# Patient Record
Sex: MALE | Race: WHITE | Employment: OTHER | URBAN - NONMETROPOLITAN AREA
[De-identification: names, ages, dates, MRNs, and addresses within clinical notes are randomized per-mention and may not be internally consistent; named-entity substitution may affect disease eponyms.]

---

## 2017-01-20 RX ORDER — IPRATROPIUM BROMIDE AND ALBUTEROL SULFATE 2.5; .5 MG/3ML; MG/3ML
1 SOLUTION RESPIRATORY (INHALATION) EVERY 6 HOURS PRN
Qty: 360 VIAL | Refills: 11 | Status: SHIPPED | OUTPATIENT
Start: 2017-01-20 | End: 2021-04-20 | Stop reason: SDUPTHER

## 2017-08-30 ENCOUNTER — OFFICE VISIT (OUTPATIENT)
Dept: PULMONOLOGY | Age: 77
End: 2017-08-30
Payer: MEDICARE

## 2017-08-30 VITALS
HEIGHT: 68 IN | SYSTOLIC BLOOD PRESSURE: 102 MMHG | WEIGHT: 199.4 LBS | HEART RATE: 68 BPM | OXYGEN SATURATION: 92 % | BODY MASS INDEX: 30.22 KG/M2 | TEMPERATURE: 98.9 F | DIASTOLIC BLOOD PRESSURE: 62 MMHG

## 2017-08-30 DIAGNOSIS — J43.1 PANLOBULAR EMPHYSEMA (HCC): Primary | ICD-10-CM

## 2017-08-30 PROCEDURE — 99213 OFFICE O/P EST LOW 20 MIN: CPT | Performed by: INTERNAL MEDICINE

## 2017-08-30 ASSESSMENT — ENCOUNTER SYMPTOMS
STRIDOR: 0
WHEEZING: 0
COUGH: 0
VOICE CHANGE: 0
SHORTNESS OF BREATH: 1

## 2018-08-30 ENCOUNTER — OFFICE VISIT (OUTPATIENT)
Dept: PULMONOLOGY | Age: 78
End: 2018-08-30
Payer: MEDICARE

## 2018-08-30 VITALS
HEART RATE: 73 BPM | BODY MASS INDEX: 29.25 KG/M2 | TEMPERATURE: 98.5 F | HEIGHT: 68 IN | SYSTOLIC BLOOD PRESSURE: 116 MMHG | DIASTOLIC BLOOD PRESSURE: 68 MMHG | WEIGHT: 193 LBS | OXYGEN SATURATION: 96 %

## 2018-08-30 DIAGNOSIS — Z87.891 PERSONAL HISTORY OF TOBACCO USE: ICD-10-CM

## 2018-08-30 DIAGNOSIS — Z99.81 CHRONIC RESPIRATORY FAILURE WITH HYPOXIA, ON HOME O2 THERAPY (HCC): ICD-10-CM

## 2018-08-30 DIAGNOSIS — J43.1 PANLOBULAR EMPHYSEMA (HCC): Primary | ICD-10-CM

## 2018-08-30 DIAGNOSIS — J96.11 CHRONIC RESPIRATORY FAILURE WITH HYPOXIA, ON HOME O2 THERAPY (HCC): ICD-10-CM

## 2018-08-30 PROCEDURE — 99214 OFFICE O/P EST MOD 30 MIN: CPT | Performed by: NURSE PRACTITIONER

## 2018-08-30 PROCEDURE — 1123F ACP DISCUSS/DSCN MKR DOCD: CPT | Performed by: NURSE PRACTITIONER

## 2018-08-30 PROCEDURE — 4040F PNEUMOC VAC/ADMIN/RCVD: CPT | Performed by: NURSE PRACTITIONER

## 2018-08-30 PROCEDURE — G8427 DOCREV CUR MEDS BY ELIG CLIN: HCPCS | Performed by: NURSE PRACTITIONER

## 2018-08-30 PROCEDURE — G0296 VISIT TO DETERM LDCT ELIG: HCPCS | Performed by: NURSE PRACTITIONER

## 2018-08-30 PROCEDURE — 3023F SPIROM DOC REV: CPT | Performed by: NURSE PRACTITIONER

## 2018-08-30 PROCEDURE — 1101F PT FALLS ASSESS-DOCD LE1/YR: CPT | Performed by: NURSE PRACTITIONER

## 2018-08-30 PROCEDURE — 1036F TOBACCO NON-USER: CPT | Performed by: NURSE PRACTITIONER

## 2018-08-30 PROCEDURE — G8417 CALC BMI ABV UP PARAM F/U: HCPCS | Performed by: NURSE PRACTITIONER

## 2018-08-30 PROCEDURE — G8926 SPIRO NO PERF OR DOC: HCPCS | Performed by: NURSE PRACTITIONER

## 2018-08-30 RX ORDER — TAMSULOSIN HYDROCHLORIDE 0.4 MG/1
0.4 CAPSULE ORAL DAILY
COMMUNITY

## 2018-08-30 ASSESSMENT — ENCOUNTER SYMPTOMS
DIARRHEA: 0
EYES NEGATIVE: 1
HEMOPTYSIS: 0
SPUTUM PRODUCTION: 1
VOMITING: 0
SHORTNESS OF BREATH: 1
ABDOMINAL PAIN: 0
WHEEZING: 0
COUGH: 1
NAUSEA: 0

## 2018-08-30 NOTE — PROGRESS NOTES
budesonide-formoterol (SYMBICORT) 160-4.5 MCG/ACT AERO Inhale 2 puffs into the lungs 2 times daily.  albuterol (PROVENTIL HFA) 108 (90 BASE) MCG/ACT inhaler Inhale 2 puffs into the lungs every 4 hours as needed.  pravastatin (PRAVACHOL) 80 MG tablet Take 80 mg by mouth daily.  metoprolol (LOPRESSOR) 50 MG tablet Take 50 mg by mouth daily. 1/2 tablet bid      ipratropium (ATROVENT HFA) 17 MCG/ACT inhaler Inhale 2 puffs into the lungs daily.  aspirin 81 MG EC tablet Take 81 mg by mouth daily. No current facility-administered medications for this visit. Lion RIVERA   Review of Systems   Constitutional: Negative for chills, fever, malaise/fatigue and weight loss. HENT: Positive for congestion (in mornings). Eyes: Negative. Respiratory: Positive for cough, sputum production (white) and shortness of breath. Negative for hemoptysis and wheezing. Cardiovascular: Negative for chest pain, palpitations and leg swelling. Gastrointestinal: Negative for abdominal pain, diarrhea, nausea and vomiting. Genitourinary: Negative. Musculoskeletal: Negative. Skin: Negative. Neurological: Negative. Endo/Heme/Allergies: Does not bruise/bleed easily. Psychiatric/Behavioral: Negative for depression and suicidal ideas. Physical exam   /68 (Site: Left Arm, Position: Sitting)   Pulse 73   Temp 98.5 °F (36.9 °C) (Tympanic)   Ht 5' 8\" (1.727 m)   Wt 193 lb (87.5 kg)   SpO2 96%   BMI 29.35 kg/m²        Physical Exam   Constitutional: He is oriented to person, place, and time and well-developed, well-nourished, and in no distress. Moderately nourished, moderately built, chronically ill appearing, in no distress on portable O2    HENT:   Head: Normocephalic and atraumatic. Mouth/Throat: Oropharynx is clear and moist.   Eyes: Pupils are equal, round, and reactive to light. Neck: Neck supple. No JVD present. No tracheal deviation present.    Cardiovascular: lead to a diagnosis of cancer. Usually further imaging can resolve most false-positive results; however, some patients may require invasive procedures. Over diagnosis risk - 10% to 12% of screen-detected lung cancer cases are over diagnosed--that is, the cancer would not have been detected in the patient's lifetime without the screening. Need for follow up screens annually to continue lung cancer screening effectiveness     Risks associated with radiation from annual LDCT- Radiation exposure is about the same as for a mammogram, which is about 1/3 of the annual background radiation exposure from everyday life. Starting screening at age 54 is not likely to increase cancer risk from radiation exposure. Patients with comorbidities resulting in life expectancy of < 10 years, or that would preclude treatment of an abnormality identified on CT, should not be screened due to lack of benefit.     To obtain maximal benefit from this screening, smoking cessation and long-term abstinence from smoking is critical

## 2018-10-24 ENCOUNTER — HOSPITAL ENCOUNTER (OUTPATIENT)
Dept: CT IMAGING | Age: 78
Discharge: HOME | End: 2018-10-24
Payer: MEDICARE

## 2018-10-24 ENCOUNTER — HOSPITAL ENCOUNTER (OUTPATIENT)
Dept: PULMONOLOGY | Age: 78
Discharge: HOME | End: 2018-10-24
Payer: MEDICARE

## 2018-10-24 DIAGNOSIS — Z87.891 PERSONAL HISTORY OF TOBACCO USE: ICD-10-CM

## 2018-10-24 DIAGNOSIS — Z99.81 CHRONIC RESPIRATORY FAILURE WITH HYPOXIA, ON HOME O2 THERAPY (HCC): ICD-10-CM

## 2018-10-24 DIAGNOSIS — J96.11 CHRONIC RESPIRATORY FAILURE WITH HYPOXIA, ON HOME O2 THERAPY (HCC): ICD-10-CM

## 2018-10-24 DIAGNOSIS — J43.1 PANLOBULAR EMPHYSEMA (HCC): ICD-10-CM

## 2018-10-24 PROCEDURE — 94060 EVALUATION OF WHEEZING: CPT

## 2018-10-24 PROCEDURE — G0297 LDCT FOR LUNG CA SCREEN: HCPCS

## 2018-10-24 PROCEDURE — 2709999900 HC NON-CHARGEABLE SUPPLY

## 2018-10-30 ENCOUNTER — OFFICE VISIT (OUTPATIENT)
Dept: PULMONOLOGY | Age: 78
End: 2018-10-30
Payer: MEDICARE

## 2018-10-30 VITALS
WEIGHT: 195 LBS | HEART RATE: 70 BPM | OXYGEN SATURATION: 90 % | SYSTOLIC BLOOD PRESSURE: 130 MMHG | DIASTOLIC BLOOD PRESSURE: 70 MMHG | RESPIRATION RATE: 20 BRPM | TEMPERATURE: 97.7 F | HEIGHT: 68 IN | BODY MASS INDEX: 29.55 KG/M2

## 2018-10-30 DIAGNOSIS — J43.1 PANLOBULAR EMPHYSEMA (HCC): Primary | ICD-10-CM

## 2018-10-30 DIAGNOSIS — J96.11 CHRONIC RESPIRATORY FAILURE WITH HYPOXIA, ON HOME O2 THERAPY (HCC): ICD-10-CM

## 2018-10-30 DIAGNOSIS — Z99.81 CHRONIC RESPIRATORY FAILURE WITH HYPOXIA, ON HOME O2 THERAPY (HCC): ICD-10-CM

## 2018-10-30 DIAGNOSIS — Z87.891 PERSONAL HISTORY OF TOBACCO USE: ICD-10-CM

## 2018-10-30 PROCEDURE — 1036F TOBACCO NON-USER: CPT | Performed by: NURSE PRACTITIONER

## 2018-10-30 PROCEDURE — 99214 OFFICE O/P EST MOD 30 MIN: CPT | Performed by: NURSE PRACTITIONER

## 2018-10-30 PROCEDURE — 3023F SPIROM DOC REV: CPT | Performed by: NURSE PRACTITIONER

## 2018-10-30 PROCEDURE — G8926 SPIRO NO PERF OR DOC: HCPCS | Performed by: NURSE PRACTITIONER

## 2018-10-30 PROCEDURE — G8484 FLU IMMUNIZE NO ADMIN: HCPCS | Performed by: NURSE PRACTITIONER

## 2018-10-30 PROCEDURE — 1101F PT FALLS ASSESS-DOCD LE1/YR: CPT | Performed by: NURSE PRACTITIONER

## 2018-10-30 PROCEDURE — G8417 CALC BMI ABV UP PARAM F/U: HCPCS | Performed by: NURSE PRACTITIONER

## 2018-10-30 PROCEDURE — G8427 DOCREV CUR MEDS BY ELIG CLIN: HCPCS | Performed by: NURSE PRACTITIONER

## 2018-10-30 PROCEDURE — 4040F PNEUMOC VAC/ADMIN/RCVD: CPT | Performed by: NURSE PRACTITIONER

## 2018-10-30 PROCEDURE — 1123F ACP DISCUSS/DSCN MKR DOCD: CPT | Performed by: NURSE PRACTITIONER

## 2018-10-30 ASSESSMENT — ENCOUNTER SYMPTOMS
EYES NEGATIVE: 1
NAUSEA: 0
ALLERGIC/IMMUNOLOGIC NEGATIVE: 1
CHEST TIGHTNESS: 0
COUGH: 1
DIARRHEA: 0
STRIDOR: 0
SHORTNESS OF BREATH: 0
BACK PAIN: 0
WHEEZING: 1

## 2018-10-30 NOTE — PROGRESS NOTES
inherent in voice recognition technology. **       Final report electronically signed by Dr. Kavitha Hinkle on 10/24/2018 10:47 AM       Assessment      Diagnosis Orders   1. Panlobular emphysema ([address]     2. Personal history of tobacco use     3. Chronic respiratory failure with hypoxia, on home O2 therapy [facility])           Plan   Test results discussed with patient  Feeling good, symptoms well controlled  Continue current therapies  Annual CT lung screening (order at next appt)    I have seen and evaluated this patient on 10/30/18. This patient has been compliant and has benefited from using this oxygen as ordered.     Will see Ayde Melgar back in: 6 months    Electronically signed by CASEY Funez CNP on 10/30/2018 at 9:26 AM  10/30/2018

## 2019-05-06 ENCOUNTER — OFFICE VISIT (OUTPATIENT)
Dept: FAMILY MEDICINE CLINIC | Age: 79
End: 2019-05-06

## 2019-05-06 VITALS
BODY MASS INDEX: 29.1 KG/M2 | HEART RATE: 72 BPM | DIASTOLIC BLOOD PRESSURE: 74 MMHG | RESPIRATION RATE: 14 BRPM | SYSTOLIC BLOOD PRESSURE: 132 MMHG | WEIGHT: 192 LBS | HEIGHT: 68 IN

## 2019-05-06 DIAGNOSIS — J20.9 ACUTE BRONCHITIS, UNSPECIFIED ORGANISM: Primary | ICD-10-CM

## 2019-05-06 PROCEDURE — G8427 DOCREV CUR MEDS BY ELIG CLIN: HCPCS | Performed by: FAMILY MEDICINE

## 2019-05-06 PROCEDURE — 1123F ACP DISCUSS/DSCN MKR DOCD: CPT | Performed by: FAMILY MEDICINE

## 2019-05-06 PROCEDURE — 4040F PNEUMOC VAC/ADMIN/RCVD: CPT | Performed by: FAMILY MEDICINE

## 2019-05-06 PROCEDURE — 99213 OFFICE O/P EST LOW 20 MIN: CPT | Performed by: FAMILY MEDICINE

## 2019-05-06 PROCEDURE — G8417 CALC BMI ABV UP PARAM F/U: HCPCS | Performed by: FAMILY MEDICINE

## 2019-05-06 PROCEDURE — 1036F TOBACCO NON-USER: CPT | Performed by: FAMILY MEDICINE

## 2019-05-06 RX ORDER — GUAIFENESIN AND CODEINE PHOSPHATE 100; 10 MG/5ML; MG/5ML
10 SOLUTION ORAL 4 TIMES DAILY PRN
Qty: 150 ML | Refills: 0 | Status: SHIPPED | OUTPATIENT
Start: 2019-05-06 | End: 2019-05-11

## 2019-05-06 RX ORDER — AZITHROMYCIN 250 MG/1
TABLET, FILM COATED ORAL
Qty: 1 PACKET | Refills: 0 | Status: SHIPPED | OUTPATIENT
Start: 2019-05-06 | End: 2020-02-12 | Stop reason: SDUPTHER

## 2019-05-06 RX ORDER — PREDNISONE 20 MG/1
20 TABLET ORAL DAILY
Qty: 10 TABLET | Refills: 0 | Status: SHIPPED | OUTPATIENT
Start: 2019-05-06 | End: 2020-02-12 | Stop reason: SDUPTHER

## 2019-05-06 ASSESSMENT — ENCOUNTER SYMPTOMS
COUGH: 1
CHEST TIGHTNESS: 1
NAUSEA: 1
SHORTNESS OF BREATH: 1
RHINORRHEA: 1
SINUS PRESSURE: 0
CONSTIPATION: 0
DIARRHEA: 0
WHEEZING: 1
SORE THROAT: 0

## 2019-05-06 NOTE — PROGRESS NOTES
Subjective:      Patient ID: Shanta Peterson is a 70 y.o. male. HPI  1. He has had a cold the past week  2. He used some mucinex without help   Review of Systems   Constitutional: Positive for fatigue. Negative for chills and fever. HENT: Positive for congestion, postnasal drip and rhinorrhea. Negative for ear pain, sinus pressure and sore throat. Eyes: Negative for visual disturbance. Respiratory: Positive for cough, chest tightness, shortness of breath and wheezing. Cardiovascular: Negative for chest pain. Gastrointestinal: Positive for nausea. Negative for constipation and diarrhea. Genitourinary: Negative. Musculoskeletal: Positive for arthralgias and myalgias. Skin: Negative for rash. Neurological: Negative for headaches. The patient's medications, allergies, past medical problems, surgical, social, and family histories were reviewed and updated as needed. Objective:   Physical Exam   Constitutional: He is oriented to person, place, and time. He appears well-developed and well-nourished. No distress. HENT:   Head: Normocephalic and atraumatic. Right Ear: External ear normal.   Left Ear: External ear normal.   Nose: Nose normal.   Mouth/Throat: Oropharynx is clear and moist. No oropharyngeal exudate. Eyes: Conjunctivae are normal. No scleral icterus. Neck: No tracheal deviation present. No thyromegaly present. Cardiovascular: Normal rate, regular rhythm and normal heart sounds. Pulmonary/Chest: Effort normal. No respiratory distress. He has wheezes. He has no rales. Musculoskeletal: He exhibits no edema. Lymphadenopathy:     He has no cervical adenopathy. Neurological: He is alert and oriented to person, place, and time. Skin: Skin is warm and dry. Psychiatric: He has a normal mood and affect. His behavior is normal.   Blood pressure 132/74, pulse 72, resp. rate 14, height 5' 8\" (1.727 m), weight 192 lb (87.1 kg). Assessment:       Diagnosis Orders   1. Acute bronchitis, unspecified organism  guaiFENesin-codeine (CHERATUSSIN AC) 100-10 MG/5ML syrup    predniSONE (DELTASONE) 20 MG tablet    azithromycin (ZITHROMAX) 250 MG tablet           Plan:      See me later this week and call sooner if needed        Arabella Ladd MD

## 2019-05-09 ENCOUNTER — OFFICE VISIT (OUTPATIENT)
Dept: FAMILY MEDICINE CLINIC | Age: 79
End: 2019-05-09

## 2019-05-09 VITALS
BODY MASS INDEX: 28.83 KG/M2 | HEIGHT: 68 IN | DIASTOLIC BLOOD PRESSURE: 68 MMHG | HEART RATE: 74 BPM | SYSTOLIC BLOOD PRESSURE: 130 MMHG | RESPIRATION RATE: 14 BRPM | WEIGHT: 190.25 LBS

## 2019-05-09 DIAGNOSIS — J20.9 ACUTE BRONCHITIS, UNSPECIFIED ORGANISM: Primary | ICD-10-CM

## 2019-05-09 PROCEDURE — G8427 DOCREV CUR MEDS BY ELIG CLIN: HCPCS | Performed by: FAMILY MEDICINE

## 2019-05-09 PROCEDURE — 1036F TOBACCO NON-USER: CPT | Performed by: FAMILY MEDICINE

## 2019-05-09 PROCEDURE — 99213 OFFICE O/P EST LOW 20 MIN: CPT | Performed by: FAMILY MEDICINE

## 2019-05-09 PROCEDURE — 1123F ACP DISCUSS/DSCN MKR DOCD: CPT | Performed by: FAMILY MEDICINE

## 2019-05-09 PROCEDURE — G8417 CALC BMI ABV UP PARAM F/U: HCPCS | Performed by: FAMILY MEDICINE

## 2019-05-09 PROCEDURE — 4040F PNEUMOC VAC/ADMIN/RCVD: CPT | Performed by: FAMILY MEDICINE

## 2019-05-09 ASSESSMENT — ENCOUNTER SYMPTOMS
SHORTNESS OF BREATH: 1
WHEEZING: 1
COUGH: 1
SINUS PRESSURE: 0
CONSTIPATION: 0

## 2019-05-09 NOTE — PROGRESS NOTES
Subjective:      Patient ID: Carly Clarke is a 70 y.o. male. HPI   1. He states feeling better but not to baseline  2. No side effects and uses the albuteral qid  Review of Systems   Constitutional: Negative for chills and fever. HENT: Negative for sinus pressure. Eyes: Negative for visual disturbance. Respiratory: Positive for cough, shortness of breath and wheezing. Cardiovascular: Negative for chest pain. Gastrointestinal: Negative for constipation. Genitourinary: Negative. Musculoskeletal: Negative for arthralgias. Skin: Negative for rash. Neurological: Negative for headaches. The patient's medications, allergies, past medical problems, surgical, social, and family histories were reviewed and updated as needed. Objective:   Physical Exam   Constitutional: He is oriented to person, place, and time. He appears well-developed and well-nourished. No distress. HENT:   Head: Normocephalic and atraumatic. Eyes: Conjunctivae are normal. No scleral icterus. Neck: No tracheal deviation present. Cardiovascular: Normal rate, regular rhythm and normal heart sounds. Pulmonary/Chest: Effort normal. He has no decreased breath sounds. He has no wheezes. He has rhonchi. He has no rales. Musculoskeletal: He exhibits no edema. Neurological: He is alert and oriented to person, place, and time. Skin: Skin is warm and dry. Psychiatric: He has a normal mood and affect. His behavior is normal.   Blood pressure 130/68, pulse 74, resp. rate 14, height 5' 8\" (1.727 m), weight 190 lb 4 oz (86.3 kg). Assessment:       Diagnosis Orders   1.  Acute bronchitis, unspecified organism             Plan:      See me in 6 months        Jailene Manriquez MD

## 2019-05-20 ENCOUNTER — OFFICE VISIT (OUTPATIENT)
Dept: PULMONOLOGY | Age: 79
End: 2019-05-20
Payer: MEDICARE

## 2019-05-20 VITALS
DIASTOLIC BLOOD PRESSURE: 66 MMHG | TEMPERATURE: 98.7 F | SYSTOLIC BLOOD PRESSURE: 120 MMHG | WEIGHT: 191.8 LBS | HEIGHT: 68 IN | BODY MASS INDEX: 29.07 KG/M2 | HEART RATE: 66 BPM | OXYGEN SATURATION: 92 %

## 2019-05-20 DIAGNOSIS — J96.11 CHRONIC RESPIRATORY FAILURE WITH HYPOXIA, ON HOME O2 THERAPY (HCC): ICD-10-CM

## 2019-05-20 DIAGNOSIS — Z99.81 CHRONIC RESPIRATORY FAILURE WITH HYPOXIA, ON HOME O2 THERAPY (HCC): ICD-10-CM

## 2019-05-20 DIAGNOSIS — Z87.891 PERSONAL HISTORY OF TOBACCO USE: ICD-10-CM

## 2019-05-20 DIAGNOSIS — J43.1 PANLOBULAR EMPHYSEMA (HCC): Primary | ICD-10-CM

## 2019-05-20 DIAGNOSIS — J44.9 STAGE 3 SEVERE COPD BY GOLD CLASSIFICATION (HCC): ICD-10-CM

## 2019-05-20 PROCEDURE — 99214 OFFICE O/P EST MOD 30 MIN: CPT | Performed by: NURSE PRACTITIONER

## 2019-05-20 PROCEDURE — G8417 CALC BMI ABV UP PARAM F/U: HCPCS | Performed by: NURSE PRACTITIONER

## 2019-05-20 PROCEDURE — G0296 VISIT TO DETERM LDCT ELIG: HCPCS | Performed by: NURSE PRACTITIONER

## 2019-05-20 PROCEDURE — 1123F ACP DISCUSS/DSCN MKR DOCD: CPT | Performed by: NURSE PRACTITIONER

## 2019-05-20 PROCEDURE — G8427 DOCREV CUR MEDS BY ELIG CLIN: HCPCS | Performed by: NURSE PRACTITIONER

## 2019-05-20 PROCEDURE — 1036F TOBACCO NON-USER: CPT | Performed by: NURSE PRACTITIONER

## 2019-05-20 PROCEDURE — G8926 SPIRO NO PERF OR DOC: HCPCS | Performed by: NURSE PRACTITIONER

## 2019-05-20 PROCEDURE — 3023F SPIROM DOC REV: CPT | Performed by: NURSE PRACTITIONER

## 2019-05-20 PROCEDURE — 4040F PNEUMOC VAC/ADMIN/RCVD: CPT | Performed by: NURSE PRACTITIONER

## 2019-05-20 ASSESSMENT — ENCOUNTER SYMPTOMS
WHEEZING: 0
EYES NEGATIVE: 1
APNEA: 0
COUGH: 1
NAUSEA: 0
VOMITING: 0
DIARRHEA: 0
ABDOMINAL PAIN: 0
CHEST TIGHTNESS: 0
SHORTNESS OF BREATH: 1

## 2019-05-20 NOTE — PROGRESS NOTES
[facility]     Patient: Enola Opitz, 70 y.o.   : 1940    Pt of Dr. Guy Rodrigez   Patient presents with    Follow-up     Emphysema 6 mo f/u No testing        HPI  Ashely Metcalf is here for 6 month follow up for emphysema   On 3LPM continuous O2, presents today on  POC   LDCT completed 10/2018- lung RADS 2,   Known exposures to absestos in [employer] and  while working at [employer] here in [place], [place]  Currently on [facility] [employer] [address] 4 times daily, Symbicort 160 BID- no issues with his meds  Has Atrovent for PRN use- using rarely   Sleeping good. UTD on vaccinations  Quit smoking in   Last spirometry completed 10/2018: FEV1 34%  , no response to BD - severe COPD    Recent bronchitis, last week placed on steroid and ATBx per PCP- states doing much better, still coughing but now clear. SOB back to baseline. Never had to go up on oxygen. Has not been hospitalized for about 3 years. He inquires today about options outside of his meds for COPD, has questions about stem cell transplant.    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 HENT:   Mouth/Throat: No oropharyngeal exudate. Eyes: Conjunctivae are normal. Right eye exhibits no discharge. No scleral icterus. Neck: Neck supple. No JVD present. Cardiovascular: Normal rate and regular rhythm. Exam reveals no friction rub. No murmur heard. Pulmonary/Chest: Effort normal and breath sounds normal. No respiratory distress. He has no wheezes. He has no rales. He exhibits no tenderness. Abdominal: Soft. Musculoskeletal: He exhibits no edema or tenderness. Lymphadenopathy:     He has no cervical adenopathy. Neurological: He is alert and oriented to person, place, and time. Skin: Skin is warm and dry. Capillary refill takes less than 2 seconds. Psychiatric: He has a normal mood and affect. His behavior is normal. Judgment and thought content normal.   Nursing note and vitals reviewed. Test results   Lung Nodule Screening     [x] Qualifies    []Does not qualify   [] Declined    [x] Completed- 10/2018     Ref. Range 10/9/2015 14:20   Ph Latest Ref Range: 7.35 - 7.45  7.35   PCO2 Latest Ref Range: 35 - 45 mmHg 70 (HH)   pO2 Latest Ref Range: 80 - 105 mmHg 56 (L)   HCO3 Latest Ref Range: 22 - 26 mmol/L 38.6 (H)   Arnav Test Unknown [employer] Excess, Arterial Latest Ref Range: -2 - 3 mmol/L 10 (H)   O2 Sat, Arterial Latest Ref Range: 95 - 98 % 87 (L)   Drawn By Unknown ldavis   DEVICE Unknown Nasal Cannula   Site Unknown L Radial   LITERS PER MINUTE Unknown 4.00     Assessment      Diagnosis Orders   1. Panlobular emphysema ([address]  Blood Gas, Arterial   2. Personal history of tobacco use  Blood Gas, Arterial    LA VISIT TO DISCUSS LUNG CA SCREEN W LDCT    CT Lung Screening (Annual)    CT Lung Screening (Annual)   3. Chronic respiratory failure with hypoxia, on home O2 therapy ([facility])  Blood Gas, Arterial   4.  Stage 3 severe COPD by GOLD classification ([address]  Blood Gas, Arterial         Compensated hypercapnia on ABG in 2015  Plan   -Discussed stem cell transplant, not enough data for this to be a recommended treatment, not FDA approved, and costly but is his choice if he wishes to pursue this.   -continue Symbicort and Duonebs  -ABG to assess for hypercapnia- ? Candidate for home BiPAP due to severity of his COPD- will call with results   -offered referral to tertiary center for LVRDS evaluation- declines at this time  -encouraged to stay active.   -benefiting from use of his oxygen as prescribed at 3LPM continuous   -annual LDCT screening (due Oct 2019, order placed)     Will see Monika Dan in: 6 months    Electronically signed by CASEY Bolanos CNP on 5/20/2019 at 12:58 PM   Low Dose CT (LDCT) Lung Screening criteria met   Age 55-77   Pack year smoking >30   Still smoking or less than 15 year since quit   No sign or symptoms of lung cancer   > 11 months since last LDCT     Risks and benefits of lung cancer screening with LDCT scans discussed:    Significance of positive screen - False-positive LDCT results often occur. 95% of all positive results do not lead to a diagnosis of cancer. Usually further imaging can resolve most false-positive results; however, some patients may require invasive procedures. Over diagnosis risk - 10% to 12% of screen-detected lung cancer cases are over diagnosed--that is, the cancer would not have been detected in the patient's lifetime without the screening. Need for follow up screens annually to continue lung cancer screening effectiveness     Risks associated with radiation from annual LDCT- Radiation exposure is about the same as for a mammogram, which is about 1/3 of the annual background radiation exposure from everyday life. Starting screening at age 54 is not likely to increase cancer risk from radiation exposure. Patients with comorbidities resulting in life expectancy of < 10 years, or that would preclude treatment of an abnormality identified on CT, should not be screened due to lack of benefit.     To obtain maximal benefit from this screening, smoking cessation and long-term abstinence from smoking is critical

## 2019-05-21 ENCOUNTER — HOSPITAL ENCOUNTER (OUTPATIENT)
Age: 79
Discharge: HOME | End: 2019-05-21
Payer: MEDICARE

## 2019-05-21 LAB
BASE EXCESS (CALCULATED): 6.2 MMOL/L (ref -2.5–2.5)
COLLECTED BY:: ABNORMAL
DEVICE: ABNORMAL
HCO3: 33 MMOL/L (ref 23–28)
IFIO2: 3
O2 SATURATION: 90 %
PCO2: 57 MMHG (ref 35–45)
PH BLOOD GAS: 7.38 (ref 7.35–7.45)
PO2: 61 MMHG (ref 71–104)
SOURCE, BLOOD GAS: ABNORMAL

## 2019-05-21 PROCEDURE — 82803 BLOOD GASES ANY COMBINATION: CPT

## 2019-05-21 PROCEDURE — 36600 WITHDRAWAL OF ARTERIAL BLOOD: CPT

## 2019-06-03 ENCOUNTER — TELEPHONE (OUTPATIENT)
Dept: PULMONOLOGY | Age: 79
End: 2019-06-03

## 2019-06-03 NOTE — TELEPHONE ENCOUNTER
Left VM to give results of ABG. He does show elevated CO2 (hypercapnia), want to offer him home Non Invasive Ventilator with face mask due to his severe COPD and chronic respiratory failure with multiple exacerbations. Addendum:\  Patient called back, he is not interested in a face mask. States he has tried one of these in past and could not tolerate. Discussed Life 2000, he is interested in seeing this product and trying it out in the office. Can you please contact Ilya Landa, RRT Life 2000 rep to get some dates/times she is available and then schedule Mr Jorge Haley to come In for evaluation.     Electronically signed by CASEY Pearl CNP on 6/3/2019 at 1:31 PM

## 2019-06-20 ENCOUNTER — OFFICE VISIT (OUTPATIENT)
Dept: PULMONOLOGY | Age: 79
End: 2019-06-20
Payer: MEDICARE

## 2019-06-20 VITALS
HEIGHT: 68 IN | WEIGHT: 187.8 LBS | HEART RATE: 59 BPM | BODY MASS INDEX: 28.46 KG/M2 | OXYGEN SATURATION: 90 % | SYSTOLIC BLOOD PRESSURE: 128 MMHG | TEMPERATURE: 98.4 F | DIASTOLIC BLOOD PRESSURE: 68 MMHG

## 2019-06-20 DIAGNOSIS — J44.9 STAGE 3 SEVERE COPD BY GOLD CLASSIFICATION (HCC): ICD-10-CM

## 2019-06-20 DIAGNOSIS — J43.1 PANLOBULAR EMPHYSEMA (HCC): Primary | ICD-10-CM

## 2019-06-20 DIAGNOSIS — Z99.81 CHRONIC RESPIRATORY FAILURE WITH HYPOXIA, ON HOME O2 THERAPY (HCC): ICD-10-CM

## 2019-06-20 DIAGNOSIS — J96.11 CHRONIC RESPIRATORY FAILURE WITH HYPOXIA, ON HOME O2 THERAPY (HCC): ICD-10-CM

## 2019-06-20 PROCEDURE — G8417 CALC BMI ABV UP PARAM F/U: HCPCS | Performed by: NURSE PRACTITIONER

## 2019-06-20 PROCEDURE — G8926 SPIRO NO PERF OR DOC: HCPCS | Performed by: NURSE PRACTITIONER

## 2019-06-20 PROCEDURE — 94618 PULMONARY STRESS TESTING: CPT | Performed by: NURSE PRACTITIONER

## 2019-06-20 PROCEDURE — G8427 DOCREV CUR MEDS BY ELIG CLIN: HCPCS | Performed by: NURSE PRACTITIONER

## 2019-06-20 PROCEDURE — 1036F TOBACCO NON-USER: CPT | Performed by: NURSE PRACTITIONER

## 2019-06-20 PROCEDURE — 99213 OFFICE O/P EST LOW 20 MIN: CPT | Performed by: NURSE PRACTITIONER

## 2019-06-20 PROCEDURE — 1123F ACP DISCUSS/DSCN MKR DOCD: CPT | Performed by: NURSE PRACTITIONER

## 2019-06-20 PROCEDURE — 3023F SPIROM DOC REV: CPT | Performed by: NURSE PRACTITIONER

## 2019-06-20 PROCEDURE — 4040F PNEUMOC VAC/ADMIN/RCVD: CPT | Performed by: NURSE PRACTITIONER

## 2019-06-20 ASSESSMENT — ENCOUNTER SYMPTOMS
NAUSEA: 0
RHINORRHEA: 1
VOMITING: 0
SHORTNESS OF BREATH: 1
WHEEZING: 1
EYES NEGATIVE: 1
COUGH: 0
APNEA: 0
DIARRHEA: 0
ABDOMINAL PAIN: 0

## 2019-06-20 NOTE — PROGRESS NOTES
[facility]     Patient: Marene Spatz, 70 y.o.   : 1940    Previous Pt of Dr. Damon Ashley   Patient presents with    Follow-up     Hypercapnia follow up with Test on Life 2000 with Terra STANTON  Ludwin Plummer is here for follow up for evaluation for NIV. Spoke with him over phone and explained Trilogy or Astral device and he declined to have any mask over his face, was willing to come in today for a Life 2000 evaluation   Has been using 3LPM continuous flow O2 ATC- his last 6 min walk in office was in   Gets his Oxygen from [address] smoking in   Last spirometry completed 10/2018: FEV1 34%  , no response to BD - severe COPD  ABG completed 19:  PH 7.38, PCO2 57, PO2 61 on 3LPM NC with POC device   Currently using Symbicort 160/4.5 mcg BID  Duoneb PRN   Atrovent HFA 4 times daily   Very active 70year old, was out all day yesterday fishing. Chronic SOB with activity and daily cough- unchanged. C/o recent sinus drainage and congestion   Has not completed Pulm rehab- declined referral     No recent ER visits, was treated by PCP with acute bronchitis with guifenesin-codeine, prednisone, and zithromax 2019.   Past Medical hx   PMH:  Past Medical History:   Diagnosis Date    Adenomatous polyp 2015    Asthma     CAD (coronary artery disease)     COPD (chronic obstructive pulmonary disease) (HCC)     Hyperlipidemia     Hypertension     Transitional cell carcinoma of bladder [address] [address] 10/2013     SURGICAL HISTORY:  Past Surgical History:   Procedure Laterality Date    CAROTID ENDARTERECTOMY Right     COLONOSCOPY      CORONARY ANGIOPLASTY WITH STENT PLACEMENT       SOCIAL HISTORY:  Social History     Tobacco Use    Smoking status: Former Smoker     Packs/day: 1.00     Years: 45.00     Pack years: 45.00     Types: Cigarettes     Last attempt to quit: 3/30/2007     Years since quittin.2    Smokeless tobacco: Never Used   Substance Use Topics    Alcohol use: Yes     Comment: couple times per week    Drug use: No     ALLERGIES:No Known Allergies  FAMILY HISTORY:No family history on file. CURRENT MEDICATIONS:  Current Outpatient Medications   Medication Sig Dispense Refill    tamsulosin (FLOMAX) 0.4 MG capsule Take 0.4 mg by mouth daily      ipratropium-albuterol (DUONEB) 0.5-2.5 (3) MG/3ML SOLN nebulizer solution Take 3 mLs by nebulization every 6 hours as needed for Shortness of Breath 360 vial 11    budesonide-formoterol (SYMBICORT) 160-4.5 MCG/ACT AERO Inhale 2 puffs into the lungs 2 times daily.  albuterol (PROVENTIL HFA) 108 (90 BASE) MCG/ACT inhaler Inhale 2 puffs into the lungs every 4 hours as needed.  pravastatin (PRAVACHOL) 80 MG tablet Take 80 mg by mouth daily.  metoprolol (LOPRESSOR) 50 MG tablet Take 50 mg by mouth daily. 1/2 tablet bid      ipratropium (ATROVENT HFA) 17 MCG/ACT inhaler Inhale 2 puffs into the lungs daily.  aspirin 81 MG EC tablet Take 81 mg by mouth daily. No current facility-administered medications for this visit. Sagar RIVERA   Review of Systems   Constitutional: Negative for chills, fever and unexpected weight change. HENT: Positive for congestion, postnasal drip and rhinorrhea. Eyes: Negative. Respiratory: Positive for shortness of breath and wheezing. Negative for apnea and cough. Cardiovascular: Negative for chest pain, palpitations and leg swelling. Gastrointestinal: Negative for abdominal pain, diarrhea, nausea and vomiting. Genitourinary: Negative. Musculoskeletal: Negative. Skin: Negative. Neurological: Negative. Hematological: Does not bruise/bleed easily. Psychiatric/Behavioral: Negative for suicidal ideas.         Physical exam   /68 (Site: Left Upper Arm, Position: Sitting, Cuff Size: Medium Adult)   Pulse 59   Temp 98.4 °F (36.9 °C) (Tympanic)   Ht 5' 8\" (1.727 m)   Wt 187 lb 12.8 oz (85.2 kg)   SpO2 90% Comment: on RA  BMI 28.55 kg/m²        Physical Exam   Constitutional: He is oriented to person, place, and time. He appears well-developed and well-nourished. No distress. Nasal cannula in place. HENT:   Mouth/Throat: No oropharyngeal exudate. Eyes: Conjunctivae are normal. Right eye exhibits no discharge. No scleral icterus. Neck: Neck supple. No JVD present. Cardiovascular: Normal rate and regular rhythm. Exam reveals no friction rub. No murmur heard. Pulmonary/Chest: Effort normal. No respiratory distress. He has decreased breath sounds. He has no wheezes. He has no rales. He exhibits no tenderness. Abdominal: Soft. Musculoskeletal: He exhibits no edema or tenderness. Lymphadenopathy:     He has no cervical adenopathy. Neurological: He is alert and oriented to person, place, and time. Skin: Skin is warm and dry. Capillary refill takes less than 2 seconds. Psychiatric: He has a normal mood and affect. His behavior is normal. Judgment and thought content normal.   Nursing note and vitals reviewed. Test results   Lung Nodule Screening     [x] Qualifies    []Does not qualify   [] Declined    [x] Completed                Results for Anup Shah (MRN [id number]) as of 6/20/2019 13:43   Ref. Range 5/21/2019 09:56   pH, Blood Gas Latest Ref Range: 7.35 - 7.45  7.38   PCO2 Latest Ref Range: 35 - 45 mmhg 57 (H)   pO2 Latest Ref Range: 71 - 104 mmhg 61 (L)   HCO3 Latest Ref Range: 23 - 28 mmol/l 33 (H)   Base Excess (Calculated) Latest Ref Range: -2.5 - 2.5 mmol/l 6.2 (H)   O2 Sat Latest Units: % 90   IFIO2 Unknown 3   DEVICE Unknown Cannula   COLLECTED BY: Unknown [id number]           A 6 min walk was done on the Life 2000 in the office today as above, he did not feel any difference on this device vs. His regular oxygen. Notably did have decreased HR with Life 200 than with reg oxygen.         Six Minute Walk Test  Paolo Jane 1940    Six minute walk test done in my office today by my medical assistant Rajesh Strong LPN. Gerard's oxygen saturation at rest on room air was fluctuating between 88-90%. The walk was started on room air  His oxygen saturation dropped in 1 min of walking  to 87% on room air with exertion. He walked a total of 216 feet without oxygen     The six minute walk test was repeated with oxygen supplementation. Oxygen supplimentation was started with 1LPM via nasal cannula and titrated to 3LPM via nasal cannula. At the end of the test Eliceo Jane's oxygen saturation remained at 91%. He stopped the walk with 1:30 sec left due to leg fatigue. He ambulated a total of 864 ft with oxygen  . He is mobile in the home and requires oxygen as outlined above. Assessment      Diagnosis Orders   1. Panlobular emphysema (HCC)  6 Minute Walk Test    6 Minute Walk Test   2. Chronic respiratory failure with hypoxia, on home O2 therapy (HCC)  6 Minute Walk Test    6 Minute Walk Test   3. Stage 3 severe COPD by GOLD classification ([facility])  6 Minute Walk Test    6 Minute Walk Test         Plan   Catia Espino tolerated the Life 2000 NIV well in the office with 6 min walk. His baseline O2 requirement is 3LPM continuous  He would like to look into the cost and think about it some more before purchasing.     He should continue his current nebulizer therapies and keep his scheduled appt with us in November   Can can call anytime if he wishes to proceed with ordering the Life 2000 or looking into other NIV options     Electronically signed by CASEY Smith CNP on 6/20/2019 at 3:47 PM

## 2019-11-15 ENCOUNTER — HOSPITAL ENCOUNTER (OUTPATIENT)
Dept: CT IMAGING | Age: 79
Discharge: HOME | End: 2019-11-15
Payer: MEDICARE

## 2019-11-15 DIAGNOSIS — Z87.891 PERSONAL HISTORY OF TOBACCO USE: ICD-10-CM

## 2019-11-15 PROCEDURE — G0297 LDCT FOR LUNG CA SCREEN: HCPCS

## 2019-11-20 ENCOUNTER — OFFICE VISIT (OUTPATIENT)
Dept: PULMONOLOGY | Age: 79
End: 2019-11-20
Payer: MEDICARE

## 2019-11-20 VITALS
HEIGHT: 68 IN | BODY MASS INDEX: 28.73 KG/M2 | DIASTOLIC BLOOD PRESSURE: 82 MMHG | TEMPERATURE: 99.5 F | SYSTOLIC BLOOD PRESSURE: 122 MMHG | OXYGEN SATURATION: 92 % | HEART RATE: 59 BPM | WEIGHT: 189.6 LBS

## 2019-11-20 DIAGNOSIS — Z77.090 HISTORY OF ASBESTOS EXPOSURE: ICD-10-CM

## 2019-11-20 DIAGNOSIS — R91.8 ABNORMAL CT LUNG SCREENING: ICD-10-CM

## 2019-11-20 DIAGNOSIS — J44.9 STAGE 3 SEVERE COPD BY GOLD CLASSIFICATION (HCC): ICD-10-CM

## 2019-11-20 DIAGNOSIS — J43.1 PANLOBULAR EMPHYSEMA (HCC): Primary | ICD-10-CM

## 2019-11-20 DIAGNOSIS — Z86.11 PERSONAL HISTORY OF TUBERCULOSIS: ICD-10-CM

## 2019-11-20 DIAGNOSIS — Z99.81 CHRONIC RESPIRATORY FAILURE WITH HYPOXIA, ON HOME O2 THERAPY (HCC): ICD-10-CM

## 2019-11-20 DIAGNOSIS — J96.11 CHRONIC RESPIRATORY FAILURE WITH HYPOXIA, ON HOME O2 THERAPY (HCC): ICD-10-CM

## 2019-11-20 DIAGNOSIS — Z87.891 PERSONAL HISTORY OF TOBACCO USE: ICD-10-CM

## 2019-11-20 PROCEDURE — G8484 FLU IMMUNIZE NO ADMIN: HCPCS | Performed by: NURSE PRACTITIONER

## 2019-11-20 PROCEDURE — 99214 OFFICE O/P EST MOD 30 MIN: CPT | Performed by: NURSE PRACTITIONER

## 2019-11-20 PROCEDURE — G8417 CALC BMI ABV UP PARAM F/U: HCPCS | Performed by: NURSE PRACTITIONER

## 2019-11-20 PROCEDURE — G8427 DOCREV CUR MEDS BY ELIG CLIN: HCPCS | Performed by: NURSE PRACTITIONER

## 2019-11-20 PROCEDURE — 1123F ACP DISCUSS/DSCN MKR DOCD: CPT | Performed by: NURSE PRACTITIONER

## 2019-11-20 PROCEDURE — 3023F SPIROM DOC REV: CPT | Performed by: NURSE PRACTITIONER

## 2019-11-20 PROCEDURE — 4040F PNEUMOC VAC/ADMIN/RCVD: CPT | Performed by: NURSE PRACTITIONER

## 2019-11-20 PROCEDURE — 1036F TOBACCO NON-USER: CPT | Performed by: NURSE PRACTITIONER

## 2019-11-20 PROCEDURE — G8926 SPIRO NO PERF OR DOC: HCPCS | Performed by: NURSE PRACTITIONER

## 2019-11-20 ASSESSMENT — ENCOUNTER SYMPTOMS
WHEEZING: 0
SHORTNESS OF BREATH: 1
EYES NEGATIVE: 1
NAUSEA: 0
COUGH: 0
DIARRHEA: 0
VOMITING: 0
TROUBLE SWALLOWING: 0
ABDOMINAL PAIN: 0

## 2019-11-25 ENCOUNTER — HOSPITAL ENCOUNTER (OUTPATIENT)
Dept: PET IMAGING | Age: 79
Discharge: HOME | End: 2019-11-25
Payer: MEDICARE

## 2019-11-25 DIAGNOSIS — Z87.891 PERSONAL HISTORY OF TOBACCO USE: ICD-10-CM

## 2019-11-25 DIAGNOSIS — R91.8 ABNORMAL CT LUNG SCREENING: ICD-10-CM

## 2019-11-25 DIAGNOSIS — J96.11 CHRONIC RESPIRATORY FAILURE WITH HYPOXIA, ON HOME O2 THERAPY (HCC): ICD-10-CM

## 2019-11-25 DIAGNOSIS — Z77.090 HISTORY OF ASBESTOS EXPOSURE: ICD-10-CM

## 2019-11-25 DIAGNOSIS — Z99.81 CHRONIC RESPIRATORY FAILURE WITH HYPOXIA, ON HOME O2 THERAPY (HCC): ICD-10-CM

## 2019-11-25 DIAGNOSIS — J44.9 STAGE 3 SEVERE COPD BY GOLD CLASSIFICATION (HCC): ICD-10-CM

## 2019-11-25 DIAGNOSIS — Z86.11 PERSONAL HISTORY OF TUBERCULOSIS: ICD-10-CM

## 2019-11-25 PROCEDURE — 3430000000 HC RX DIAGNOSTIC RADIOPHARMACEUTICAL: Performed by: NURSE PRACTITIONER

## 2019-11-25 PROCEDURE — A9552 F18 FDG: HCPCS | Performed by: NURSE PRACTITIONER

## 2019-11-25 PROCEDURE — 78815 PET IMAGE W/CT SKULL-THIGH: CPT

## 2019-11-25 RX ORDER — FLUDEOXYGLUCOSE F 18 200 MCI/ML
14.5 INJECTION, SOLUTION INTRAVENOUS
Status: COMPLETED | OUTPATIENT
Start: 2019-11-25 | End: 2019-11-25

## 2019-11-25 RX ADMIN — FLUDEOXYGLUCOSE F 18 14.5 MILLICURIE: 200 INJECTION, SOLUTION INTRAVENOUS at 12:30

## 2019-12-03 ENCOUNTER — OFFICE VISIT (OUTPATIENT)
Dept: PULMONOLOGY | Age: 79
End: 2019-12-03
Payer: MEDICARE

## 2019-12-03 VITALS
SYSTOLIC BLOOD PRESSURE: 126 MMHG | HEART RATE: 66 BPM | TEMPERATURE: 98.2 F | OXYGEN SATURATION: 94 % | BODY MASS INDEX: 30.86 KG/M2 | HEIGHT: 66 IN | DIASTOLIC BLOOD PRESSURE: 64 MMHG | WEIGHT: 192 LBS

## 2019-12-03 DIAGNOSIS — J96.11 CHRONIC RESPIRATORY FAILURE WITH HYPOXIA, ON HOME O2 THERAPY (HCC): ICD-10-CM

## 2019-12-03 DIAGNOSIS — R91.8 ABNORMAL CT LUNG SCREENING: ICD-10-CM

## 2019-12-03 DIAGNOSIS — Z99.81 CHRONIC RESPIRATORY FAILURE WITH HYPOXIA, ON HOME O2 THERAPY (HCC): ICD-10-CM

## 2019-12-03 DIAGNOSIS — J44.9 STAGE 3 SEVERE COPD BY GOLD CLASSIFICATION (HCC): Primary | ICD-10-CM

## 2019-12-03 PROCEDURE — G8427 DOCREV CUR MEDS BY ELIG CLIN: HCPCS | Performed by: NURSE PRACTITIONER

## 2019-12-03 PROCEDURE — 99213 OFFICE O/P EST LOW 20 MIN: CPT | Performed by: NURSE PRACTITIONER

## 2019-12-03 PROCEDURE — G8417 CALC BMI ABV UP PARAM F/U: HCPCS | Performed by: NURSE PRACTITIONER

## 2019-12-03 PROCEDURE — 4040F PNEUMOC VAC/ADMIN/RCVD: CPT | Performed by: NURSE PRACTITIONER

## 2019-12-03 PROCEDURE — 1123F ACP DISCUSS/DSCN MKR DOCD: CPT | Performed by: NURSE PRACTITIONER

## 2019-12-03 PROCEDURE — G8484 FLU IMMUNIZE NO ADMIN: HCPCS | Performed by: NURSE PRACTITIONER

## 2019-12-03 PROCEDURE — 3023F SPIROM DOC REV: CPT | Performed by: NURSE PRACTITIONER

## 2019-12-03 PROCEDURE — G8926 SPIRO NO PERF OR DOC: HCPCS | Performed by: NURSE PRACTITIONER

## 2019-12-03 PROCEDURE — 1036F TOBACCO NON-USER: CPT | Performed by: NURSE PRACTITIONER

## 2019-12-03 ASSESSMENT — ENCOUNTER SYMPTOMS
BACK PAIN: 0
DIARRHEA: 0
ALLERGIC/IMMUNOLOGIC NEGATIVE: 1
SHORTNESS OF BREATH: 1
CHEST TIGHTNESS: 0
WHEEZING: 0
NAUSEA: 0
EYES NEGATIVE: 1
STRIDOR: 0
TROUBLE SWALLOWING: 0
COUGH: 1

## 2020-02-12 ENCOUNTER — TELEPHONE (OUTPATIENT)
Dept: FAMILY MEDICINE CLINIC | Age: 80
End: 2020-02-12

## 2020-02-12 RX ORDER — PREDNISONE 20 MG/1
20 TABLET ORAL DAILY
Qty: 10 TABLET | Refills: 0 | Status: SHIPPED | OUTPATIENT
Start: 2020-02-12 | End: 2020-02-22

## 2020-02-12 RX ORDER — AZITHROMYCIN 250 MG/1
TABLET, FILM COATED ORAL
Qty: 1 PACKET | Refills: 0 | Status: SHIPPED | OUTPATIENT
Start: 2020-02-12 | End: 2020-02-22

## 2020-02-28 ENCOUNTER — HOSPITAL ENCOUNTER (OUTPATIENT)
Dept: CT IMAGING | Age: 80
Discharge: HOME | End: 2020-02-28
Payer: MEDICARE

## 2020-02-28 PROCEDURE — 71250 CT THORAX DX C-: CPT

## 2020-03-03 ENCOUNTER — OFFICE VISIT (OUTPATIENT)
Dept: PULMONOLOGY | Age: 80
End: 2020-03-03
Payer: MEDICARE

## 2020-03-03 VITALS
DIASTOLIC BLOOD PRESSURE: 68 MMHG | BODY MASS INDEX: 31.27 KG/M2 | OXYGEN SATURATION: 92 % | TEMPERATURE: 98.2 F | HEART RATE: 63 BPM | SYSTOLIC BLOOD PRESSURE: 136 MMHG | HEIGHT: 66 IN | WEIGHT: 194.6 LBS

## 2020-03-03 PROCEDURE — 1123F ACP DISCUSS/DSCN MKR DOCD: CPT | Performed by: NURSE PRACTITIONER

## 2020-03-03 PROCEDURE — 1036F TOBACCO NON-USER: CPT | Performed by: NURSE PRACTITIONER

## 2020-03-03 PROCEDURE — G8417 CALC BMI ABV UP PARAM F/U: HCPCS | Performed by: NURSE PRACTITIONER

## 2020-03-03 PROCEDURE — 4040F PNEUMOC VAC/ADMIN/RCVD: CPT | Performed by: NURSE PRACTITIONER

## 2020-03-03 PROCEDURE — 99214 OFFICE O/P EST MOD 30 MIN: CPT | Performed by: NURSE PRACTITIONER

## 2020-03-03 PROCEDURE — G8484 FLU IMMUNIZE NO ADMIN: HCPCS | Performed by: NURSE PRACTITIONER

## 2020-03-03 PROCEDURE — G8926 SPIRO NO PERF OR DOC: HCPCS | Performed by: NURSE PRACTITIONER

## 2020-03-03 PROCEDURE — 3023F SPIROM DOC REV: CPT | Performed by: NURSE PRACTITIONER

## 2020-03-03 PROCEDURE — G8427 DOCREV CUR MEDS BY ELIG CLIN: HCPCS | Performed by: NURSE PRACTITIONER

## 2020-03-03 ASSESSMENT — ENCOUNTER SYMPTOMS
ABDOMINAL PAIN: 0
NAUSEA: 0
DIARRHEA: 0
COUGH: 1
EYES NEGATIVE: 1
VOMITING: 0
SHORTNESS OF BREATH: 1
WHEEZING: 0

## 2020-03-04 ENCOUNTER — TELEPHONE (OUTPATIENT)
Dept: PULMONOLOGY | Age: 80
End: 2020-03-04

## 2020-03-04 NOTE — TELEPHONE ENCOUNTER
----- Message from CASEY Michel CNP sent at 3/3/2020  7:22 PM EST -----  Needs referral to ct surgeon       Called pt to discuss the findings of stable aneurysms on CT, left VM for him to call back to see if he needs referral to CT surgery for eval and monitoring.

## 2020-03-04 NOTE — TELEPHONE ENCOUNTER
Isabelle Frank phoned back in and mentioned that he used to see Dr. Irene Strong, cardiologist but has not seen for a while now. He is asking whom you would suggest for CT Surgeon referral and we can go ahead and place order to schedule. Please advise.

## 2020-03-05 ENCOUNTER — TELEPHONE (OUTPATIENT)
Dept: CARDIOTHORACIC SURGERY | Age: 80
End: 2020-03-05

## 2020-03-27 ENCOUNTER — TELEPHONE (OUTPATIENT)
Dept: CARDIOTHORACIC SURGERY | Age: 80
End: 2020-03-27

## 2020-03-27 NOTE — TELEPHONE ENCOUNTER
Called patient to inform of his testing and appt in office with Dr. Cisco tSanley. Informed to arrive for CTA abd/pelvis on 4/7/2020 at 8:30 a.m. to [facility]. NPO 4 hours prior. Appt scheduled on 4/13/2020 with Dr. Cisco Stanley at 10:00 a.m. Patient verbalized understanding.

## 2020-04-07 ENCOUNTER — HOSPITAL ENCOUNTER (OUTPATIENT)
Dept: CT IMAGING | Age: 80
Discharge: HOME | End: 2020-04-07
Payer: MEDICARE

## 2020-04-07 LAB — POC CREATININE WHOLE BLOOD: 0.6 MG/DL (ref 0.5–1.2)

## 2020-04-07 PROCEDURE — 74174 CTA ABD&PLVS W/CONTRAST: CPT

## 2020-04-07 PROCEDURE — 82565 ASSAY OF CREATININE: CPT

## 2020-04-07 PROCEDURE — 6360000004 HC RX CONTRAST MEDICATION: Performed by: THORACIC SURGERY (CARDIOTHORACIC VASCULAR SURGERY)

## 2020-04-07 RX ADMIN — IOPAMIDOL 90 ML: 755 INJECTION, SOLUTION INTRAVENOUS at 08:45

## 2020-04-13 ENCOUNTER — OFFICE VISIT (OUTPATIENT)
Dept: CARDIOTHORACIC SURGERY | Age: 80
End: 2020-04-13
Payer: MEDICARE

## 2020-04-13 VITALS
DIASTOLIC BLOOD PRESSURE: 81 MMHG | WEIGHT: 196.2 LBS | HEIGHT: 66 IN | BODY MASS INDEX: 31.53 KG/M2 | SYSTOLIC BLOOD PRESSURE: 154 MMHG | HEART RATE: 57 BPM

## 2020-04-13 PROCEDURE — G8417 CALC BMI ABV UP PARAM F/U: HCPCS | Performed by: THORACIC SURGERY (CARDIOTHORACIC VASCULAR SURGERY)

## 2020-04-13 PROCEDURE — 1123F ACP DISCUSS/DSCN MKR DOCD: CPT | Performed by: THORACIC SURGERY (CARDIOTHORACIC VASCULAR SURGERY)

## 2020-04-13 PROCEDURE — 1036F TOBACCO NON-USER: CPT | Performed by: THORACIC SURGERY (CARDIOTHORACIC VASCULAR SURGERY)

## 2020-04-13 PROCEDURE — 99204 OFFICE O/P NEW MOD 45 MIN: CPT | Performed by: THORACIC SURGERY (CARDIOTHORACIC VASCULAR SURGERY)

## 2020-04-13 PROCEDURE — 4040F PNEUMOC VAC/ADMIN/RCVD: CPT | Performed by: THORACIC SURGERY (CARDIOTHORACIC VASCULAR SURGERY)

## 2020-04-13 PROCEDURE — G8427 DOCREV CUR MEDS BY ELIG CLIN: HCPCS | Performed by: THORACIC SURGERY (CARDIOTHORACIC VASCULAR SURGERY)

## 2020-04-13 NOTE — PROGRESS NOTES
CT/CV Surgery New Patient Office Visit      Patient's Name/Date of Birth: Giles Warren / 1940 (21 y.o.)      PCP: Parish Talbot MD    Date: April 13, 2020     CC: Known infrarenal abdominal aortic aneurysm, 5.2 cm. And totally occluded left common femoral artery. HPI:   We had the pleasure of seeing Giles Warren in the office today, as you know this is a very pleasant 68y.o. year old male with a history of abdominal aortic aneurysm, COPD, right lung lesion, status post myocardial infarction x2 in the past, bladder cancer, and hyperlipidemia. During the work-up for COPD, he was found to have diffusely enlarged ascending, and descending, thoracic aorta, and abdominal aortic aneurysm. He denied any symptoms related to abdominal aortic aneurysm. PastMedical History:  Caitlyn Tubbs  has a past medical history of Adenomatous polyp, Asthma, CAD (coronary artery disease), COPD (chronic obstructive pulmonary disease) ([facility] [address] Hyperlipidemia, Hypertension, and Transitional cell carcinoma of bladder ([facility] Past Surgical History:  The patient  has a past surgical history that includes Coronary angioplasty with stent; Colonoscopy; and Carotid endarterectomy (Right). Allergies: The patient has No Known Allergies. Medications:    Current Outpatient Medications:     tamsulosin (FLOMAX) 0.4 MG capsule, Take 0.4 mg by mouth daily, Disp: , Rfl:     ipratropium-albuterol (DUONEB) 0.5-2.5 (3) MG/3ML SOLN nebulizer solution, Take 3 mLs by nebulization every 6 hours as needed for Shortness of Breath, Disp: 360 vial, Rfl: 11    budesonide-formoterol (SYMBICORT) 160-4.5 MCG/ACT AERO, Inhale 2 puffs into the lungs 2 times daily. , Disp: , Rfl:     albuterol (PROVENTIL HFA) 108 (90 BASE) MCG/ACT inhaler, Inhale 2 puffs into the lungs every 4 hours as needed. , Disp: , Rfl:     pravastatin (PRAVACHOL) 80 MG tablet, Take 80 mg by mouth daily.   , Disp: , Rfl:     metoprolol (LOPRESSOR) 50 MG tablet, Take 50 mg by mouth daily. 1/2 tablet bid, Disp: , Rfl:     aspirin 81 MG EC tablet, Take 81 mg by mouth daily. , Disp: , Rfl:     Family History: This patient's family history is not on file. Social History:  Stan Khalil  reports that he quit smoking about 13 years ago. His smoking use included cigarettes. He has a 45.00 pack-year smoking history. He has never used smokeless tobacco. He reports current alcohol use. He reports that he does not use drugs. Vital Signs:   BP (!) 154/81   Pulse 57   Ht 5' 6\" (1.676 m)   Wt 196 lb 3.2 oz (89 kg)   BMI 31.67 kg/m²     ROS:  Constitutional: Negative for activity change, chills, fatigue, fever and unexpected weight change. HENT: Negative for congestion, facial swelling, sore throat, and changes in voice. Eyes: Negative for photophobia, redness, itching and visual disturbance. Respiratory: Negative for apnea, choking, shortness of breath, wheezing and stridor. Cardiovascular: Negative for chest pain, palpitations and leg swelling. Gastrointestinal: Negative for abdominal distention, constipation, nausea and vomiting. Endocrine: Negative for cold intolerance, heat intolerance, polyphagia and polyuria. Musculoskeletal: Negative for arthralgias, gait problem, and myalgias. Skin: Negative for color change, rash and wound. Allergic/Immunologic: Negative for food allergies and immunocompromised state. Neurological: Negative for dizziness, tremors, speech difficulty, weakness, numbness and headaches. Hematological: Negative for adenopathy. Does not bruise/bleed easily. Psychiatric/Behavioral: Negative for agitation, confusion, and dysphoric mood. Physical Exam:   General appearance:  No apparent distress, appears stated age and cooperative. HEENT:  Normal cephalic, atraumatic without obvious deformity. Conjunctivae/corneas clear. Neck: Supple, with full range of motion. No jugular venous distention. Trachea midline.   Respiratory:  Decreased breathing sound bilaterally. Cardiovascular:  Regular rate and rhythm with normal S1/S2 without murmurs, rubs or gallops. Abdomen: Soft, non-tender, non-distended with normal bowel sounds. Obese abdomen. Musculoskeletal:  No clubbing, cyanosis or edema bilaterally. Full range of motion without deformity. Skin: Skin color, texture, turgor normal.  No rashes or lesions. Neurologic:  Neurovascularly intact without any focal sensory/motor deficits. Psychiatric:  Alert and oriented, thought content appropriate, normal insight. Capillary Refill: Brisk,< 3 seconds   Peripheral Pulses: +2 palpable pulses over right femoral artery. Weak but palpable left femoral artery. Biphasic Doppler signals over bilateral pedal arteries. Labs:    CBC:  Lab Results   Component Value Date    WBC 14.4 10/09/2015    HGB 15.8 10/09/2015    HCT 49.1 10/09/2015    MCV 94.9 10/09/2015     10/09/2015     BMP:   Lab Results   Component Value Date     10/09/2015    K 5.3 10/09/2015    CL 96 10/09/2015    CO2 36 10/09/2015    BUN 9 10/09/2015    CREATININE 0.77 10/09/2015       Imaging  I have reviewed all imaging. CT Selina of the abdomen shows infra renal abdominal aortic aneurysm with 5.2 cm in its greatest dimension. There is totally occluded left common femoral artery. Problem List:  Patient Active Problem List   Diagnosis    COPD (chronic obstructive pulmonary disease) ([facility] [address]    Transitional cell carcinoma of bladder ([facility] [address]    Adenomatous polyp       Assessment: Infrarenal abdominal aortic aneurysm with 5.2 cm in diameter. Plan 4/13/20:  1) we will arrange for a hybrid procedure including endarterectomy of the left common femoral artery, and insertion of endograft for abdominal aortic aneurysm    Thank you for allowing us to be involved in the patient's care.     Electronically by Ryan Calixto MD  on 4/13/2020 at 10:55 AM

## 2020-06-03 ENCOUNTER — TELEPHONE (OUTPATIENT)
Dept: CARDIOTHORACIC SURGERY | Age: 80
End: 2020-06-03

## 2020-06-03 NOTE — TELEPHONE ENCOUNTER
Called to inform patient of surgery scheduled on 6/26/2020 with Dr. ALTMAN [facility]. No answer. Left message for patient to call office back at [phone].

## 2020-06-12 ENCOUNTER — TELEPHONE (OUTPATIENT)
Dept: CARDIOTHORACIC SURGERY | Age: 80
End: 2020-06-12

## 2020-06-22 ENCOUNTER — PREP FOR PROCEDURE (OUTPATIENT)
Dept: CARDIOTHORACIC SURGERY | Age: 80
End: 2020-06-22

## 2020-06-22 ENCOUNTER — HOSPITAL ENCOUNTER (OUTPATIENT)
Age: 80
Discharge: HOME | End: 2020-06-22
Payer: MEDICARE

## 2020-06-22 PROCEDURE — U0002 COVID-19 LAB TEST NON-CDC: HCPCS

## 2020-06-22 RX ORDER — SODIUM CHLORIDE 0.9 % (FLUSH) 0.9 %
10 SYRINGE (ML) INJECTION EVERY 12 HOURS SCHEDULED
Status: CANCELLED | OUTPATIENT
Start: 2020-06-22

## 2020-06-22 RX ORDER — SODIUM CHLORIDE 0.9 % (FLUSH) 0.9 %
10 SYRINGE (ML) INJECTION PRN
Status: CANCELLED | OUTPATIENT
Start: 2020-06-22

## 2020-06-23 LAB
PERFORMING LAB: NORMAL
REPORT: NORMAL
SARS-COV-2: NOT DETECTED

## 2020-06-25 ENCOUNTER — ANESTHESIA EVENT (OUTPATIENT)
Dept: OPERATING ROOM | Age: 80
DRG: 269 | End: 2020-06-25
Payer: MEDICARE

## 2020-06-26 ENCOUNTER — APPOINTMENT (OUTPATIENT)
Dept: INTERVENTIONAL RADIOLOGY/VASCULAR | Age: 80
DRG: 269 | End: 2020-06-26
Attending: THORACIC SURGERY (CARDIOTHORACIC VASCULAR SURGERY)
Payer: MEDICARE

## 2020-06-26 ENCOUNTER — ANESTHESIA (OUTPATIENT)
Dept: OPERATING ROOM | Age: 80
DRG: 269 | End: 2020-06-26
Payer: MEDICARE

## 2020-06-26 ENCOUNTER — HOSPITAL ENCOUNTER (INPATIENT)
Age: 80
LOS: 5 days | Discharge: HOME | DRG: 269 | End: 2020-07-01
Attending: THORACIC SURGERY (CARDIOTHORACIC VASCULAR SURGERY) | Admitting: THORACIC SURGERY (CARDIOTHORACIC VASCULAR SURGERY)
Payer: MEDICARE

## 2020-06-26 VITALS — TEMPERATURE: 99.3 F | OXYGEN SATURATION: 98 % | DIASTOLIC BLOOD PRESSURE: 60 MMHG | SYSTOLIC BLOOD PRESSURE: 107 MMHG

## 2020-06-26 PROBLEM — I71.40 AAA (ABDOMINAL AORTIC ANEURYSM) (HCC): Status: ACTIVE | Noted: 2020-06-26

## 2020-06-26 LAB
ABO: NORMAL
ANION GAP SERPL CALCULATED.3IONS-SCNC: 6 MEQ/L (ref 8–16)
ANTIBODY SCREEN: NORMAL
APTT: 30.7 SECONDS (ref 22–38)
BASE EXCESS (CALCULATED): 0.2 MMOL/L (ref -2.5–2.5)
BASE EXCESS (CALCULATED): 0.8 MMOL/L (ref -2.5–2.5)
BUN BLDV-MCNC: 16 MG/DL (ref 7–22)
CALCIUM IONIZED SERUM: 1.11 MMOL/L (ref 1.12–1.32)
CALCIUM IONIZED SERUM: 1.17 MMOL/L (ref 1.12–1.32)
CALCIUM SERPL-MCNC: 9.2 MG/DL (ref 8.5–10.5)
CHLORIDE BLD-SCNC: 103 MEQ/L (ref 98–111)
CO2: 31 MEQ/L (ref 23–33)
COLLECTED BY:: ABNORMAL
COLLECTED BY:: ABNORMAL
CREAT SERPL-MCNC: 0.6 MG/DL (ref 0.4–1.2)
DEVICE: ABNORMAL
ERYTHROCYTE [DISTWIDTH] IN BLOOD BY AUTOMATED COUNT: 13.6 % (ref 11.5–14.5)
ERYTHROCYTE [DISTWIDTH] IN BLOOD BY AUTOMATED COUNT: 49.4 FL (ref 35–45)
GFR SERPL CREATININE-BSD FRML MDRD: > 90 ML/MIN/1.73M2
GLUCOSE BLD-MCNC: 118 MG/DL (ref 70–108)
GLUCOSE BLD-MCNC: 118 MG/DL (ref 70–108)
GLUCOSE, WHOLE BLOOD: 121 MG/DL (ref 70–108)
GLUCOSE, WHOLE BLOOD: 143 MG/DL (ref 70–108)
HCO3: 28 MMOL/L (ref 23–28)
HCO3: 29 MMOL/L (ref 23–28)
HCT VFR BLD CALC: 45.6 % (ref 42–52)
HEMOGLOBIN FINGERSTICK, POC: 10.3 G/DL (ref 14–18)
HEMOGLOBIN FINGERSTICK, POC: 10.7 G/DL (ref 14–18)
HEMOGLOBIN: 14.2 GM/DL (ref 14–18)
INR BLD: 0.93 (ref 0.85–1.13)
MCH RBC QN AUTO: 31 PG (ref 26–33)
MCHC RBC AUTO-ENTMCNC: 31.1 GM/DL (ref 32.2–35.5)
MCV RBC AUTO: 99.6 FL (ref 80–94)
MRSA SCREEN RT-PCR: NEGATIVE
O2 SATURATION: 100 %
O2 SATURATION: 100 %
PCO2: 58 MMHG (ref 35–45)
PCO2: 61 MMHG (ref 35–45)
PH BLOOD GAS: 7.28 (ref 7.35–7.45)
PH BLOOD GAS: 7.29 (ref 7.35–7.45)
PLATELET # BLD: 163 THOU/MM3 (ref 130–400)
PMV BLD AUTO: 10.3 FL (ref 9.4–12.4)
PO2: 294 MMHG (ref 71–104)
PO2: 448 MMHG (ref 71–104)
POTASSIUM REFLEX MAGNESIUM: 4.3 MEQ/L (ref 3.5–5.2)
POTASSIUM, WHOLE BLOOD: 3.7 MEQ/L (ref 3.5–4.9)
POTASSIUM, WHOLE BLOOD: 3.8 MEQ/L (ref 3.5–4.9)
RBC # BLD: 4.58 MILL/MM3 (ref 4.7–6.1)
RH FACTOR: NORMAL
SODIUM BLD-SCNC: 140 MEQ/L (ref 135–145)
SODIUM, WHOLE BLOOD: 142 MEQ/L (ref 138–146)
SODIUM, WHOLE BLOOD: 145 MEQ/L (ref 138–146)
SOURCE, BLOOD GAS: ABNORMAL
VANCOMYCIN RESISTANT ENTEROCOCCUS: NEGATIVE
WBC # BLD: 7.8 THOU/MM3 (ref 4.8–10.8)

## 2020-06-26 PROCEDURE — 6360000002 HC RX W HCPCS: Performed by: PHYSICIAN ASSISTANT

## 2020-06-26 PROCEDURE — 88304 TISSUE EXAM BY PATHOLOGIST: CPT

## 2020-06-26 PROCEDURE — 041J0JJ BYPASS LEFT EXTERNAL ILIAC ARTERY TO LEFT FEMORAL ARTERY WITH SYNTHETIC SUBSTITUTE, OPEN APPROACH: ICD-10-PCS | Performed by: THORACIC SURGERY (CARDIOTHORACIC VASCULAR SURGERY)

## 2020-06-26 PROCEDURE — 6360000002 HC RX W HCPCS

## 2020-06-26 PROCEDURE — 34812 OPN FEM ART EXPOS: CPT | Performed by: INTERNAL MEDICINE

## 2020-06-26 PROCEDURE — C1760 CLOSURE DEV, VASC: HCPCS

## 2020-06-26 PROCEDURE — 94640 AIRWAY INHALATION TREATMENT: CPT

## 2020-06-26 PROCEDURE — 6370000000 HC RX 637 (ALT 250 FOR IP)

## 2020-06-26 PROCEDURE — 35141 REPAIR DEFECT OF ARTERY: CPT | Performed by: THORACIC SURGERY (CARDIOTHORACIC VASCULAR SURGERY)

## 2020-06-26 PROCEDURE — 82948 REAGENT STRIP/BLOOD GLUCOSE: CPT

## 2020-06-26 PROCEDURE — 6360000002 HC RX W HCPCS: Performed by: THORACIC SURGERY (CARDIOTHORACIC VASCULAR SURGERY)

## 2020-06-26 PROCEDURE — 87641 MR-STAPH DNA AMP PROBE: CPT

## 2020-06-26 PROCEDURE — 34705 EVAC RPR A-BIILIAC NDGFT: CPT | Performed by: THORACIC SURGERY (CARDIOTHORACIC VASCULAR SURGERY)

## 2020-06-26 PROCEDURE — 3600000007 HC SURGERY HYBRID BASE: Performed by: THORACIC SURGERY (CARDIOTHORACIC VASCULAR SURGERY)

## 2020-06-26 PROCEDURE — 37799 UNLISTED PX VASCULAR SURGERY: CPT

## 2020-06-26 PROCEDURE — 82330 ASSAY OF CALCIUM: CPT

## 2020-06-26 PROCEDURE — 2709999900 HC NON-CHARGEABLE SUPPLY: Performed by: THORACIC SURGERY (CARDIOTHORACIC VASCULAR SURGERY)

## 2020-06-26 PROCEDURE — 7100000000 HC PACU RECOVERY - FIRST 15 MIN: Performed by: THORACIC SURGERY (CARDIOTHORACIC VASCULAR SURGERY)

## 2020-06-26 PROCEDURE — C1781 MESH (IMPLANTABLE): HCPCS | Performed by: THORACIC SURGERY (CARDIOTHORACIC VASCULAR SURGERY)

## 2020-06-26 PROCEDURE — 2700000000 HC OXYGEN THERAPY PER DAY

## 2020-06-26 PROCEDURE — 85730 THROMBOPLASTIN TIME PARTIAL: CPT

## 2020-06-26 PROCEDURE — 84132 ASSAY OF SERUM POTASSIUM: CPT

## 2020-06-26 PROCEDURE — 2580000003 HC RX 258

## 2020-06-26 PROCEDURE — 94761 N-INVAS EAR/PLS OXIMETRY MLT: CPT

## 2020-06-26 PROCEDURE — 6370000000 HC RX 637 (ALT 250 FOR IP): Performed by: THORACIC SURGERY (CARDIOTHORACIC VASCULAR SURGERY)

## 2020-06-26 PROCEDURE — 3700000000 HC ANESTHESIA ATTENDED CARE: Performed by: THORACIC SURGERY (CARDIOTHORACIC VASCULAR SURGERY)

## 2020-06-26 PROCEDURE — 85018 HEMOGLOBIN: CPT

## 2020-06-26 PROCEDURE — 82803 BLOOD GASES ANY COMBINATION: CPT

## 2020-06-26 PROCEDURE — 2580000003 HC RX 258: Performed by: PHYSICIAN ASSISTANT

## 2020-06-26 PROCEDURE — APPSS60 APP SPLIT SHARED TIME 46-60 MINUTES: Performed by: PHYSICIAN ASSISTANT

## 2020-06-26 PROCEDURE — 85027 COMPLETE CBC AUTOMATED: CPT

## 2020-06-26 PROCEDURE — 34812 OPN FEM ART EXPOS: CPT | Performed by: THORACIC SURGERY (CARDIOTHORACIC VASCULAR SURGERY)

## 2020-06-26 PROCEDURE — P9045 ALBUMIN (HUMAN), 5%, 250 ML: HCPCS

## 2020-06-26 PROCEDURE — 87500 VANOMYCIN DNA AMP PROBE: CPT

## 2020-06-26 PROCEDURE — 35141 REPAIR DEFECT OF ARTERY: CPT | Performed by: PHYSICIAN ASSISTANT

## 2020-06-26 PROCEDURE — 86923 COMPATIBILITY TEST ELECTRIC: CPT

## 2020-06-26 PROCEDURE — 86850 RBC ANTIBODY SCREEN: CPT

## 2020-06-26 PROCEDURE — 34701 EVASC RPR A-AO NDGFT: CPT | Performed by: INTERNAL MEDICINE

## 2020-06-26 PROCEDURE — 7100000001 HC PACU RECOVERY - ADDTL 15 MIN: Performed by: THORACIC SURGERY (CARDIOTHORACIC VASCULAR SURGERY)

## 2020-06-26 PROCEDURE — 3600000017 HC SURGERY HYBRID ADDL 15MIN: Performed by: THORACIC SURGERY (CARDIOTHORACIC VASCULAR SURGERY)

## 2020-06-26 PROCEDURE — 85610 PROTHROMBIN TIME: CPT

## 2020-06-26 PROCEDURE — 84295 ASSAY OF SERUM SODIUM: CPT

## 2020-06-26 PROCEDURE — 86900 BLOOD TYPING SEROLOGIC ABO: CPT

## 2020-06-26 PROCEDURE — 88305 TISSUE EXAM BY PATHOLOGIST: CPT

## 2020-06-26 PROCEDURE — 36415 COLL VENOUS BLD VENIPUNCTURE: CPT

## 2020-06-26 PROCEDURE — 80048 BASIC METABOLIC PNL TOTAL CA: CPT

## 2020-06-26 PROCEDURE — 04V03DZ RESTRICTION OF ABDOMINAL AORTA WITH INTRALUMINAL DEVICE, PERCUTANEOUS APPROACH: ICD-10-PCS | Performed by: THORACIC SURGERY (CARDIOTHORACIC VASCULAR SURGERY)

## 2020-06-26 PROCEDURE — 3700000001 HC ADD 15 MINUTES (ANESTHESIA): Performed by: THORACIC SURGERY (CARDIOTHORACIC VASCULAR SURGERY)

## 2020-06-26 PROCEDURE — 93005 ELECTROCARDIOGRAM TRACING: CPT | Performed by: THORACIC SURGERY (CARDIOTHORACIC VASCULAR SURGERY)

## 2020-06-26 PROCEDURE — 86901 BLOOD TYPING SEROLOGIC RH(D): CPT

## 2020-06-26 PROCEDURE — 82947 ASSAY GLUCOSE BLOOD QUANT: CPT

## 2020-06-26 PROCEDURE — 2000000000 HC ICU R&B

## 2020-06-26 PROCEDURE — 2500000003 HC RX 250 WO HCPCS

## 2020-06-26 PROCEDURE — 2580000003 HC RX 258: Performed by: THORACIC SURGERY (CARDIOTHORACIC VASCULAR SURGERY)

## 2020-06-26 PROCEDURE — 87081 CULTURE SCREEN ONLY: CPT

## 2020-06-26 PROCEDURE — C1768 GRAFT, VASCULAR: HCPCS | Performed by: THORACIC SURGERY (CARDIOTHORACIC VASCULAR SURGERY)

## 2020-06-26 DEVICE — GRAFT EVAR 16FR L124MM DIA16X24MM HI DENS MULTIFILAMENT: Type: IMPLANTABLE DEVICE | Site: GROIN | Status: FUNCTIONAL

## 2020-06-26 DEVICE — IMPLANTABLE DEVICE: Type: IMPLANTABLE DEVICE | Site: GROIN | Status: FUNCTIONAL

## 2020-06-26 DEVICE — MESH HERN W6XL6IN INGUINAL POLYPR MFIL SQ NONABSORBABLE: Type: IMPLANTABLE DEVICE | Site: GROIN | Status: FUNCTIONAL

## 2020-06-26 RX ORDER — SODIUM CHLORIDE 9 MG/ML
INJECTION, SOLUTION INTRAVENOUS CONTINUOUS
Status: DISCONTINUED | OUTPATIENT
Start: 2020-06-26 | End: 2020-06-27

## 2020-06-26 RX ORDER — PROTAMINE SULFATE 10 MG/ML
INJECTION, SOLUTION INTRAVENOUS PRN
Status: DISCONTINUED | OUTPATIENT
Start: 2020-06-26 | End: 2020-06-26 | Stop reason: SDUPTHER

## 2020-06-26 RX ORDER — MEPERIDINE HYDROCHLORIDE 25 MG/ML
12.5 INJECTION INTRAMUSCULAR; INTRAVENOUS; SUBCUTANEOUS EVERY 5 MIN PRN
Status: DISCONTINUED | OUTPATIENT
Start: 2020-06-26 | End: 2020-06-26 | Stop reason: HOSPADM

## 2020-06-26 RX ORDER — ONDANSETRON 2 MG/ML
4 INJECTION INTRAMUSCULAR; INTRAVENOUS
Status: DISCONTINUED | OUTPATIENT
Start: 2020-06-26 | End: 2020-06-26 | Stop reason: HOSPADM

## 2020-06-26 RX ORDER — PRAVASTATIN SODIUM 80 MG/1
80 TABLET ORAL NIGHTLY
Status: DISCONTINUED | OUTPATIENT
Start: 2020-06-26 | End: 2020-07-01 | Stop reason: HOSPADM

## 2020-06-26 RX ORDER — SODIUM CHLORIDE 9 MG/ML
INJECTION, SOLUTION INTRAVENOUS CONTINUOUS PRN
Status: DISCONTINUED | OUTPATIENT
Start: 2020-06-26 | End: 2020-06-26 | Stop reason: SDUPTHER

## 2020-06-26 RX ORDER — TAMSULOSIN HYDROCHLORIDE 0.4 MG/1
0.4 CAPSULE ORAL NIGHTLY
Status: DISCONTINUED | OUTPATIENT
Start: 2020-06-26 | End: 2020-07-01 | Stop reason: HOSPADM

## 2020-06-26 RX ORDER — IPRATROPIUM BROMIDE AND ALBUTEROL SULFATE 2.5; .5 MG/3ML; MG/3ML
1 SOLUTION RESPIRATORY (INHALATION) ONCE
Status: COMPLETED | OUTPATIENT
Start: 2020-06-26 | End: 2020-06-26

## 2020-06-26 RX ORDER — CEFAZOLIN SODIUM 1 G/3ML
INJECTION, POWDER, FOR SOLUTION INTRAMUSCULAR; INTRAVENOUS PRN
Status: DISCONTINUED | OUTPATIENT
Start: 2020-06-26 | End: 2020-06-26 | Stop reason: SDUPTHER

## 2020-06-26 RX ORDER — FENTANYL CITRATE 50 UG/ML
INJECTION, SOLUTION INTRAMUSCULAR; INTRAVENOUS PRN
Status: DISCONTINUED | OUTPATIENT
Start: 2020-06-26 | End: 2020-06-26 | Stop reason: SDUPTHER

## 2020-06-26 RX ORDER — BUDESONIDE AND FORMOTEROL FUMARATE DIHYDRATE 160; 4.5 UG/1; UG/1
2 AEROSOL RESPIRATORY (INHALATION) 2 TIMES DAILY
Status: DISCONTINUED | OUTPATIENT
Start: 2020-06-26 | End: 2020-07-01 | Stop reason: HOSPADM

## 2020-06-26 RX ORDER — SODIUM CHLORIDE 0.9 % (FLUSH) 0.9 %
10 SYRINGE (ML) INJECTION PRN
Status: DISCONTINUED | OUTPATIENT
Start: 2020-06-26 | End: 2020-07-01 | Stop reason: HOSPADM

## 2020-06-26 RX ORDER — 0.9 % SODIUM CHLORIDE 0.9 %
20 INTRAVENOUS SOLUTION INTRAVENOUS ONCE
Status: DISCONTINUED | OUTPATIENT
Start: 2020-06-26 | End: 2020-06-27

## 2020-06-26 RX ORDER — IPRATROPIUM BROMIDE AND ALBUTEROL SULFATE 2.5; .5 MG/3ML; MG/3ML
SOLUTION RESPIRATORY (INHALATION)
Status: COMPLETED
Start: 2020-06-26 | End: 2020-06-26

## 2020-06-26 RX ORDER — SODIUM CHLORIDE 0.9 % (FLUSH) 0.9 %
10 SYRINGE (ML) INJECTION EVERY 12 HOURS SCHEDULED
Status: DISCONTINUED | OUTPATIENT
Start: 2020-06-26 | End: 2020-06-26 | Stop reason: HOSPADM

## 2020-06-26 RX ORDER — IPRATROPIUM BROMIDE AND ALBUTEROL SULFATE 2.5; .5 MG/3ML; MG/3ML
1 SOLUTION RESPIRATORY (INHALATION) EVERY 6 HOURS PRN
Status: DISCONTINUED | OUTPATIENT
Start: 2020-06-26 | End: 2020-07-01 | Stop reason: HOSPADM

## 2020-06-26 RX ORDER — ALBUTEROL SULFATE 90 UG/1
2 AEROSOL, METERED RESPIRATORY (INHALATION) 4 TIMES DAILY
Status: DISCONTINUED | OUTPATIENT
Start: 2020-06-26 | End: 2020-07-01 | Stop reason: HOSPADM

## 2020-06-26 RX ORDER — HYDROCODONE BITARTRATE AND ACETAMINOPHEN 5; 325 MG/1; MG/1
1 TABLET ORAL EVERY 4 HOURS PRN
Status: DISCONTINUED | OUTPATIENT
Start: 2020-06-26 | End: 2020-07-01 | Stop reason: HOSPADM

## 2020-06-26 RX ORDER — ONDANSETRON 2 MG/ML
4 INJECTION INTRAMUSCULAR; INTRAVENOUS EVERY 6 HOURS PRN
Status: DISCONTINUED | OUTPATIENT
Start: 2020-06-26 | End: 2020-07-01 | Stop reason: HOSPADM

## 2020-06-26 RX ORDER — FENTANYL CITRATE 50 UG/ML
50 INJECTION, SOLUTION INTRAMUSCULAR; INTRAVENOUS EVERY 5 MIN PRN
Status: DISCONTINUED | OUTPATIENT
Start: 2020-06-26 | End: 2020-06-26 | Stop reason: HOSPADM

## 2020-06-26 RX ORDER — PROPOFOL 10 MG/ML
INJECTION, EMULSION INTRAVENOUS PRN
Status: DISCONTINUED | OUTPATIENT
Start: 2020-06-26 | End: 2020-06-26 | Stop reason: SDUPTHER

## 2020-06-26 RX ORDER — ROCURONIUM BROMIDE 10 MG/ML
INJECTION, SOLUTION INTRAVENOUS PRN
Status: DISCONTINUED | OUTPATIENT
Start: 2020-06-26 | End: 2020-06-26 | Stop reason: SDUPTHER

## 2020-06-26 RX ORDER — ONDANSETRON 2 MG/ML
INJECTION INTRAMUSCULAR; INTRAVENOUS PRN
Status: DISCONTINUED | OUTPATIENT
Start: 2020-06-26 | End: 2020-06-26 | Stop reason: SDUPTHER

## 2020-06-26 RX ORDER — EPHEDRINE SULFATE/0.9% NACL/PF 50 MG/5 ML
SYRINGE (ML) INTRAVENOUS PRN
Status: DISCONTINUED | OUTPATIENT
Start: 2020-06-26 | End: 2020-06-26 | Stop reason: SDUPTHER

## 2020-06-26 RX ORDER — ASPIRIN 81 MG/1
81 TABLET ORAL DAILY
Status: DISCONTINUED | OUTPATIENT
Start: 2020-06-26 | End: 2020-07-01 | Stop reason: HOSPADM

## 2020-06-26 RX ORDER — MORPHINE SULFATE 2 MG/ML
2 INJECTION, SOLUTION INTRAMUSCULAR; INTRAVENOUS
Status: DISCONTINUED | OUTPATIENT
Start: 2020-06-26 | End: 2020-06-27

## 2020-06-26 RX ORDER — PROMETHAZINE HYDROCHLORIDE 25 MG/1
12.5 TABLET ORAL EVERY 6 HOURS PRN
Status: DISCONTINUED | OUTPATIENT
Start: 2020-06-26 | End: 2020-06-27

## 2020-06-26 RX ORDER — ACETAMINOPHEN 325 MG/1
650 TABLET ORAL EVERY 4 HOURS PRN
Status: DISCONTINUED | OUTPATIENT
Start: 2020-06-26 | End: 2020-07-01 | Stop reason: HOSPADM

## 2020-06-26 RX ORDER — HYDROCODONE BITARTRATE AND ACETAMINOPHEN 5; 325 MG/1; MG/1
2 TABLET ORAL EVERY 4 HOURS PRN
Status: DISCONTINUED | OUTPATIENT
Start: 2020-06-26 | End: 2020-07-01 | Stop reason: HOSPADM

## 2020-06-26 RX ORDER — SODIUM CHLORIDE 0.9 % (FLUSH) 0.9 %
10 SYRINGE (ML) INJECTION EVERY 12 HOURS SCHEDULED
Status: DISCONTINUED | OUTPATIENT
Start: 2020-06-26 | End: 2020-07-01 | Stop reason: HOSPADM

## 2020-06-26 RX ORDER — MORPHINE SULFATE 4 MG/ML
4 INJECTION, SOLUTION INTRAMUSCULAR; INTRAVENOUS
Status: DISCONTINUED | OUTPATIENT
Start: 2020-06-26 | End: 2020-06-27

## 2020-06-26 RX ORDER — HEPARIN SODIUM 1000 [USP'U]/ML
INJECTION, SOLUTION INTRAVENOUS; SUBCUTANEOUS PRN
Status: DISCONTINUED | OUTPATIENT
Start: 2020-06-26 | End: 2020-06-26 | Stop reason: SDUPTHER

## 2020-06-26 RX ORDER — HYDRALAZINE HYDROCHLORIDE 20 MG/ML
5 INJECTION INTRAMUSCULAR; INTRAVENOUS EVERY 10 MIN PRN
Status: DISCONTINUED | OUTPATIENT
Start: 2020-06-26 | End: 2020-06-26 | Stop reason: HOSPADM

## 2020-06-26 RX ORDER — METOPROLOL TARTRATE 50 MG/1
50 TABLET, FILM COATED ORAL DAILY
Status: DISCONTINUED | OUTPATIENT
Start: 2020-06-26 | End: 2020-06-27

## 2020-06-26 RX ORDER — SODIUM CHLORIDE 0.9 % (FLUSH) 0.9 %
10 SYRINGE (ML) INJECTION PRN
Status: DISCONTINUED | OUTPATIENT
Start: 2020-06-26 | End: 2020-06-26 | Stop reason: HOSPADM

## 2020-06-26 RX ORDER — METOPROLOL TARTRATE 50 MG/1
50 TABLET, FILM COATED ORAL DAILY
Status: DISCONTINUED | OUTPATIENT
Start: 2020-06-27 | End: 2020-06-26

## 2020-06-26 RX ORDER — ALBUMIN, HUMAN INJ 5% 5 %
SOLUTION INTRAVENOUS PRN
Status: DISCONTINUED | OUTPATIENT
Start: 2020-06-26 | End: 2020-06-26 | Stop reason: SDUPTHER

## 2020-06-26 RX ADMIN — ROCURONIUM BROMIDE 30 MG: 10 INJECTION INTRAVENOUS at 13:54

## 2020-06-26 RX ADMIN — IPRATROPIUM BROMIDE AND ALBUTEROL SULFATE 1 AMPULE: 2.5; .5 SOLUTION RESPIRATORY (INHALATION) at 17:21

## 2020-06-26 RX ADMIN — Medication 2 PUFF: at 22:24

## 2020-06-26 RX ADMIN — SODIUM CHLORIDE: 9 INJECTION, SOLUTION INTRAVENOUS at 13:37

## 2020-06-26 RX ADMIN — Medication 5 MG: at 15:14

## 2020-06-26 RX ADMIN — Medication 5 MG: at 14:52

## 2020-06-26 RX ADMIN — HEPARIN SODIUM 5000 UNITS: 1000 INJECTION, SOLUTION INTRAVENOUS; SUBCUTANEOUS at 13:54

## 2020-06-26 RX ADMIN — Medication 2 PUFF: at 22:25

## 2020-06-26 RX ADMIN — SODIUM CHLORIDE: 9 INJECTION, SOLUTION INTRAVENOUS at 07:18

## 2020-06-26 RX ADMIN — FENTANYL CITRATE 50 MCG: 50 INJECTION, SOLUTION INTRAMUSCULAR; INTRAVENOUS at 09:23

## 2020-06-26 RX ADMIN — HEPARIN SODIUM 10000 UNITS: 1000 INJECTION, SOLUTION INTRAVENOUS; SUBCUTANEOUS at 10:14

## 2020-06-26 RX ADMIN — IPRATROPIUM BROMIDE AND ALBUTEROL SULFATE 1 AMPULE: .5; 3 SOLUTION RESPIRATORY (INHALATION) at 17:21

## 2020-06-26 RX ADMIN — IPRATROPIUM BROMIDE AND ALBUTEROL SULFATE 3 ML: .5; 3 SOLUTION RESPIRATORY (INHALATION) at 22:57

## 2020-06-26 RX ADMIN — SODIUM CHLORIDE: 9 INJECTION, SOLUTION INTRAVENOUS at 08:20

## 2020-06-26 RX ADMIN — SODIUM CHLORIDE: 9 INJECTION, SOLUTION INTRAVENOUS at 10:07

## 2020-06-26 RX ADMIN — CEFAZOLIN 2 G: 10 INJECTION, POWDER, FOR SOLUTION INTRAVENOUS at 22:22

## 2020-06-26 RX ADMIN — CEFAZOLIN 2000 MG: 1 INJECTION, POWDER, FOR SOLUTION INTRAMUSCULAR; INTRAVENOUS; PARENTERAL at 12:23

## 2020-06-26 RX ADMIN — CEFAZOLIN 2 G: 1 INJECTION, POWDER, FOR SOLUTION INTRAMUSCULAR; INTRAVENOUS at 08:25

## 2020-06-26 RX ADMIN — ONDANSETRON HYDROCHLORIDE 4 MG: 4 INJECTION, SOLUTION INTRAMUSCULAR; INTRAVENOUS at 15:11

## 2020-06-26 RX ADMIN — PROPOFOL 120 MG: 10 INJECTION, EMULSION INTRAVENOUS at 08:12

## 2020-06-26 RX ADMIN — ROCURONIUM BROMIDE 30 MG: 10 INJECTION INTRAVENOUS at 09:10

## 2020-06-26 RX ADMIN — ROCURONIUM BROMIDE 30 MG: 10 INJECTION INTRAVENOUS at 10:49

## 2020-06-26 RX ADMIN — SODIUM CHLORIDE: 9 INJECTION, SOLUTION INTRAVENOUS at 08:02

## 2020-06-26 RX ADMIN — Medication 5 MG: at 13:20

## 2020-06-26 RX ADMIN — ROCURONIUM BROMIDE 5 MG: 10 INJECTION INTRAVENOUS at 10:34

## 2020-06-26 RX ADMIN — FENTANYL CITRATE 50 MCG: 50 INJECTION, SOLUTION INTRAMUSCULAR; INTRAVENOUS at 10:57

## 2020-06-26 RX ADMIN — ROCURONIUM BROMIDE 50 MG: 10 INJECTION INTRAVENOUS at 08:13

## 2020-06-26 RX ADMIN — METOPROLOL TARTRATE 25 MG: 50 TABLET, FILM COATED ORAL at 22:22

## 2020-06-26 RX ADMIN — Medication 5 MG: at 08:52

## 2020-06-26 RX ADMIN — FENTANYL CITRATE 100 MCG: 50 INJECTION, SOLUTION INTRAMUSCULAR; INTRAVENOUS at 08:11

## 2020-06-26 RX ADMIN — Medication 10 MG: at 14:21

## 2020-06-26 RX ADMIN — PROTAMINE SULFATE 10 MG: 10 INJECTION, SOLUTION INTRAVENOUS at 14:52

## 2020-06-26 RX ADMIN — SODIUM CHLORIDE: 9 INJECTION, SOLUTION INTRAVENOUS at 08:22

## 2020-06-26 RX ADMIN — TAMSULOSIN HYDROCHLORIDE 0.4 MG: 0.4 CAPSULE ORAL at 22:22

## 2020-06-26 RX ADMIN — PROTAMINE SULFATE 40 MG: 10 INJECTION, SOLUTION INTRAVENOUS at 14:55

## 2020-06-26 RX ADMIN — SUGAMMADEX 200 MG: 100 INJECTION, SOLUTION INTRAVENOUS at 16:01

## 2020-06-26 RX ADMIN — ROCURONIUM BROMIDE 40 MG: 10 INJECTION INTRAVENOUS at 12:45

## 2020-06-26 RX ADMIN — PRAVASTATIN SODIUM 80 MG: 80 TABLET ORAL at 22:22

## 2020-06-26 RX ADMIN — ALBUMIN (HUMAN) 250 ML: 12.5 SOLUTION INTRAVENOUS at 13:28

## 2020-06-26 RX ADMIN — Medication 5 MG: at 10:18

## 2020-06-26 RX ADMIN — HEPARIN SODIUM 5000 UNITS: 1000 INJECTION, SOLUTION INTRAVENOUS; SUBCUTANEOUS at 11:36

## 2020-06-26 RX ADMIN — ROCURONIUM BROMIDE 15 MG: 10 INJECTION INTRAVENOUS at 09:46

## 2020-06-26 RX ADMIN — Medication 5 MG: at 13:58

## 2020-06-26 ASSESSMENT — PULMONARY FUNCTION TESTS
PIF_VALUE: 23
PIF_VALUE: 28
PIF_VALUE: 34
PIF_VALUE: 24
PIF_VALUE: 27
PIF_VALUE: 24
PIF_VALUE: 26
PIF_VALUE: 34
PIF_VALUE: 26
PIF_VALUE: 0
PIF_VALUE: 25
PIF_VALUE: 26
PIF_VALUE: 28
PIF_VALUE: 28
PIF_VALUE: 34
PIF_VALUE: 24
PIF_VALUE: 34
PIF_VALUE: 26
PIF_VALUE: 28
PIF_VALUE: 24
PIF_VALUE: 26
PIF_VALUE: 26
PIF_VALUE: 1
PIF_VALUE: 24
PIF_VALUE: 3
PIF_VALUE: 4
PIF_VALUE: 28
PIF_VALUE: 24
PIF_VALUE: 28
PIF_VALUE: 13
PIF_VALUE: 24
PIF_VALUE: 26
PIF_VALUE: 26
PIF_VALUE: 28
PIF_VALUE: 24
PIF_VALUE: 23
PIF_VALUE: 23
PIF_VALUE: 26
PIF_VALUE: 26
PIF_VALUE: 34
PIF_VALUE: 24
PIF_VALUE: 34
PIF_VALUE: 26
PIF_VALUE: 34
PIF_VALUE: 26
PIF_VALUE: 24
PIF_VALUE: 34
PIF_VALUE: 34
PIF_VALUE: 12
PIF_VALUE: 29
PIF_VALUE: 25
PIF_VALUE: 28
PIF_VALUE: 34
PIF_VALUE: 24
PIF_VALUE: 32
PIF_VALUE: 0
PIF_VALUE: 28
PIF_VALUE: 2
PIF_VALUE: 26
PIF_VALUE: 34
PIF_VALUE: 24
PIF_VALUE: 25
PIF_VALUE: 24
PIF_VALUE: 25
PIF_VALUE: 25
PIF_VALUE: 5
PIF_VALUE: 26
PIF_VALUE: 2
PIF_VALUE: 26
PIF_VALUE: 34
PIF_VALUE: 24
PIF_VALUE: 34
PIF_VALUE: 34
PIF_VALUE: 26
PIF_VALUE: 2
PIF_VALUE: 34
PIF_VALUE: 23
PIF_VALUE: 26
PIF_VALUE: 26
PIF_VALUE: 34
PIF_VALUE: 28
PIF_VALUE: 24
PIF_VALUE: 12
PIF_VALUE: 34
PIF_VALUE: 0
PIF_VALUE: 24
PIF_VALUE: 26
PIF_VALUE: 34
PIF_VALUE: 23
PIF_VALUE: 26
PIF_VALUE: 28
PIF_VALUE: 24
PIF_VALUE: 34
PIF_VALUE: 26
PIF_VALUE: 1
PIF_VALUE: 34
PIF_VALUE: 24
PIF_VALUE: 24
PIF_VALUE: 26
PIF_VALUE: 24
PIF_VALUE: 24
PIF_VALUE: 34
PIF_VALUE: 26
PIF_VALUE: 34
PIF_VALUE: 24
PIF_VALUE: 28
PIF_VALUE: 28
PIF_VALUE: 24
PIF_VALUE: 0
PIF_VALUE: 28
PIF_VALUE: 34
PIF_VALUE: 26
PIF_VALUE: 28
PIF_VALUE: 23
PIF_VALUE: 25
PIF_VALUE: 26
PIF_VALUE: 28
PIF_VALUE: 24
PIF_VALUE: 28
PIF_VALUE: 26
PIF_VALUE: 16
PIF_VALUE: 26
PIF_VALUE: 34
PIF_VALUE: 28
PIF_VALUE: 28
PIF_VALUE: 3
PIF_VALUE: 35
PIF_VALUE: 26
PIF_VALUE: 28
PIF_VALUE: 26
PIF_VALUE: 26
PIF_VALUE: 17
PIF_VALUE: 34
PIF_VALUE: 0
PIF_VALUE: 34
PIF_VALUE: 24
PIF_VALUE: 34
PIF_VALUE: 24
PIF_VALUE: 28
PIF_VALUE: 34
PIF_VALUE: 25
PIF_VALUE: 4
PIF_VALUE: 28
PIF_VALUE: 28
PIF_VALUE: 24
PIF_VALUE: 0
PIF_VALUE: 0
PIF_VALUE: 24
PIF_VALUE: 24
PIF_VALUE: 26
PIF_VALUE: 24
PIF_VALUE: 28
PIF_VALUE: 34
PIF_VALUE: 24
PIF_VALUE: 26
PIF_VALUE: 28
PIF_VALUE: 24
PIF_VALUE: 26
PIF_VALUE: 34
PIF_VALUE: 26
PIF_VALUE: 34
PIF_VALUE: 34
PIF_VALUE: 24
PIF_VALUE: 24
PIF_VALUE: 2
PIF_VALUE: 28
PIF_VALUE: 28
PIF_VALUE: 24
PIF_VALUE: 34
PIF_VALUE: 26
PIF_VALUE: 0
PIF_VALUE: 25
PIF_VALUE: 34
PIF_VALUE: 28
PIF_VALUE: 30
PIF_VALUE: 1
PIF_VALUE: 26
PIF_VALUE: 24
PIF_VALUE: 3
PIF_VALUE: 26
PIF_VALUE: 26
PIF_VALUE: 25
PIF_VALUE: 34
PIF_VALUE: 17
PIF_VALUE: 23
PIF_VALUE: 34
PIF_VALUE: 34
PIF_VALUE: 23
PIF_VALUE: 3
PIF_VALUE: 24
PIF_VALUE: 26
PIF_VALUE: 26
PIF_VALUE: 34
PIF_VALUE: 24
PIF_VALUE: 28
PIF_VALUE: 34
PIF_VALUE: 34
PIF_VALUE: 26
PIF_VALUE: 28
PIF_VALUE: 34
PIF_VALUE: 26
PIF_VALUE: 24
PIF_VALUE: 34
PIF_VALUE: 26
PIF_VALUE: 1
PIF_VALUE: 0
PIF_VALUE: 24
PIF_VALUE: 28
PIF_VALUE: 26
PIF_VALUE: 34
PIF_VALUE: 24
PIF_VALUE: 34
PIF_VALUE: 26
PIF_VALUE: 34
PIF_VALUE: 26
PIF_VALUE: 26
PIF_VALUE: 28
PIF_VALUE: 34
PIF_VALUE: 35
PIF_VALUE: 28
PIF_VALUE: 24
PIF_VALUE: 27
PIF_VALUE: 5
PIF_VALUE: 24
PIF_VALUE: 25
PIF_VALUE: 26
PIF_VALUE: 24
PIF_VALUE: 24
PIF_VALUE: 8
PIF_VALUE: 24
PIF_VALUE: 34
PIF_VALUE: 28
PIF_VALUE: 34
PIF_VALUE: 28
PIF_VALUE: 25
PIF_VALUE: 26
PIF_VALUE: 28
PIF_VALUE: 34
PIF_VALUE: 5
PIF_VALUE: 26
PIF_VALUE: 24
PIF_VALUE: 26
PIF_VALUE: 28
PIF_VALUE: 28
PIF_VALUE: 24
PIF_VALUE: 34
PIF_VALUE: 34
PIF_VALUE: 1
PIF_VALUE: 34
PIF_VALUE: 12
PIF_VALUE: 24
PIF_VALUE: 24
PIF_VALUE: 34
PIF_VALUE: 26
PIF_VALUE: 26
PIF_VALUE: 24
PIF_VALUE: 26
PIF_VALUE: 4
PIF_VALUE: 26
PIF_VALUE: 12
PIF_VALUE: 24
PIF_VALUE: 28
PIF_VALUE: 27
PIF_VALUE: 26
PIF_VALUE: 24
PIF_VALUE: 4
PIF_VALUE: 34
PIF_VALUE: 26
PIF_VALUE: 24
PIF_VALUE: 26
PIF_VALUE: 24
PIF_VALUE: 24
PIF_VALUE: 34
PIF_VALUE: 12
PIF_VALUE: 2
PIF_VALUE: 34
PIF_VALUE: 34
PIF_VALUE: 24
PIF_VALUE: 41
PIF_VALUE: 34
PIF_VALUE: 3
PIF_VALUE: 24
PIF_VALUE: 26
PIF_VALUE: 34
PIF_VALUE: 26
PIF_VALUE: 34
PIF_VALUE: 26
PIF_VALUE: 34
PIF_VALUE: 25
PIF_VALUE: 3
PIF_VALUE: 34
PIF_VALUE: 28
PIF_VALUE: 3
PIF_VALUE: 24
PIF_VALUE: 26
PIF_VALUE: 34
PIF_VALUE: 34
PIF_VALUE: 24
PIF_VALUE: 26
PIF_VALUE: 28
PIF_VALUE: 34
PIF_VALUE: 34
PIF_VALUE: 24
PIF_VALUE: 26
PIF_VALUE: 12
PIF_VALUE: 34
PIF_VALUE: 34
PIF_VALUE: 18
PIF_VALUE: 24
PIF_VALUE: 3
PIF_VALUE: 26
PIF_VALUE: 24
PIF_VALUE: 26
PIF_VALUE: 24
PIF_VALUE: 26
PIF_VALUE: 0
PIF_VALUE: 26
PIF_VALUE: 24
PIF_VALUE: 23
PIF_VALUE: 26
PIF_VALUE: 30
PIF_VALUE: 34
PIF_VALUE: 24
PIF_VALUE: 34
PIF_VALUE: 26
PIF_VALUE: 26
PIF_VALUE: 28
PIF_VALUE: 26
PIF_VALUE: 2
PIF_VALUE: 4
PIF_VALUE: 24
PIF_VALUE: 22
PIF_VALUE: 24
PIF_VALUE: 2
PIF_VALUE: 34
PIF_VALUE: 2
PIF_VALUE: 28
PIF_VALUE: 26
PIF_VALUE: 34
PIF_VALUE: 26
PIF_VALUE: 26
PIF_VALUE: 23
PIF_VALUE: 34
PIF_VALUE: 26
PIF_VALUE: 24
PIF_VALUE: 18
PIF_VALUE: 26
PIF_VALUE: 28
PIF_VALUE: 24
PIF_VALUE: 24
PIF_VALUE: 1
PIF_VALUE: 28
PIF_VALUE: 24
PIF_VALUE: 23
PIF_VALUE: 24
PIF_VALUE: 12
PIF_VALUE: 24
PIF_VALUE: 28
PIF_VALUE: 34
PIF_VALUE: 26
PIF_VALUE: 26
PIF_VALUE: 28
PIF_VALUE: 24
PIF_VALUE: 34
PIF_VALUE: 26
PIF_VALUE: 24
PIF_VALUE: 26
PIF_VALUE: 3
PIF_VALUE: 34
PIF_VALUE: 4
PIF_VALUE: 26
PIF_VALUE: 26
PIF_VALUE: 34
PIF_VALUE: 17
PIF_VALUE: 24
PIF_VALUE: 34
PIF_VALUE: 24
PIF_VALUE: 23
PIF_VALUE: 28
PIF_VALUE: 1
PIF_VALUE: 22
PIF_VALUE: 34
PIF_VALUE: 28
PIF_VALUE: 34
PIF_VALUE: 28
PIF_VALUE: 34
PIF_VALUE: 28
PIF_VALUE: 24
PIF_VALUE: 26
PIF_VALUE: 27
PIF_VALUE: 28
PIF_VALUE: 24
PIF_VALUE: 24
PIF_VALUE: 34
PIF_VALUE: 26
PIF_VALUE: 26
PIF_VALUE: 24
PIF_VALUE: 3
PIF_VALUE: 26
PIF_VALUE: 24
PIF_VALUE: 1
PIF_VALUE: 28
PIF_VALUE: 26
PIF_VALUE: 11
PIF_VALUE: 34
PIF_VALUE: 2
PIF_VALUE: 34
PIF_VALUE: 33
PIF_VALUE: 26
PIF_VALUE: 24
PIF_VALUE: 34
PIF_VALUE: 5
PIF_VALUE: 26
PIF_VALUE: 30
PIF_VALUE: 34
PIF_VALUE: 34
PIF_VALUE: 26
PIF_VALUE: 34
PIF_VALUE: 34
PIF_VALUE: 28
PIF_VALUE: 26
PIF_VALUE: 24
PIF_VALUE: 27
PIF_VALUE: 27
PIF_VALUE: 28
PIF_VALUE: 24
PIF_VALUE: 34
PIF_VALUE: 24
PIF_VALUE: 24
PIF_VALUE: 34
PIF_VALUE: 24
PIF_VALUE: 24
PIF_VALUE: 23
PIF_VALUE: 4
PIF_VALUE: 34
PIF_VALUE: 34
PIF_VALUE: 26
PIF_VALUE: 26
PIF_VALUE: 4
PIF_VALUE: 35
PIF_VALUE: 34
PIF_VALUE: 24
PIF_VALUE: 26
PIF_VALUE: 4
PIF_VALUE: 1
PIF_VALUE: 1
PIF_VALUE: 24
PIF_VALUE: 12
PIF_VALUE: 24
PIF_VALUE: 26
PIF_VALUE: 34
PIF_VALUE: 24
PIF_VALUE: 28
PIF_VALUE: 24
PIF_VALUE: 26
PIF_VALUE: 20
PIF_VALUE: 17
PIF_VALUE: 35
PIF_VALUE: 24
PIF_VALUE: 23
PIF_VALUE: 24
PIF_VALUE: 26
PIF_VALUE: 24

## 2020-06-26 ASSESSMENT — PAIN - FUNCTIONAL ASSESSMENT: PAIN_FUNCTIONAL_ASSESSMENT: 0-10

## 2020-06-26 ASSESSMENT — PAIN SCALES - GENERAL: PAINLEVEL_OUTOF10: 0

## 2020-06-26 ASSESSMENT — PAIN SCALES - WONG BAKER: WONGBAKER_NUMERICALRESPONSE: 0

## 2020-06-26 NOTE — FLOWSHEET NOTE
1800 Patient arrived to unit from PACU via bed. Patient placed on continuous ICU bedside monitor. Patient admitted for AAA (abdominal aortic aneurysm) ([facility] [address] [I71.4]. Vitals obtained. See flowsheets. Patient's IV access includes Peripheral IV left hand. Current infusions and rates of infusion include NS at kvo. Assessment completed by Marisabel Plummer. Two nurse skin assessment completed by Lauren Garvey. See flowsheets for assessment details. Policies and procedures of ICU able to be explained to patient at this time. Family member(s)/representative(s) present at time of admission include Daughter. Patient rights explained to family member(s)/representatives and patient, as able. Patient/patient's family member(s)/representative(s) N/A to have physician notified of their admission. All questions posed by patient's family member(s)/representative(s) and patient answered at this time.

## 2020-06-26 NOTE — H&P
chills, fatigue, fever and unexpected weight change. HENT: Negative for congestion, facial swelling, sore throat, and changes in voice. Eyes: Negative for photophobia, redness, itching and visual disturbance. Respiratory: Negative for apnea, choking, shortness of breath, wheezing and stridor. Cardiovascular: Negative for chest pain, palpitations and leg swelling. Gastrointestinal: Negative for abdominal distention, constipation, nausea and vomiting. Endocrine: Negative for cold intolerance, heat intolerance, polyphagia and polyuria. Musculoskeletal: Negative for arthralgias, gait problem, and myalgias. Skin: Negative for color change, rash and wound. Allergic/Immunologic: Negative for food allergies and immunocompromised state. Neurological: Negative for dizziness, tremors, speech difficulty, weakness, numbness and headaches. Hematological: Negative for adenopathy. Does not bruise/bleed easily. Psychiatric/Behavioral: Negative for agitation, confusion, and dysphoric mood. Physical Exam:   General appearance:  No apparent distress, appears stated age and cooperative. HEENT:  Normal cephalic, atraumatic without obvious deformity. Conjunctivae/corneas clear. Neck: Supple, with full range of motion. No jugular venous distention. Trachea midline. Respiratory:  Decreased breathing sound bilaterally. Cardiovascular:  Regular rate and rhythm with normal S1/S2 without murmurs, rubs or gallops. Abdomen: Soft, non-tender, non-distended with normal bowel sounds. Obese abdomen. Musculoskeletal:  No clubbing, cyanosis or edema bilaterally. Full range of motion without deformity. Skin: Skin color, texture, turgor normal.  No rashes or lesions. Neurologic:  Neurovascularly intact without any focal sensory/motor deficits. Psychiatric:  Alert and oriented, thought content appropriate, normal insight.   Capillary Refill: Brisk,< 3 seconds   Peripheral Pulses: +2 palpable pulses over right femoral artery. Weak but palpable left femoral artery. Biphasic Doppler signals over bilateral pedal arteries. Labs:    CBC:  Lab Results   Component Value Date    WBC 7.8 06/26/2020    HGB 14.2 06/26/2020    HCT 45.6 06/26/2020    MCV 99.6 06/26/2020     06/26/2020    INR 0.93 06/26/2020     BMP:   Lab Results   Component Value Date     10/09/2015    K 5.3 10/09/2015    CL 96 10/09/2015    CO2 36 10/09/2015    BUN 9 10/09/2015    CREATININE 0.77 10/09/2015       Imaging  I have reviewed all imaging. CT Selina of the abdomen shows infra renal abdominal aortic aneurysm with 5.2 cm in its greatest dimension. There is totally occluded left common femoral artery. Problem List:  Patient Active Problem List   Diagnosis    COPD (chronic obstructive pulmonary disease) ([facility] [address]    Transitional cell carcinoma of bladder ([facility] [address]    Adenomatous polyp       Assessment: Infrarenal abdominal aortic aneurysm with 5.2 cm in diameter. Plan 4/13/20:  1)  left common femoral artery, and insertion of endograft for abdominal aortic aneurysm    Thank you for allowing us to be involved in the patient's care.       NO CHANGES IN H&P, ROS, AND PE SINCE OFFICE VSIIT    Electronically by Grover Amador PA-C  on 6/26/2020 at 7:14 AM
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
## 2020-06-26 NOTE — OP NOTE
Operative Note      Patient: Zulma Heart  YOB: 1940  MRN: [id number]   CSN: [id number]    Date of Procedure: 6/26/2020    Pre-Op Diagnosis: Abdominal aortic aneurysm, right common iliac artery aneurysm, left common femoral artery aneurysm, and peripheral arterial disease    Post-Op Diagnosis: Abdominal aortic aneurysm, right common iliac artery aneurysm, left common femoral artery aneurysm, and peripheral arterial disease       Procedure(s):  1. ENDOVASCULAR ABDOMINAL AORTIC ANEURYSM REPAIR with Endurant II s stent graft system, Medtronic. 2.  Resection of left common femoral artery aneurysm, left external iliac artery to superficial femoral artery bypass grafting with a 8 mm Dacron graft, and reattachment of the deep femoral artery to the side of Dacron graft. Repair of resected the inguinal ligament with a mesh. Surgeon: Diamond Kendrick MD    Co-surgeon for abdominal aortic aneurysm stent graft repair: Ana Lentz MD.    Surgeon for common femoral artery aneurysm repair: Diamond Kendrick MD  -[place]wa co-surgeon for femoral artery aneurysm repair. Assistant:   Physician Assistant: Casi Markham PA-C; Migdalia Cho PA-C  Because of complexity of the procedure, two physician assistants scrubbed during the case. Name of device inserted: Medtronic, Endurant II s main body bifurcated graft; 36 x 14 x 103, left limb; 16 x 13 x 124, right extension; 16 x 24 x 93    Anesthesia: General    Estimated Blood Loss (mL): 449 cc    Complications: None. Specimens:   ID Type Source Tests Collected by Time Destination   A : LEFT GROIN LYMPH NODE Tissue Lymph Node SURGICAL PATHOLOGY Marlena Baumgarten, MD 6/26/2020 1219    B : LEFT FEMORAL PLAQUE Tissue Groin SURGICAL PATHOLOGY Marlena Baumgarten, MD 6/26/2020 1258        Implants:  Implant Name Type Inv.  Item Serial No.  Lot No. LRB No. Used Action   MEDTRONIC ENDURANT IIs STENT GRAFT SYSTEM BIFURCATED Graft:Patch  [id number] [employer] [place] [employer]  Right 1 Implanted   MEDTRONIC ENDURANT II STENT GRAFT SYSTEM   [id number] Hector [id number] Left 1 Implanted   MEDTRONIC ENDURANT II STENT GRAFT SYSTEM   [id number] Hector  Right 1 Implanted   GRAFT VASC GELSOFT STR [id number] Vascular/Graft/Patch/Filter GRAFT VASC GELSOFT STR [id number]  [employer] [id number] Left 1 Implanted   GRAFT [address] SURG ANAID GROIN SHT ST 6X6IN Mesh GRAFT [address] SURG ANAID GROIN SHT ST 6X6IN  [employer] [id number] Left 1 Implanted         Drains: None. Findings: The abdominal aortic aneurysm was 5.2 cm in diameter associated with a fairly large right common iliac artery aneurysm. The common femoral artery aneurysm was large and totally occluded with organized thrombus. The upper end of the femoral artery aneurysm was 2 cm above the inguinal ligament extended into the external iliac artery. The distal end was 2 cm distal to the bifurcation of superficial and deep femoral artery. Detailed Description of Procedure: The patient was brought into the hybrid operating room and was placed in supine position. A routine preparation was performed. General endotracheal anesthesia was obtained. He was painted and draped in a sterile fashion. A timeout was called and all necessary measures were taken. Endoluminal stent graft repair for abdominal aortic aneurysm was performed first.  The left external iliac artery was exposed through a curvilinear incision made in the left lower quadrant of the abdomen. The external artery was exposed by retroperitoneal approach. Vascular control was obtained. The right common femoral artery was exposed by open surgical technique with a oblique incision made approximately 2 cm above the inguinal ligament. Dr. Laury Kenny, myself and Dr. Bora Fuentes, interventional cardiologist, both scrubbed, and worked together for the endoluminal aneurysm repair. A routine procedure was performed for stent graft repair of abdominal aortic aneurysm.   The main body bifurcated graft was inserted through the right common femoral artery, and deployed. Left limb extension graft was placed through the left iliac artery. Another extension right limb was placed through the right. During this procedure, preoperative angiogram was performed. The stent graft was dilated with the balloon. Intraoperative final angiogram shows no endoleak. The right common femoral artery was repaired surgically with interrupted 6-0 Prolene. Dr. Sarina Garcia left after the completion of endoluminal graft repair. Then the procedure was continued for left common femoral artery aneurysm resection and repair by Dr. Ladon Kussmaul with two surgical physician assistants. Proximal vascular control was obtained in the proximal portion of the external iliac artery, and distal control was done at the superficial femoral and deep femoral artery. Because the aneurysm was found to be extended above the inguinal ligament, the inguinal ligament was divided. The femoral aneurysm was completely open and resected. Then left external iliac artery to superficial femoral artery bypass grafting was performed with a 8 mm Dacron graft. It was anastomosed in an end-to-end fashion for proximal and distal anastomosis. The orifice of the deep femoral artery was anastomosed to the side of Dacron graft. Complete hemostasis was then obtained. The resected inguinal ligament was repaired with mesh. The mesh was sutures to the abdominal muscle fascial layer and to inguinal ligament to prevent inguinal hernia. Soft tissue was closed with absorbable suture. Skin were closed with staples. Throughout this procedure large amount of heparin was given, and was reversed with protamine later. There was a strong palpable pulse over left dorsalis pedis after the procedure. There was strong Doppler signals over right dorsalis pedis artery. He tolerated the procedure well, and was extubated in the operating room.   He was then transferred to the intensive care unit in a stable condition. Kary Burnette MD  There was 1 missing one 6-0 needle. But x-ray shows no foreign body. No blood transfusion was made during the procedure.    6/26/2020 at 5:23 PM

## 2020-06-27 LAB
ANION GAP SERPL CALCULATED.3IONS-SCNC: 6 MEQ/L (ref 8–16)
BUN BLDV-MCNC: 10 MG/DL (ref 7–22)
CALCIUM SERPL-MCNC: 7.7 MG/DL (ref 8.5–10.5)
CHLORIDE BLD-SCNC: 108 MEQ/L (ref 98–111)
CO2: 27 MEQ/L (ref 23–33)
CREAT SERPL-MCNC: 0.5 MG/DL (ref 0.4–1.2)
EKG ATRIAL RATE: 61 BPM
EKG ATRIAL RATE: 76 BPM
EKG P AXIS: 34 DEGREES
EKG P AXIS: 52 DEGREES
EKG P-R INTERVAL: 228 MS
EKG P-R INTERVAL: 234 MS
EKG Q-T INTERVAL: 452 MS
EKG Q-T INTERVAL: 456 MS
EKG QRS DURATION: 156 MS
EKG QRS DURATION: 162 MS
EKG QTC CALCULATION (BAZETT): 459 MS
EKG QTC CALCULATION (BAZETT): 508 MS
EKG R AXIS: -39 DEGREES
EKG R AXIS: -56 DEGREES
EKG T AXIS: 74 DEGREES
EKG T AXIS: 85 DEGREES
EKG VENTRICULAR RATE: 61 BPM
EKG VENTRICULAR RATE: 76 BPM
ERYTHROCYTE [DISTWIDTH] IN BLOOD BY AUTOMATED COUNT: 13.6 % (ref 11.5–14.5)
ERYTHROCYTE [DISTWIDTH] IN BLOOD BY AUTOMATED COUNT: 51.3 FL (ref 35–45)
GFR SERPL CREATININE-BSD FRML MDRD: > 90 ML/MIN/1.73M2
GLUCOSE BLD-MCNC: 121 MG/DL (ref 70–108)
HCT VFR BLD CALC: 37.1 % (ref 42–52)
HEMOGLOBIN: 11.5 GM/DL (ref 14–18)
MCH RBC QN AUTO: 31.6 PG (ref 26–33)
MCHC RBC AUTO-ENTMCNC: 31 GM/DL (ref 32.2–35.5)
MCV RBC AUTO: 101.9 FL (ref 80–94)
PLATELET # BLD: 135 THOU/MM3 (ref 130–400)
PMV BLD AUTO: 10.4 FL (ref 9.4–12.4)
POTASSIUM SERPL-SCNC: 3.7 MEQ/L (ref 3.5–5.2)
RBC # BLD: 3.64 MILL/MM3 (ref 4.7–6.1)
SODIUM BLD-SCNC: 141 MEQ/L (ref 135–145)
WBC # BLD: 14.8 THOU/MM3 (ref 4.8–10.8)

## 2020-06-27 PROCEDURE — 2580000003 HC RX 258: Performed by: THORACIC SURGERY (CARDIOTHORACIC VASCULAR SURGERY)

## 2020-06-27 PROCEDURE — 2709999900 HC NON-CHARGEABLE SUPPLY

## 2020-06-27 PROCEDURE — 6370000000 HC RX 637 (ALT 250 FOR IP): Performed by: THORACIC SURGERY (CARDIOTHORACIC VASCULAR SURGERY)

## 2020-06-27 PROCEDURE — 80048 BASIC METABOLIC PNL TOTAL CA: CPT

## 2020-06-27 PROCEDURE — 6360000002 HC RX W HCPCS: Performed by: PHYSICIAN ASSISTANT

## 2020-06-27 PROCEDURE — 93010 ELECTROCARDIOGRAM REPORT: CPT | Performed by: INTERNAL MEDICINE

## 2020-06-27 PROCEDURE — 94640 AIRWAY INHALATION TREATMENT: CPT

## 2020-06-27 PROCEDURE — 2060000000 HC ICU INTERMEDIATE R&B

## 2020-06-27 PROCEDURE — 6370000000 HC RX 637 (ALT 250 FOR IP): Performed by: PHYSICIAN ASSISTANT

## 2020-06-27 PROCEDURE — 36415 COLL VENOUS BLD VENIPUNCTURE: CPT

## 2020-06-27 PROCEDURE — 94761 N-INVAS EAR/PLS OXIMETRY MLT: CPT

## 2020-06-27 PROCEDURE — 85027 COMPLETE CBC AUTOMATED: CPT

## 2020-06-27 PROCEDURE — 2700000000 HC OXYGEN THERAPY PER DAY

## 2020-06-27 RX ORDER — POTASSIUM CHLORIDE 20 MEQ/1
20 TABLET, EXTENDED RELEASE ORAL 2 TIMES DAILY WITH MEALS
Status: DISCONTINUED | OUTPATIENT
Start: 2020-06-27 | End: 2020-07-01 | Stop reason: HOSPADM

## 2020-06-27 RX ORDER — FUROSEMIDE 40 MG/1
40 TABLET ORAL DAILY
Status: DISCONTINUED | OUTPATIENT
Start: 2020-06-27 | End: 2020-07-01 | Stop reason: HOSPADM

## 2020-06-27 RX ADMIN — Medication 2 PUFF: at 20:20

## 2020-06-27 RX ADMIN — IPRATROPIUM BROMIDE AND ALBUTEROL SULFATE 3 ML: .5; 3 SOLUTION RESPIRATORY (INHALATION) at 04:58

## 2020-06-27 RX ADMIN — SODIUM CHLORIDE: 9 INJECTION, SOLUTION INTRAVENOUS at 02:02

## 2020-06-27 RX ADMIN — ENOXAPARIN SODIUM 40 MG: 40 INJECTION SUBCUTANEOUS at 08:36

## 2020-06-27 RX ADMIN — HYDROCODONE BITARTRATE AND ACETAMINOPHEN 2 TABLET: 5; 325 TABLET ORAL at 08:30

## 2020-06-27 RX ADMIN — Medication 2 PUFF: at 09:23

## 2020-06-27 RX ADMIN — IPRATROPIUM BROMIDE AND ALBUTEROL SULFATE 3 ML: .5; 3 SOLUTION RESPIRATORY (INHALATION) at 17:00

## 2020-06-27 RX ADMIN — Medication 2 PUFF: at 14:06

## 2020-06-27 RX ADMIN — CEFAZOLIN 2 G: 10 INJECTION, POWDER, FOR SOLUTION INTRAVENOUS at 03:44

## 2020-06-27 RX ADMIN — ASPIRIN 81 MG: 81 TABLET ORAL at 08:36

## 2020-06-27 RX ADMIN — HYDROCODONE BITARTRATE AND ACETAMINOPHEN 2 TABLET: 5; 325 TABLET ORAL at 15:04

## 2020-06-27 RX ADMIN — POTASSIUM CHLORIDE 20 MEQ: 1500 TABLET, EXTENDED RELEASE ORAL at 17:27

## 2020-06-27 RX ADMIN — POTASSIUM CHLORIDE 20 MEQ: 1500 TABLET, EXTENDED RELEASE ORAL at 12:42

## 2020-06-27 RX ADMIN — TAMSULOSIN HYDROCHLORIDE 0.4 MG: 0.4 CAPSULE ORAL at 20:14

## 2020-06-27 RX ADMIN — HYDROCODONE BITARTRATE AND ACETAMINOPHEN 2 TABLET: 5; 325 TABLET ORAL at 20:13

## 2020-06-27 RX ADMIN — Medication 2 PUFF: at 09:22

## 2020-06-27 RX ADMIN — Medication 10 ML: at 20:14

## 2020-06-27 RX ADMIN — FUROSEMIDE 40 MG: 40 TABLET ORAL at 12:42

## 2020-06-27 RX ADMIN — PRAVASTATIN SODIUM 80 MG: 80 TABLET ORAL at 20:14

## 2020-06-27 ASSESSMENT — PAIN SCALES - GENERAL
PAINLEVEL_OUTOF10: 0
PAINLEVEL_OUTOF10: 7
PAINLEVEL_OUTOF10: 6
PAINLEVEL_OUTOF10: 8
PAINLEVEL_OUTOF10: 7
PAINLEVEL_OUTOF10: 9

## 2020-06-27 ASSESSMENT — PAIN DESCRIPTION - FREQUENCY
FREQUENCY: CONTINUOUS

## 2020-06-27 ASSESSMENT — PAIN DESCRIPTION - ORIENTATION
ORIENTATION: LEFT

## 2020-06-27 ASSESSMENT — PAIN DESCRIPTION - PAIN TYPE
TYPE: SURGICAL PAIN

## 2020-06-27 ASSESSMENT — PAIN DESCRIPTION - PROGRESSION
CLINICAL_PROGRESSION: NOT CHANGED

## 2020-06-27 ASSESSMENT — PAIN DESCRIPTION - ONSET
ONSET: ON-GOING

## 2020-06-27 ASSESSMENT — PAIN DESCRIPTION - DESCRIPTORS
DESCRIPTORS: ACHING;BURNING

## 2020-06-27 ASSESSMENT — PAIN DESCRIPTION - LOCATION
LOCATION: LEG

## 2020-06-27 NOTE — PLAN OF CARE
Problem: Falls - Risk of:  Goal: Will remain free from falls  Description: Will remain free from falls  6/27/2020 0543 by Davis Davidson RN  Outcome: Ongoing  Note: Fall precautions in place, call light within reach, bed in lowest position, side rails up x4 per ICU, bed alarm on. Hourly rounding in place. Problem: Falls - Risk of:  Goal: Absence of physical injury  Description: Absence of physical injury  Outcome: Ongoing  Note: No physical injury this shift. Problem: Pain Control  Goal: Maintain pain level at or below patient's acceptable level (or 5 if patient is unable to determine acceptable level)  6/27/2020 0543 by Davis Davidson RN  Outcome: Ongoing  Note: Denies pain. Pain goal 0/10. Repositioning and resting for care. Problem: Cardiovascular  Goal: Hemodynamic stability  Outcome: Ongoing  Note: Monitoring VS q1h. NSR. Problem: Respiratory  Goal: O2 Sat > 90%  Outcome: Ongoing  Note: Patient on 3L NC (home O2). O2 saturation > 90%. Problem: Skin Integrity/Risk  Goal: No skin breakdown during hospitalization  Outcome: Ongoing  Note: Redness to buttock and bilateral heels. Repositioning q2h and PRN. Pillow for support. Surgical site to left and right groin. Dressing intact. No hematoma or bruising. Pedal and tibia pulses present. Care plan reviewed with patient. Patient verbalized understanding of the plan of care and contribute to goal setting.

## 2020-06-27 NOTE — PLAN OF CARE
Problem: Respiratory  Goal: Pneumonia vaccine at discharge as needed  Outcome: Met This Shift     Problem: Falls - Risk of:  Goal: Will remain free from falls  Description: Will remain free from falls  6/27/2020 0950 by Tiana Portillo RN  Outcome: Ongoing  Note: Pt uses call light when needs something, pt alert and oriented x 4. Problem: Falls - Risk of:  Goal: Absence of physical injury  Description: Absence of physical injury  6/27/2020 0950 by Tiana Portillo RN  Outcome: Ongoing  Note: Pt uses call light and alert and oriented      Problem: Pain Control  Goal: Maintain pain level at or below patient's acceptable level (or 5 if patient is unable to determine acceptable level)  6/27/2020 0950 by Tiana Portillo RN  Outcome: Ongoing  Note: Pt c/o pain in groin and abdomen area medicated with pain medication per orders      Problem: Cardiovascular  Goal: Hemodynamic stability  6/27/2020 0950 by Tiana Portillo RN  Outcome: Ongoing  Note: Pt is hemodynamic stable      Problem: Respiratory  Goal: No pulmonary complications  Outcome: Ongoing  Note: Pt is on oxygen at 3L NC the same as at home      Problem: Respiratory  Goal: O2 Sat > 90%  6/27/2020 0950 by Tiana Portillo RN  Outcome: Ongoing  Note: Pt is on oxygen at 3L NC the same as at home      Problem: Respiratory  Goal: Supplemental O2 requirements decreased  Outcome: Ongoing  Note: Pt is on oxygen at 3L NC the same as at home      Problem: Respiratory  Goal: Agreement to quit smoking  Outcome: Ongoing  Note: Pt quit smoking years ago.      Problem: Skin Integrity/Risk  Goal: No skin breakdown during hospitalization  6/27/2020 0950 by Tiana Portillo RN  Outcome: Ongoing  Note: No skin breakdown noticed      Problem: Skin Integrity/Risk  Goal: Wound healing  Outcome: Ongoing  Note: Dressing are intact to bilateral groins      Problem: Pain Control  Goal: Improvement in pain related behaviors BP/HR WNL  Note: BP and HR are within normal    Care plan reviewed with patient and family. Patient and family verbalize understanding of the plan of care and contribute to goal setting.

## 2020-06-27 NOTE — PLAN OF CARE
Problem: Impaired respiratory status  Goal: Clear lung sounds  Description: Clear lung sounds     6/27/2020 0933 by Duane Washington RCP  Outcome: Ongoing  Note: Albuterol and Symbicort to improve breath sounds.

## 2020-06-27 NOTE — PLAN OF CARE
Problem: Impaired respiratory status  Goal: Clear lung sounds  Description: Clear lung sounds     Outcome: Ongoing  Note: Pt given Albuterol inhaler ans Symbicort inhaler to improve aeration and decrease wheezing. Pt requested nebulizers 4x daily, as he takes at home.  Dr. Mindy Munoz notified of pt request.

## 2020-06-28 ENCOUNTER — APPOINTMENT (OUTPATIENT)
Dept: GENERAL RADIOLOGY | Age: 80
DRG: 269 | End: 2020-06-28
Attending: THORACIC SURGERY (CARDIOTHORACIC VASCULAR SURGERY)
Payer: MEDICARE

## 2020-06-28 LAB
ANION GAP SERPL CALCULATED.3IONS-SCNC: 8 MEQ/L (ref 8–16)
BACTERIA: ABNORMAL
BILIRUBIN URINE: NEGATIVE
BLOOD, URINE: NEGATIVE
BUN BLDV-MCNC: 11 MG/DL (ref 7–22)
CALCIUM SERPL-MCNC: 8.5 MG/DL (ref 8.5–10.5)
CASTS: ABNORMAL /LPF
CASTS: ABNORMAL /LPF
CHARACTER, URINE: CLEAR
CHLORIDE BLD-SCNC: 101 MEQ/L (ref 98–111)
CO2: 30 MEQ/L (ref 23–33)
COLOR: YELLOW
CREAT SERPL-MCNC: 0.6 MG/DL (ref 0.4–1.2)
CRYSTALS: ABNORMAL
EPITHELIAL CELLS, UA: ABNORMAL /HPF
ERYTHROCYTE [DISTWIDTH] IN BLOOD BY AUTOMATED COUNT: 13.5 % (ref 11.5–14.5)
ERYTHROCYTE [DISTWIDTH] IN BLOOD BY AUTOMATED COUNT: 49.7 FL (ref 35–45)
GFR SERPL CREATININE-BSD FRML MDRD: > 90 ML/MIN/1.73M2
GLUCOSE BLD-MCNC: 128 MG/DL (ref 70–108)
GLUCOSE, URINE: 250 MG/DL
HCT VFR BLD CALC: 37.7 % (ref 42–52)
HEMOGLOBIN: 11.7 GM/DL (ref 14–18)
KETONES, URINE: 15
LEUKOCYTE EST, POC: ABNORMAL
MCH RBC QN AUTO: 31 PG (ref 26–33)
MCHC RBC AUTO-ENTMCNC: 31 GM/DL (ref 32.2–35.5)
MCV RBC AUTO: 100 FL (ref 80–94)
MISCELLANEOUS LAB TEST RESULT: ABNORMAL
MRSA SCREEN: NORMAL
NITRITE, URINE: NEGATIVE
PH UA: 5 (ref 5–9)
PLATELET # BLD: 142 THOU/MM3 (ref 130–400)
PMV BLD AUTO: 10.4 FL (ref 9.4–12.4)
POTASSIUM SERPL-SCNC: 3.9 MEQ/L (ref 3.5–5.2)
PROTEIN UA: NEGATIVE MG/DL
RBC # BLD: 3.77 MILL/MM3 (ref 4.7–6.1)
RBC URINE: ABNORMAL /HPF
RENAL EPITHELIAL, UA: ABNORMAL
SODIUM BLD-SCNC: 139 MEQ/L (ref 135–145)
SPECIFIC GRAVITY UA: 1.01 (ref 1–1.03)
UROBILINOGEN, URINE: 0.2 EU/DL (ref 0–1)
WBC # BLD: 18 THOU/MM3 (ref 4.8–10.8)
WBC UA: ABNORMAL /HPF
YEAST: ABNORMAL

## 2020-06-28 PROCEDURE — 87086 URINE CULTURE/COLONY COUNT: CPT

## 2020-06-28 PROCEDURE — 2060000000 HC ICU INTERMEDIATE R&B

## 2020-06-28 PROCEDURE — 6370000000 HC RX 637 (ALT 250 FOR IP): Performed by: THORACIC SURGERY (CARDIOTHORACIC VASCULAR SURGERY)

## 2020-06-28 PROCEDURE — 94760 N-INVAS EAR/PLS OXIMETRY 1: CPT

## 2020-06-28 PROCEDURE — 2580000003 HC RX 258: Performed by: THORACIC SURGERY (CARDIOTHORACIC VASCULAR SURGERY)

## 2020-06-28 PROCEDURE — 80048 BASIC METABOLIC PNL TOTAL CA: CPT

## 2020-06-28 PROCEDURE — 94640 AIRWAY INHALATION TREATMENT: CPT

## 2020-06-28 PROCEDURE — 85027 COMPLETE CBC AUTOMATED: CPT

## 2020-06-28 PROCEDURE — 36415 COLL VENOUS BLD VENIPUNCTURE: CPT

## 2020-06-28 PROCEDURE — 81001 URINALYSIS AUTO W/SCOPE: CPT

## 2020-06-28 PROCEDURE — 2709999900 HC NON-CHARGEABLE SUPPLY

## 2020-06-28 PROCEDURE — 2700000000 HC OXYGEN THERAPY PER DAY

## 2020-06-28 PROCEDURE — 71046 X-RAY EXAM CHEST 2 VIEWS: CPT

## 2020-06-28 PROCEDURE — 6360000002 HC RX W HCPCS: Performed by: THORACIC SURGERY (CARDIOTHORACIC VASCULAR SURGERY)

## 2020-06-28 RX ADMIN — IPRATROPIUM BROMIDE AND ALBUTEROL SULFATE 3 ML: .5; 3 SOLUTION RESPIRATORY (INHALATION) at 07:29

## 2020-06-28 RX ADMIN — POTASSIUM CHLORIDE 20 MEQ: 1500 TABLET, EXTENDED RELEASE ORAL at 17:32

## 2020-06-28 RX ADMIN — POTASSIUM CHLORIDE 20 MEQ: 1500 TABLET, EXTENDED RELEASE ORAL at 08:12

## 2020-06-28 RX ADMIN — Medication 2 PUFF: at 19:44

## 2020-06-28 RX ADMIN — Medication 10 ML: at 08:14

## 2020-06-28 RX ADMIN — ASPIRIN 81 MG: 81 TABLET ORAL at 08:12

## 2020-06-28 RX ADMIN — TAMSULOSIN HYDROCHLORIDE 0.4 MG: 0.4 CAPSULE ORAL at 20:03

## 2020-06-28 RX ADMIN — FUROSEMIDE 40 MG: 40 TABLET ORAL at 08:12

## 2020-06-28 RX ADMIN — IPRATROPIUM BROMIDE AND ALBUTEROL SULFATE 3 ML: .5; 3 SOLUTION RESPIRATORY (INHALATION) at 15:05

## 2020-06-28 RX ADMIN — HYDROCODONE BITARTRATE AND ACETAMINOPHEN 2 TABLET: 5; 325 TABLET ORAL at 18:47

## 2020-06-28 RX ADMIN — HYDROCODONE BITARTRATE AND ACETAMINOPHEN 1 TABLET: 5; 325 TABLET ORAL at 13:27

## 2020-06-28 RX ADMIN — PRAVASTATIN SODIUM 80 MG: 80 TABLET ORAL at 20:03

## 2020-06-28 RX ADMIN — Medication 2 PUFF: at 19:43

## 2020-06-28 RX ADMIN — ENOXAPARIN SODIUM 40 MG: 40 INJECTION SUBCUTANEOUS at 08:13

## 2020-06-28 RX ADMIN — Medication 10 ML: at 20:04

## 2020-06-28 RX ADMIN — MAGNESIUM HYDROXIDE 30 ML: 2400 SUSPENSION ORAL at 13:33

## 2020-06-28 RX ADMIN — HYDROCODONE BITARTRATE AND ACETAMINOPHEN 2 TABLET: 5; 325 TABLET ORAL at 08:51

## 2020-06-28 RX ADMIN — HYDROCODONE BITARTRATE AND ACETAMINOPHEN 2 TABLET: 5; 325 TABLET ORAL at 04:38

## 2020-06-28 RX ADMIN — Medication 2 PUFF: at 07:37

## 2020-06-28 RX ADMIN — Medication 2 PUFF: at 11:30

## 2020-06-28 ASSESSMENT — PAIN SCALES - GENERAL
PAINLEVEL_OUTOF10: 5
PAINLEVEL_OUTOF10: 3
PAINLEVEL_OUTOF10: 6
PAINLEVEL_OUTOF10: 6
PAINLEVEL_OUTOF10: 0
PAINLEVEL_OUTOF10: 7
PAINLEVEL_OUTOF10: 7
PAINLEVEL_OUTOF10: 0

## 2020-06-28 ASSESSMENT — PAIN DESCRIPTION - LOCATION
LOCATION: INCISION
LOCATION: INCISION

## 2020-06-28 ASSESSMENT — PAIN DESCRIPTION - PAIN TYPE: TYPE: SURGICAL PAIN

## 2020-06-28 ASSESSMENT — PAIN DESCRIPTION - ORIENTATION: ORIENTATION: LEFT

## 2020-06-28 ASSESSMENT — PAIN DESCRIPTION - FREQUENCY: FREQUENCY: CONTINUOUS

## 2020-06-28 ASSESSMENT — PAIN DESCRIPTION - DESCRIPTORS: DESCRIPTORS: ACHING

## 2020-06-28 ASSESSMENT — PAIN DESCRIPTION - ONSET: ONSET: ON-GOING

## 2020-06-28 ASSESSMENT — PAIN DESCRIPTION - PROGRESSION: CLINICAL_PROGRESSION: NOT CHANGED

## 2020-06-28 NOTE — PLAN OF CARE
Problem: Falls - Risk of:  Goal: Will remain free from falls  Description: Will remain free from falls  6/27/2020 2256 by Tammi Pena RN  Outcome: Ongoing  Note: Patient has remained free from falls throughout this shift. Patient's bed is in lowest position with bed alarm on. Patient is being rounded on every hour or as needed. Will continue to monitor. Problem: Respiratory  Goal: O2 Sat > 90%  6/27/2020 2256 by Tammi Pena RN  Outcome: Ongoing  Note: Patient's oxygen has been above 90% during this shift. Will continue to monitor. Problem: Skin Integrity/Risk  Goal: No skin breakdown during hospitalization  6/27/2020 2256 by Tammi Pena RN  Outcome: Ongoing  Note: Patient shows no signs of new skin breakdown during this shift. Patient turns self during this shift. Will continue to monitor. Problem: Discharge Planning:  Goal: Discharged to appropriate level of care  Description: Discharged to appropriate level of care  Outcome: Ongoing  Note: Patient to be discharged home alone when timing is appropriate. Problem: Pain:  Goal: Pain level will decrease  Description: Pain level will decrease  Outcome: Ongoing  Note: Patient experienced left leg pain during this shift. Patient receiving norco. Will continue to monitor. Care plan reviewed with patient and family. Patient and family verbalize understanding of the plan of care and contribute to goal setting.

## 2020-06-28 NOTE — PLAN OF CARE
Problem: Impaired respiratory status  Goal: Clear lung sounds  Description: Clear lung sounds     6/27/2020 2026 by Jm Christensen RCP  Outcome: Ongoing  Note: Treatment will be continued as ordered to improve aeration.

## 2020-06-28 NOTE — PLAN OF CARE
Problem: Falls - Risk of:  Goal: Will remain free from falls  Description: Will remain free from falls  Outcome: Met This Shift  Note: Up with staff at side. Bed alarm on while in chair, encourage call light usage Call light in reach     Problem: Cardiovascular  Goal: Hemodynamic stability  Outcome: Met This Shift  Note: Vitals stable and taken every 4 hrs     Problem: Respiratory  Goal: Agreement to quit smoking  Outcome: Met This Shift     Problem: Skin Integrity/Risk  Goal: No skin breakdown during hospitalization  Outcome: Met This Shift  Note: Prevented patch on coccyx      Problem: Pain Control  Goal: Maintain pain level at or below patient's acceptable level (or 5 if patient is unable to determine acceptable level)  Outcome: Ongoing  Note: Incisional pain at bilat groin. Norco given every 4 hrs for level \"6\" Will continue to monitor comfort. Pain level has improved     Problem: Respiratory  Goal: No pulmonary complications  Outcome: Ongoing  Note: Chest xray taken and breathing treatments. Lungs e wheeze and coarse     Problem: Discharge Planning:  Goal: Discharged to appropriate level of care  Description: Discharged to appropriate level of care  Outcome: Ongoing  Note: Plans to go home alone.  Daughter will check in and assist patient after discharge  Careplan reviewed with patient and patient states an understanding

## 2020-06-29 ENCOUNTER — APPOINTMENT (OUTPATIENT)
Dept: INTERVENTIONAL RADIOLOGY/VASCULAR | Age: 80
DRG: 269 | End: 2020-06-29
Attending: THORACIC SURGERY (CARDIOTHORACIC VASCULAR SURGERY)
Payer: MEDICARE

## 2020-06-29 LAB
ANION GAP SERPL CALCULATED.3IONS-SCNC: 7 MEQ/L (ref 8–16)
BUN BLDV-MCNC: 14 MG/DL (ref 7–22)
CALCIUM SERPL-MCNC: 8.2 MG/DL (ref 8.5–10.5)
CHLORIDE BLD-SCNC: 102 MEQ/L (ref 98–111)
CO2: 30 MEQ/L (ref 23–33)
CREAT SERPL-MCNC: 0.6 MG/DL (ref 0.4–1.2)
ERYTHROCYTE [DISTWIDTH] IN BLOOD BY AUTOMATED COUNT: 13.4 % (ref 11.5–14.5)
ERYTHROCYTE [DISTWIDTH] IN BLOOD BY AUTOMATED COUNT: 49.6 FL (ref 35–45)
GFR SERPL CREATININE-BSD FRML MDRD: > 90 ML/MIN/1.73M2
GLUCOSE BLD-MCNC: 130 MG/DL (ref 70–108)
HCT VFR BLD CALC: 35.5 % (ref 42–52)
HEMOGLOBIN: 11.2 GM/DL (ref 14–18)
MCH RBC QN AUTO: 31.5 PG (ref 26–33)
MCHC RBC AUTO-ENTMCNC: 31.5 GM/DL (ref 32.2–35.5)
MCV RBC AUTO: 99.7 FL (ref 80–94)
ORGANISM: ABNORMAL
PLATELET # BLD: 137 THOU/MM3 (ref 130–400)
PMV BLD AUTO: 10.8 FL (ref 9.4–12.4)
POTASSIUM SERPL-SCNC: 4 MEQ/L (ref 3.5–5.2)
RBC # BLD: 3.56 MILL/MM3 (ref 4.7–6.1)
SODIUM BLD-SCNC: 139 MEQ/L (ref 135–145)
URINE CULTURE, ROUTINE: ABNORMAL
WBC # BLD: 14.1 THOU/MM3 (ref 4.8–10.8)

## 2020-06-29 PROCEDURE — 6370000000 HC RX 637 (ALT 250 FOR IP): Performed by: THORACIC SURGERY (CARDIOTHORACIC VASCULAR SURGERY)

## 2020-06-29 PROCEDURE — 2580000003 HC RX 258: Performed by: THORACIC SURGERY (CARDIOTHORACIC VASCULAR SURGERY)

## 2020-06-29 PROCEDURE — 6370000000 HC RX 637 (ALT 250 FOR IP): Performed by: PHYSICIAN ASSISTANT

## 2020-06-29 PROCEDURE — 94760 N-INVAS EAR/PLS OXIMETRY 1: CPT

## 2020-06-29 PROCEDURE — 6360000002 HC RX W HCPCS: Performed by: THORACIC SURGERY (CARDIOTHORACIC VASCULAR SURGERY)

## 2020-06-29 PROCEDURE — 94669 MECHANICAL CHEST WALL OSCILL: CPT

## 2020-06-29 PROCEDURE — 36415 COLL VENOUS BLD VENIPUNCTURE: CPT

## 2020-06-29 PROCEDURE — 2700000000 HC OXYGEN THERAPY PER DAY

## 2020-06-29 PROCEDURE — 85027 COMPLETE CBC AUTOMATED: CPT

## 2020-06-29 PROCEDURE — 94640 AIRWAY INHALATION TREATMENT: CPT

## 2020-06-29 PROCEDURE — 80048 BASIC METABOLIC PNL TOTAL CA: CPT

## 2020-06-29 PROCEDURE — 93971 EXTREMITY STUDY: CPT

## 2020-06-29 PROCEDURE — 2060000000 HC ICU INTERMEDIATE R&B

## 2020-06-29 RX ADMIN — MAGNESIUM CITRATE 296 ML: 1.75 LIQUID ORAL at 16:22

## 2020-06-29 RX ADMIN — HYDROCODONE BITARTRATE AND ACETAMINOPHEN 2 TABLET: 5; 325 TABLET ORAL at 04:46

## 2020-06-29 RX ADMIN — IPRATROPIUM BROMIDE AND ALBUTEROL SULFATE 3 ML: .5; 3 SOLUTION RESPIRATORY (INHALATION) at 04:29

## 2020-06-29 RX ADMIN — Medication 10 ML: at 08:26

## 2020-06-29 RX ADMIN — FUROSEMIDE 40 MG: 40 TABLET ORAL at 08:25

## 2020-06-29 RX ADMIN — HYDROCODONE BITARTRATE AND ACETAMINOPHEN 2 TABLET: 5; 325 TABLET ORAL at 09:21

## 2020-06-29 RX ADMIN — ENOXAPARIN SODIUM 40 MG: 40 INJECTION SUBCUTANEOUS at 08:25

## 2020-06-29 RX ADMIN — Medication 10 ML: at 19:53

## 2020-06-29 RX ADMIN — Medication 2 PUFF: at 21:10

## 2020-06-29 RX ADMIN — TAMSULOSIN HYDROCHLORIDE 0.4 MG: 0.4 CAPSULE ORAL at 19:53

## 2020-06-29 RX ADMIN — POTASSIUM CHLORIDE 20 MEQ: 1500 TABLET, EXTENDED RELEASE ORAL at 16:22

## 2020-06-29 RX ADMIN — PRAVASTATIN SODIUM 80 MG: 80 TABLET ORAL at 19:53

## 2020-06-29 RX ADMIN — HYDROCODONE BITARTRATE AND ACETAMINOPHEN 2 TABLET: 5; 325 TABLET ORAL at 13:43

## 2020-06-29 RX ADMIN — ASPIRIN 81 MG: 81 TABLET ORAL at 08:25

## 2020-06-29 RX ADMIN — Medication 2 PUFF: at 09:00

## 2020-06-29 RX ADMIN — POTASSIUM CHLORIDE 20 MEQ: 1500 TABLET, EXTENDED RELEASE ORAL at 08:25

## 2020-06-29 RX ADMIN — HYDROCODONE BITARTRATE AND ACETAMINOPHEN 2 TABLET: 5; 325 TABLET ORAL at 17:50

## 2020-06-29 RX ADMIN — Medication 2 PUFF: at 13:26

## 2020-06-29 RX ADMIN — IPRATROPIUM BROMIDE AND ALBUTEROL SULFATE 3 ML: .5; 3 SOLUTION RESPIRATORY (INHALATION) at 17:29

## 2020-06-29 ASSESSMENT — PAIN DESCRIPTION - PROGRESSION
CLINICAL_PROGRESSION: NOT CHANGED

## 2020-06-29 ASSESSMENT — PAIN DESCRIPTION - LOCATION
LOCATION: INCISION;GROIN
LOCATION: GROIN;INCISION
LOCATION: GROIN;INCISION
LOCATION: INCISION;LEG
LOCATION: INCISION;GROIN
LOCATION: INCISION;LEG
LOCATION: INCISION;GROIN

## 2020-06-29 ASSESSMENT — PAIN SCALES - GENERAL
PAINLEVEL_OUTOF10: 7
PAINLEVEL_OUTOF10: 8
PAINLEVEL_OUTOF10: 0
PAINLEVEL_OUTOF10: 9
PAINLEVEL_OUTOF10: 7
PAINLEVEL_OUTOF10: 7
PAINLEVEL_OUTOF10: 8
PAINLEVEL_OUTOF10: 5
PAINLEVEL_OUTOF10: 9

## 2020-06-29 ASSESSMENT — PAIN - FUNCTIONAL ASSESSMENT

## 2020-06-29 ASSESSMENT — PAIN DESCRIPTION - FREQUENCY
FREQUENCY: CONTINUOUS

## 2020-06-29 ASSESSMENT — PAIN DESCRIPTION - DESCRIPTORS
DESCRIPTORS: BURNING

## 2020-06-29 ASSESSMENT — PAIN DESCRIPTION - ONSET
ONSET: ON-GOING

## 2020-06-29 ASSESSMENT — PAIN DESCRIPTION - PAIN TYPE
TYPE: SURGICAL PAIN

## 2020-06-29 ASSESSMENT — PAIN DESCRIPTION - ORIENTATION
ORIENTATION: RIGHT;LEFT
ORIENTATION: LEFT;UPPER
ORIENTATION: RIGHT;LEFT
ORIENTATION: LEFT;UPPER
ORIENTATION: RIGHT;LEFT
ORIENTATION: LEFT
ORIENTATION: RIGHT;LEFT

## 2020-06-29 ASSESSMENT — PAIN SCALES - WONG BAKER: WONGBAKER_NUMERICALRESPONSE: 0

## 2020-06-29 NOTE — PLAN OF CARE
Problem: Falls - Risk of:  Goal: Will remain free from falls  Description: Will remain free from falls  6/29/2020 0346 by Maricruz Vera RN  Outcome: Ongoing  Note: Patient has remained free from falls throughout this shift. Patient's bed is in lowest position with bed alarm on. Patient is being rounded on every hour or as needed. Will continue to monitor. Problem: Pain Control  Goal: Maintain pain level at or below patient's acceptable level (or 5 if patient is unable to determine acceptable level)  6/29/2020 0346 by Maricruz Vera RN  Outcome: Ongoing  Note: Patient experiencing pain in left leg during this shift. Patient given norco during this shift. This is an ongoing assessment & interventions are provided throughout shift. Reminded patient to report any pain, pressure, or shortness of breath to the nurse. Pain medications provided per physician's orders. Problem: Cardiovascular  Goal: Hemodynamic stability  6/29/2020 0346 by Maricruz Vera RN  Outcome: Ongoing  Note: Patient's vital signs have remained stable during this shift. Will continue to monitor. Problem: Respiratory  Goal: O2 Sat > 90%  Outcome: Ongoing  Note: Patient on 3 L N.C. at rest and 4L N. C. with exertion. Patient's oxygen has been above 90% during this shift. Will continue to monitor. Problem: Skin Integrity/Risk  Goal: No skin breakdown during hospitalization  6/29/2020 0346 by Maricruz Vera RN  Outcome: Ongoing  Note: Patient shows no signs of new skin breakdown during this shift. Patient able to turn self. Will continue to monitor. Problem: Discharge Planning:  Goal: Discharged to appropriate level of care  Description: Discharged to appropriate level of care  6/29/2020 0346 by Maricruz Vera RN  Note: Patient to be discharged home alone when timing is appropriate. Care plan reviewed with patient. Patient verbalizes understanding of the care plan and contributed to goal setting.

## 2020-06-29 NOTE — FLOWSHEET NOTE
06/29/20 7434   Provider Notification   Reason for Communication Review case;Evaluate   Provider Name Dr. Mary Beth Devine   Provider Notification Physician   Method of Communication Secure Message   Response No new orders   Upon 0400 assessment, patient complaining of left leg tingling and severe pain. This RN assessed patient legs, left was visibly more red than 0000 assessment and hot to touch. Patient had palpable pulses. This RN called [facility] nurse to assess patient. This RN notified Dr. Mary Beth Devine who stated he will evaluate during morning rounds.   No new orders at this time

## 2020-06-29 NOTE — PLAN OF CARE
Problem: Falls - Risk of:  Goal: Will remain free from falls  Description: Will remain free from falls  6/29/2020 1050 by Jono Thomas RN  Outcome: Ongoing  Note: Surgical pain,pain mediation side effects, SOB on exertion, continous oxygen support, left leg increase swelling,warmer than right and increase pain, BLE edema      Problem: Pain Control  Goal: Maintain pain level at or below patient's acceptable level (or 5 if patient is unable to determine acceptable level)  6/29/2020 1050 by Jono Thomas RN  Outcome: Ongoing  Flowsheets (Taken 6/29/2020 1050)  Patient's Stated Pain Goal: No pain  Note: Pain medication give prn for surgical discomfort      Problem: Respiratory  Goal: O2 Sat > 90%  6/29/2020 1050 by Jono Tohmas RN  Outcome: Ongoing  Note: Patient on 3 L/NC which is home dose, SOB on exertion, abnormal lung sounds and chest xray,monitor vital signs, breathing tx ordered      Problem: Skin Integrity/Risk  Goal: No skin breakdown during hospitalization  6/29/2020 1050 by Jono Thomas RN  Outcome: Ongoing  Note: Patient encourage to q2hr turn while in bed, continue to monitor skin, encourage protein and good nutrition, lovenox and asa given     Problem: Skin Integrity/Risk  Goal: Wound healing  Outcome: Ongoing  Note: Monitor s/s of infection at incision site, incision care q shift      Problem: Discharge Planning:  Goal: Discharged to appropriate level of care  Description: Discharged to appropriate level of care  6/29/2020 1050 by Jono Thomas RN  Outcome: Ongoing  Note: Discharge plan home when medically stable    Care plan reviewed with patient and daughter. Patient and daughter verbalize understanding of the plan of care and contribute to goal setting.

## 2020-06-29 NOTE — PLAN OF CARE
Problem: Impaired respiratory status  Goal: Clear lung sounds  Description: Clear lung sounds     Outcome: Ongoing  Note: PRN Duoneb given at this time to improve lung sounds.  Pt with wheezing and rhonchi, on 3L

## 2020-06-30 LAB
ANION GAP SERPL CALCULATED.3IONS-SCNC: 7 MEQ/L (ref 8–16)
BUN BLDV-MCNC: 12 MG/DL (ref 7–22)
CALCIUM SERPL-MCNC: 8.4 MG/DL (ref 8.5–10.5)
CHLORIDE BLD-SCNC: 99 MEQ/L (ref 98–111)
CO2: 32 MEQ/L (ref 23–33)
CREAT SERPL-MCNC: 0.5 MG/DL (ref 0.4–1.2)
ERYTHROCYTE [DISTWIDTH] IN BLOOD BY AUTOMATED COUNT: 13.5 % (ref 11.5–14.5)
ERYTHROCYTE [DISTWIDTH] IN BLOOD BY AUTOMATED COUNT: 49.3 FL (ref 35–45)
GFR SERPL CREATININE-BSD FRML MDRD: > 90 ML/MIN/1.73M2
GLUCOSE BLD-MCNC: 122 MG/DL (ref 70–108)
HCT VFR BLD CALC: 35.1 % (ref 42–52)
HEMOGLOBIN: 10.9 GM/DL (ref 14–18)
MCH RBC QN AUTO: 31.1 PG (ref 26–33)
MCHC RBC AUTO-ENTMCNC: 31.1 GM/DL (ref 32.2–35.5)
MCV RBC AUTO: 100.3 FL (ref 80–94)
PLATELET # BLD: 155 THOU/MM3 (ref 130–400)
PMV BLD AUTO: 10.8 FL (ref 9.4–12.4)
POTASSIUM SERPL-SCNC: 5 MEQ/L (ref 3.5–5.2)
RBC # BLD: 3.5 MILL/MM3 (ref 4.7–6.1)
SODIUM BLD-SCNC: 138 MEQ/L (ref 135–145)
WBC # BLD: 9.9 THOU/MM3 (ref 4.8–10.8)

## 2020-06-30 PROCEDURE — 6370000000 HC RX 637 (ALT 250 FOR IP): Performed by: THORACIC SURGERY (CARDIOTHORACIC VASCULAR SURGERY)

## 2020-06-30 PROCEDURE — 2060000000 HC ICU INTERMEDIATE R&B

## 2020-06-30 PROCEDURE — 2700000000 HC OXYGEN THERAPY PER DAY

## 2020-06-30 PROCEDURE — 80048 BASIC METABOLIC PNL TOTAL CA: CPT

## 2020-06-30 PROCEDURE — 94760 N-INVAS EAR/PLS OXIMETRY 1: CPT

## 2020-06-30 PROCEDURE — 6360000002 HC RX W HCPCS: Performed by: THORACIC SURGERY (CARDIOTHORACIC VASCULAR SURGERY)

## 2020-06-30 PROCEDURE — APPSS30 APP SPLIT SHARED TIME 16-30 MINUTES: Performed by: PHYSICIAN ASSISTANT

## 2020-06-30 PROCEDURE — 85027 COMPLETE CBC AUTOMATED: CPT

## 2020-06-30 PROCEDURE — 2580000003 HC RX 258: Performed by: THORACIC SURGERY (CARDIOTHORACIC VASCULAR SURGERY)

## 2020-06-30 PROCEDURE — 36415 COLL VENOUS BLD VENIPUNCTURE: CPT

## 2020-06-30 PROCEDURE — 94640 AIRWAY INHALATION TREATMENT: CPT

## 2020-06-30 RX ADMIN — Medication 10 ML: at 19:55

## 2020-06-30 RX ADMIN — HYDROCODONE BITARTRATE AND ACETAMINOPHEN 2 TABLET: 5; 325 TABLET ORAL at 13:40

## 2020-06-30 RX ADMIN — Medication 2 PUFF: at 21:51

## 2020-06-30 RX ADMIN — IPRATROPIUM BROMIDE AND ALBUTEROL SULFATE 3 ML: .5; 3 SOLUTION RESPIRATORY (INHALATION) at 18:42

## 2020-06-30 RX ADMIN — HYDROCODONE BITARTRATE AND ACETAMINOPHEN 2 TABLET: 5; 325 TABLET ORAL at 04:32

## 2020-06-30 RX ADMIN — ASPIRIN 81 MG: 81 TABLET ORAL at 07:47

## 2020-06-30 RX ADMIN — POTASSIUM CHLORIDE 20 MEQ: 1500 TABLET, EXTENDED RELEASE ORAL at 07:47

## 2020-06-30 RX ADMIN — HYDROCODONE BITARTRATE AND ACETAMINOPHEN 2 TABLET: 5; 325 TABLET ORAL at 09:24

## 2020-06-30 RX ADMIN — HYDROCODONE BITARTRATE AND ACETAMINOPHEN 2 TABLET: 5; 325 TABLET ORAL at 18:06

## 2020-06-30 RX ADMIN — Medication 2 PUFF: at 18:48

## 2020-06-30 RX ADMIN — HYDROCODONE BITARTRATE AND ACETAMINOPHEN 2 TABLET: 5; 325 TABLET ORAL at 23:01

## 2020-06-30 RX ADMIN — Medication 2 PUFF: at 08:38

## 2020-06-30 RX ADMIN — TAMSULOSIN HYDROCHLORIDE 0.4 MG: 0.4 CAPSULE ORAL at 19:55

## 2020-06-30 RX ADMIN — Medication 5 ML: at 07:54

## 2020-06-30 RX ADMIN — ENOXAPARIN SODIUM 40 MG: 40 INJECTION SUBCUTANEOUS at 07:47

## 2020-06-30 RX ADMIN — FUROSEMIDE 40 MG: 40 TABLET ORAL at 07:47

## 2020-06-30 RX ADMIN — PRAVASTATIN SODIUM 80 MG: 80 TABLET ORAL at 19:55

## 2020-06-30 RX ADMIN — HYDROCODONE BITARTRATE AND ACETAMINOPHEN 2 TABLET: 5; 325 TABLET ORAL at 00:25

## 2020-06-30 RX ADMIN — IPRATROPIUM BROMIDE AND ALBUTEROL SULFATE 3 ML: .5; 3 SOLUTION RESPIRATORY (INHALATION) at 13:15

## 2020-06-30 RX ADMIN — IPRATROPIUM BROMIDE AND ALBUTEROL SULFATE 3 ML: .5; 3 SOLUTION RESPIRATORY (INHALATION) at 08:38

## 2020-06-30 RX ADMIN — POTASSIUM CHLORIDE 20 MEQ: 1500 TABLET, EXTENDED RELEASE ORAL at 17:03

## 2020-06-30 RX ADMIN — Medication 10 ML: at 07:48

## 2020-06-30 ASSESSMENT — PAIN DESCRIPTION - ONSET: ONSET: ON-GOING

## 2020-06-30 ASSESSMENT — PAIN SCALES - GENERAL
PAINLEVEL_OUTOF10: 0
PAINLEVEL_OUTOF10: 9
PAINLEVEL_OUTOF10: 5
PAINLEVEL_OUTOF10: 7
PAINLEVEL_OUTOF10: 0
PAINLEVEL_OUTOF10: 9
PAINLEVEL_OUTOF10: 0

## 2020-06-30 ASSESSMENT — PAIN DESCRIPTION - LOCATION: LOCATION: INCISION;GROIN

## 2020-06-30 ASSESSMENT — PAIN DESCRIPTION - ORIENTATION: ORIENTATION: LEFT;RIGHT

## 2020-06-30 ASSESSMENT — PAIN DESCRIPTION - PROGRESSION: CLINICAL_PROGRESSION: NOT CHANGED

## 2020-06-30 ASSESSMENT — PAIN DESCRIPTION - DESCRIPTORS: DESCRIPTORS: BURNING;ACHING

## 2020-06-30 ASSESSMENT — PAIN DESCRIPTION - FREQUENCY: FREQUENCY: CONTINUOUS

## 2020-06-30 ASSESSMENT — PAIN DESCRIPTION - PAIN TYPE: TYPE: SURGICAL PAIN

## 2020-06-30 ASSESSMENT — PAIN - FUNCTIONAL ASSESSMENT: PAIN_FUNCTIONAL_ASSESSMENT: PREVENTS OR INTERFERES SOME ACTIVE ACTIVITIES AND ADLS

## 2020-06-30 NOTE — PLAN OF CARE
Problem: Impaired respiratory status  Goal: Clear lung sounds  Description: Clear lung sounds     6/30/2020 0841 by Doran Phalen, RCP  Outcome: Ongoing  Note:  Patient lung sounds are considered normal for their current lung condition. No signs of distress noted.  Current treatment regimen appropriate

## 2020-06-30 NOTE — PLAN OF CARE
Problem: Falls - Risk of:  Goal: Will remain free from falls  Description: Will remain free from falls  6/29/2020 2256 by Mukesh Bolaños RN  Outcome: Ongoing  Note: Patient remains free from falls. Call light in reach, bed alarm on, bed in lowest position. Rounding hourly. Problem: Falls - Risk of:  Goal: Absence of physical injury  Description: Absence of physical injury  Outcome: Ongoing  Note: No signs of physical injury at this time. Problem: Pain Control  Goal: Maintain pain level at or below patient's acceptable level (or 5 if patient is unable to determine acceptable level)  6/29/2020 2256 by Mukesh Bolaños RN  Outcome: Ongoing  Flowsheets (Taken 6/29/2020 1947)  Patient's Stated Pain Goal: No pain  Note: Pain Assessment: 0-10  Pain Level: 7   Patient's Stated Pain Goal: No pain   Is pain goal met at this time? Yes     Non-Pharmaceutical Pain Intervention(s): Cold applied, Repositioned      Problem: Cardiovascular  Goal: Hemodynamic stability  Outcome: Ongoing  Note: Vital signs remain WDL so far this shift. Problem: Respiratory  Goal: No pulmonary complications  Outcome: Ongoing  Note: Patients lungs ronchi throughout with expiratory wheezes and dyspnea with exertion. Receiving breathing treatments but refusing physiotherapy due to pain from surgical incisions. Problem: Respiratory  Goal: O2 Sat > 90%  6/29/2020 2256 by Mukesh Bolaños RN  Outcome: Ongoing  Note: O2 >90% on 3L NC which is patients home dose. Problem: Skin Integrity/Risk  Goal: No skin breakdown during hospitalization  6/29/2020 2256 by Mukesh Bolaños RN  Outcome: Ongoing  Note: Patients bottom is red but blanches. Encouraging to turn every 2 hours. Problem: Skin Integrity/Risk  Goal: Wound healing  6/29/2020 1050 by Mukesh Bolaños RN  Outcome: Ongoing  Note: Monitoring signs of infection at incision sites. Cleaning wounds every shift.      Problem: Discharge Planning:  Goal: Discharged to appropriate level of care  Description: Discharged to appropriate level of care  6/29/2020 2256 by Ban Russo RN  Outcome: Ongoing  Note: Patient is from home. Plan to return when medically stable. Problem: Pain:  Goal: Pain level will decrease  Description: Pain level will decrease  Outcome: Ongoing  Note: Patient receiving pain medications and using ice therapy to control pain. Pain appears controled at this time. Care plan reviewed with patient. Patient verbalize understanding of the plan of care and contribute to goal setting.

## 2020-06-30 NOTE — PLAN OF CARE
Problem: Impaired respiratory status  Goal: Clear lung sounds  Description: Clear lung sounds     6/29/2020 2115 by Merced Washingtno RCP  Outcome: Ongoing

## 2020-07-01 VITALS
SYSTOLIC BLOOD PRESSURE: 173 MMHG | RESPIRATION RATE: 20 BRPM | HEART RATE: 80 BPM | TEMPERATURE: 97.6 F | WEIGHT: 200.1 LBS | BODY MASS INDEX: 31.4 KG/M2 | HEIGHT: 67 IN | OXYGEN SATURATION: 94 % | DIASTOLIC BLOOD PRESSURE: 77 MMHG

## 2020-07-01 LAB
ANION GAP SERPL CALCULATED.3IONS-SCNC: 8 MEQ/L (ref 8–16)
BUN BLDV-MCNC: 11 MG/DL (ref 7–22)
CALCIUM SERPL-MCNC: 8.8 MG/DL (ref 8.5–10.5)
CHLORIDE BLD-SCNC: 96 MEQ/L (ref 98–111)
CO2: 31 MEQ/L (ref 23–33)
CREAT SERPL-MCNC: 0.5 MG/DL (ref 0.4–1.2)
ERYTHROCYTE [DISTWIDTH] IN BLOOD BY AUTOMATED COUNT: 13.3 % (ref 11.5–14.5)
ERYTHROCYTE [DISTWIDTH] IN BLOOD BY AUTOMATED COUNT: 48.5 FL (ref 35–45)
GFR SERPL CREATININE-BSD FRML MDRD: > 90 ML/MIN/1.73M2
GLUCOSE BLD-MCNC: 97 MG/DL (ref 70–108)
HCT VFR BLD CALC: 35.5 % (ref 42–52)
HEMOGLOBIN: 11.2 GM/DL (ref 14–18)
MCH RBC QN AUTO: 31.3 PG (ref 26–33)
MCHC RBC AUTO-ENTMCNC: 31.5 GM/DL (ref 32.2–35.5)
MCV RBC AUTO: 99.2 FL (ref 80–94)
PLATELET # BLD: 181 THOU/MM3 (ref 130–400)
PMV BLD AUTO: 10 FL (ref 9.4–12.4)
POTASSIUM SERPL-SCNC: 4.5 MEQ/L (ref 3.5–5.2)
RBC # BLD: 3.58 MILL/MM3 (ref 4.7–6.1)
SODIUM BLD-SCNC: 135 MEQ/L (ref 135–145)
WBC # BLD: 7.3 THOU/MM3 (ref 4.8–10.8)

## 2020-07-01 PROCEDURE — 94640 AIRWAY INHALATION TREATMENT: CPT

## 2020-07-01 PROCEDURE — 6370000000 HC RX 637 (ALT 250 FOR IP): Performed by: THORACIC SURGERY (CARDIOTHORACIC VASCULAR SURGERY)

## 2020-07-01 PROCEDURE — 85027 COMPLETE CBC AUTOMATED: CPT

## 2020-07-01 PROCEDURE — APPSS60 APP SPLIT SHARED TIME 46-60 MINUTES: Performed by: PHYSICIAN ASSISTANT

## 2020-07-01 PROCEDURE — 94760 N-INVAS EAR/PLS OXIMETRY 1: CPT

## 2020-07-01 PROCEDURE — 80048 BASIC METABOLIC PNL TOTAL CA: CPT

## 2020-07-01 PROCEDURE — 36415 COLL VENOUS BLD VENIPUNCTURE: CPT

## 2020-07-01 RX ORDER — HYDROCODONE BITARTRATE AND ACETAMINOPHEN 5; 325 MG/1; MG/1
1 TABLET ORAL EVERY 8 HOURS PRN
Qty: 21 TABLET | Refills: 0 | Status: SHIPPED | OUTPATIENT
Start: 2020-07-01 | End: 2020-07-08

## 2020-07-01 RX ADMIN — Medication 2 PUFF: at 08:16

## 2020-07-01 RX ADMIN — HYDROCODONE BITARTRATE AND ACETAMINOPHEN 2 TABLET: 5; 325 TABLET ORAL at 03:16

## 2020-07-01 RX ADMIN — POTASSIUM CHLORIDE 20 MEQ: 1500 TABLET, EXTENDED RELEASE ORAL at 09:01

## 2020-07-01 RX ADMIN — Medication 2 PUFF: at 08:19

## 2020-07-01 RX ADMIN — ASPIRIN 81 MG: 81 TABLET ORAL at 09:01

## 2020-07-01 RX ADMIN — HYDROCODONE BITARTRATE AND ACETAMINOPHEN 2 TABLET: 5; 325 TABLET ORAL at 09:01

## 2020-07-01 RX ADMIN — FUROSEMIDE 40 MG: 40 TABLET ORAL at 09:02

## 2020-07-01 ASSESSMENT — PAIN DESCRIPTION - PAIN TYPE: TYPE: SURGICAL PAIN

## 2020-07-01 ASSESSMENT — PAIN SCALES - GENERAL
PAINLEVEL_OUTOF10: 8
PAINLEVEL_OUTOF10: 8
PAINLEVEL_OUTOF10: 0
PAINLEVEL_OUTOF10: 8
PAINLEVEL_OUTOF10: 5

## 2020-07-01 ASSESSMENT — PAIN DESCRIPTION - ORIENTATION: ORIENTATION: RIGHT;LEFT

## 2020-07-01 ASSESSMENT — PAIN DESCRIPTION - DESCRIPTORS: DESCRIPTORS: BURNING

## 2020-07-01 ASSESSMENT — PAIN DESCRIPTION - FREQUENCY: FREQUENCY: INTERMITTENT

## 2020-07-01 ASSESSMENT — PAIN DESCRIPTION - LOCATION: LOCATION: INCISION;GROIN

## 2020-07-01 NOTE — DISCHARGE SUMMARY
had an unremarkable and progressive convalescence without adverse events. At time of discharge, Arielle Gaytan was alert, tolerating a regular diet, having bowel movements, ambulating on his own accord and had adequate analgesia on oral pain medications, and had no signs of any complications. Discharge Vitals:  height is 5' 7\" (1.702 m) and weight is 200 lb 1.6 oz (90.8 kg). His oral temperature is 98.2 °F (36.8 °C). His blood pressure is 139/69 and his pulse is 79. His respiration is 18 and oxygen saturation is 96%. DISCHARGE INSTRUCTIONS:  Discharge Medications:         Medication List      START taking these medications    HYDROcodone-acetaminophen 5-325 MG per tablet  Commonly known as:  NORCO  Take 1 tablet by mouth every 8 hours as needed for Pain for up to 7 days. CONTINUE taking these medications    ALBUTEROL IN     aspirin 81 MG EC tablet     ipratropium-albuterol 0.5-2.5 (3) MG/3ML Soln nebulizer solution  Commonly known as:  DUONEB  Take 3 mLs by nebulization every 6 hours as needed for Shortness of Breath     metoprolol tartrate 50 MG tablet  Commonly known as:  LOPRESSOR     pravastatin 80 MG tablet  Commonly known as:  PRAVACHOL     Symbicort 160-4.5 MCG/ACT Aero  Generic drug:  budesonide-formoterol     tamsulosin 0.4 MG capsule  Commonly known as:  FLOMAX           Where to Get Your Medications      You can get these medications from any pharmacy    Bring a paper prescription for each of these medications  · HYDROcodone-acetaminophen 5-325 MG per tablet       Diet: AHA diet as tolerated  Activity: As instructed on discharge, no driving while on narcotics. Aerobic activity (walking, climbing stairs, etc.) is encouraged. Wound Care: Clean wounds as instructed on discharge    Follow-up:    1   Follow up with Dao Farris MD 2-3 weeks  2. Follow up with Dr. Rocael Xiao in 1-2 weeks  3. The pt was agreeable to discharge and future plan of care. All questions were thoroughly answered.        Jes Cortes Nirav Sr   Electronincally signed 7/1/2020 at 8:09 AM    CC: Aurelio Zabala MD

## 2020-07-01 NOTE — PLAN OF CARE
Problem: Impaired respiratory status  Goal: Clear lung sounds  Description: Clear lung sounds     6/30/2020 2154 by Ericka Sparrow RCP  Outcome: Ongoing  Note: Treatment will be continued as ordered to improve aeration.

## 2020-07-01 NOTE — CARE COORDINATION
Client plans home alone when medically cleared, borrowing RW from family, has [facility] home oxygen 3L ATC  Patient goals/plan/ treatment preferences discussed by  and . Patient goals/plan/ treatment preferences reviewed with patient/ family. Patient/ family verbalize understanding of discharge plan and are in agreement with goal/plan/treatment preferences. Understanding was demonstrated using the teach back method. AVS provided by RN at time of discharge, which includes all necessary medical information pertaining to the patients current course of illness, treatment, post-discharge goals of care, and treatment preferences.         IMM Letter  IMM Letter given to Patient/Family/Significant other/Guardian/POA/by[de-identified]   IMM Letter date given[de-identified] 06/29/20  IMM Letter time given[de-identified] 7003
PCP: Fabiola Zambrano MD  Readmission 30 days or less: none  Readmission Risk Score: 14%    Patient Goals/Plan/Treatment Preferences: meet with client; denied needs as plans home alone independently and son lives close by and plans to borrow RW, daughter considering [facility] only if needed for therapy, has nebulizer, [employer] home oxygen 3L ATC; monitor for home oxygen eval if not weaned to baseline 3L; will ask physician  Transportation/Food Security/Housekeeping Addressed:  No issues identified.     Evaluation: no

## 2020-07-01 NOTE — PROGRESS NOTES
06/29/20 0457   Provider Notification   Reason for Communication Review case   Provider Name Dr. Tati Gross   Provider Notification Physician   Method of Communication Secure Message   Response See orders     Request to Dr. Tati Gross for vest therapy to help with wheezes/rhonchi that is not resolved with treatments. Orders placed.
1400 called and updated daughter Jinny See that pt is being transferred to 4K 19    1427 report called to [facility] on 4K    1450 pt transferred to 4K 19 per wheelchair, pt voided 175 ml of urine
1618: Patient arrived to PACU. Report received from [facility], [facility] and Cisco Jiménez and Rodolfo Luis RN. Patient placed on cardiac monitor. Patient intubated with bag valve attached. Spontaneous ventilation observed with OPA in place. To hook patient up to Co2 monitoring and give patient time to wake up. Dressings to bilateral groins clean, dry, intact. Bilateral sites are soft. 1930: Patient starting to open eyes when name is called. 1645: Dr. Meliton Beckham at bedside. Would like Co2 into 40's and patient responding before we try to extubate. 1657: Pt following commands. Able to squeeze hands, move bilateral legs and lift head. Pt suctioned orally and endotracheally and extubated. Placed on 6 liters per nc. Patient encouraged to cough. 1705: Pt continues with secretions. Suctioned orally. Thin, tan color. 1715: Report called to Cathie Corral RN.   1721: Update given to Dr. Meliton Beckham that patient wheezing and pulse ox 91-92% on 6L per nc. Orders for duoneb once and PRN bipap on the floor 12/6 (RT called to have bipap at bedside) and duoneb started at this time. 1730: patient meets pacu discharge criteria. Patient transported to ICU in stable condition on 6L per nc and cardiac monitoring. Report handoff given to Aleks Chu at bedside. Patient's daughter, Kareem Aguilar updated in waiting room.
CT/CV Surgery Progress Note    2020 8:50 AM    Subjective:  Mr. 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 He reports burning sensation over left thigh. He had a urine culture, which grew out Proteus. He is afebrile today. His white cell count is a 14.1 down from 18 yesterday. His left leg has mild swelling. He has a good palpable pulses over left dorsalis pedis artery. He was able to ambulate yesterday. All incisions are clean and dry. Vital Signs: BP (!) 122/56   Pulse 73   Temp 98.7 °F (37.1 °C) (Oral)   Resp 19   Ht 5' 7\" (1.702 m)   Wt 201 lb 4.8 oz (91.3 kg)   SpO2 95%   BMI 31.53 kg/m²    Temp (24hrs), Av.6 °F (37 °C), Min:98.2 °F (36.8 °C), Max:98.7 °F (37.1 °C)      PULSE OXIMETRY RANGE: SpO2  Av.1 %  Min: 93 %  Max: 97 %     Labs:   CBC:  Recent Labs     20  0344 20  0800 20  0416   WBC 14.8* 18.0* 14.1*   HGB 11.5* 11.7* 11.2*   HCT 37.1* 37.7* 35.5*   .9* 100.0* 99.7*    142 137     BMP:   Recent Labs     20  1620 20  0344 20  0800 20  0416   NA  --  141 139 139   K  --  3.7 3.9 4.0   CL  --  108 101 102   CO2  --  27 30 30   BUN  --  10 11 14   CREATININE  --  0.5 0.6 0.6   POCGLU 118*  --   --   --      Last HgA1C: No results found for: LABA1C    Imaging:  CXR: I have reviewed the CXR image.        Intake/Output Summary (Last 24 hours) at 2020 0850  Last data filed at 2020 0334  Gross per 24 hour   Intake 740 ml   Output 1425 ml   Net -685 ml       Scheduled Meds:    potassium (CARDIAC) replacement protocol   Other RX Placeholder    furosemide  40 mg Oral Daily    potassium chloride  20 mEq Oral BID WC   
Called Dr. Sandy Banks office and spoke with [facility] and let her know patient needs EKG prior to surgery due to being on hypertensive meds
Cardiovascular Surgery Progress Note      Up in chair, comfortable on NC (patient on home O2), alert  NIBPs 90s  Groin operative sites soft, no hematoma  Palpable L DP pulse  Moderate peripheral edema  -Lasix 40 po qd, with scheduled KCl  -D/c metoprolol for marginal hypotension  -Ambulate  -Transfer stepdown
In preparation for their surgical procedure above patient was screened for Obstructive Sleep Apnea (IRINA) using the STOP-Bang Questionnaire by the Pre-Admission Testing department. This is a pre-surgical screening tool for patient safety and serves as a recommendation, this WILL NOT cause cancellation of surgery.     STOP-Bang Questionnaire  * Do you currently see a pulmonologist?  Yes         Wears oxygen 3liters nasal cannula 24/7
NPO after midnight  [employer] and drivers license  Wear comfortable clean clothing  Do not bring jewelry  Bring medications in original bottles  Shower and wash hair with antibacterial soap morning of surgery  IS instruction given   To help prevent bowel problems after surgery we ask that you drink a hot beverage and eat an Activia yogurt with each meal starting 3-4 days before surgery.   Viewed open heart video  Follow all instructions give by your physician   needed at discharge  Covid screening questionnaire complete and negative for symptoms or exposure see chart for documentation
Patient contacted regarding COVID-19 screen. Following questions asked: In the last month, have you been in contact with someone who was confirmed or suspected to have Coronavirus/COVID-19:  Patient stated NO    Do you or family members have any of the following symptoms:  Cough-no   Muscle pain-no   Shortness of breath-no   Fever-no   Weakness-no  Severe headache-no   Sore throat-no   Respiratory symptoms-no    Have you traveled internationally in the last month?  No     Have you been to the emergency room recently-no
Patient contacted regarding COVID-19 screen. Voicemail let to call back.
Patient continues to refuse vest therapy. Pt states, \"That machine makes my incisions burn. \"
Patient reviewed discharge orders, incision care done with patient and daughter, denies any needs. Chart placed in yellow bin.
Pt and family oriented to [facility] rm 18 and unit. Pt verbalized approval for first name, last initial and physician name on unit whiteboard. Fall band applied. Vaccine information given. Plan of care reviewed.
Ref vest percussion
Spoke with Lg Rodriguez at Dr Jolly Marina office and she will call patient regarding needing to get his pre op testing done.
acute distress  Cardiovascular: normal rate, regular rhythm, normal S1 and S2, no murmurs, rubs, clicks, or gallops  Pulmonary/Chest: clear to auscultation bilaterally- no wheezes, rales or rhonchi, normal air movement, no respiratory distress  Neurological: alert, oriented, normal speech, no focal findings or movement disorder noted  Left leg: Clean and dry all incisions. Mild erythema changes in the left thigh medial portion. Good palpable pulses over left dorsalis pedis. Assessment:   Patient Active Problem List   Diagnosis    COPD [facility]ronic obstructive pulmonary disease) [facility] [address]    Transitional cell carcinoma of bladder ([facility] [address]    Adenomatous polyp    AAA (abdominal aortic aneurysm) ([facility] [address]    Endoleak post endovascular aneurysm repair [facility])   Status post stent graft repair of abdominal aortic aneurysm, resection and repair of left common femoral artery aneurysm with a Dacron graft. Progressing well. Positive urine culture. Plan:   1. We will arrange for venous duplex study to rule out deep vein thrombosis. 2. Waiting for urine culture and sensitivity report  3. Ambulation.         Jose Guadalupe Roberts PA-C

## 2020-07-08 ENCOUNTER — OFFICE VISIT (OUTPATIENT)
Dept: FAMILY MEDICINE CLINIC | Age: 80
End: 2020-07-08

## 2020-07-08 VITALS
TEMPERATURE: 97.4 F | RESPIRATION RATE: 24 BRPM | SYSTOLIC BLOOD PRESSURE: 112 MMHG | BODY MASS INDEX: 30.84 KG/M2 | WEIGHT: 196.5 LBS | HEIGHT: 67 IN | HEART RATE: 64 BPM | DIASTOLIC BLOOD PRESSURE: 60 MMHG

## 2020-07-08 PROBLEM — Z87.891 HISTORY OF TOBACCO USE: Status: ACTIVE | Noted: 2020-07-08

## 2020-07-08 PROBLEM — J96.11 CHRONIC HYPOXEMIC RESPIRATORY FAILURE (HCC): Status: ACTIVE | Noted: 2020-07-08

## 2020-07-08 PROCEDURE — 1111F DSCHRG MED/CURRENT MED MERGE: CPT | Performed by: FAMILY MEDICINE

## 2020-07-08 PROCEDURE — 99214 OFFICE O/P EST MOD 30 MIN: CPT | Performed by: FAMILY MEDICINE

## 2020-07-08 NOTE — PROGRESS NOTES
Post-Discharge Transitional Care Management Services or Hospital Follow Up      Eusebio Araujo   YOB: 1940    Date of Office Visit:  7/8/2020  Date of Hospital Admission: 6/26/20  Date of Hospital Discharge: 7/1/20  Risk of hospital readmission (high >=14%. Medium >=10%) :Readmission Risk Score: 13      Care management risk score Rising risk (score 2-5) and Complex Care (Scores >=6): 2     Non face to face  following discharge, date last encounter closed (first attempt may have been earlier): *No documented post hospital discharge outreach found in the last 14 days    Call initiated 2 business days of discharge: *No response recorded in the last 14 days    Patient Active Problem List   Diagnosis    Chronic obstructive lung disease ([facility] [address]    Transitional cell carcinoma of bladder ([facility] [address]    Adenomatous polyp    AAA (abdominal aortic aneurysm) ([facility] [address]    Endoleak post endovascular aneurysm repair ([facility] [address]    Chronic hypoxemic respiratory failure ([facility] [address]    History of tobacco use       No Known Allergies    Medications listed as ordered at the time of discharge from [facility] Medication Instructions KEN:    Printed on:07/08/20 5394   Medication Information                      ALBUTEROL IN  Inhale into the lungs 2puff 4x daily             aspirin 81 MG EC tablet  Take 81 mg by mouth daily. budesonide-formoterol (SYMBICORT) 160-4.5 MCG/ACT AERO  Inhale 2 puffs into the lungs 2 times daily. HYDROcodone-acetaminophen (NORCO) 5-325 MG per tablet  Take 1 tablet by mouth every 8 hours as needed for Pain for up to 7 days. ipratropium-albuterol (DUONEB) 0.5-2.5 (3) MG/3ML SOLN nebulizer solution  Take 3 mLs by nebulization every 6 hours as needed for Shortness of Breath             metoprolol (LOPRESSOR) 50 MG tablet  Take 50 mg by mouth daily. 1/2 tablet bid             pravastatin (PRAVACHOL) 80 MG tablet  Take 80 mg by mouth daily. tamsulosin (FLOMAX) 0.4 MG capsule  Take 0.4 mg by mouth daily                   Medications marked \"taking\" at this time  Outpatient Medications Marked as Taking for the 7/8/20 encounter (Office Visit) with Pablo Maguire MD   Medication Sig Dispense Refill    ALBUTEROL IN Inhale into the lungs 2puff 4x daily      tamsulosin (FLOMAX) 0.4 MG capsule Take 0.4 mg by mouth daily      ipratropium-albuterol (DUONEB) 0.5-2.5 (3) MG/3ML SOLN nebulizer solution Take 3 mLs by nebulization every 6 hours as needed for Shortness of Breath 360 vial 11    budesonide-formoterol (SYMBICORT) 160-4.5 MCG/ACT AERO Inhale 2 puffs into the lungs 2 times daily.  pravastatin (PRAVACHOL) 80 MG tablet Take 80 mg by mouth daily.  metoprolol (LOPRESSOR) 50 MG tablet Take 50 mg by mouth daily. 1/2 tablet bid      aspirin 81 MG EC tablet Take 81 mg by mouth daily. Medications patient taking as of now reconciled against medications ordered at time of hospital discharge: Yes    Chief Complaint   Patient presents with    Other     harmony       History of Present illness - Follow up of Hospital diagnosis(es): we reviewed the leg troubles he's had since the procedure    Inpatient course: Discharge summary reviewed- see chart. Interval history/Current status: there is swelling in the left lower leg. It's some better in the AM but the swelling comes back when he's up again. There is some swelling on the right lower leg. There was a doppler done in the hospital that was negative      A comprehensive review of systems was negative except for what was noted in the HPI. Vitals:    07/08/20 1449   BP: 112/60   Site: Right Upper Arm   Position: Sitting   Cuff Size: Medium Adult   Pulse: 64   Resp: 24   Temp: 97.4 °F (36.3 °C)   TempSrc: Oral   Weight: 196 lb 8 oz (89.1 kg)   Height: 5' 7\" (1.702 m)     Body mass index is 30.78 kg/m².    Wt Readings from Last 3 Encounters:   07/08/20 196 lb 8 oz (89.1 kg)   07/01/20 200 lb 1.6 oz (90.8 kg)   04/13/20 196 lb 3.2 oz (89 kg)     BP Readings from Last 3 Encounters:   07/08/20 112/60   07/01/20 (!) 173/77   06/26/20 107/60        Physical Exam:  General Appearance: alert and oriented to person, place and time, well-developed and well-nourished, in no acute distress  Skin: warm and dry, no rash or erythema  Head: normocephalic and atraumatic  Pulmonary/Chest: rhonchi present- bilaterally  Cardiovascular: normal rate, normal S1 and S2 and no murmurs  Abdomen: soft, non-tender, non-distended, normal bowel sounds, no masses or organomegaly  Extremities: no cyanosis, no clubbing and swelling is 3+ on the left and 1+ on the right lower legs, the stapled incisions are slightly red but look well otherwise  Musculoskeletal: normal range of motion, no joint swelling, deformity or tenderness  Neurologic: gait and coordination normal and speech normal    Assessment/Plan:   Diagnosis Orders   1. Endoleak post endovascular aneurysm repair, initial encounter ([facility] [address]     2.  Acute bronchitis, unspecified organism           Begin some over the counter iron at 325 mg twice daily for 3 weeks  See me in November     Medical Decision Making: high complexity

## 2020-07-08 NOTE — LETTER
[facility]  [id number] [place] [address]  Phone: [phone]  Fax: [phone]    Eleni Breaux MD        July 8, 2020     Patient: Lei Rao   YOB: 1940   Date of Visit: 7/8/2020       To Whom It May Concern: It is my medical opinion that Jessica Fernández requires a disability parking placard for the following reasons:  He cannot walk 200 feet without stopping to rest.  Duration of need: 5 years    If you have any questions or concerns, please don't hesitate to call.     Sincerely,        Eleni Breaux MD

## 2020-07-10 ENCOUNTER — TELEPHONE (OUTPATIENT)
Dept: PULMONOLOGY | Age: 80
End: 2020-07-10

## 2020-07-10 NOTE — TELEPHONE ENCOUNTER
Nancy Reynolds was a patient of yours and Dr Destiny Duncan several years back. Amanda has been seeing this patient most recently. His nebulizer machine  last night. . He uses [facility].  Can you order him a new one ??

## 2020-07-13 ENCOUNTER — OFFICE VISIT (OUTPATIENT)
Dept: CARDIOTHORACIC SURGERY | Age: 80
End: 2020-07-13

## 2020-07-13 VITALS
SYSTOLIC BLOOD PRESSURE: 118 MMHG | BODY MASS INDEX: 31.04 KG/M2 | HEART RATE: 64 BPM | DIASTOLIC BLOOD PRESSURE: 67 MMHG | WEIGHT: 197.8 LBS | HEIGHT: 67 IN | TEMPERATURE: 99.9 F

## 2020-07-13 PROCEDURE — 99024 POSTOP FOLLOW-UP VISIT: CPT | Performed by: THORACIC SURGERY (CARDIOTHORACIC VASCULAR SURGERY)

## 2020-07-13 RX ORDER — FERROUS SULFATE 325(65) MG
325 TABLET ORAL 2 TIMES DAILY
COMMUNITY
End: 2020-09-03

## 2020-07-13 NOTE — PROGRESS NOTES
CT/CV Surgery Progress Note    7/13/2020 10:17 AM    Subjective:  Mr. 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 He reports a good recovery. He denied any symptoms of claudication or ischemic rest pain of the left leg. He also denied any recent episodes of fever, chills, or drainage from the incision. He had mild to moderate degree of left leg swelling. He had a venous duplex study done on 6/29/2020, which showed no evidence of deep vein thrombosis. ROS: All neg unless specifically mentioned in subjective section. Exam:  General Appearance: alert ,conversing, in no acute distress  Cardiovascular: normal rate, regular rhythm, normal S1 and S2, no murmurs, rubs, clicks, or gallops  Pulmonary/Chest: clear to auscultation bilaterally- no wheezes, rales or rhonchi, normal air movement, no respiratory distress  Neurological: alert, oriented, normal speech, no focal findings or movement disorder noted  Bilateral groin: Incision well healing well. Left leg: Mild diffuse swelling. Good Doppler signals over femoral and popliteal artery. Good venous augmentation of Doppler signals over popliteal vein upon compression of left lower leg. 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 Return to cardiovascular surgery clinic with a CTA a of abdomen, pelvis, runoff in 2 weeks.         Azra Marquez MD

## 2020-07-23 ENCOUNTER — HOSPITAL ENCOUNTER (OUTPATIENT)
Dept: CT IMAGING | Age: 80
Discharge: HOME | End: 2020-07-23
Payer: MEDICARE

## 2020-07-23 PROCEDURE — 75635 CT ANGIO ABDOMINAL ARTERIES: CPT

## 2020-07-23 PROCEDURE — 6360000004 HC RX CONTRAST MEDICATION: Performed by: FAMILY MEDICINE

## 2020-07-23 RX ADMIN — IOPAMIDOL 125 ML: 755 INJECTION, SOLUTION INTRAVENOUS at 08:09

## 2020-08-04 ENCOUNTER — OFFICE VISIT (OUTPATIENT)
Dept: CARDIOTHORACIC SURGERY | Age: 80
End: 2020-08-04

## 2020-08-04 VITALS
OXYGEN SATURATION: 97 % | TEMPERATURE: 99.7 F | HEIGHT: 67 IN | SYSTOLIC BLOOD PRESSURE: 131 MMHG | WEIGHT: 199.2 LBS | BODY MASS INDEX: 31.27 KG/M2 | DIASTOLIC BLOOD PRESSURE: 63 MMHG | HEART RATE: 63 BPM

## 2020-08-04 PROCEDURE — 99024 POSTOP FOLLOW-UP VISIT: CPT | Performed by: THORACIC SURGERY (CARDIOTHORACIC VASCULAR SURGERY)

## 2020-08-04 NOTE — PROGRESS NOTES
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 He reports a good recovery. He denied any abdominal pain or leg pain. CTA of abdomen pelvis shows no endoleak, but a large cystic mass in the right groin possibility of lymphocele. He has no complain about that. Physical examination shows palpable dorsalis pedis bilaterally. Left leg has mild swelling. Both groin incision healed well without evidence of infection. Right groin cystic masses palpated. There is no pulses over the cystic mass. Assessment: Stable status post endoluminal graft repair of the AAA  and left common femoral artery aneurysm. Right groin cystic mass. Recommendation: Vascular ultrasounds in 1 to 2 days. CTA of abdomen and pelvis in 3-month.     Marcia Hernandez MD

## 2020-08-10 ENCOUNTER — HOSPITAL ENCOUNTER (OUTPATIENT)
Dept: INTERVENTIONAL RADIOLOGY/VASCULAR | Age: 80
Discharge: HOME | End: 2020-08-10
Payer: MEDICARE

## 2020-08-10 PROCEDURE — 93926 LOWER EXTREMITY STUDY: CPT

## 2020-08-27 ENCOUNTER — HOSPITAL ENCOUNTER (OUTPATIENT)
Dept: CT IMAGING | Age: 80
Discharge: HOME | End: 2020-08-27
Payer: MEDICARE

## 2020-08-27 PROCEDURE — 71250 CT THORAX DX C-: CPT

## 2020-09-03 ENCOUNTER — OFFICE VISIT (OUTPATIENT)
Dept: PULMONOLOGY | Age: 80
End: 2020-09-03
Payer: MEDICARE

## 2020-09-03 VITALS
DIASTOLIC BLOOD PRESSURE: 80 MMHG | RESPIRATION RATE: 22 BRPM | TEMPERATURE: 98.8 F | WEIGHT: 195.2 LBS | HEART RATE: 60 BPM | HEIGHT: 67 IN | SYSTOLIC BLOOD PRESSURE: 130 MMHG | BODY MASS INDEX: 30.64 KG/M2 | OXYGEN SATURATION: 97 %

## 2020-09-03 PROCEDURE — 3023F SPIROM DOC REV: CPT | Performed by: NURSE PRACTITIONER

## 2020-09-03 PROCEDURE — G8926 SPIRO NO PERF OR DOC: HCPCS | Performed by: NURSE PRACTITIONER

## 2020-09-03 PROCEDURE — 4040F PNEUMOC VAC/ADMIN/RCVD: CPT | Performed by: NURSE PRACTITIONER

## 2020-09-03 PROCEDURE — 99214 OFFICE O/P EST MOD 30 MIN: CPT | Performed by: NURSE PRACTITIONER

## 2020-09-03 PROCEDURE — G8427 DOCREV CUR MEDS BY ELIG CLIN: HCPCS | Performed by: NURSE PRACTITIONER

## 2020-09-03 PROCEDURE — 1036F TOBACCO NON-USER: CPT | Performed by: NURSE PRACTITIONER

## 2020-09-03 PROCEDURE — G8417 CALC BMI ABV UP PARAM F/U: HCPCS | Performed by: NURSE PRACTITIONER

## 2020-09-03 PROCEDURE — 1123F ACP DISCUSS/DSCN MKR DOCD: CPT | Performed by: NURSE PRACTITIONER

## 2020-09-03 ASSESSMENT — ENCOUNTER SYMPTOMS
DIARRHEA: 0
CHEST TIGHTNESS: 0
EYES NEGATIVE: 1
SHORTNESS OF BREATH: 1
VOMITING: 0
COUGH: 1
WHEEZING: 0
ABDOMINAL PAIN: 0
NAUSEA: 0

## 2020-09-03 NOTE — Clinical Note
Can you call [facility] pathology/ micro dept and see if they have any old results, pt reports Dr Juan Alberto Mcallister did lung biopsy in past , do not know how many years ago, I do not see anything scanned in media, nothing in [de-identified] old notes
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
## 2020-09-25 ENCOUNTER — TELEPHONE (OUTPATIENT)
Dept: PULMONOLOGY | Age: 80
End: 2020-09-25

## 2020-09-25 NOTE — TELEPHONE ENCOUNTER
----- Message from Tien Michele, CASEY - CNP sent at 9/3/2020 10:06 AM EDT -----  Can you call [facility] pathology/ micro dept and see if they have any old results, pt reports Dr Lundy Record did lung biopsy in past , do not know how many years ago, I do not see anything scanned in media, nothing in [de-identified] old notes

## 2020-10-12 NOTE — PROGRESS NOTES
Called [facility] X 2 No pathology for Henrik Parkinson or Marleen Daily

## 2020-11-05 ENCOUNTER — HOSPITAL ENCOUNTER (OUTPATIENT)
Dept: CT IMAGING | Age: 80
Discharge: HOME | End: 2020-11-05
Payer: MEDICARE

## 2020-11-05 LAB — POC CREATININE WHOLE BLOOD: 0.6 MG/DL (ref 0.5–1.2)

## 2020-11-05 PROCEDURE — 6360000004 HC RX CONTRAST MEDICATION: Performed by: THORACIC SURGERY (CARDIOTHORACIC VASCULAR SURGERY)

## 2020-11-05 PROCEDURE — 82565 ASSAY OF CREATININE: CPT

## 2020-11-05 PROCEDURE — 74174 CTA ABD&PLVS W/CONTRAST: CPT

## 2020-11-05 RX ADMIN — IOPAMIDOL 85 ML: 755 INJECTION, SOLUTION INTRAVENOUS at 09:30

## 2020-11-09 ENCOUNTER — OFFICE VISIT (OUTPATIENT)
Dept: CARDIOTHORACIC SURGERY | Age: 80
End: 2020-11-09
Payer: MEDICARE

## 2020-11-09 VITALS
HEART RATE: 61 BPM | HEIGHT: 67 IN | DIASTOLIC BLOOD PRESSURE: 72 MMHG | BODY MASS INDEX: 29.51 KG/M2 | SYSTOLIC BLOOD PRESSURE: 157 MMHG | WEIGHT: 188 LBS

## 2020-11-09 PROCEDURE — 99214 OFFICE O/P EST MOD 30 MIN: CPT | Performed by: THORACIC SURGERY (CARDIOTHORACIC VASCULAR SURGERY)

## 2020-11-09 PROCEDURE — G8417 CALC BMI ABV UP PARAM F/U: HCPCS | Performed by: THORACIC SURGERY (CARDIOTHORACIC VASCULAR SURGERY)

## 2020-11-09 PROCEDURE — G8427 DOCREV CUR MEDS BY ELIG CLIN: HCPCS | Performed by: THORACIC SURGERY (CARDIOTHORACIC VASCULAR SURGERY)

## 2020-11-09 PROCEDURE — 1123F ACP DISCUSS/DSCN MKR DOCD: CPT | Performed by: THORACIC SURGERY (CARDIOTHORACIC VASCULAR SURGERY)

## 2020-11-09 PROCEDURE — G8482 FLU IMMUNIZE ORDER/ADMIN: HCPCS | Performed by: THORACIC SURGERY (CARDIOTHORACIC VASCULAR SURGERY)

## 2020-11-09 PROCEDURE — 4040F PNEUMOC VAC/ADMIN/RCVD: CPT | Performed by: THORACIC SURGERY (CARDIOTHORACIC VASCULAR SURGERY)

## 2020-11-09 PROCEDURE — 1036F TOBACCO NON-USER: CPT | Performed by: THORACIC SURGERY (CARDIOTHORACIC VASCULAR SURGERY)

## 2020-11-09 NOTE — PROGRESS NOTES
tablet, Take 50 mg by mouth daily. 1/2 tablet bid, Disp: , Rfl:     aspirin 81 MG EC tablet, Take 81 mg by mouth daily. , Disp: , Rfl:     Family History: This patient's family history is not on file. Social History:  Rani Salvador  reports that he quit smoking about 13 years ago. His smoking use included cigarettes. He has a 45.00 pack-year smoking history. He has never used smokeless tobacco. He reports current alcohol use. He reports that he does not use drugs. Vital Signs:   BP (!) 157/72 (Site: Left Upper Arm, Position: Sitting, Cuff Size: Medium Adult)   Pulse 61   Ht 5' 7\" (1.702 m)   Wt 188 lb (85.3 kg)   BMI 29.44 kg/m²     ROS:   Constitutional: Negative for activity change, chills, fatigue, fever and unexpected weight change. Respiratory: Negative for apnea, shortness of breath, wheezing and stridor. Cardiovascular: Negative for chest pain, palpitations and leg swelling. Gastrointestinal: Negative for hematochezia, melana, constipation, and N/V/D. Musculoskeletal: Negative for myalgias  Skin: Negative for color change, rash and wound. Neurological: Negative for dizziness or syncope. Physical Exam:  General appearance:  No acute distress, appears stated age and cooperative. Neck: No jugular venous distention. Trachea midline. No carotid bruits. Respiratory:  Normal respiratory effort. Clear to auscultation, bilaterally without Rales/Wheezes/Rhonch. Cardiovascular:  Regular rate and rhythm with normal S1/S2 without murmurs, rubs or gallops. Abdomen: Soft, non-tender, non-distended with normal bowel sounds. Ext: No clubbing, cyanosis or edema bilaterally. Full range of motion without deformity. Skin: Skin color, texture, turgor normal.  No rashes or lesions. Neurologic:  Neurovascularly intact without any focal sensory/motor deficits. Psychiatric:  Alert and oriented, thought content appropriate, normal insight.    Capillary Refill: Brisk,< 3 seconds   Peripheral Pulses: Palpable femoral artery, and dorsalis pedis artery bilaterally. A small palpable cyst in the right groin. Labs:    CBC:  Lab Results   Component Value Date    WBC 7.3 07/01/2020    HGB 11.2 07/01/2020    HCT 35.5 07/01/2020    MCV 99.2 07/01/2020     07/01/2020    INR 0.93 06/26/2020     BMP:   Lab Results   Component Value Date     07/01/2020    K 4.5 07/01/2020    K 4.3 06/26/2020    CL 96 07/01/2020    CO2 31 07/01/2020    BUN 11 07/01/2020    CREATININE 0.5 07/01/2020       Imaging: I have reviewed CTA. Problem List:  Patient Active Problem List   Diagnosis    Chronic obstructive lung disease ([address]    Transitional cell carcinoma of bladder ([address]    Adenomatous polyp    AAA (abdominal aortic aneurysm) ([address]    Endoleak post endovascular aneurysm repair ([address]    Chronic hypoxemic respiratory failure ([address]    History of tobacco use       Assessment: Stable status post EVAR and left common femoral artery aneurysm repair. Plan 11/9/20:  1) CTA and follow-through in 6 months. Thank you for allowing us to be involved in the patient's care.     Electronically by Jose Carrillo MD  on 11/9/2020 at 9:14 AM

## 2020-11-10 ENCOUNTER — TELEPHONE (OUTPATIENT)
Dept: CARDIOTHORACIC SURGERY | Age: 80
End: 2020-11-10

## 2020-11-10 NOTE — TELEPHONE ENCOUNTER
CTA -  Abd/ Pelvis 05.06.21 at 8am       Dr. Tommy Joe please agree with Creatinine order prior to CTA.

## 2021-02-26 ENCOUNTER — IMMUNIZATION (OUTPATIENT)
Dept: PRIMARY CARE CLINIC | Age: 81
End: 2021-02-26
Payer: MEDICARE

## 2021-02-26 PROCEDURE — 91300 COVID-19, PFIZER VACCINE 30MCG/0.3ML DOSE: CPT | Performed by: FAMILY MEDICINE

## 2021-02-26 PROCEDURE — 0001A COVID-19, PFIZER VACCINE 30MCG/0.3ML DOSE: CPT | Performed by: FAMILY MEDICINE

## 2021-03-19 ENCOUNTER — IMMUNIZATION (OUTPATIENT)
Dept: PRIMARY CARE CLINIC | Age: 81
End: 2021-03-19
Payer: MEDICARE

## 2021-03-19 PROCEDURE — 91300 COVID-19, PFIZER VACCINE 30MCG/0.3ML DOSE: CPT

## 2021-03-19 PROCEDURE — 0002A COVID-19, PFIZER VACCINE 30MCG/0.3ML DOSE: CPT

## 2021-03-30 ENCOUNTER — TELEPHONE (OUTPATIENT)
Dept: PULMONOLOGY | Age: 81
End: 2021-03-30

## 2021-03-30 DIAGNOSIS — J44.9 COPD (CHRONIC OBSTRUCTIVE PULMONARY DISEASE) (HCC): ICD-10-CM

## 2021-03-30 RX ORDER — BUDESONIDE AND FORMOTEROL FUMARATE DIHYDRATE 160; 4.5 UG/1; UG/1
2 AEROSOL RESPIRATORY (INHALATION) 2 TIMES DAILY
Qty: 1 INHALER | Refills: 3 | Status: SHIPPED | OUTPATIENT
Start: 2021-03-30 | End: 2021-04-20 | Stop reason: SDUPTHER

## 2021-03-30 NOTE — TELEPHONE ENCOUNTER
Received a message from Maggie that patient's CT chest has not been approved. Left message for pt to call me back about rescheduling out a little farther.   Patient is currently scheduled for 3/31/2021

## 2021-03-30 NOTE — TELEPHONE ENCOUNTER
Date of last visit:  7/8/2020  Date of next visit:  Visit date not found    Requested Prescriptions     Pending Prescriptions Disp Refills    budesonide-formoterol (SYMBICORT) 160-4.5 MCG/ACT AERO 1 Inhaler      Sig: Inhale 2 puffs into the lungs 2 times daily

## 2021-03-30 NOTE — TELEPHONE ENCOUNTER
Ansley Wilburn called back and verbalized understanding to not get his CT scan done until we receive authorization.

## 2021-03-30 NOTE — TELEPHONE ENCOUNTER
Pt called requesting Rx for Symbicort be faxed to the [facility]. Pt last seen 7/8/20.   No future appts

## 2021-04-05 NOTE — TELEPHONE ENCOUNTER
Patient called back and rescheduled office visit.   Transferred pt to central scheduling to reschedule CT chest.

## 2021-04-05 NOTE — TELEPHONE ENCOUNTER
Left message for patient to call back. Patient's CT has been approved. Will need to reschedule testing and appointment.  Advanced Medicine ([facility]):

## 2021-04-09 ENCOUNTER — HOSPITAL ENCOUNTER (OUTPATIENT)
Dept: CT IMAGING | Age: 81
Discharge: HOME | End: 2021-04-09
Payer: MEDICARE

## 2021-04-09 DIAGNOSIS — Z86.11 PERSONAL HISTORY OF TUBERCULOSIS: ICD-10-CM

## 2021-04-09 DIAGNOSIS — J44.9 STAGE 3 SEVERE COPD BY GOLD CLASSIFICATION (HCC): ICD-10-CM

## 2021-04-09 DIAGNOSIS — Z77.090 HISTORY OF ASBESTOS EXPOSURE: ICD-10-CM

## 2021-04-09 DIAGNOSIS — Z87.891 PERSONAL HISTORY OF TOBACCO USE: ICD-10-CM

## 2021-04-09 PROCEDURE — 71250 CT THORAX DX C-: CPT

## 2021-04-20 ENCOUNTER — OFFICE VISIT (OUTPATIENT)
Dept: PULMONOLOGY | Age: 81
End: 2021-04-20
Payer: MEDICARE

## 2021-04-20 VITALS
WEIGHT: 188.2 LBS | HEIGHT: 67 IN | HEART RATE: 59 BPM | BODY MASS INDEX: 29.54 KG/M2 | SYSTOLIC BLOOD PRESSURE: 132 MMHG | DIASTOLIC BLOOD PRESSURE: 68 MMHG | TEMPERATURE: 98.7 F | OXYGEN SATURATION: 97 %

## 2021-04-20 DIAGNOSIS — Z77.090 HISTORY OF ASBESTOS EXPOSURE: ICD-10-CM

## 2021-04-20 DIAGNOSIS — J44.9 STAGE 3 SEVERE COPD BY GOLD CLASSIFICATION (HCC): Primary | ICD-10-CM

## 2021-04-20 DIAGNOSIS — I71.20 THORACIC AORTIC ANEURYSM WITHOUT RUPTURE: ICD-10-CM

## 2021-04-20 DIAGNOSIS — Z86.11 PERSONAL HISTORY OF TUBERCULOSIS: ICD-10-CM

## 2021-04-20 DIAGNOSIS — R91.8 PULMONARY NODULES: ICD-10-CM

## 2021-04-20 DIAGNOSIS — Z87.891 PERSONAL HISTORY OF TOBACCO USE: ICD-10-CM

## 2021-04-20 PROCEDURE — 4040F PNEUMOC VAC/ADMIN/RCVD: CPT | Performed by: NURSE PRACTITIONER

## 2021-04-20 PROCEDURE — G8926 SPIRO NO PERF OR DOC: HCPCS | Performed by: NURSE PRACTITIONER

## 2021-04-20 PROCEDURE — 1123F ACP DISCUSS/DSCN MKR DOCD: CPT | Performed by: NURSE PRACTITIONER

## 2021-04-20 PROCEDURE — G8417 CALC BMI ABV UP PARAM F/U: HCPCS | Performed by: NURSE PRACTITIONER

## 2021-04-20 PROCEDURE — 3023F SPIROM DOC REV: CPT | Performed by: NURSE PRACTITIONER

## 2021-04-20 PROCEDURE — 99214 OFFICE O/P EST MOD 30 MIN: CPT | Performed by: NURSE PRACTITIONER

## 2021-04-20 PROCEDURE — G8427 DOCREV CUR MEDS BY ELIG CLIN: HCPCS | Performed by: NURSE PRACTITIONER

## 2021-04-20 PROCEDURE — 1036F TOBACCO NON-USER: CPT | Performed by: NURSE PRACTITIONER

## 2021-04-20 RX ORDER — IPRATROPIUM BROMIDE AND ALBUTEROL SULFATE 2.5; .5 MG/3ML; MG/3ML
1 SOLUTION RESPIRATORY (INHALATION) 4 TIMES DAILY
Qty: 360 VIAL | Refills: 3 | Status: SHIPPED | OUTPATIENT
Start: 2021-04-20

## 2021-04-20 RX ORDER — BUDESONIDE AND FORMOTEROL FUMARATE DIHYDRATE 160; 4.5 UG/1; UG/1
2 AEROSOL RESPIRATORY (INHALATION) 2 TIMES DAILY
Qty: 3 INHALER | Refills: 3 | Status: SHIPPED | OUTPATIENT
Start: 2021-04-20 | End: 2022-04-19 | Stop reason: CLARIF

## 2021-04-20 ASSESSMENT — ENCOUNTER SYMPTOMS
WHEEZING: 0
ABDOMINAL PAIN: 0
COUGH: 1
NAUSEA: 0
DIARRHEA: 0
SHORTNESS OF BREATH: 0
EYES NEGATIVE: 1
VOMITING: 0

## 2021-04-20 NOTE — PROGRESS NOTES
[facility]     Patient: Kate Roper, 80 y.o.   : 1940    Pt of Aileen Hudson CNP      Subjective     Chief Complaint   Patient presents with    Follow-up     Pulm 6 month follow up with CT chest on 2021        HPI  Rachel Solorzano is here for 6 month  follow up for COPD /abnormal CT chest     Interval history :  Ct lung screen from 11/15/19 revealed  Development of an ovoid pleural based nodular density posterior right lung base. LUNG RADS 4B, pt decided to proceed with PET/CT. Showing small mild FDG avid nodules and nodular densities in right lower lung. The degree of activity is suggestive of a benign infectious/ inflammatory process. , however the nodules were below threshold of resolution for PET imaging so malignancy could not be excluded. Ct 2020: mild atelctasis/ pneumonia anterior segment left upper lobe . Several small scattered nodular densities , pleural based in right lung, likely fibrotic scarring. CT chest - Scattered micronodular densities are demonstrated within the right lower lobe, lingula and lateral left upper lobe which appears similar in distribution and extent when compared to prior examination. This may represent an inflammatory or infectious   process to include sequela from atypical mycobacterial infectious etiologies. Recommend continued follow-up. 2. There is an ill-defined nodular density abutting the pleura posteriorly at the right lung base which appears unchanged from prior examination. This is seen on axial image 44.       3. There is bronchiectasis noted within the right lower lobe and lingula. Pt reports he has been through all types of testing and procedures in past for his lung nodules, states had a lung [facility] TB  while in [facility] was treated.  Known asbestos exposure in [employer] and breathing chemical exposures > 19 years    Former smoker     Current HPI  Presents with 6 mo. F/u CT   Since last visit: hospitalized for elective AAA repair 6/26/2020  No respiratory issues with surgery . Denies any recent exacerbations  Underlying bronchiectasis : chronic cough, is able to expel mucous , no recent oral atb  On 3LPM O2 ATC ([employer] is DME)   No personal history of COVID 19, had both Pfizer vaccines  Last spirometry 2018  ROS   Review of Systems   Constitutional: Negative for activity change, appetite change, chills, fatigue and fever. HENT: Negative. Eyes: Negative. Respiratory: Positive for cough. Negative for shortness of breath (on exertion ) and wheezing. Cardiovascular: Negative for chest pain, palpitations and leg swelling. Gastrointestinal: Negative for abdominal pain, diarrhea, nausea and vomiting. Genitourinary: Negative. Musculoskeletal: Negative. Skin: Negative. Neurological: Negative. Hematological: Does not bruise/bleed easily. Psychiatric/Behavioral: Negative for sleep disturbance and suicidal ideas. Physical exam   /68 (Site: Left Upper Arm, Position: Sitting, Cuff Size: Large Adult)   Pulse 59   Temp 98.7 °F (37.1 °C) (Temporal)   Ht 5' 7\" (1.702 m)   Wt 188 lb 3.2 oz (85.4 kg)   SpO2 97% Comment: Room air at rest  BMI 29.48 kg/m²      Wt Readings from Last 3 Encounters:   04/20/21 188 lb 3.2 oz (85.4 kg)   11/09/20 188 lb (85.3 kg)   09/03/20 195 lb 3.2 oz (88.5 kg)     Physical Exam  Vitals signs and nursing note reviewed. Constitutional:       General: He is not in acute distress. Appearance: Normal appearance. He is well-developed. Interventions: Nasal cannula and face mask in place. HENT:      Mouth/Throat:      Lips: Pink. Mouth: Mucous membranes are moist.      Pharynx: Oropharynx is clear. No oropharyngeal exudate or posterior oropharyngeal erythema. Eyes:      Conjunctiva/sclera: Conjunctivae normal.   Neck:      Vascular: No JVD.    Cardiovascular:      Rate and Rhythm: Normal rate and regular rhythm. Heart sounds: No murmur. No friction rub. Pulmonary:      Effort: Pulmonary effort is normal. No accessory muscle usage or respiratory distress. Breath sounds: Examination of the right-lower field reveals rales. Rales present. No wheezing or rhonchi. Chest:      Chest wall: No tenderness. Musculoskeletal:      Right lower leg: No edema. Left lower leg: No edema. Skin:     General: Skin is warm and dry. Capillary Refill: Capillary refill takes less than 2 seconds. Nails: There is no clubbing. Neurological:      Mental Status: He is alert. Psychiatric:         Mood and Affect: Mood normal.         Behavior: Behavior normal.         Thought Content: Thought content normal.         Judgment: Judgment normal.          Test results   Lung Nodule Screening     [x] Qualifies    []Does not qualify   [] Declined    [x] Completed       1. Stable CT scan of the chest, no significant interval change since previous study dated 27 August 2020.   2. Scattered micronodular densities especially in the right lower lobe, lingula and to lesser extent right and left upper lobes. These findings may be secondary to inflammatory or infectious process or atypical mycobacterial infection. 3. Bronchiectasis especially in the right lower lobe and lingula. Mild upper lobe predominant centrilobular emphysema. 4. Aneurysmal dilatation of the ascending and descending thoracic aorta, unchanged. 5. Borderline cardiomegaly. 6. Thoracic spondylosis. 7. Small right adrenal nodule, gallstones and endoluminal graft in the abdominal aorta, unchanged.         There is a atherosclerotic calcification in the aorta and dilated ascending aorta which measures 4.9 cm in diameter. The descending thoracic aorta measures 3.9 cm in diameter.            Assessment      Diagnosis Orders   1.  Stage 3 severe COPD by GOLD classification (HCC)  budesonide-formoterol (SYMBICORT) 160-4.5 MCG/ACT AERO    ipratropium-albuterol (DUONEB) 0.5-2.5 (3) MG/3ML SOLN nebulizer solution   2. Personal history of tuberculosis     3. Personal history of tobacco use     4. History of asbestos exposure     5. Pulmonary nodules     6. Thoracic aortic aneurysm without rupture [facility])         Plan    Continue your Symbicort and Duonebs in nebulizer- scripts printed and faxed to [facility] today  Keep follow ups with Dr Rodríguez Rios for aneurysm management, thoracic aneurysm stable, needs monitored  Continue oxygen at 3LPM   Can take over the counter Muccinex if needed for any increased mucous or trouble getting mucous out.    Call if any worsening respiratory symptoms   Will plan to order annual CT and spirometry at next visit     Total time spent on encounter was 30 minutes    Will see Christy Malik in: 6 months    Electronically signed by CASEY Urbano CNP on 4/20/2021 at 9:09 AM

## 2021-05-01 ENCOUNTER — HOSPITAL ENCOUNTER (OUTPATIENT)
Age: 81
Discharge: HOME | End: 2021-05-01
Payer: MEDICARE

## 2021-05-01 DIAGNOSIS — I71.40 ABDOMINAL AORTIC ANEURYSM (AAA) WITHOUT RUPTURE: ICD-10-CM

## 2021-05-01 LAB
CREAT SERPL-MCNC: 0.6 MG/DL (ref 0.4–1.2)
GFR SERPL CREATININE-BSD FRML MDRD: > 90 ML/MIN/1.73M2

## 2021-05-01 PROCEDURE — 82565 ASSAY OF CREATININE: CPT

## 2021-05-01 PROCEDURE — 36415 COLL VENOUS BLD VENIPUNCTURE: CPT

## 2021-05-06 ENCOUNTER — HOSPITAL ENCOUNTER (OUTPATIENT)
Dept: CT IMAGING | Age: 81
Discharge: HOME | End: 2021-05-06
Payer: MEDICARE

## 2021-05-06 DIAGNOSIS — I71.40 ABDOMINAL AORTIC ANEURYSM (AAA) WITHOUT RUPTURE: ICD-10-CM

## 2021-05-06 PROCEDURE — 6360000004 HC RX CONTRAST MEDICATION: Performed by: FAMILY MEDICINE

## 2021-05-06 PROCEDURE — 74174 CTA ABD&PLVS W/CONTRAST: CPT

## 2021-05-06 RX ADMIN — IOPAMIDOL 80 ML: 755 INJECTION, SOLUTION INTRAVENOUS at 08:34

## 2021-05-10 ENCOUNTER — HOSPITAL ENCOUNTER (OUTPATIENT)
Dept: ULTRASOUND IMAGING | Age: 81
Discharge: HOME | End: 2021-05-10
Payer: MEDICARE

## 2021-05-10 ENCOUNTER — OFFICE VISIT (OUTPATIENT)
Dept: CARDIOTHORACIC SURGERY | Age: 81
End: 2021-05-10
Payer: MEDICARE

## 2021-05-10 ENCOUNTER — HOSPITAL ENCOUNTER (OUTPATIENT)
Dept: INTERVENTIONAL RADIOLOGY/VASCULAR | Age: 81
Discharge: HOME | End: 2021-05-10
Payer: MEDICARE

## 2021-05-10 VITALS
BODY MASS INDEX: 28.56 KG/M2 | SYSTOLIC BLOOD PRESSURE: 146 MMHG | DIASTOLIC BLOOD PRESSURE: 78 MMHG | HEIGHT: 67 IN | HEART RATE: 61 BPM | WEIGHT: 182 LBS

## 2021-05-10 DIAGNOSIS — I71.40 ABDOMINAL AORTIC ANEURYSM (AAA) WITHOUT RUPTURE: Primary | ICD-10-CM

## 2021-05-10 DIAGNOSIS — R10.31 RIGHT INGUINAL PAIN: ICD-10-CM

## 2021-05-10 DIAGNOSIS — I71.40 ABDOMINAL AORTIC ANEURYSM (AAA) WITHOUT RUPTURE: ICD-10-CM

## 2021-05-10 DIAGNOSIS — R09.89 OTHER SPECIFIED SYMPTOMS AND SIGNS INVOLVING THE CIRCULATORY AND RESPIRATORY SYSTEMS: ICD-10-CM

## 2021-05-10 PROCEDURE — 1036F TOBACCO NON-USER: CPT | Performed by: THORACIC SURGERY (CARDIOTHORACIC VASCULAR SURGERY)

## 2021-05-10 PROCEDURE — G8427 DOCREV CUR MEDS BY ELIG CLIN: HCPCS | Performed by: THORACIC SURGERY (CARDIOTHORACIC VASCULAR SURGERY)

## 2021-05-10 PROCEDURE — G8417 CALC BMI ABV UP PARAM F/U: HCPCS | Performed by: THORACIC SURGERY (CARDIOTHORACIC VASCULAR SURGERY)

## 2021-05-10 PROCEDURE — 99214 OFFICE O/P EST MOD 30 MIN: CPT | Performed by: THORACIC SURGERY (CARDIOTHORACIC VASCULAR SURGERY)

## 2021-05-10 PROCEDURE — 4040F PNEUMOC VAC/ADMIN/RCVD: CPT | Performed by: THORACIC SURGERY (CARDIOTHORACIC VASCULAR SURGERY)

## 2021-05-10 PROCEDURE — 1123F ACP DISCUSS/DSCN MKR DOCD: CPT | Performed by: THORACIC SURGERY (CARDIOTHORACIC VASCULAR SURGERY)

## 2021-05-10 PROCEDURE — 93926 LOWER EXTREMITY STUDY: CPT

## 2021-05-10 NOTE — PROGRESS NOTES
CT/CV Surgery Follow Up Office Visit      Patient's Name/Date of Birth: Jaycee Severs / 1940 (35 y.o.)    PCP: Dwaine Muhammad MD    Date: May 10, 2021    We had the pleasure of seeing Jaycee Severs in the office today, as you know this is a very pleasant 80y.o. year old male with a history of endoluminal repair of abdominal aortic aneurysm on 06/26/2020. He returned to cardiovascular surgery clinic for follow-up. He denied any symptoms that might be related to abdominal aortic aneurysm. He denies any upper epigastric pain, radiating back pain or groin pain. He reports left leg pain when he walk more than 100 yards. He underwent a follow-up CTA of abdomen and pelvis recently. The CTA shows no evidence of endoleak, and decreased diameter of native abdominal aortic aneurysm sac. It also shows right groin cyst in the femoral cutdown site. And there is no significant interval changes over last 6 months. PastMedical History:  Giuliana Llanos  has a past medical history of Adenomatous polyp, Aortic aneurysm ([address] Asthma, CAD (coronary artery disease), COPD (chronic obstructive pulmonary disease) ([address] Hyperlipidemia, Hypertension, Prolonged emergence from general anesthesia, and Transitional cell carcinoma of bladder ([address] Past Surgical History:  The patient  has a past surgical history that includes Coronary angioplasty with stent; Colonoscopy; Carotid endarterectomy (Right); bladder tumor excision; other surgical history; and AAA repair, endovascular (N/A, 6/26/2020). Allergies: The patient has No Known Allergies.     Medications:    Current Outpatient Medications:     budesonide-formoterol (SYMBICORT) 160-4.5 MCG/ACT AERO, Inhale 2 puffs into the lungs 2 times daily, Disp: 3 Inhaler, Rfl: 3    ipratropium-albuterol (DUONEB) 0.5-2.5 (3) MG/3ML SOLN nebulizer solution, Take 3 mLs by nebulization 4 times daily, Disp: 360 vial, Rfl: 3    ALBUTEROL IN, Inhale into the lungs 2puff 4x daily, Disp: ,

## 2021-05-20 ENCOUNTER — OFFICE VISIT (OUTPATIENT)
Dept: CARDIOTHORACIC SURGERY | Age: 81
End: 2021-05-20

## 2021-05-20 VITALS
BODY MASS INDEX: 28.56 KG/M2 | HEART RATE: 67 BPM | HEIGHT: 67 IN | DIASTOLIC BLOOD PRESSURE: 70 MMHG | SYSTOLIC BLOOD PRESSURE: 127 MMHG | WEIGHT: 182 LBS

## 2021-05-20 DIAGNOSIS — Z98.890 STATUS POST ABDOMINAL AORTIC ANEURYSM REPAIR: Primary | ICD-10-CM

## 2021-05-20 DIAGNOSIS — Z86.79 STATUS POST ABDOMINAL AORTIC ANEURYSM REPAIR: Primary | ICD-10-CM

## 2021-05-20 PROCEDURE — 99024 POSTOP FOLLOW-UP VISIT: CPT | Performed by: THORACIC SURGERY (CARDIOTHORACIC VASCULAR SURGERY)

## 2021-05-20 NOTE — PROGRESS NOTES
Sharita Rodriguez recurrent [facility] vascular surgery clinic for follow-up. He is a 80years old male who underwent abdominal aortic aneurysm repaire with a [employer] Endurant II endoluminal graft on 6/26/2020. He developed right groin seroma. Ultrasound confirmed cystic nature of right groin cyst.    Under the sterile condition, the cyst was aspirated using 18-gauge needle and a 50 cc syringe. Total of 60 ml of serosanguineous clear fluid was aspirated. Patient tolerated the procedure well. Assessment: Successful aspiration of cystic fluid from the right groin. Plan: Close observation for reaccumulation. And aspiration again as needed basis.

## 2021-08-17 ENCOUNTER — OFFICE VISIT (OUTPATIENT)
Dept: CARDIOTHORACIC SURGERY | Age: 81
End: 2021-08-17

## 2021-08-17 VITALS
BODY MASS INDEX: 28.09 KG/M2 | HEART RATE: 75 BPM | DIASTOLIC BLOOD PRESSURE: 70 MMHG | HEIGHT: 67 IN | WEIGHT: 179 LBS | SYSTOLIC BLOOD PRESSURE: 117 MMHG

## 2021-08-17 DIAGNOSIS — Z98.890 STATUS POST ABDOMINAL AORTIC ANEURYSM REPAIR: Primary | ICD-10-CM

## 2021-08-17 DIAGNOSIS — Z86.79 STATUS POST ABDOMINAL AORTIC ANEURYSM REPAIR: Primary | ICD-10-CM

## 2021-08-17 PROCEDURE — 99024 POSTOP FOLLOW-UP VISIT: CPT | Performed by: THORACIC SURGERY (CARDIOTHORACIC VASCULAR SURGERY)

## 2021-08-17 NOTE — PROGRESS NOTES
CT/CV Surgery Follow Up Office Visit      Patient's Name/Date of Birth: Yue Mata / 1940 (89 y.o.)    PCP: Nataly Rivera MD    Date: August 17, 2021    We had the pleasure of seeing Yue Mata in the office today, as you know this is a very pleasant 80y.o. year old male with a history of abdominal aortic aneurysm and right common femoral artery aneurysm. He underwent repair of abdominal aortic aneurysm with endoluminal grafts and excision and bypass grafting of left common femoral artery aneurysm on 6/26/2020. He developed a right groin seroma in the right groin open exposure incision site. The seroma has been aspirated several times since then. The last one was done on May 20, 2021. It was drained approximately 60 cc of emily-colored fluid at the time. He returned to our clinic again with a reaccumulation of right groin seroma. He denied any abdominal, or back pain. He also denied any pain in the right groin. The last CTA of abdomen pelvis shows no endoleak. PastMedical History:  Jessa Azar  has a past medical history of Adenomatous polyp, Aortic aneurysm ([facility] [address] Asthma, CAD (coronary artery disease), COPD (chronic obstructive pulmonary disease) ([facility] [address] Hyperlipidemia, Hypertension, Prolonged emergence from general anesthesia, and Transitional cell carcinoma of bladder ([facility] [address] Past Surgical History:  The patient  has a past surgical history that includes Coronary angioplasty with stent; Colonoscopy; Carotid endarterectomy (Right); bladder tumor excision; other surgical history; and AAA repair, endovascular (N/A, 6/26/2020). Allergies: The patient has No Known Allergies.     Medications:    Current Outpatient Medications:     budesonide-formoterol (SYMBICORT) 160-4.5 MCG/ACT AERO, Inhale 2 puffs into the lungs 2 times daily, Disp: 3 Inhaler, Rfl: 3    ipratropium-albuterol (DUONEB) 0.5-2.5 (3) MG/3ML SOLN nebulizer solution, Take 3 mLs by nebulization 4 times daily, Disp: 360 vial, Rfl: 3    ALBUTEROL IN, Inhale into the lungs 2puff 4x daily, Disp: , Rfl:     tamsulosin (FLOMAX) 0.4 MG capsule, Take 0.4 mg by mouth daily, Disp: , Rfl:     pravastatin (PRAVACHOL) 80 MG tablet, Take 80 mg by mouth daily. , Disp: , Rfl:     metoprolol (LOPRESSOR) 50 MG tablet, Take 50 mg by mouth daily. 1/2 tablet bid, Disp: , Rfl:     aspirin 81 MG EC tablet, Take 81 mg by mouth daily. , Disp: , Rfl:     Family History: This patient's family history is not on file. Social History:  Zi Burnette  reports that he quit smoking about 14 years ago. His smoking use included cigarettes. He has a 45.00 pack-year smoking history. He has never used smokeless tobacco. He reports current alcohol use. He reports that he does not use drugs. Vital Signs:   /70 (Site: Right Upper Arm, Position: Sitting, Cuff Size: Medium Adult)   Pulse 75   Ht 5' 7\" (1.702 m)   Wt 179 lb (81.2 kg)   BMI 28.04 kg/m²     ROS:   Constitutional: Negative for activity change, chills, fatigue, fever and unexpected weight change. Respiratory: COPD. Positive first supplementary home oxygen. Cardiac: Negative for midsternal chest pain, arrhythmia, shortness of breath,  Vascular: Status post EVAR and left common femoral artery aneurysm resection and bypass grafting. Gastrointestinal: Negative for hematochezia, melana, constipation, and N/V/D. Musculoskeletal: Negative for myalgias, amputation. Skin: Negative for color change, rash and wound. Neurological: Negative for dizziness or syncope. Nephrology: Negative for chronic kidney disease,     Physical Exam:  General appearance:  No acute distress, appears stated age and cooperative. Neck: No jugular venous distention. Trachea midline. No carotid bruits. Chest Wall: No deformity, midsternal scar, enlarged palpable supraclavicular lymphnode. Respiratory:  Normal respiratory effort. Clear to auscultation, bilaterally without Rales/Wheezes/Rhonch.    Cardiovascular:  Regular rate and rhythm with normal S1/S2 without murmurs, rubs or gallops. Abdomen: Soft, non-tender, non-distended with normal bowel sounds. Palpable seroma in the right groin. No evidence of infection in the overlying skin. Ext: No clubbing, cyanosis or edema bilaterally. No chronic ischemic changes, No varicorsity in lower leg. Skin: Skin color, texture, turgor normal.  No rashes or lesions. No rubor. No venous stasis pigmentation. Neurologic:  Neurovascularly intact without any focal sensory/motor deficits. Peripheral Pulses: +2 palpable femoral pulses bilaterally,    Labs:    CBC:  Lab Results   Component Value Date    WBC 7.3 07/01/2020    HGB 11.2 07/01/2020    HCT 35.5 07/01/2020    MCV 99.2 07/01/2020     07/01/2020    INR 0.93 06/26/2020     BMP:   Lab Results   Component Value Date     07/01/2020    K 4.5 07/01/2020    K 4.3 06/26/2020    CL 96 07/01/2020    CO2 31 07/01/2020    BUN 11 07/01/2020    CREATININE 0.6 05/01/2021         Problem List:  Patient Active Problem List   Diagnosis    Chronic obstructive lung disease ([address]    Transitional cell carcinoma of bladder ([address]    Adenomatous polyp    AAA (abdominal aortic aneurysm) ([address]    Endoleak post endovascular aneurysm repair    Chronic hypoxemic respiratory failure ([address]    History of tobacco use       Assessment: Reaccumulation of right groin seroma. Plan 8/17/21:  1) it was aspirated the time of office visit under sterile condition. 60 cc of thin emily color serosanguineous fluid was aspirated. No study was sent with the fluid. Thank you for allowing us to be involved in the patient's care.     Electronically by Robert Bryson MD  on 8/17/2021 at 12:09 PM

## 2021-10-25 ENCOUNTER — OFFICE VISIT (OUTPATIENT)
Dept: PULMONOLOGY | Age: 81
End: 2021-10-25
Payer: MEDICARE

## 2021-10-25 VITALS
OXYGEN SATURATION: 95 % | DIASTOLIC BLOOD PRESSURE: 66 MMHG | BODY MASS INDEX: 27.47 KG/M2 | HEART RATE: 62 BPM | HEIGHT: 67 IN | TEMPERATURE: 98.6 F | SYSTOLIC BLOOD PRESSURE: 124 MMHG | WEIGHT: 175 LBS

## 2021-10-25 DIAGNOSIS — Z86.11 PERSONAL HISTORY OF TUBERCULOSIS: ICD-10-CM

## 2021-10-25 DIAGNOSIS — J44.9 STAGE 3 SEVERE COPD BY GOLD CLASSIFICATION (HCC): Primary | ICD-10-CM

## 2021-10-25 DIAGNOSIS — J96.11 CHRONIC HYPOXEMIC RESPIRATORY FAILURE (HCC): ICD-10-CM

## 2021-10-25 DIAGNOSIS — Z87.891 PERSONAL HISTORY OF TOBACCO USE: ICD-10-CM

## 2021-10-25 DIAGNOSIS — I71.40 ABDOMINAL AORTIC ANEURYSM (AAA) WITHOUT RUPTURE: ICD-10-CM

## 2021-10-25 PROCEDURE — G8417 CALC BMI ABV UP PARAM F/U: HCPCS | Performed by: NURSE PRACTITIONER

## 2021-10-25 PROCEDURE — G8427 DOCREV CUR MEDS BY ELIG CLIN: HCPCS | Performed by: NURSE PRACTITIONER

## 2021-10-25 PROCEDURE — 99215 OFFICE O/P EST HI 40 MIN: CPT | Performed by: NURSE PRACTITIONER

## 2021-10-25 PROCEDURE — 3023F SPIROM DOC REV: CPT | Performed by: NURSE PRACTITIONER

## 2021-10-25 PROCEDURE — G8484 FLU IMMUNIZE NO ADMIN: HCPCS | Performed by: NURSE PRACTITIONER

## 2021-10-25 PROCEDURE — 1123F ACP DISCUSS/DSCN MKR DOCD: CPT | Performed by: NURSE PRACTITIONER

## 2021-10-25 PROCEDURE — 4040F PNEUMOC VAC/ADMIN/RCVD: CPT | Performed by: NURSE PRACTITIONER

## 2021-10-25 PROCEDURE — 1036F TOBACCO NON-USER: CPT | Performed by: NURSE PRACTITIONER

## 2021-10-25 PROCEDURE — G8926 SPIRO NO PERF OR DOC: HCPCS | Performed by: NURSE PRACTITIONER

## 2021-10-25 ASSESSMENT — ENCOUNTER SYMPTOMS
DIARRHEA: 0
EYES NEGATIVE: 1
CHEST TIGHTNESS: 0
SHORTNESS OF BREATH: 0
ABDOMINAL PAIN: 0
NAUSEA: 0
COUGH: 1
VOMITING: 0
WHEEZING: 0

## 2021-10-25 NOTE — PROGRESS NOTES
[facility]     Patient: Sonia Woo, 80 y.o.   : 1940  10/25/2021    Pt of mine      Subjective     Chief Complaint   Patient presents with    Follow-up     COPD  6 month pulmonary follow up with no testing         HPI  Te Barton is here for 6 month  follow up for COPD   Accompanied by daughter   [place] changed formulary from Symbicort to Americana, has not started,still has some Symbicort left - is rinsing well after use  Using Duoneb 4x daily   Cough - stable/ SOB on exertion only   Using muccinex / Incentive spirometer daily - is able to  Bring up mucous, no change in color or consistency   No ER/ hospital visits, has not had any COPD exacerbations  On 3LPM O2 ATC, will sometimes have off at rest, does SPO2 spot checks at home, never below 90%  Follows with CT surgery for AAA monitoring     Interval history :  Ct lung screen from 11/15/19 revealed  Development of an ovoid pleural based nodular density posterior right lung base. LUNG RADS 4B, pt decided to proceed with PET/CT. Showing small mild FDG avid nodules and nodular densities in right lower lung. The degree of activity is suggestive of a benign infectious/ inflammatory process. , however the nodules were below threshold of resolution for PET imaging so malignancy could not be excluded. Ct 2020: mild atelctasis/ pneumonia anterior segment left upper lobe . Several small scattered nodular densities , pleural based in right lung, likely fibrotic scarring. CT chest - Scattered micronodular densities are demonstrated within the right lower lobe, lingula and lateral left upper lobe which appears similar in distribution and extent when compared to prior examination. This may represent an inflammatory or infectious   process to include sequela from atypical mycobacterial infectious etiologies. Recommend continued follow-up.        2. There is an ill-defined nodular density abutting the pleura posteriorly at the right lung base which appears unchanged from prior examination. This is seen on axial image 44.       3. There is bronchiectasis noted within the right lower lobe and lingula. Pt reports he has been through all types of testing and procedures in past for his lung nodules, states had a lung tissue biopsy at [facility] per Dr Cody Altamirano. PMH TB  while in [employer] was treated. Known asbestos exposure in [employer] and breathing chemical exposures > 19 years    Former smoker       SOCIAL HISTORY:  Social History     Tobacco Use    Smoking status: Former Smoker     Packs/day: 1.00     Years: 45.00     Pack years: 45.00     Types: Cigarettes     Quit date: 3/30/2007     Years since quittin.5    Smokeless tobacco: Never Used   Vaping Use    Vaping Use: Never used   Substance Use Topics    Alcohol use: Yes     Comment: couple times per week    Drug use: No         CURRENT MEDICATIONS:  Current Outpatient Medications   Medication Sig Dispense Refill    fluticasone-salmeterol (WIXELA INHUB) 250-50 MCG/DOSE AEPB Inhale 1 puff into the lungs every 12 hours      budesonide-formoterol (SYMBICORT) 160-4.5 MCG/ACT AERO Inhale 2 puffs into the lungs 2 times daily 3 Inhaler 3    ipratropium-albuterol (DUONEB) 0.5-2.5 (3) MG/3ML SOLN nebulizer solution Take 3 mLs by nebulization 4 times daily 360 vial 3    ALBUTEROL IN Inhale into the lungs 2puff 4x daily      tamsulosin (FLOMAX) 0.4 MG capsule Take 0.4 mg by mouth daily      pravastatin (PRAVACHOL) 80 MG tablet Take 80 mg by mouth daily.  metoprolol (LOPRESSOR) 50 MG tablet Take 50 mg by mouth daily. 1/2 tablet bid      aspirin 81 MG EC tablet Take 81 mg by mouth daily. No current facility-administered medications for this visit. Ruddy Mc ROS   Review of Systems   Constitutional: Negative for activity change, appetite change, chills, fatigue, fever and unexpected weight change. HENT: Negative. Eyes: Negative. Respiratory: Positive for cough. Negative for chest tightness, shortness of breath and wheezing. Cardiovascular: Positive for leg swelling. Negative for chest pain and palpitations. Gastrointestinal: Negative for abdominal pain, diarrhea, nausea and vomiting. Genitourinary: Negative. Musculoskeletal: Positive for arthralgias. Skin: Negative. Neurological: Negative. Hematological: Does not bruise/bleed easily. Psychiatric/Behavioral: Negative for sleep disturbance and suicidal ideas. Physical exam   /66 (Site: Left Upper Arm, Position: Sitting)   Pulse 62   Temp 98.6 °F (37 °C)   Ht 5' 7\" (1.702 m)   Wt 175 lb (79.4 kg)   SpO2 95% Comment: on 3 POC  BMI 27.41 kg/m²      Wt Readings from Last 3 Encounters:   10/25/21 175 lb (79.4 kg)   08/17/21 179 lb (81.2 kg)   05/20/21 182 lb (82.6 kg)     Physical Exam  Vitals and nursing note reviewed. Constitutional:       General: He is not in acute distress. Appearance: Normal appearance. He is well-developed. Interventions: Nasal cannula in place. HENT:      Mouth/Throat:      Lips: Pink. Mouth: Mucous membranes are moist.      Pharynx: Oropharynx is clear. No oropharyngeal exudate or posterior oropharyngeal erythema. Eyes:      Conjunctiva/sclera: Conjunctivae normal.   Neck:      Vascular: No JVD. Cardiovascular:      Rate and Rhythm: Normal rate and regular rhythm. Heart sounds: No murmur heard. No friction rub. Pulmonary:      Effort: Pulmonary effort is normal. No accessory muscle usage or respiratory distress. Breath sounds: Normal breath sounds. No wheezing, rhonchi or rales. Chest:      Chest wall: No tenderness. Musculoskeletal:      Right lower leg: No edema. Left lower leg: No edema. Skin:     General: Skin is warm and dry. Capillary Refill: Capillary refill takes less than 2 seconds. Nails: There is no clubbing. Neurological:      Mental Status: He is alert.    Psychiatric:         Mood and Affect: Mood normal.         Behavior: Behavior normal.         Thought Content: Thought content normal.         Judgment: Judgment normal.          Test results   Lung Nodule Screening     [x] Qualifies    []Does not qualify   [] Declined    [x] Completed    CTA abd/pelvis 5/6/2021       Impression   1. Stable abdominal aortic endograft and aneurysm. 2. Interval development of right hydroureteronephrosis with a 2 mm calculus in the distal third of the right ureter. 3. Other chronic findings are discussed above report.               **This report has been created using voice recognition software.  It may contain minor errors which are inherent in voice recognition technology. **       Final report electronically signed by Dr. Benson Morales on 5/6/2021 9:55 AM     Ct chest wo contrast 4/9/2021     Impression       1. Stable CT scan of the chest, no significant interval change since previous study dated 27 August 2020.   2. Scattered micronodular densities especially in the right lower lobe, lingula and to lesser extent right and left upper lobes. These findings may be secondary to inflammatory or infectious process or atypical mycobacterial infection. 3. Bronchiectasis especially in the right lower lobe and lingula. Mild upper lobe predominant centrilobular emphysema. 4. Aneurysmal dilatation of the ascending and descending thoracic aorta, unchanged. 5. Borderline cardiomegaly. 6. Thoracic spondylosis. 7. Small right adrenal nodule, gallstones and endoluminal graft in the abdominal aorta, unchanged       Assessment      Diagnosis Orders   1. Stage 3 severe COPD by GOLD classification ([address]     2. Personal history of tuberculosis     3. Personal history of tobacco use  CT LUNG SCREENING    NM VISIT TO DISCUSS LUNG CA SCREEN W LDCT    CT Lung Screen (Annual)   4. Abdominal aortic aneurysm (AAA) without rupture ([address]     5.  Chronic hypoxemic respiratory failure (HCC)         Plan    Stable COPD symptoms: continue symbicort until gone then switch to Americana per [place] , take as directed  Continue oxygen at 3LPM ATC  Schedule annual LDCT in 6 months  PRN albuterol sulfate   Educated on bronchiectasis. Continue muccinex PRN and acapella/ IS use   Avoid ill contacts  Keep UTD with vaccinations     Total time spent on encounter was 40 minutes    Will see Noemi Rose in: 6 months    Electronically signed by CASEY Bauer CNP on 10/25/2021 at 11:19 AM   Low Dose CT (LDCT) Lung Screening criteria met:     Age 55-77(Medicare) or 50-80 ([facility])   Pack year smoking >30 (Medicare) or >20 ([facility])   Still smoking or less than 15 year since quit   No sign or symptoms of lung cancer   > 11 months since last LDCT     Risks and benefits of lung cancer screening with LDCT scans discussed:    Significance of positive screen - False-positive LDCT results often occur. 95% of all positive results do not lead to a diagnosis of cancer. Usually further imaging can resolve most false-positive results; however, some patients may require invasive procedures. Over diagnosis risk - 10% to 12% of screen-detected lung cancer cases are over diagnosed--that is, the cancer would not have been detected in the patient's lifetime without the screening. Need for follow up screens annually to continue lung cancer screening effectiveness     Risks associated with radiation from annual LDCT- Radiation exposure is about the same as for a mammogram, which is about 1/3 of the annual background radiation exposure from everyday life. Starting screening at age 54 is not likely to increase cancer risk from radiation exposure. Patients with comorbidities resulting in life expectancy of < 10 years, or that would preclude treatment of an abnormality identified on CT, should not be screened due to lack of benefit.     To obtain maximal benefit from this screening, smoking cessation and long-term abstinence from smoking is critical

## 2022-04-12 ENCOUNTER — HOSPITAL ENCOUNTER (OUTPATIENT)
Dept: CT IMAGING | Age: 82
Discharge: HOME | End: 2022-04-12
Payer: MEDICARE

## 2022-04-12 DIAGNOSIS — Z87.891 PERSONAL HISTORY OF TOBACCO USE: ICD-10-CM

## 2022-04-12 PROCEDURE — 71271 CT THORAX LUNG CANCER SCR C-: CPT

## 2022-04-19 ENCOUNTER — OFFICE VISIT (OUTPATIENT)
Dept: PULMONOLOGY | Age: 82
End: 2022-04-19
Payer: MEDICARE

## 2022-04-19 VITALS
WEIGHT: 176.2 LBS | TEMPERATURE: 97.8 F | BODY MASS INDEX: 27.65 KG/M2 | HEART RATE: 59 BPM | HEIGHT: 67 IN | OXYGEN SATURATION: 94 % | DIASTOLIC BLOOD PRESSURE: 72 MMHG | SYSTOLIC BLOOD PRESSURE: 132 MMHG

## 2022-04-19 DIAGNOSIS — Z87.891 PERSONAL HISTORY OF TOBACCO USE: ICD-10-CM

## 2022-04-19 DIAGNOSIS — J43.2 CENTRILOBULAR EMPHYSEMA (HCC): ICD-10-CM

## 2022-04-19 DIAGNOSIS — Z86.11 PERSONAL HISTORY OF TUBERCULOSIS: ICD-10-CM

## 2022-04-19 DIAGNOSIS — I71.20 THORACIC AORTIC ANEURYSM WITHOUT RUPTURE: ICD-10-CM

## 2022-04-19 DIAGNOSIS — J44.9 STAGE 3 SEVERE COPD BY GOLD CLASSIFICATION (HCC): Primary | ICD-10-CM

## 2022-04-19 DIAGNOSIS — I71.40 ABDOMINAL AORTIC ANEURYSM (AAA) WITHOUT RUPTURE: ICD-10-CM

## 2022-04-19 DIAGNOSIS — J96.11 CHRONIC HYPOXEMIC RESPIRATORY FAILURE (HCC): ICD-10-CM

## 2022-04-19 PROCEDURE — G8417 CALC BMI ABV UP PARAM F/U: HCPCS | Performed by: NURSE PRACTITIONER

## 2022-04-19 PROCEDURE — 4040F PNEUMOC VAC/ADMIN/RCVD: CPT | Performed by: NURSE PRACTITIONER

## 2022-04-19 PROCEDURE — 3023F SPIROM DOC REV: CPT | Performed by: NURSE PRACTITIONER

## 2022-04-19 PROCEDURE — 1123F ACP DISCUSS/DSCN MKR DOCD: CPT | Performed by: NURSE PRACTITIONER

## 2022-04-19 PROCEDURE — 99214 OFFICE O/P EST MOD 30 MIN: CPT | Performed by: NURSE PRACTITIONER

## 2022-04-19 PROCEDURE — G8427 DOCREV CUR MEDS BY ELIG CLIN: HCPCS | Performed by: NURSE PRACTITIONER

## 2022-04-19 PROCEDURE — 1036F TOBACCO NON-USER: CPT | Performed by: NURSE PRACTITIONER

## 2022-04-19 ASSESSMENT — ENCOUNTER SYMPTOMS
ABDOMINAL PAIN: 0
VOMITING: 0
EYES NEGATIVE: 1
WHEEZING: 0
COUGH: 1
NAUSEA: 0
SHORTNESS OF BREATH: 1
DIARRHEA: 0

## 2022-04-19 NOTE — PROGRESS NOTES
[facility]     Patient: Alphonzo Blizzard, [de-identified] y.o.   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 , sometimes productive of white phlegm- has not trouble expelling mucous    Symptoms stable     Any recent exacerbations that have required oral atb or steroids No  Any new medical issues since last visit : No    Last spirometry: 10/2018: FEV1 34%  , no response to BD - severe COPD    Patient is on home oxygen therapy:   Current Oxygen order: 3LPM O2 continuous 24 hours a day. Has POC , is using compliantly    . Interval history :  Ct lung screen from 11/15/19 revealed  Development of an ovoid pleural based nodular density posterior right lung base. LUNG RADS 4B, pt decided to proceed with PET/CT. Showing small mild FDG avid nodules and nodular densities in right lower lung. The degree of activity is suggestive of a benign infectious/ inflammatory process. , however the nodules were below threshold of resolution for PET imaging so malignancy could not be excluded. Ct 2020: mild atelctasis/ pneumonia anterior segment left upper lobe . Several small scattered nodular densities , pleural based in right lung, likely fibrotic scarring. CT chest - Scattered micronodular densities are demonstrated within the right lower lobe, lingula and lateral left upper lobe which appears similar in distribution and extent when compared to prior examination.  This may represent an inflammatory or infectious   process to include sequela from atypical mycobacterial infectious etiologies. Recommend continued follow-up. 2. There is an ill-defined nodular density abutting the pleura posteriorly at the right lung base which appears unchanged from prior examination. This is seen on axial image 44.       3. There is bronchiectasis noted within the right lower lobe and lingula. Pt reports he has been through all types of testing and procedures in past for his lung nodules, states had a lung tissue biopsy at [facility] per Dr Belinda Toscano. [facility] TB 1964 while in [facility] was treated. Known asbestos exposure in [employer] and breathing chemical exposures > 19 years    Former smoker        SOCIAL HISTORY:  Social History     Tobacco Use    Smoking status: Former Smoker     Packs/day: 1.00     Years: 45.00     Pack years: 45.00     Types: Cigarettes     Quit date: 3/30/2007     Years since quitting: 15.0    Smokeless tobacco: Never Used   Vaping Use    Vaping Use: Never used   Substance Use Topics    Alcohol use: Yes     Comment: couple times per week    Drug use: No         CURRENT MEDICATIONS:  Current Outpatient Medications   Medication Sig Dispense Refill    fluticasone-salmeterol (WIXELA INHUB) 250-50 MCG/DOSE AEPB Inhale 1 puff into the lungs every 12 hours      ipratropium-albuterol (DUONEB) 0.5-2.5 (3) MG/3ML SOLN nebulizer solution Take 3 mLs by nebulization 4 times daily 360 vial 3    ALBUTEROL IN Inhale into the lungs 2puff 4x daily      tamsulosin (FLOMAX) 0.4 MG capsule Take 0.4 mg by mouth daily      pravastatin (PRAVACHOL) 80 MG tablet Take 80 mg by mouth daily.  metoprolol (LOPRESSOR) 50 MG tablet Take 50 mg by mouth daily. 1/2 tablet bid      aspirin 81 MG EC tablet Take 81 mg by mouth daily. No current facility-administered medications for this visit. Flores Bauman     ROS   Review of Systems   Constitutional: Negative for activity change, appetite change, chills, fatigue, fever and unexpected weight change. HENT: Negative. Eyes: Negative. Respiratory: Positive for cough and shortness of breath. Negative for wheezing. Cardiovascular: Negative for chest pain, palpitations and leg swelling. Gastrointestinal: Negative for abdominal pain, diarrhea, nausea and vomiting. Genitourinary: Negative. Musculoskeletal: Negative. Skin: Negative. Neurological: Negative. Hematological: Negative. Psychiatric/Behavioral: Negative. Physical exam   /72 (Site: Left Upper Arm, Position: Sitting)   Pulse 59   Temp 97.8 °F (36.6 °C)   Ht 5' 7\" (1.702 m)   Wt 176 lb 3.2 oz (79.9 kg)   SpO2 94% Comment: on 3 liters O2 poc  BMI 27.60 kg/m²      Wt Readings from Last 3 Encounters:   04/19/22 176 lb 3.2 oz (79.9 kg)   10/25/21 175 lb (79.4 kg)   08/17/21 179 lb (81.2 kg)     Physical Exam  Vitals and nursing note reviewed. Constitutional:       General: He is not in acute distress. Appearance: He is well-developed and overweight. Interventions: Nasal cannula in place. HENT:      Mouth/Throat:      Lips: Pink. Mouth: Mucous membranes are moist.      Pharynx: Oropharynx is clear. No oropharyngeal exudate or posterior oropharyngeal erythema. Eyes:      Conjunctiva/sclera: Conjunctivae normal.   Neck:      Vascular: No JVD. Cardiovascular:      Rate and Rhythm: Normal rate and regular rhythm. Heart sounds: No murmur heard. No friction rub. Pulmonary:      Effort: Pulmonary effort is normal. No accessory muscle usage or respiratory distress. Breath sounds: Examination of the right-middle field reveals rales. Examination of the right-lower field reveals rales. Rales present. No wheezing or rhonchi. Chest:      Chest wall: No tenderness. Musculoskeletal:      Right lower leg: No edema. Left lower leg: No edema. Skin:     General: Skin is warm and dry. Capillary Refill: Capillary refill takes less than 2 seconds. Nails:  There is no clubbing. Neurological:      Mental Status: He is alert and oriented to person, place, and time. Psychiatric:         Mood and Affect: Mood normal.         Behavior: Behavior normal.         Thought Content: Thought content normal.         Judgment: Judgment normal.          Test results   Lung Nodule Screening     [x] Qualifies    []Does not qualify   [] Declined    [x] Completed     CT lung screen 4/12/2022- reviewed      Impression   1. 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 These findings are unchanged from prior study    and could related to interstitial fibrosis or an infectious/inflammatory process. Findings of bronchiectasis bilaterally. Prominent pulmonary trunk, consistent with pulmonary chill hypertension. 2. Aneurysmal descending thoracic aorta, maximum diameter 4.8 cm. Aneurysmal ascending thoracic aorta, 5.4 cm. No dissection is seen. An abdominal aortic endograft is present. No abnormally enlarged or suspicious appearing hilar or mediastinal lymph    nodes are seen. 3. LUNGRADS ASSESSMENT VALUE: 2. . Continue annual CT lung screening.                               The LUNG RADS RECOMMENDATIONS for monitoring lung nodules listed below (ACR- Lung-RADS Version 1.0 Assessment Categories Release date\" April 28, 2014)       LUNG RADS RECOMMENDATIONS;   1.  Normal, continue annual screening   2.  Benign appearance or behavior, continue annual screening   3.  6 month CT recommended   4A.  3 month CT recommended; may consider PET/CT   4B.  Additional diagnostics and/or tissue sampling recommended   4X.  Additional diagnostics and/or tissue sampling recommended         **This report has been created using voice recognition software.  It may contain minor errors which are inherent in voice recognition technology. **       Final report electronically signed by Dr. Tavo Nathan on 4/12/2022 8:46 AM                             Assessment      Diagnosis Orders   1. Stage 3 severe COPD by GOLD classification ([facility]     2. Personal history of tuberculosis     3. Personal history of tobacco use     4. Centrilobular emphysema ([facility]     5. Thoracic aortic aneurysm without rupture ([facility]     6. Chronic hypoxemic respiratory failure (HCC)     7. Abdominal aortic aneurysm (AAA) without rupture ([facility]            Plan    -doing well, COPD symptoms under optimal control: continue Wixela INH , Duoneb, Albuterol HFA[facility]   -CT stable, will be 81 y/o next year, no longer qualifies for annual LDCT . -CT surgery following aneurysms.  Stable on current CT chest   -benefiting from use of oxygen, continue 3LPM ATC   -avoid ill contacts  -use IS and Muccinex PRN to help mobilize secretions   -keep UTD on recommended vaccinations    Will see Richelle Buenrostro in: 1 year  billing based on time: total time on encounter 30 min   Electronically signed by Adriane Galeazzi, APRN - CNP on 4/19/2022 at 9:00 AM

## 2022-04-26 ENCOUNTER — OFFICE VISIT (OUTPATIENT)
Dept: CARDIOTHORACIC SURGERY | Age: 82
End: 2022-04-26

## 2022-04-26 ENCOUNTER — TELEPHONE (OUTPATIENT)
Dept: CARDIOTHORACIC SURGERY | Age: 82
End: 2022-04-26

## 2022-04-26 VITALS
HEART RATE: 59 BPM | SYSTOLIC BLOOD PRESSURE: 141 MMHG | BODY MASS INDEX: 27.47 KG/M2 | DIASTOLIC BLOOD PRESSURE: 72 MMHG | HEIGHT: 67 IN | WEIGHT: 175 LBS

## 2022-04-26 DIAGNOSIS — Z98.890 STATUS POST ENDOVASCULAR ANEURYSM REPAIR (EVAR): Primary | ICD-10-CM

## 2022-04-26 DIAGNOSIS — Z86.79 STATUS POST ENDOVASCULAR ANEURYSM REPAIR (EVAR): Primary | ICD-10-CM

## 2022-04-26 PROCEDURE — 99024 POSTOP FOLLOW-UP VISIT: CPT | Performed by: THORACIC SURGERY (CARDIOTHORACIC VASCULAR SURGERY)

## 2022-04-26 NOTE — TELEPHONE ENCOUNTER
Informed Sergio Drew of CTA Abd/Pelv date/time of 5/16/22 at 1:00PM  He voiced understanding. Appt reminder sent in mail.

## 2022-04-26 NOTE — PROGRESS NOTES
Franky Barone is a [de-identified]years old male who is a status post EVAR on 6/26/2020. He returns to cardiovascular surgery clinic with reaccumulation of right groin seroma. The seroma has been aspirated several times since the surgery. The seroma was aspirated at the time of office visit today. Total 40 cc of emily-colored clear fluid was aspirated. We will arrange for a CTA for yearly follow-up.

## 2022-05-16 ENCOUNTER — HOSPITAL ENCOUNTER (OUTPATIENT)
Dept: CT IMAGING | Age: 82
Discharge: HOME | End: 2022-05-16
Payer: MEDICARE

## 2022-05-16 DIAGNOSIS — Z98.890 STATUS POST ENDOVASCULAR ANEURYSM REPAIR (EVAR): ICD-10-CM

## 2022-05-16 DIAGNOSIS — Z86.79 STATUS POST ENDOVASCULAR ANEURYSM REPAIR (EVAR): ICD-10-CM

## 2022-05-16 LAB — POC CREATININE WHOLE BLOOD: 0.6 MG/DL (ref 0.5–1.2)

## 2022-05-16 PROCEDURE — 82565 ASSAY OF CREATININE: CPT

## 2022-05-16 PROCEDURE — 74174 CTA ABD&PLVS W/CONTRAST: CPT

## 2022-05-16 PROCEDURE — 6360000004 HC RX CONTRAST MEDICATION: Performed by: THORACIC SURGERY (CARDIOTHORACIC VASCULAR SURGERY)

## 2022-05-16 RX ADMIN — IOPAMIDOL 85 ML: 755 INJECTION, SOLUTION INTRAVENOUS at 13:16

## 2022-06-08 ENCOUNTER — OFFICE VISIT (OUTPATIENT)
Dept: CARDIOTHORACIC SURGERY | Age: 82
End: 2022-06-08

## 2022-06-08 VITALS
HEIGHT: 67 IN | HEART RATE: 58 BPM | DIASTOLIC BLOOD PRESSURE: 59 MMHG | WEIGHT: 175 LBS | BODY MASS INDEX: 27.47 KG/M2 | SYSTOLIC BLOOD PRESSURE: 109 MMHG

## 2022-06-08 DIAGNOSIS — Z86.79 STATUS POST ENDOVASCULAR ANEURYSM REPAIR (EVAR): Primary | ICD-10-CM

## 2022-06-08 DIAGNOSIS — Z98.890 STATUS POST ENDOVASCULAR ANEURYSM REPAIR (EVAR): Primary | ICD-10-CM

## 2022-06-08 PROCEDURE — 99024 POSTOP FOLLOW-UP VISIT: CPT | Performed by: THORACIC SURGERY (CARDIOTHORACIC VASCULAR SURGERY)

## 2022-06-08 NOTE — PROGRESS NOTES
Rigo Schulte is a [de-identified]years old male status post EVAR on 6/26/2020. He developed recurrent right groin lymphocele. Follow-up CTA showed no endoleak recurrent right groin lymphocele. Lymphocytic was aspirated at the time of OFFICE visit. Approximately 40 cc of clear fluid was aspirated. Assessment/plan status post EVAR. No endoleak. Recurrent right groin lymphocele. Follow-up 1 year with CTA. Follow-up in 2 to 3 months if needed for lymphocele.

## 2022-06-10 ENCOUNTER — OFFICE VISIT (OUTPATIENT)
Dept: FAMILY MEDICINE CLINIC | Age: 82
End: 2022-06-10

## 2022-06-10 VITALS — HEIGHT: 67 IN | BODY MASS INDEX: 27.41 KG/M2

## 2022-06-10 DIAGNOSIS — Z00.00 INITIAL MEDICARE ANNUAL WELLNESS VISIT: ICD-10-CM

## 2022-06-10 DIAGNOSIS — R31.29 MICROSCOPIC HEMATURIA: Primary | ICD-10-CM

## 2022-06-10 DIAGNOSIS — C67.9 MALIGNANT NEOPLASM OF URINARY BLADDER, UNSPECIFIED SITE (HCC): ICD-10-CM

## 2022-06-10 DIAGNOSIS — N30.00 ACUTE CYSTITIS WITHOUT HEMATURIA: ICD-10-CM

## 2022-06-10 LAB
BACTERIA URINE, POC: ABNORMAL
BILIRUBIN URINE: 0 MG/DL
BLOOD, URINE: NEGATIVE
CASTS URINE, POC: ABNORMAL
CLARITY: ABNORMAL
COLOR: YELLOW
CRYSTALS URINE, POC: ABNORMAL
EPI CELLS URINE, POC: ABNORMAL
GLUCOSE URINE: ABNORMAL
KETONES, URINE: NEGATIVE
LEUKOCYTE EST, POC: ABNORMAL
NITRITE, URINE: NEGATIVE
PH UA: 5 (ref 4.5–8)
PROTEIN UA: POSITIVE
RBC URINE, POC: ABNORMAL
SPECIFIC GRAVITY UA: 1.01 (ref 1–1.03)
UROBILINOGEN, URINE: NORMAL
WBC URINE, POC: ABNORMAL
YEAST URINE, POC: ABNORMAL

## 2022-06-10 PROCEDURE — G0438 PPPS, INITIAL VISIT: HCPCS | Performed by: FAMILY MEDICINE

## 2022-06-10 PROCEDURE — 81000 URINALYSIS NONAUTO W/SCOPE: CPT | Performed by: FAMILY MEDICINE

## 2022-06-10 PROCEDURE — G8417 CALC BMI ABV UP PARAM F/U: HCPCS | Performed by: FAMILY MEDICINE

## 2022-06-10 PROCEDURE — 1036F TOBACCO NON-USER: CPT | Performed by: FAMILY MEDICINE

## 2022-06-10 PROCEDURE — 1123F ACP DISCUSS/DSCN MKR DOCD: CPT | Performed by: FAMILY MEDICINE

## 2022-06-10 PROCEDURE — G8427 DOCREV CUR MEDS BY ELIG CLIN: HCPCS | Performed by: FAMILY MEDICINE

## 2022-06-10 RX ORDER — NITROFURANTOIN 25; 75 MG/1; MG/1
100 CAPSULE ORAL 2 TIMES DAILY
Qty: 14 CAPSULE | Refills: 0 | Status: SHIPPED | OUTPATIENT
Start: 2022-06-10 | End: 2022-06-17

## 2022-06-10 SDOH — ECONOMIC STABILITY: FOOD INSECURITY: WITHIN THE PAST 12 MONTHS, THE FOOD YOU BOUGHT JUST DIDN'T LAST AND YOU DIDN'T HAVE MONEY TO GET MORE.: NEVER TRUE

## 2022-06-10 SDOH — ECONOMIC STABILITY: FOOD INSECURITY: WITHIN THE PAST 12 MONTHS, YOU WORRIED THAT YOUR FOOD WOULD RUN OUT BEFORE YOU GOT MONEY TO BUY MORE.: NEVER TRUE

## 2022-06-10 ASSESSMENT — PATIENT HEALTH QUESTIONNAIRE - PHQ9
2. FEELING DOWN, DEPRESSED OR HOPELESS: 0
SUM OF ALL RESPONSES TO PHQ9 QUESTIONS 1 & 2: 0
SUM OF ALL RESPONSES TO PHQ QUESTIONS 1-9: 0
1. LITTLE INTEREST OR PLEASURE IN DOING THINGS: 0

## 2022-06-10 ASSESSMENT — SOCIAL DETERMINANTS OF HEALTH (SDOH): HOW HARD IS IT FOR YOU TO PAY FOR THE VERY BASICS LIKE FOOD, HOUSING, MEDICAL CARE, AND HEATING?: NOT HARD AT ALL

## 2022-06-10 ASSESSMENT — LIFESTYLE VARIABLES
HOW OFTEN DO YOU HAVE A DRINK CONTAINING ALCOHOL: 2-4 TIMES A MONTH
HOW MANY STANDARD DRINKS CONTAINING ALCOHOL DO YOU HAVE ON A TYPICAL DAY: 1 OR 2

## 2022-06-10 NOTE — PROGRESS NOTES
Medicare Annual Wellness Visit    Barry Arguello is here for Medicare AWV (referral to [facility] urology)    Assessment & Plan   Microscopic hematuria  -     POC URINE with Microscopic  -     [facility]  Malignant neoplasm of urinary bladder, unspecified site [facility])  -     [facility]  Acute cystitis without hematuria  -     nitrofurantoin, macrocrystal-monohydrate, (MACROBID) 100 MG capsule; Take 1 capsule by mouth 2 times daily for 7 days, Disp-14 capsule, R-0Normal      Recommendations for Preventive Services Due: see orders and patient instructions/AVS.  Recommended screening schedule for the next 5-10 years is provided to the patient in written form: see Patient Instructions/AVS.     No follow-ups on file. Subjective   1. He needs a urologist referral due to being told of  blood in the urine. There is a history of bladder cancer. Patient's complete Health Risk Assessment and screening values have been reviewed and are found in Flowsheets. The following problems were reviewed today and where indicated follow up appointments were made and/or referrals ordered.     Positive Risk Factor Screenings with Interventions:               General Health and ACP:  General  In general, how would you say your health is?: Fair  In the past 7 days, have you experienced any of the following: New or Increased Pain, New or Increased Fatigue, Loneliness, Social Isolation, Stress or Anger?: No  Do you get the social and emotional support that you need?: Yes  Do you have a Living Will?: Yes    Advance Directives     Power of [address] [place] [address] Will ACP-Advance Directive ACP-Power of     Not on File Not on File Not on File Not on File      General Health Risk Interventions:  · the answers are acceptable    Health Habits/Nutrition:     Physical Activity: Insufficiently Active    Days of Exercise per Week: 3 days    Minutes of Exercise per Session: 30 min     Have you lost any weight without trying in the past 3 months?: (!) Yes     Have you seen the dentist within the past year?: N/A - wear dentures    Health Habits/Nutrition Interventions:  · he feels he's lost 20 pounds in the past 3 months but it does not show on the scale here. his pants still fit the same    Hearing/Vision:  Do you or your family notice any trouble with your hearing that hasn't been managed with hearing aids?: (!) Yes  Do you have difficulty driving, watching TV, or doing any of your daily activities because of your eyesight?: No  Have you had an eye exam within the past year?: Yes  No exam data present    Hearing/Vision Interventions:  · [facility] through the [facility]            Objective   Vitals:    06/10/22 1048   Height: 5' 7\" (1.702 m)      Body mass index is 27.41 kg/m². General Appearance: alert and oriented to person, place and time, well-developed and well-nourished, in no acute distress  Skin: warm and dry, no rash or erythema  Head: normocephalic and atraumatic  Eyes: pupils equal, round, and reactive to light, extraocular eye movements intact, conjunctivae normal  ENT: tympanic membrane, external ear and ear canal normal bilaterally, oropharynx clear and moist with normal mucous membranes  Neck: neck supple and non tender without mass, no thyromegaly or thyroid nodules, no cervical lymphadenopathy   Pulmonary/Chest: clear to auscultation bilaterally- no wheezes, rales or rhonchi, normal air movement, no respiratory distress  Cardiovascular: normal rate, regular rhythm, normal S1 and S2, no murmurs and no gallops  Abdomen: soft, non-tender, non-distended, normal bowel sounds, no masses or organomegaly  Extremities: no cyanosis and no clubbing  Musculoskeletal: normal range of motion, no joint swelling, deformity or tenderness  Neurologic: gait and coordination normal and speech normal       No Known Allergies  Prior to Visit Medications    Medication Sig Taking?  Authorizing Provider   nitrofurantoin, macrocrystal-monohydrate, (MACROBID) 100 MG capsule Take 1 capsule by mouth 2 times daily for 7 days Yes Oren Moncada MD   fluticasone-salmeterol [facility] INHUB) 250-50 MCG/DOSE AEPB Inhale 1 puff into the lungs every 12 hours Yes Historical Provider, MD   ipratropium-albuterol (DUONEB) 0.5-2.5 (3) MG/3ML SOLN nebulizer solution Take 3 mLs by nebulization 4 times daily Yes CASEY Stephen - CNP   ALBUTEROL IN Inhale into the lungs 2puff 4x daily Yes Historical Provider, MD   tamsulosin (FLOMAX) 0.4 MG capsule Take 0.4 mg by mouth daily Yes Historical Provider, MD   pravastatin (PRAVACHOL) 80 MG tablet Take 80 mg by mouth daily. Yes Historical Provider, MD   metoprolol (LOPRESSOR) 50 MG tablet Take 50 mg by mouth daily. 1/2 tablet bid Yes Historical Provider, MD   aspirin 81 MG EC tablet Take 81 mg by mouth daily.    Yes Historical Provider, MD Rose (Including outside providers/suppliers regularly involved in providing care):   Patient Care Team:  Oren Moncada MD as PCP - General (Family Medicine)  Oren Moncada MD as PCP - [facility] Empaneled Provider  Radha Johnson DO (Internal Medicine)  Wilma Coronado MD as Consulting Physician (Urology)  Danielle Mckoy MD as Consulting Physician (Pulmonology)     Reviewed and updated this visit:  Tobacco  Allergies  Meds  Med Hx  Surg Hx  Soc Hx  Fam Hx

## 2022-06-10 NOTE — PATIENT INSTRUCTIONS
Let us know if you do not hear from the urologist's office in the next couple of working days. Personalized Preventive Plan for Anshul Sheridan - 6/10/2022  Medicare offers a range of preventive health benefits. Some of the tests and screenings are paid in full while other may be subject to a deductible, co-insurance, and/or copay. Some of these benefits include a comprehensive review of your medical history including lifestyle, illnesses that may run in your family, and various assessments and screenings as appropriate. After reviewing your medical record and screening and assessments performed today your provider may have ordered immunizations, labs, imaging, and/or referrals for you. A list of these orders (if applicable) as well as your Preventive Care list are included within your After Visit Summary for your review. Other Preventive Recommendations:    · A preventive eye exam performed by an eye specialist is recommended every 1-2 years to screen for glaucoma; cataracts, macular degeneration, and other eye disorders. · A preventive dental visit is recommended every 6 months. · Try to get at least 150 minutes of exercise per week or 10,000 steps per day on a pedometer . · Order or download the FREE \"Exercise & Physical Activity: Your Everyday Guide\" from [employer]. Call 9-333.803.7252 or search [employer] online. · You need 2942-8642 mg of calcium and 5579-8828 IU of vitamin D per day. It is possible to meet your calcium requirement with diet alone, but a vitamin D supplement is usually necessary to meet this goal.  · When exposed to the sun, use a sunscreen that protects against both UVA and UVB radiation with an SPF of 30 or greater. Reapply every 2 to 3 hours or after sweating, drying off with a towel, or swimming. · Always wear a seat belt when traveling in a car. Always wear a helmet when riding a bicycle or motorcycle.

## 2022-06-13 ENCOUNTER — HOSPITAL ENCOUNTER (OUTPATIENT)
Dept: NON INVASIVE DIAGNOSTICS | Age: 82
Discharge: HOME | End: 2022-06-13
Payer: OTHER GOVERNMENT

## 2022-06-13 ENCOUNTER — TELEPHONE (OUTPATIENT)
Dept: FAMILY MEDICINE CLINIC | Age: 82
End: 2022-06-13

## 2022-06-13 DIAGNOSIS — R01.1 CARDIAC MURMUR: ICD-10-CM

## 2022-06-13 LAB
LV EF: 35 %
LVEF MODALITY: NORMAL
URINE CULTURE, ROUTINE: NORMAL

## 2022-06-13 PROCEDURE — 93306 TTE W/DOPPLER COMPLETE: CPT

## 2022-06-13 NOTE — PROGRESS NOTES
In process of contacting provider to clarify order. Echo ordered Echo 2d w doppler w color w contrast [ECH23].

## 2022-06-13 NOTE — PROGRESS NOTES
Was on phone trying to reach provider for 20 minutes. I talked to Yuriy Ding from [place] office. She stated that this order was discontinued and that what was ordered was a cardio echo.  Will proceed with a complete echo without contrast.

## 2022-07-05 ENCOUNTER — TELEPHONE (OUTPATIENT)
Dept: CARDIOLOGY CLINIC | Age: 82
End: 2022-07-05

## 2022-07-05 NOTE — TELEPHONE ENCOUNTER
REFERRAL RECEIVED FROM [place]. ATTEMPT TO CONTACT PATIENT TO MAKE AN APPOINTMENT, NO ANSWER, [facility].

## 2022-07-06 NOTE — TELEPHONE ENCOUNTER
Spoke to patient, appointment scheduled. Fax sent to [facility] with appt date/time.

## 2022-07-08 ENCOUNTER — OFFICE VISIT (OUTPATIENT)
Dept: UROLOGY | Age: 82
End: 2022-07-08
Payer: OTHER GOVERNMENT

## 2022-07-08 ENCOUNTER — TELEPHONE (OUTPATIENT)
Dept: FAMILY MEDICINE CLINIC | Age: 82
End: 2022-07-08

## 2022-07-08 VITALS
WEIGHT: 175 LBS | HEIGHT: 67 IN | DIASTOLIC BLOOD PRESSURE: 72 MMHG | BODY MASS INDEX: 27.47 KG/M2 | SYSTOLIC BLOOD PRESSURE: 142 MMHG

## 2022-07-08 DIAGNOSIS — Z85.51 HISTORY OF BLADDER CANCER: ICD-10-CM

## 2022-07-08 DIAGNOSIS — R31.29 OTHER MICROSCOPIC HEMATURIA: Primary | ICD-10-CM

## 2022-07-08 LAB
BILIRUBIN URINE: NEGATIVE
BLOOD URINE, POC: NEGATIVE
CHARACTER, URINE: CLEAR
COLOR, URINE: YELLOW
GLUCOSE URINE: NEGATIVE MG/DL
KETONES, URINE: NEGATIVE
LEUKOCYTE CLUMPS, URINE: ABNORMAL
NITRITE, URINE: NEGATIVE
PH, URINE: 7 (ref 5–9)
PROTEIN, URINE: 30 MG/DL
SPECIFIC GRAVITY, URINE: 1.02 (ref 1–1.03)
UROBILINOGEN, URINE: 0.2 EU/DL (ref 0–1)

## 2022-07-08 PROCEDURE — 1123F ACP DISCUSS/DSCN MKR DOCD: CPT | Performed by: UROLOGY

## 2022-07-08 PROCEDURE — 81003 URINALYSIS AUTO W/O SCOPE: CPT | Performed by: UROLOGY

## 2022-07-08 PROCEDURE — 99204 OFFICE O/P NEW MOD 45 MIN: CPT | Performed by: UROLOGY

## 2022-07-08 NOTE — TELEPHONE ENCOUNTER
Pt's daughter, KIRSTY called and said that the referral that we did to [facility] needs faxed to [place] in [place]. [facility] [place] at [phone] Ext [phone] option 2 for [facility] to find out where to fax the referral.  This is so the [place] will pay for the urologist [email or web address].
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
## 2022-07-12 ENCOUNTER — OFFICE VISIT (OUTPATIENT)
Dept: CARDIOLOGY CLINIC | Age: 82
End: 2022-07-12
Payer: OTHER GOVERNMENT

## 2022-07-12 VITALS
BODY MASS INDEX: 27.47 KG/M2 | DIASTOLIC BLOOD PRESSURE: 62 MMHG | HEIGHT: 67 IN | HEART RATE: 61 BPM | WEIGHT: 175 LBS | SYSTOLIC BLOOD PRESSURE: 101 MMHG

## 2022-07-12 DIAGNOSIS — Z87.891 HISTORY OF TOBACCO USE: ICD-10-CM

## 2022-07-12 DIAGNOSIS — J96.11 CHRONIC HYPOXEMIC RESPIRATORY FAILURE (HCC): ICD-10-CM

## 2022-07-12 DIAGNOSIS — C67.9 TRANSITIONAL CELL CARCINOMA OF BLADDER (HCC): ICD-10-CM

## 2022-07-12 DIAGNOSIS — I71.40 ABDOMINAL AORTIC ANEURYSM (AAA) WITHOUT RUPTURE: ICD-10-CM

## 2022-07-12 DIAGNOSIS — J44.9 CHRONIC OBSTRUCTIVE PULMONARY DISEASE, UNSPECIFIED COPD TYPE (HCC): ICD-10-CM

## 2022-07-12 DIAGNOSIS — R06.09 DOE (DYSPNEA ON EXERTION): Primary | ICD-10-CM

## 2022-07-12 DIAGNOSIS — D36.9 ADENOMATOUS POLYP: ICD-10-CM

## 2022-07-12 PROCEDURE — 99244 OFF/OP CNSLTJ NEW/EST MOD 40: CPT | Performed by: INTERNAL MEDICINE

## 2022-07-12 PROCEDURE — 93000 ELECTROCARDIOGRAM COMPLETE: CPT | Performed by: INTERNAL MEDICINE

## 2022-07-21 ENCOUNTER — PREP FOR PROCEDURE (OUTPATIENT)
Dept: CARDIOLOGY | Age: 82
End: 2022-07-21

## 2022-07-21 RX ORDER — ASPIRIN 325 MG
325 TABLET ORAL ONCE
Status: CANCELLED | OUTPATIENT
Start: 2022-07-21 | End: 2022-07-21

## 2022-07-21 RX ORDER — SODIUM CHLORIDE 9 MG/ML
INJECTION, SOLUTION INTRAVENOUS PRN
Status: CANCELLED | OUTPATIENT
Start: 2022-07-21

## 2022-07-21 RX ORDER — NITROGLYCERIN 0.4 MG/1
0.4 TABLET SUBLINGUAL EVERY 5 MIN PRN
Status: CANCELLED | OUTPATIENT
Start: 2022-07-21

## 2022-07-21 RX ORDER — SODIUM CHLORIDE 0.9 % (FLUSH) 0.9 %
5-40 SYRINGE (ML) INJECTION PRN
Status: CANCELLED | OUTPATIENT
Start: 2022-07-21

## 2022-07-21 RX ORDER — SODIUM CHLORIDE 0.9 % (FLUSH) 0.9 %
5-40 SYRINGE (ML) INJECTION EVERY 12 HOURS SCHEDULED
Status: CANCELLED | OUTPATIENT
Start: 2022-07-21

## 2022-07-22 ENCOUNTER — HOSPITAL ENCOUNTER (OUTPATIENT)
Dept: INPATIENT UNIT | Age: 82
Discharge: HOME | End: 2022-07-22
Attending: INTERNAL MEDICINE | Admitting: INTERNAL MEDICINE
Payer: MEDICARE

## 2022-07-22 VITALS
WEIGHT: 170 LBS | HEIGHT: 67 IN | HEART RATE: 66 BPM | TEMPERATURE: 98.9 F | DIASTOLIC BLOOD PRESSURE: 62 MMHG | SYSTOLIC BLOOD PRESSURE: 133 MMHG | RESPIRATION RATE: 16 BRPM | BODY MASS INDEX: 26.68 KG/M2 | OXYGEN SATURATION: 97 %

## 2022-07-22 DIAGNOSIS — I50.21 ACUTE HFREF (HEART FAILURE WITH REDUCED EJECTION FRACTION) (HCC): ICD-10-CM

## 2022-07-22 DIAGNOSIS — I25.10 CAD IN NATIVE ARTERY: Primary | ICD-10-CM

## 2022-07-22 DIAGNOSIS — I42.9 CARDIOMYOPATHY, UNSPECIFIED TYPE (HCC): ICD-10-CM

## 2022-07-22 LAB
ABO: NORMAL
ANION GAP SERPL CALCULATED.3IONS-SCNC: 8 MEQ/L (ref 8–16)
ANTIBODY SCREEN: NORMAL
AVERAGE GLUCOSE: 108 MG/DL (ref 70–126)
BUN BLDV-MCNC: 21 MG/DL (ref 7–22)
CALCIUM SERPL-MCNC: 9.9 MG/DL (ref 8.5–10.5)
CHLORIDE BLD-SCNC: 105 MEQ/L (ref 98–111)
CHOLESTEROL, TOTAL: 132 MG/DL (ref 100–199)
CO2: 30 MEQ/L (ref 23–33)
CREAT SERPL-MCNC: 0.5 MG/DL (ref 0.4–1.2)
EKG ATRIAL RATE: 59 BPM
EKG P AXIS: 62 DEGREES
EKG P-R INTERVAL: 276 MS
EKG Q-T INTERVAL: 448 MS
EKG QRS DURATION: 154 MS
EKG QTC CALCULATION (BAZETT): 443 MS
EKG R AXIS: -57 DEGREES
EKG T AXIS: 71 DEGREES
EKG VENTRICULAR RATE: 59 BPM
ERYTHROCYTE [DISTWIDTH] IN BLOOD BY AUTOMATED COUNT: 14 % (ref 11.5–14.5)
ERYTHROCYTE [DISTWIDTH] IN BLOOD BY AUTOMATED COUNT: 51.3 FL (ref 35–45)
GFR SERPL CREATININE-BSD FRML MDRD: > 90 ML/MIN/1.73M2
GLUCOSE BLD-MCNC: 109 MG/DL (ref 70–108)
HBA1C MFR BLD: 5.6 % (ref 4.4–6.4)
HCT VFR BLD CALC: 46.4 % (ref 42–52)
HDLC SERPL-MCNC: 44 MG/DL
HEMOGLOBIN: 14.3 GM/DL (ref 14–18)
INR BLD: 0.88 (ref 0.85–1.13)
LDL CHOLESTEROL CALCULATED: 77 MG/DL
MCH RBC QN AUTO: 30.6 PG (ref 26–33)
MCHC RBC AUTO-ENTMCNC: 30.8 GM/DL (ref 32.2–35.5)
MCV RBC AUTO: 99.4 FL (ref 80–94)
PLATELET # BLD: 179 THOU/MM3 (ref 130–400)
PMV BLD AUTO: 10.2 FL (ref 9.4–12.4)
POTASSIUM SERPL-SCNC: 4.2 MEQ/L (ref 3.5–5.2)
RBC # BLD: 4.67 MILL/MM3 (ref 4.7–6.1)
RH FACTOR: NORMAL
SODIUM BLD-SCNC: 143 MEQ/L (ref 135–145)
TRIGL SERPL-MCNC: 57 MG/DL (ref 0–199)
WBC # BLD: 9 THOU/MM3 (ref 4.8–10.8)

## 2022-07-22 PROCEDURE — 83036 HEMOGLOBIN GLYCOSYLATED A1C: CPT

## 2022-07-22 PROCEDURE — 86900 BLOOD TYPING SEROLOGIC ABO: CPT

## 2022-07-22 PROCEDURE — 85610 PROTHROMBIN TIME: CPT

## 2022-07-22 PROCEDURE — 6370000000 HC RX 637 (ALT 250 FOR IP): Performed by: INTERNAL MEDICINE

## 2022-07-22 PROCEDURE — C1894 INTRO/SHEATH, NON-LASER: HCPCS

## 2022-07-22 PROCEDURE — 6360000002 HC RX W HCPCS

## 2022-07-22 PROCEDURE — 93571 IV DOP VEL&/PRESS C FLO 1ST: CPT | Performed by: INTERNAL MEDICINE

## 2022-07-22 PROCEDURE — 93005 ELECTROCARDIOGRAM TRACING: CPT | Performed by: NURSE PRACTITIONER

## 2022-07-22 PROCEDURE — 80061 LIPID PANEL: CPT

## 2022-07-22 PROCEDURE — 36415 COLL VENOUS BLD VENIPUNCTURE: CPT

## 2022-07-22 PROCEDURE — 86901 BLOOD TYPING SEROLOGIC RH(D): CPT

## 2022-07-22 PROCEDURE — 6360000004 HC RX CONTRAST MEDICATION: Performed by: INTERNAL MEDICINE

## 2022-07-22 PROCEDURE — 2580000003 HC RX 258: Performed by: INTERNAL MEDICINE

## 2022-07-22 PROCEDURE — C1887 CATHETER, GUIDING: HCPCS

## 2022-07-22 PROCEDURE — 93458 L HRT ARTERY/VENTRICLE ANGIO: CPT

## 2022-07-22 PROCEDURE — 80048 BASIC METABOLIC PNL TOTAL CA: CPT

## 2022-07-22 PROCEDURE — C1769 GUIDE WIRE: HCPCS

## 2022-07-22 PROCEDURE — 93571 IV DOP VEL&/PRESS C FLO 1ST: CPT

## 2022-07-22 PROCEDURE — 93458 L HRT ARTERY/VENTRICLE ANGIO: CPT | Performed by: INTERNAL MEDICINE

## 2022-07-22 PROCEDURE — 86850 RBC ANTIBODY SCREEN: CPT

## 2022-07-22 PROCEDURE — 85027 COMPLETE CBC AUTOMATED: CPT

## 2022-07-22 PROCEDURE — 2500000003 HC RX 250 WO HCPCS

## 2022-07-22 PROCEDURE — 94640 AIRWAY INHALATION TREATMENT: CPT

## 2022-07-22 PROCEDURE — 93010 ELECTROCARDIOGRAM REPORT: CPT | Performed by: INTERNAL MEDICINE

## 2022-07-22 RX ORDER — ASPIRIN 325 MG
325 TABLET ORAL ONCE
Status: DISCONTINUED | OUTPATIENT
Start: 2022-07-22 | End: 2022-07-22 | Stop reason: HOSPADM

## 2022-07-22 RX ORDER — SODIUM CHLORIDE 0.9 % (FLUSH) 0.9 %
5-40 SYRINGE (ML) INJECTION EVERY 12 HOURS SCHEDULED
Status: DISCONTINUED | OUTPATIENT
Start: 2022-07-22 | End: 2022-07-22 | Stop reason: HOSPADM

## 2022-07-22 RX ORDER — IPRATROPIUM BROMIDE AND ALBUTEROL SULFATE 2.5; .5 MG/3ML; MG/3ML
1 SOLUTION RESPIRATORY (INHALATION) EVERY 4 HOURS PRN
Status: DISCONTINUED | OUTPATIENT
Start: 2022-07-22 | End: 2022-07-22 | Stop reason: HOSPADM

## 2022-07-22 RX ORDER — SODIUM CHLORIDE 9 MG/ML
INJECTION, SOLUTION INTRAVENOUS PRN
Status: DISCONTINUED | OUTPATIENT
Start: 2022-07-22 | End: 2022-07-22 | Stop reason: HOSPADM

## 2022-07-22 RX ORDER — ACETAMINOPHEN 325 MG/1
650 TABLET ORAL EVERY 4 HOURS PRN
Status: DISCONTINUED | OUTPATIENT
Start: 2022-07-22 | End: 2022-07-22 | Stop reason: HOSPADM

## 2022-07-22 RX ORDER — NITROGLYCERIN 0.4 MG/1
0.4 TABLET SUBLINGUAL EVERY 5 MIN PRN
Status: DISCONTINUED | OUTPATIENT
Start: 2022-07-22 | End: 2022-07-22 | Stop reason: HOSPADM

## 2022-07-22 RX ORDER — SODIUM CHLORIDE 0.9 % (FLUSH) 0.9 %
5-40 SYRINGE (ML) INJECTION PRN
Status: DISCONTINUED | OUTPATIENT
Start: 2022-07-22 | End: 2022-07-22 | Stop reason: HOSPADM

## 2022-07-22 RX ORDER — SODIUM CHLORIDE 9 MG/ML
INJECTION, SOLUTION INTRAVENOUS CONTINUOUS
Status: DISCONTINUED | OUTPATIENT
Start: 2022-07-22 | End: 2022-07-22 | Stop reason: HOSPADM

## 2022-07-22 RX ORDER — METOPROLOL SUCCINATE 25 MG/1
50 TABLET, EXTENDED RELEASE ORAL DAILY
Qty: 30 TABLET | Refills: 3 | Status: SHIPPED | OUTPATIENT
Start: 2022-07-22 | End: 2022-08-09 | Stop reason: SDUPTHER

## 2022-07-22 RX ADMIN — IPRATROPIUM BROMIDE AND ALBUTEROL SULFATE 1 AMPULE: .5; 3 SOLUTION RESPIRATORY (INHALATION) at 17:15

## 2022-07-22 RX ADMIN — IOPAMIDOL 145 ML: 755 INJECTION, SOLUTION INTRAVENOUS at 14:06

## 2022-07-22 RX ADMIN — SODIUM CHLORIDE: 9 INJECTION, SOLUTION INTRAVENOUS at 08:25

## 2022-07-22 RX ADMIN — IPRATROPIUM BROMIDE AND ALBUTEROL SULFATE 1 AMPULE: .5; 3 SOLUTION RESPIRATORY (INHALATION) at 09:02

## 2022-07-22 NOTE — PROGRESS NOTES
1411 Care taken over from cath lab. Radial site stable, no bleeding seen, site soft. Armboard continued with vascband. Patient instructed not to bend wrist, not to put pressure on wrist and not to lift  or twist with wrist. Patient voices understanding.

## 2022-07-22 NOTE — PROCEDURES
[address] [place] [address]                            CARDIAC CATHETERIZATION    PATIENT NAME: Lucho Boggs                       :        1940  MED REC NO:   [id number]                           ROOM:       0014  ACCOUNT NO:   [de-identified]                           ADMIT DATE: 2022  PROVIDER:     Bailee Church MD    DATE OF PROCEDURE:  2022    INDICATION:  New-onset severe cardiomyopathy, ejection fraction  worsening down to 35% on recent echocardiogram, congestive heart  failure, aortic stenosis, angina and angina equivalent symptoms. The patient has history of coronary artery disease, prior PCI and  stenting. He also has history of peripheral arterial disease, carotid  endarterectomy and EVAR. PROCEDURES PERFORMED:  1. Left cardiac catheterization with selective coronary angiogram.  2.  Left ventriculogram.  3.  Fractional flow reserve measurement of the right coronary artery. DESCRIPTION OF PROCEDURE:  After informed consent, the patient was  brought to the cardiac catheterization room. He was prepped and draped  in a sterile fashion. 2% lidocaine was injected in the skin and  subcutaneous tissue overlying the right radial artery. Under ultrasound  guidance using modified Seldinger technique, access was obtained in the  right radial artery. 5/6 Slender sheath was inserted. Standard  antithrombotic/antispasmodic medications were given. I used 6-Slovak  JR4, 6-Slovak JL4 diagnostic catheters to complete the diagnostic  procedure. Based on findings, decision was made to proceed with  fractional flow reserve measurement of the right coronary artery. I  used 6-Slovak 3DRC guide catheter to cannulate the right coronary  artery. Heparin was given. ACT was monitored, kept above 250. Fractional flow reserve measurement wire was prepared outside of the  patient's body per 's recommendations. FFR wire was  advanced to the proximal RCA. Equalization of pressures was completed. FFR was then advanced distal to the target lesion area in the mid RCA. At baseline, FFR was 0.98. Adenosine at 140 mcg/kg per minute was  initiated and was continued for 3 minutes and then was stopped. At  maximal hyperemia, FFR was measured at 0.87. The wire was removed. Repeat angiogram revealed no complications including no dissection,  distal embolization or perforation. FINDINGS:  HEMODYNAMICS:  Left ventricular end-diastolic pressure 7 mmHg. 28 mmHg  pressure gradient across the aortic valve upon pullback. LEFT VENTRICULOGRAM:  Moderate global hypokinesis. Ejection fraction  35% to 40%. CORONARY ANGIOGRAM:  1. RIGHT CORONARY ARTERY:  Dominant vessel. Proximal RCA has 30%  stenosis. Mid RCA has 60% in-stent restenosis. Distal RCA with luminal  irregularities; bifurcates into RPL and RPDA, both with mild diffuse  disease. 2.  LEFT MAIN CORONARY ARTERY:  Patent without significant stenosis. Gives rise to ramus intermedius, left circumflex and left anterior  descending arteries. 3.  RAMUS INTERMEDIUS:  Patent. No significant stenosis. 4.  LEFT CIRCUMFLEX ARTERY:  Proximal LCX is patent. OM1 has a patent  intervention site, followed by luminal irregularities. Distal LCX with  mild luminal irregularities. There is a small left-to-right collateral,  which seems to feed into a small vessel most consistent with a smaller  branch of RPLB. 5.  LEFT ANTERIOR DESCENDING ARTERY:  Proximal LAD is patent. D1 is an  early takeoff large vessel with ostial 50% stenosis. Mid LAD has 10% to  20% stenosis. Distal LAD is patent. MEDICATIONS:  See MAR. COMPLICATIONS:  None. ESTIMATED BLOOD LOSS:  Less than 50 mL. ACCESS:  Right radial artery access. Vasc Band was applied. Hemostasis  was achieved.     IMPRESSION:  1.  60% mid RCA in-stent restenosis, hemodynamically insignificant with  negative FFR measurement. 2.  Patent stent in the obtuse marginal branch. 3.  Nonobstructive coronary artery disease. 4.  Nonischemic cardiomyopathy. 5.  LVEDP 7 mmHg, consistent with compensated CHF. PLAN OF CARE:  Access site care. Monitor in recovery room. Continue  telemetry until discharge. Discharge home today if condition remains  stable. Optimize medical therapy for congestive heart failure with  reduced ejection fraction. We will change Lopressor to Toprol. Monitor  home blood pressure medications. Further escalation of  guideline-directed medical therapy for congestive heart failure with  reduced ejection fraction as blood pressure allows. May consider adding  Entresto with next office visit. Refer the patient to CHF Clinic. The  patient has history of COPD. Wheezing was noted on exam today. COPD  seems to be contributing to the patient's symptoms. Advised to follow  up with primary care physician and pulmonologist as needed.         Diana Myles MD    D: 07/22/2022 15:09:21       T: 07/22/2022 15:13:16     [id number]  Job#: [id number]     Doc#: [id number]    CC:

## 2022-07-22 NOTE — PLAN OF CARE
1900 Discharge instructions given to pt and daughter, both \"voiced understanding\"    1915  discharged per wc per transport team with personal belongings with no needs   Has home oxygen

## 2022-07-22 NOTE — DISCHARGE INSTRUCTIONS
Daily weight. Limit sodium intake. POST RADIAL  Take it easy for 3-4 days and limit vigorous activity (contact sports) for 2 weeks after the procedure. No driving for 2 days after the procedure. No lifting of 5 lbs. or more for 5 days with the affected arm. You can shower after 24 hours. Remove the arm board after 24 hours. Apply a clean band aid to the insertion site for 5 days after the procedure. Wash the site with soap and water daily. No creams, ointments, or powders near the insertion site. No bath tubs, swimming, hot tubs or hand - washing dishes for 1 week after the procedure. Watch for signs of infection (redness, warmth, swelling, pus drainage) or if there is coolness of the extremity. Call the physician if this occurs. If there is bleeding from the incision, apply pressure to it and call 911. Watch for numbness / tingling. If this occurs, go to the Emergency Room as soon as possible. May Apply ice for 15 minutes with an hour break in between for comfort. Alternate with a heat compress for 15 minutes to reduce the swelling. Do this for 3 - 5 days after procedure if needed . Coronary Angiogram: What to Expect at [address] [place] [address]     A coronary angiogram is a test to examine the large blood vessels of your heart (coronary arteries). The doctor inserted a thin, flexible tube (catheter) intoa blood vessel in your groin or wrist.  Your groin or wrist may have a bruise and feel sore for a few days after the procedure. You can do light activities around the house. But do not do anythingstrenuous until your doctor says it is okay. This may be for several days. This care sheet gives you a general idea about how long it will take for you to recover. But each person recovers at a different pace. Follow the steps belowto feel better as quickly as possible. How can you care for yourself at home? Activity    If the doctor gave you a sedative:   For 24 hours, don't do anything that requires attention to detail, such as going to work, making important decisions, or signing any legal documents. It takes time for the medicine's effects to completely wear off. For your safety, do not drive or operate any machinery that could be dangerous. Wait until the medicine wears off and you can think clearly and react easily. Do not do strenuous exercise and do not lift, pull, or push anything heavy until your doctor says it is okay. This may be for several days. You can walk around the house and do light activity, such as cooking. If the catheter was placed in your groin, try not to walk up stairs for the first couple of days. If the catheter was placed in your wrist, do not bend your wrist deeply for the first couple of days. Be careful using your hand to get into and out of a chair or bed. Get regular exercise. Walking may be a good choice. Try for at least 30 minutes on most days of the week. Diet    Drink plenty of fluids to help your body flush out the dye. If you have kidney, heart, or liver disease and have to limit fluids, talk with your doctor before you increase the amount of fluids you drink. Keep eating a heart-healthy diet that has lots of fruits, vegetables, and whole grains. If you have not been eating this way, talk to your doctor. You also may want to talk to a dietitian. This expert can help you to learn about healthy foods and plan meals. Medicines    Your doctor will tell you if and when you can restart your medicines. You will also be given instructions about taking any new medicines. If you take aspirin or some other blood thinner, ask your doctor if and when to start taking it again. Make sure that you understand exactly what your doctor wants you to do. Your doctor may prescribe a blood-thinning medicine like aspirin or clopidogrel (Plavix).  It is very important that you take these medicines exactly as directed in order to keep the coronary artery open and reduce your risk of a heart attack. Be safe with medicines. Call your doctor if you think you are having a problem with your medicine. Care of the catheter site    For 1 or 2 days, keep a bandage over the spot where the catheter was inserted. The bandage probably will fall off in this time. Put ice or a cold pack on the area for 10 to 20 minutes at a time to help with soreness or swelling. Put a thin cloth between the ice and your skin. You may shower 24 to 48 hours after the procedure, if your doctor okays it. Pat the incision dry. Do not soak the catheter site until it is healed. Don't take a bath for 1 week, or until your doctor tells you it is okay. Watch for bleeding from the site. A small amount of blood (up to the size of a quarter) on the bandage can be normal.     If you are bleeding, lie down and press on the area for 15 minutes to try to make it stop. If the bleeding does not stop, call your doctor or seek immediate medical care. Follow-up care is a key part of your treatment and safety. Be sure to make and go to all appointments, and call your doctor if you are having problems. It's also a good idea to know your test results and keep alist of the medicines you take. When should you call for help? Call 911 anytime you think you may need emergency care. For example, call if:    You passed out (lost consciousness). You have severe trouble breathing. You have sudden chest pain and shortness of breath, or you cough up blood. You have symptoms of a heart attack. These may include:  Chest pain or pressure, or a strange feeling in the chest.  Sweating. Shortness of breath. Nausea or vomiting. Pain, pressure, or a strange feeling in the back, neck, jaw, or upper belly, or in one or both shoulders or arms. Lightheadedness or sudden weakness. A fast or irregular heartbeat.      After you call 911, the  may tell you to chew 1 adult-strength or 2 to 4 low-dose aspirin. Wait for an ambulance. Do not try to drive yourself. You have been diagnosed with angina, and you have symptoms that do not go away with rest or are not getting better within 5 minutes after you take a dose of nitroglycerin. Call your doctor now or seek immediate medical care if:    You are bleeding from the area where the catheter was put in your artery. You have a fast-growing, painful lump at the catheter site. You have signs of infection, such as: Increased pain, swelling, warmth, or redness. Red streaks leading from the catheter site. Pus draining from the catheter site. A fever. Your leg or hand is painful, looks blue, or feels cold, numb, or tingly. Watch closely for changes in your health, and be sure to contact your doctor ifyou have any problems. Where can you learn more? Go to https://[employer]Du[employer]. org and sign in to your [employer] account. Enter O302 in the [employer] box to learn more about \"Coronary Angiogram: What to Expect at Home. \"     If you do not have an account, please click on the \"Sign Up Now\" link. Current as of: January 10, 2022               Content Version: 13.3  © 2006-2022 Healthwise, Incorporated. Care instructions adapted under license by [facility] ([facility]). If you have questions about a medical condition or this instruction, always ask your healthcare professional. [place] [address] any warranty or liability for your use of this information.

## 2022-07-22 NOTE — BRIEF OP NOTE
[facility]  Sedation/Analgesia Post Sedation Record    Pt Name: Bard Bender  Account number: [de-identified]  MRN: [id number]  YOB: 1940  Procedure Performed By: Jovita Harkins MD MD   Primary Care Physician: Killian Castillo MD  Date: 7/22/2022    POST-PROCEDURE    Physicians/Assistants: Jovita Harkins MD MD     Procedure Performed:Cath      Sedation/Anesthesia: Versed/ Fentanyl and 2% xylocaine local anesthesia. Estimated Blood Loss: < 50 ml. Specimens Removed: None         Disposition of Specimen: N/A        Complications: No Immediate Complications. Post-procedure Diagnosis/Findings:       60% Mid RCA ISR, hemodynamically insignificant by FFR  Patent stent in OM  Nonobstructive CAD   Nonischemic cardiomyopathy   LVEDP 7 mmHg    Recommendations:  Bed rest for the next 2 hours  Access site care  Medical therapy is recommended  Aggressive risk factor modification for CAD  ASA 81 mg PO daily   Statin therapy  Optimize medical therapy for HFrEF  Change lopressor to Toprol   Monitor home BP  Refer to CHF clinic  Daily weight  Limit Na intake   COPD management per PCP, consider pulmonary medicine evaluation   Discharge home today   Outpatient follow up in office in the next 2-3 weeks      Above findings and plan of care were discussed with patient and his family, questions were answered, agreeable with plan.        Jovita Harkins MD, Deb Days   Electronically signed 7/22/2022 at 2:51 PM  Interventional Cardiology

## 2022-07-22 NOTE — H&P
[address] [address]  Sedation/Analgesia History & Physical    Pt Name: Hugh Abbasi  Account number: [de-identified]  MRN: [id number]  YOB: 1940  Provider Performing Procedure: Ned Lerner MD MD  Referring Provider: Ned Lerner MD   Primary Care Physician: Amanda Santo MD  Date: 7/22/2022    PRE-PROCEDURE    Code Status: FULL CODE  Brief History/Pre-Procedure Diagnosis:   Cardiomyopathy  Worsening LVEF  EF 35%  TODD, SOB, Fatigue, angina equivalent symptoms  AS  CAD  Prior PCI  PAD, prior EVAR, CEA     Consent: : I have discussed with the patient risks, benefits, and alternatives (and relevant risks, benefits, and side effects related to alternatives or not receiving care), and likelihood of the success. The patient and/or representative appear to understand and agree to proceed. The discussion encompasses risks, benefits, and side effects related to the alternatives and the risks related to not receiving the proposed care, treatment, and services. The indication, risks and benefits of the procedure and possible therapeutic consequences and alternatives were discussed with the patient. The patient was given the opportunity to ask questions and to have them answered to his/her satisfaction. Risks of the procedure include but are not limited to the following: Bleeding, hematoma including retroperitoneal hemmorhage, infection, pain and discomfort, injury to the aorta and other blood vessels, rhythm disturbance, low blood pressure, myocardial infarction, stroke, kidney damage/failure, myocardial perforation, allergic reactions to sedatives/contrast material, loss of pulse/vascular compromise requiring surgery, aneurysm/pseudoaneurysm formation, possible loss of a limb/hand/leg, needing blood transfusion, requiring emergent open heart surgery or emergent coronary intervention, the need for intubation/respiratory support, the requirement for defibrillation/cardioversion, and death. Alternatives to and omission of the suggested procedure were discussed. The patient had no further questions and wished to proceed; the consent form was signed. MEDICAL HISTORY  []ASHD/ANGINA/MI/CHF   []Hypertension  []Diabetes  []Hyperlipidemia  []Smoking  []Family Hx of ASHD  []Additional information:       has a past medical history of Adenomatous polyp, Aortic aneurysm ([facility] [address] Asthma, CAD (coronary artery disease), COPD (chronic obstructive pulmonary disease) ([facility] [address] Hyperlipidemia, Hypertension, Prolonged emergence from general anesthesia, and Transitional cell carcinoma of bladder ([facility] [address] SURGICAL HISTORY   has a past surgical history that includes Coronary angioplasty with stent; Colonoscopy;  Carotid endarterectomy (Right); bladder tumor excision; other surgical history; AAA repair, endovascular (N/A, 6/26/2020); and Cataract removal.  Additional information:       ALLERGIES   Allergies as of 07/22/2022    (No Known Allergies)     Additional information:       MEDICATIONS   Aspirin  [] 81 mg  [] 325 mg  [] None  Antiplatelet drug therapy use last 5 days  []No []Yes  Coumadin Use Last 5 Days []No []Yes  Other anticoagulant use last 5 days  []No []Yes    Current Facility-Administered Medications:     sodium chloride flush 0.9 % injection 5-40 mL, 5-40 mL, IntraVENous, 2 times per day, Keith Lee, APRN - CNP    sodium chloride flush 0.9 % injection 5-40 mL, 5-40 mL, IntraVENous, PRN, Keith Lee, APRN - CNP    0.9 % sodium chloride infusion, , IntraVENous, PRN, Keith Lee, APRN - CNP    aspirin tablet 325 mg, 325 mg, Oral, Once, Adelina Tuckerfield, APRN - CNP    nitroGLYCERIN (NITROSTAT) SL tablet 0.4 mg, 0.4 mg, SubLINGual, Q5 Min PRN, Keith Lee APRN - CNP    0.9 % sodium chloride infusion, , IntraVENous, Continuous, Erika Caceres MD, Last Rate: 75 mL/hr at 07/22/22 0825, New Bag at 07/22/22 0825    ipratropium-albuterol (DUONEB) nebulizer solution 1 ampule, 1 ampule, Inhalation, Q4H PRN, Rach Eisenberg MD, 1 ampule at 07/22/22 0902  Prior to Admission medications    Medication Sig Start Date End Date Taking? Authorizing Provider   fluticasone-salmeterol (ADVAIR) 250-50 MCG/DOSE AEPB Inhale 1 puff into the lungs every 12 hours    Historical Provider, MD   ipratropium-albuterol (DUONEB) 0.5-2.5 (3) MG/3ML SOLN nebulizer solution Take 3 mLs by nebulization 4 times daily 4/20/21   CASEY Sanabria - CNP   ALBUTEROL IN Inhale into the lungs 2puff 4x daily    Historical Provider, MD   tamsulosin (FLOMAX) 0.4 MG capsule Take 0.4 mg by mouth daily    Historical Provider, MD   pravastatin (PRAVACHOL) 80 MG tablet Take 80 mg by mouth daily. Historical Provider, MD   metoprolol (LOPRESSOR) 50 MG tablet Take 50 mg by mouth daily. 1/2 tablet bid    Historical Provider, MD   aspirin 81 MG EC tablet Take 81 mg by mouth daily. Historical Provider, MD     Additional information:       VITAL SIGNS   Vitals:    07/22/22 0902   BP:    Pulse: 56   Resp: 16   Temp:    SpO2: 96%       PHYSICAL:   General: No acute distress  HEENT:  Unremarkable for age  Neck: without increased JVD, carotid pulses 2+ bilaterally without bruits  Heart: RRR, S1 & S2 WNL. SE murmur  Lungs: Clear to auscultation    Abdomen: BS present, without HSM, masses, or tenderness    Extremities: without C,C,E.  Pulses 2+ bilaterally   Mental Status: Alert & Oriented        PLANNED PROCEDURE   [x]Cath  [x]PCI                []Pacemaker/AICD  []GIL             []Cardioversion []Peripheral angiography/PTA  []Other:      SEDATION  Planned agent:[x]Midazolam []Meperidine []Sublimaze []Morphine  []Diazepam  []Other:     ASA Classification:  []1 []2 [x]3 []4 []5   Class 1: A normal healthy patient  Class 2: Pt with mild to moderate systemic disease  Class 3: Severe systemic disease or disturbance  Class 4: Severe systemic disorders that are already life threatening.   Class 5: Moribund pt with little chances of survival, for more than 24 hours. Mallampati I Airway Classification:  []1 []2 [x]3 []4     [x]Pre-procedure diagnostic studies complete and results available. Comment:    [x]Previous sedation/anesthesia experiences assessed. Comment:    [x]The patient is an appropriate candidate to undergo the planned procedure sedation and anesthesia. (Refer to nursing sedation/analgesia documentation record)  [x]Formulation and discussion of sedation/procedure plan, risks, and expectations with patient and/or responsible adult completed. [x]Patient examined immediately prior to the procedure.  (Refer to nursing sedation/analgesia documentation record)      Carlene Izquierdo MD, Lu Dunnellon    Electronically signed 7/22/2022 at 12:27 PM

## 2022-07-25 ENCOUNTER — CARE COORDINATION (OUTPATIENT)
Dept: FAMILY MEDICINE CLINIC | Age: 82
End: 2022-07-25

## 2022-07-25 NOTE — CARE COORDINATION
[facility] Transitions Initial Follow Up Call    Call within 2 business days of discharge: Yes     Patient: Lizbeth Kathleen Patient : 1940 MRN: [id number]    Last Discharge [facility]       Date Complaint Diagnosis Description Type Department Provider    22  CAD in native artery . .. Admission (Discharged) Carey Alan MD            RARS: No data recorded     Spoke with: Kiley Quispe    Discharge department/facility: [facility]    Non-face-to-face services provided:  Scheduled appointment with PCP-yes  Scheduled appointment with Specialist-yes  Obtained and reviewed discharge summary and/or continuity of care documents  Reviewed and followed up on pending diagnostic tests and treatments-yes  Communication with home health agencies or other community services the patient is currently using-n/a  Communication with specialists who will assume or re-assume care of the patient's system-specific problems-yes  Education of patient/family/caregiver/guardian to support self-management-yes  Assessment and support for treatment adherence and medication management-yes  Establishment or re-establishment of referrals-yes  Assistance in accessing community resources-n/a    Follow Up  Future Appointments   Date Time Provider Mary Nolan   2022  [address] [place] [address], [address] [place] [address]   2023  1:45 PM Chas Fields MD [facility]   2023  9:00 AM CASEY Rodriguez - [facility]armen Benitezbes is feeling fine. He has some bruising where they went in but he expects that will clear up. He has no pain and was able to reconcile his medications. He made a follow up appointment and I will see him then.     Lyssa Wahl RN

## 2022-08-01 ENCOUNTER — CARE COORDINATION (OUTPATIENT)
Dept: FAMILY MEDICINE CLINIC | Age: 82
End: 2022-08-01

## 2022-08-02 NOTE — CARE COORDINATION
Padma Wheatteddy returned my call. He is doing well. He denies any shortness of breath or chest pain. He also is having no edema. He is feeling pretty good. He will be here for his follow up appointment on the 17th. He has no needs or questions at this time.

## 2022-08-09 ENCOUNTER — OFFICE VISIT (OUTPATIENT)
Dept: CARDIOLOGY CLINIC | Age: 82
End: 2022-08-09
Payer: OTHER GOVERNMENT

## 2022-08-09 VITALS
OXYGEN SATURATION: 92 % | HEART RATE: 68 BPM | HEIGHT: 67 IN | DIASTOLIC BLOOD PRESSURE: 66 MMHG | WEIGHT: 174.25 LBS | BODY MASS INDEX: 27.35 KG/M2 | SYSTOLIC BLOOD PRESSURE: 108 MMHG

## 2022-08-09 DIAGNOSIS — I50.22 CHRONIC SYSTOLIC CONGESTIVE HEART FAILURE, NYHA CLASS 2 (HCC): Primary | ICD-10-CM

## 2022-08-09 PROCEDURE — 99215 OFFICE O/P EST HI 40 MIN: CPT | Performed by: NURSE PRACTITIONER

## 2022-08-09 PROCEDURE — 1123F ACP DISCUSS/DSCN MKR DOCD: CPT | Performed by: NURSE PRACTITIONER

## 2022-08-09 RX ORDER — FUROSEMIDE 20 MG/1
20 TABLET ORAL
Qty: 45 TABLET | Refills: 3 | Status: SHIPPED | OUTPATIENT
Start: 2022-08-09

## 2022-08-09 RX ORDER — METOPROLOL SUCCINATE 25 MG/1
50 TABLET, EXTENDED RELEASE ORAL DAILY
Qty: 180 TABLET | Refills: 3 | Status: SHIPPED | OUTPATIENT
Start: 2022-08-09

## 2022-08-09 RX ORDER — FLUTICASONE PROPIONATE AND SALMETEROL 250; 50 UG/1; UG/1
1 POWDER RESPIRATORY (INHALATION) EVERY 12 HOURS
COMMUNITY

## 2022-08-09 ASSESSMENT — ENCOUNTER SYMPTOMS
VOMITING: 0
NAUSEA: 0
ABDOMINAL DISTENTION: 0
COUGH: 0
SHORTNESS OF BREATH: 0

## 2022-08-09 NOTE — PATIENT INSTRUCTIONS
You may receive a survey regarding the care you received during your visit. Your input is valuable to us. We encourage you to complete and return your survey. We hope you will choose us in the future for your healthcare needs. Continue:  Continue current medications  Daily weights and record  Fluid restriction of 2 Liters per day  Limit sodium in diet to around 6658-6187 mg/day  Monitor BP  Activity as tolerated     Call the Heart Failure Clinic for any of the following symptoms: [phone]  Weight gain of 2-3 pounds in 1 day or 5 pounds in 1 week  Increased shortness of breath  Shortness of breath while laying down  Cough  Chest pain  Swelling in feet, ankles or legs  Tenderness or bloating in the abdomen  Fatigue   Decreased appetite or nausea   Confusion    Repeat blood work two weeks after starting Lasix     Start lasix 20 EOD      Continue diet/fluid adherence  Continue daily wts.   F/U w/ Cardiology  F/U in clinic in as needed per pt request
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
## 2022-08-10 ENCOUNTER — CARE COORDINATION (OUTPATIENT)
Dept: FAMILY MEDICINE CLINIC | Age: 82
End: 2022-08-10

## 2022-08-10 NOTE — CARE COORDINATION
Shelia Davidson has been to the CHF clinic and they changed his medications. He is compliant with medications. He is watching his salt intake and trying Mrs Liana Long. I made some suggestions of other places that have seasonings and different seasonings he can try like onion powder and garlic powder to enhance the taste of his foods. He denies any chest pain or shortness of breath and has no needs or questions. I will follow up with him next week.

## 2022-08-12 ENCOUNTER — PROCEDURE VISIT (OUTPATIENT)
Dept: UROLOGY | Age: 82
End: 2022-08-12
Payer: OTHER GOVERNMENT

## 2022-08-12 VITALS
BODY MASS INDEX: 27.62 KG/M2 | WEIGHT: 176 LBS | HEIGHT: 67 IN | SYSTOLIC BLOOD PRESSURE: 130 MMHG | DIASTOLIC BLOOD PRESSURE: 62 MMHG

## 2022-08-12 DIAGNOSIS — R31.29 OTHER MICROSCOPIC HEMATURIA: Primary | ICD-10-CM

## 2022-08-12 DIAGNOSIS — N35.919 STRICTURE OF MALE URETHRA, UNSPECIFIED STRICTURE TYPE: ICD-10-CM

## 2022-08-12 DIAGNOSIS — Z85.51 HISTORY OF BLADDER CANCER: ICD-10-CM

## 2022-08-12 PROCEDURE — 99214 OFFICE O/P EST MOD 30 MIN: CPT | Performed by: UROLOGY

## 2022-08-12 PROCEDURE — 1123F ACP DISCUSS/DSCN MKR DOCD: CPT | Performed by: UROLOGY

## 2022-08-12 PROCEDURE — 52000 CYSTOURETHROSCOPY: CPT | Performed by: UROLOGY
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
## 2022-08-16 ENCOUNTER — CARE COORDINATION (OUTPATIENT)
Dept: FAMILY MEDICINE CLINIC | Age: 82
End: 2022-08-16

## 2022-08-17 NOTE — CARE COORDINATION
Tayler Jaqueline is doing fine and feeling good. He denies any chest discomfort or shortness of breath and has no home needs at this time. He has no questions and will call if he does.

## 2022-08-23 ENCOUNTER — CARE COORDINATION (OUTPATIENT)
Dept: FAMILY MEDICINE CLINIC | Age: 82
End: 2022-08-23

## 2022-08-23 NOTE — CARE COORDINATION
Virgin Mood is doing well. He is making adjustments to decrease his salt intake. He thanked me for calling to check on him and has no home needs at this time. He will call if he needs anything.

## 2022-09-02 LAB
B-TYPE NATRIURETIC PEPTIDE: 43 PG/ML
BUN BLDV-MCNC: 18 MG/DL
CALCIUM SERPL-MCNC: 9.5 MG/DL
CHLORIDE BLD-SCNC: 105 MMOL/L
CO2: 33 MMOL/L
CREAT SERPL-MCNC: 0.6 MG/DL
GFR CALCULATED: >90
GLUCOSE BLD-MCNC: 96 MG/DL
MAGNESIUM: 2.01 MG/DL
POTASSIUM SERPL-SCNC: 4.2 MMOL/L
SODIUM BLD-SCNC: 142 MMOL/L

## 2023-01-31 ENCOUNTER — OFFICE VISIT (OUTPATIENT)
Dept: CARDIOTHORACIC SURGERY | Age: 83
End: 2023-01-31

## 2023-01-31 VITALS
DIASTOLIC BLOOD PRESSURE: 78 MMHG | BODY MASS INDEX: 27.31 KG/M2 | WEIGHT: 174 LBS | SYSTOLIC BLOOD PRESSURE: 132 MMHG | HEART RATE: 70 BPM | HEIGHT: 67 IN

## 2023-01-31 DIAGNOSIS — Z98.890 STATUS POST ENDOVASCULAR ANEURYSM REPAIR (EVAR): Primary | ICD-10-CM

## 2023-01-31 DIAGNOSIS — Z86.79 STATUS POST ENDOVASCULAR ANEURYSM REPAIR (EVAR): Primary | ICD-10-CM

## 2023-01-31 PROCEDURE — 99024 POSTOP FOLLOW-UP VISIT: CPT | Performed by: THORACIC SURGERY (CARDIOTHORACIC VASCULAR SURGERY)

## 2023-01-31 NOTE — PROGRESS NOTES
Ge Ortiz is a 82years old male who underwent EVAR on 6/26/2020. He returns to the cardiovascular surgery clinic for recurrent right groin lymphocele. The right groin lymphocele was aspirated at this time. Approximately 40 cc of clear fluid was aspirated. Assessment: Recurrent right groin lymphocele, status post EVAR bilateral femoral cutdown. Plan: Call us again if right groin lymphocele recurs.

## 2023-04-18 ENCOUNTER — OFFICE VISIT (OUTPATIENT)
Dept: PULMONOLOGY | Age: 83
End: 2023-04-18
Payer: MEDICARE

## 2023-04-18 VITALS
WEIGHT: 172 LBS | TEMPERATURE: 99.5 F | DIASTOLIC BLOOD PRESSURE: 70 MMHG | OXYGEN SATURATION: 89 % | HEART RATE: 68 BPM | HEIGHT: 67 IN | SYSTOLIC BLOOD PRESSURE: 152 MMHG | BODY MASS INDEX: 27 KG/M2

## 2023-04-18 DIAGNOSIS — Z87.891 PERSONAL HISTORY OF TOBACCO USE: ICD-10-CM

## 2023-04-18 DIAGNOSIS — J96.11 CHRONIC HYPOXEMIC RESPIRATORY FAILURE (HCC): ICD-10-CM

## 2023-04-18 DIAGNOSIS — J44.9 STAGE 3 SEVERE COPD BY GOLD CLASSIFICATION (HCC): Primary | ICD-10-CM

## 2023-04-18 DIAGNOSIS — J43.2 CENTRILOBULAR EMPHYSEMA (HCC): ICD-10-CM

## 2023-04-18 DIAGNOSIS — I71.40 ABDOMINAL AORTIC ANEURYSM (AAA) WITHOUT RUPTURE, UNSPECIFIED PART (HCC): ICD-10-CM

## 2023-04-18 PROCEDURE — 99215 OFFICE O/P EST HI 40 MIN: CPT | Performed by: NURSE PRACTITIONER

## 2023-04-18 PROCEDURE — G8427 DOCREV CUR MEDS BY ELIG CLIN: HCPCS | Performed by: NURSE PRACTITIONER

## 2023-04-18 PROCEDURE — G8417 CALC BMI ABV UP PARAM F/U: HCPCS | Performed by: NURSE PRACTITIONER

## 2023-04-18 PROCEDURE — 1123F ACP DISCUSS/DSCN MKR DOCD: CPT | Performed by: NURSE PRACTITIONER

## 2023-04-18 PROCEDURE — 1036F TOBACCO NON-USER: CPT | Performed by: NURSE PRACTITIONER

## 2023-04-18 PROCEDURE — 3023F SPIROM DOC REV: CPT | Performed by: NURSE PRACTITIONER

## 2023-04-18 RX ORDER — ALBUTEROL SULFATE 90 UG/1
2 AEROSOL, METERED RESPIRATORY (INHALATION) EVERY 6 HOURS PRN
Qty: 18 G | Refills: 3 | Status: SHIPPED | OUTPATIENT
Start: 2023-04-18

## 2023-04-18 RX ORDER — PREDNISONE 20 MG/1
40 TABLET ORAL DAILY
Qty: 10 TABLET | Refills: 0 | Status: SHIPPED | OUTPATIENT
Start: 2023-04-18 | End: 2023-04-23

## 2023-04-18 RX ORDER — ALBUTEROL SULFATE 2.5 MG/3ML
2.5 SOLUTION RESPIRATORY (INHALATION) EVERY 6 HOURS PRN
Qty: 120 EACH | Refills: 11 | Status: SHIPPED | OUTPATIENT
Start: 2023-04-18 | End: 2024-04-17

## 2023-04-18 RX ORDER — BUDESONIDE, GLYCOPYRROLATE, AND FORMOTEROL FUMARATE 160; 9; 4.8 UG/1; UG/1; UG/1
2 AEROSOL, METERED RESPIRATORY (INHALATION) 2 TIMES DAILY
Qty: 10.7 G | Refills: 11 | Status: SHIPPED | OUTPATIENT
Start: 2023-04-18

## 2023-04-18 ASSESSMENT — ENCOUNTER SYMPTOMS
SHORTNESS OF BREATH: 1
COUGH: 1
DIARRHEA: 0
EYES NEGATIVE: 1
NAUSEA: 0
CHEST TIGHTNESS: 0
ABDOMINAL PAIN: 0
WHEEZING: 1
VOMITING: 0

## 2023-04-18 NOTE — PROGRESS NOTES
screening at that time    All scripts were printed and faxed to [facility] clinic per patient request  (Please note that portions of this note may have been with a voice recognition program.  Efforts were made to edit the dictation but occasionally words are mis-transcribed )     Will see Nick Baker in: 3 months  billing based on time: total time on encounter 40 min   Electronically signed by CASEY Gonzales CNP on 4/18/2023 at 9:41 AM

## 2023-05-01 ENCOUNTER — TELEPHONE (OUTPATIENT)
Dept: PULMONOLOGY | Age: 83
End: 2023-05-01

## 2023-05-04 ENCOUNTER — TELEPHONE (OUTPATIENT)
Dept: PULMONOLOGY | Age: 83
End: 2023-05-04

## 2023-05-04 DIAGNOSIS — J44.9 STAGE 3 SEVERE COPD BY GOLD CLASSIFICATION (HCC): Primary | ICD-10-CM

## 2023-05-05 RX ORDER — FLUTICASONE PROPIONATE AND SALMETEROL 250; 50 UG/1; UG/1
1 POWDER RESPIRATORY (INHALATION) EVERY 12 HOURS
Qty: 180 EACH | Refills: 3 | Status: SHIPPED | OUTPATIENT
Start: 2023-05-05 | End: 2023-05-05 | Stop reason: SDUPTHER

## 2023-05-05 RX ORDER — FLUTICASONE PROPIONATE AND SALMETEROL 250; 50 UG/1; UG/1
1 POWDER RESPIRATORY (INHALATION) EVERY 12 HOURS
Qty: 180 EACH | Refills: 3 | Status: SHIPPED | OUTPATIENT
Start: 2023-05-05

## 2023-06-29 ENCOUNTER — OFFICE VISIT (OUTPATIENT)
Dept: CARDIOLOGY CLINIC | Age: 83
End: 2023-06-29
Payer: MEDICARE

## 2023-06-29 VITALS
HEIGHT: 67 IN | SYSTOLIC BLOOD PRESSURE: 134 MMHG | DIASTOLIC BLOOD PRESSURE: 80 MMHG | WEIGHT: 172 LBS | HEART RATE: 65 BPM | BODY MASS INDEX: 27 KG/M2

## 2023-06-29 DIAGNOSIS — J44.9 CHRONIC OBSTRUCTIVE PULMONARY DISEASE, UNSPECIFIED COPD TYPE (HCC): ICD-10-CM

## 2023-06-29 DIAGNOSIS — I71.40 ABDOMINAL AORTIC ANEURYSM (AAA) WITHOUT RUPTURE, UNSPECIFIED PART (HCC): ICD-10-CM

## 2023-06-29 DIAGNOSIS — I50.22 CHRONIC SYSTOLIC CONGESTIVE HEART FAILURE, NYHA CLASS 2 (HCC): Primary | ICD-10-CM

## 2023-06-29 PROCEDURE — 93000 ELECTROCARDIOGRAM COMPLETE: CPT | Performed by: INTERNAL MEDICINE

## 2023-06-29 PROCEDURE — 1123F ACP DISCUSS/DSCN MKR DOCD: CPT | Performed by: INTERNAL MEDICINE

## 2023-06-29 PROCEDURE — G8417 CALC BMI ABV UP PARAM F/U: HCPCS | Performed by: INTERNAL MEDICINE

## 2023-06-29 PROCEDURE — 99214 OFFICE O/P EST MOD 30 MIN: CPT | Performed by: INTERNAL MEDICINE

## 2023-06-29 PROCEDURE — G8428 CUR MEDS NOT DOCUMENT: HCPCS | Performed by: INTERNAL MEDICINE

## 2023-06-29 PROCEDURE — 1036F TOBACCO NON-USER: CPT | Performed by: INTERNAL MEDICINE

## 2023-06-29 PROCEDURE — 3023F SPIROM DOC REV: CPT | Performed by: INTERNAL MEDICINE

## 2023-07-06 ENCOUNTER — HOSPITAL ENCOUNTER (OUTPATIENT)
Dept: NON INVASIVE DIAGNOSTICS | Age: 83
Discharge: HOME | End: 2023-07-06
Attending: INTERNAL MEDICINE
Payer: MEDICARE

## 2023-07-06 ENCOUNTER — TELEPHONE (OUTPATIENT)
Dept: CARDIOTHORACIC SURGERY | Age: 83
End: 2023-07-06

## 2023-07-06 DIAGNOSIS — Z86.79 STATUS POST ENDOVASCULAR ANEURYSM REPAIR (EVAR): Primary | ICD-10-CM

## 2023-07-06 DIAGNOSIS — I71.40 ABDOMINAL AORTIC ANEURYSM (AAA) WITHOUT RUPTURE, UNSPECIFIED PART (HCC): ICD-10-CM

## 2023-07-06 DIAGNOSIS — Z98.890 STATUS POST ENDOVASCULAR ANEURYSM REPAIR (EVAR): Primary | ICD-10-CM

## 2023-07-06 DIAGNOSIS — I50.22 CHRONIC SYSTOLIC CONGESTIVE HEART FAILURE, NYHA CLASS 2 (HCC): ICD-10-CM

## 2023-07-06 DIAGNOSIS — J44.9 CHRONIC OBSTRUCTIVE PULMONARY DISEASE, UNSPECIFIED COPD TYPE (HCC): ICD-10-CM

## 2023-07-06 LAB
LV EF: 28 %
LVEF MODALITY: NORMAL

## 2023-07-06 PROCEDURE — 93306 TTE W/DOPPLER COMPLETE: CPT

## 2023-07-06 NOTE — TELEPHONE ENCOUNTER
Pt stopped at desk and states he needs a refill:    Rx: Entresto 24-26 mg    Pharmacy: [address] [place] [address]

## 2023-07-06 NOTE — TELEPHONE ENCOUNTER
Pt saw Dr. Venice Crocker on 6/29/23  He advised pt follow with Dr. Kandace Wetzel and get a CTA before hand. Dr. Venice Crocker did not order CTA. Last CTA Abd/Pelv was 5/16/22. Follow up with Dr. Kandace Wetzel scheduled for 8/9/23 at 945am. Notified pt's daughter Victorino Boards. Ok to order new scan?

## 2023-07-07 ENCOUNTER — TELEPHONE (OUTPATIENT)
Dept: CARDIOLOGY CLINIC | Age: 83
End: 2023-07-07

## 2023-07-07 DIAGNOSIS — R93.1 ABNORMAL ECHOCARDIOGRAM: Primary | ICD-10-CM

## 2023-07-07 NOTE — TELEPHONE ENCOUNTER
CTA Abd/Pelv ordered. Scheduled for 7/27/23 at 340pm. NPO 4 hours. Notified Victorino Boards, she voiced understanding.

## 2023-07-07 NOTE — TELEPHONE ENCOUNTER
----- Message from Shane Macedo MD sent at 7/7/2023 12:20 PM EDT -----  Valvular heart disease, will need surveillance  Further drop in EF. Make sure patient has appointment at CHF clinic.  Will need repeat Echo in 3-4 months  Order Lifrevest (if patent agrees)   Referred back to see Dr Tori Madrigal (vascular surgery), known h/o aneurysm, plz make sure appointment is made  Inform patient

## 2023-07-10 ENCOUNTER — HOSPITAL ENCOUNTER (OUTPATIENT)
Age: 83
Discharge: HOME | End: 2023-07-10
Payer: MEDICARE

## 2023-07-10 ENCOUNTER — OFFICE VISIT (OUTPATIENT)
Dept: CARDIOLOGY CLINIC | Age: 83
End: 2023-07-10
Payer: MEDICARE

## 2023-07-10 VITALS
HEIGHT: 67 IN | OXYGEN SATURATION: 92 % | BODY MASS INDEX: 27 KG/M2 | SYSTOLIC BLOOD PRESSURE: 138 MMHG | DIASTOLIC BLOOD PRESSURE: 70 MMHG | WEIGHT: 172 LBS | HEART RATE: 58 BPM

## 2023-07-10 DIAGNOSIS — R60.0 EDEMA OF BOTH LOWER LEGS: ICD-10-CM

## 2023-07-10 DIAGNOSIS — I50.22 CHRONIC SYSTOLIC CONGESTIVE HEART FAILURE, NYHA CLASS 2 (HCC): ICD-10-CM

## 2023-07-10 DIAGNOSIS — I51.89 GRADE I DIASTOLIC DYSFUNCTION: ICD-10-CM

## 2023-07-10 DIAGNOSIS — I42.8 NONISCHEMIC CARDIOMYOPATHY (HCC): ICD-10-CM

## 2023-07-10 DIAGNOSIS — I35.0 MODERATE AORTIC STENOSIS: ICD-10-CM

## 2023-07-10 DIAGNOSIS — I50.22 CHRONIC SYSTOLIC CONGESTIVE HEART FAILURE, NYHA CLASS 2 (HCC): Primary | ICD-10-CM

## 2023-07-10 LAB
ANION GAP SERPL CALC-SCNC: 8 MEQ/L (ref 8–16)
BUN SERPL-MCNC: 12 MG/DL (ref 7–22)
CALCIUM SERPL-MCNC: 9.2 MG/DL (ref 8.5–10.5)
CHLORIDE SERPL-SCNC: 102 MEQ/L (ref 98–111)
CO2 SERPL-SCNC: 33 MEQ/L (ref 23–33)
CREAT SERPL-MCNC: 0.4 MG/DL (ref 0.4–1.2)
GFR SERPL CREATININE-BSD FRML MDRD: > 60 ML/MIN/1.73M2
GLUCOSE SERPL-MCNC: 80 MG/DL (ref 70–108)
MAGNESIUM SERPL-MCNC: 2.4 MG/DL (ref 1.6–2.4)
NT-PROBNP SERPL IA-MCNC: 562.2 PG/ML (ref 0–449)
POTASSIUM SERPL-SCNC: 4.2 MEQ/L (ref 3.5–5.2)
SODIUM SERPL-SCNC: 143 MEQ/L (ref 135–145)

## 2023-07-10 PROCEDURE — 36415 COLL VENOUS BLD VENIPUNCTURE: CPT

## 2023-07-10 PROCEDURE — 83735 ASSAY OF MAGNESIUM: CPT

## 2023-07-10 PROCEDURE — G8417 CALC BMI ABV UP PARAM F/U: HCPCS | Performed by: NURSE PRACTITIONER

## 2023-07-10 PROCEDURE — 1036F TOBACCO NON-USER: CPT | Performed by: NURSE PRACTITIONER

## 2023-07-10 PROCEDURE — 1123F ACP DISCUSS/DSCN MKR DOCD: CPT | Performed by: NURSE PRACTITIONER

## 2023-07-10 PROCEDURE — 80048 BASIC METABOLIC PNL TOTAL CA: CPT

## 2023-07-10 PROCEDURE — G8427 DOCREV CUR MEDS BY ELIG CLIN: HCPCS | Performed by: NURSE PRACTITIONER

## 2023-07-10 PROCEDURE — 83880 ASSAY OF NATRIURETIC PEPTIDE: CPT

## 2023-07-10 PROCEDURE — 99214 OFFICE O/P EST MOD 30 MIN: CPT | Performed by: NURSE PRACTITIONER

## 2023-07-10 ASSESSMENT — ENCOUNTER SYMPTOMS
VOMITING: 0
COUGH: 0
SHORTNESS OF BREATH: 1
ABDOMINAL DISTENTION: 0
NAUSEA: 0

## 2023-07-10 NOTE — PATIENT INSTRUCTIONS
You may receive a survey regarding the care you received during your visit. Your input is valuable to us. We encourage you to complete and return your survey. We hope you will choose us in the future for your healthcare needs. Your nurses today were Shea and [employer]. Office hours:   Mon-Thurs 8-4:30  Friday 8-12  Phone: [phone]    Continue:  Continue current medications  Daily weights and record  Fluid restriction of 2 Liters per day  Limit sodium in diet to around 7366-1044 mg/day  Monitor BP  Activity as tolerated     Call the [address] for any of the following symptoms:   Weight gain of 2-3 pounds in 1 day or 5 pounds in 1 week  Increased shortness of breath  Shortness of breath while laying down  Cough  Chest pain  Swelling in feet, ankles or legs  Bloating in abdomen  Fatigue      Repeat labs with in the week     Repeat again two weeks after starting Entresto - call when you start taking it    Continue diet/fluid adherence  Continue daily wts.   F/U w/ Cardiology  F/U in clinic in 3 months

## 2023-07-10 NOTE — PROGRESS NOTES
Heart Failure Clinic       Visit Date: 7/10/2023  Cardiologist:  Dr. Josette Kelly  Primary Care Physician: Dr. Chance Chandler MD    Kristina John is a 82 y.o. male who presents today for:  Chief Complaint   Patient presents with    Congestive Heart Failure       HPI:     TYPE HF: HFrEF 35% (45% 2013)  Cause: new drop in EF is nonischemic. Device: none  HX:  Aortic aneurysm ([address]), Asthma, CAD s/p PCI 2014 COPD, Hyperlipidemia, Hypertension  Dry Wt:  174 on 8/9/22, 172 on 7/10/23      Kristina John is a 82 y.o. male who presents to the office for a f/u patient visit in the heart failure clinic. Concerns today: here today for a \"new\" pt again. He did see me a year ago but did not want to follow. He was sent back d/t a further drop in his EF and needing medication optimization. Pt denies worsening of his SOB and leg swelling. Urinating well on his Bumex EOD. Still eating a high Na/fluid diet. Life vest is ordered. Visit on 8/9/22:  new patient today refrerred by Dr. Josette Kelly. New to Dr. Josette Kelly as well. He was referred by the [place] d/t recent finding of an EF of 35%. Pt denies CHF symptoms today or worsening SOB.        Patient follows:      Hospitalization:  > 6 months      Activity: ADLs performed w/o limitation Loves to fish  Diet: adds salt, does not watch sodium intake drinking over 2L day    Patient has:  Chest Pain: no  SOB: chronic   Orthopnea/PND: no  IRINA: tested does not have (8years old)  Edema:  no  Fatigue: no  Abdominal bloating: no  Cough: no  Appetite: good      Past Medical History:   Diagnosis Date    Adenomatous polyp 1/2015    Aortic aneurysm (HCC)     Asthma     CAD (coronary artery disease)     COPD (chronic obstructive pulmonary disease) ([address])     Hyperlipidemia     Hypertension     Prolonged emergence from general anesthesia     wasplaced on vent after bladder cancer surgery    Transitional cell carcinoma of bladder ([address]) 10/2013     Past Surgical History:   Procedure Laterality Date

## 2023-07-17 ENCOUNTER — OFFICE VISIT (OUTPATIENT)
Dept: PULMONOLOGY | Age: 83
End: 2023-07-17
Payer: MEDICARE

## 2023-07-17 VITALS
HEART RATE: 61 BPM | BODY MASS INDEX: 26.84 KG/M2 | SYSTOLIC BLOOD PRESSURE: 118 MMHG | HEIGHT: 67 IN | DIASTOLIC BLOOD PRESSURE: 60 MMHG | WEIGHT: 171 LBS | OXYGEN SATURATION: 94 %

## 2023-07-17 DIAGNOSIS — J43.2 CENTRILOBULAR EMPHYSEMA (HCC): ICD-10-CM

## 2023-07-17 DIAGNOSIS — J96.11 CHRONIC HYPOXEMIC RESPIRATORY FAILURE (HCC): ICD-10-CM

## 2023-07-17 DIAGNOSIS — I71.40 ABDOMINAL AORTIC ANEURYSM (AAA) WITHOUT RUPTURE, UNSPECIFIED PART (HCC): ICD-10-CM

## 2023-07-17 DIAGNOSIS — I50.21 ACUTE HFREF (HEART FAILURE WITH REDUCED EJECTION FRACTION) (HCC): ICD-10-CM

## 2023-07-17 DIAGNOSIS — J44.9 STAGE 3 SEVERE COPD BY GOLD CLASSIFICATION (HCC): Primary | ICD-10-CM

## 2023-07-17 DIAGNOSIS — Z87.891 PERSONAL HISTORY OF TOBACCO USE: ICD-10-CM

## 2023-07-17 PROCEDURE — 1036F TOBACCO NON-USER: CPT | Performed by: NURSE PRACTITIONER

## 2023-07-17 PROCEDURE — 99215 OFFICE O/P EST HI 40 MIN: CPT | Performed by: NURSE PRACTITIONER

## 2023-07-17 PROCEDURE — G8427 DOCREV CUR MEDS BY ELIG CLIN: HCPCS | Performed by: NURSE PRACTITIONER

## 2023-07-17 PROCEDURE — 1123F ACP DISCUSS/DSCN MKR DOCD: CPT | Performed by: NURSE PRACTITIONER

## 2023-07-17 PROCEDURE — G8417 CALC BMI ABV UP PARAM F/U: HCPCS | Performed by: NURSE PRACTITIONER

## 2023-07-17 PROCEDURE — 3023F SPIROM DOC REV: CPT | Performed by: NURSE PRACTITIONER

## 2023-07-17 ASSESSMENT — ENCOUNTER SYMPTOMS
ABDOMINAL PAIN: 0
WHEEZING: 1
VOMITING: 0
SHORTNESS OF BREATH: 1
COUGH: 1
DIARRHEA: 0
NAUSEA: 0
CHEST TIGHTNESS: 1
EYES NEGATIVE: 1

## 2023-07-17 NOTE — PROGRESS NOTES
[facility]     Patient: Nela Michele, 82 y.o.   : 1940    Pt of Mine      Subjective     Chief Complaint   Patient presents with    3 Month Follow-Up     COPD no testing prior         HPI    3 months COPD medication follow up . Accompanied by daughter   Eduin Doll, needed step up therapy due to uncontrolled symptoms, prescribed Breztri 2 puffs INH BID   [facility] did not approve Breztri. Currently using Spiriva respimat , Wixela, Albuterol HFA/Nebs     Since last visit:  started on Entresto last week. Is sleeping better   EF 25-30%   Stable chronic / unchanged TODD   Leg swelling throughout day, goes away with elevation. Follows with CT surgery for AAA monitoring   Provided with emergency steroid Atb RX to use if COPD exac- has not used       Pain: denies pain   Mental Status:  no changes     Change in ADL's:  no    Recent ER/UC visits or hospitalizations?  no       SOCIAL HISTORY:  Social History     Tobacco Use    Smoking status: Former     Packs/day: 1.00     Years: 45.00     Pack years: 45.00     Types: Cigarettes     Quit date: 3/30/2007     Years since quittin.3    Smokeless tobacco: Never   Vaping Use    Vaping Use: Never used   Substance Use Topics    Alcohol use: Yes     Alcohol/week: 4.0 standard drinks     Types: 4 Glasses of wine per week     Comment: couple times per week    Drug use: No       CURRENT MEDICATIONS:  Current Outpatient Medications   Medication Sig Dispense Refill    sacubitril-valsartan (ENTRESTO) 24-26 MG per tablet Take 1 tablet by mouth 2 times daily 180 tablet 3    fluticasone-salmeterol (WIXELA INHUB) 250-50 MCG/ACT AEPB diskus inhaler Inhale 1 puff into the lungs in the morning and 1 puff in the evening.  180 each 3    tiotropium (SPIRIVA RESPIMAT) 2.5 MCG/ACT AERS inhaler Inhale 2 puffs into the lungs daily 12 g 3    albuterol sulfate HFA (PROVENTIL HFA) 108 (90 Base) MCG/ACT inhaler Inhale 2 puffs into the lungs

## 2023-07-25 RX ORDER — METOPROLOL SUCCINATE 25 MG/1
50 TABLET, EXTENDED RELEASE ORAL DAILY
Qty: 180 TABLET | Refills: 1 | Status: SHIPPED | OUTPATIENT
Start: 2023-07-25

## 2023-07-25 RX ORDER — FUROSEMIDE 20 MG/1
20 TABLET ORAL
Qty: 45 TABLET | Refills: 1 | Status: SHIPPED | OUTPATIENT
Start: 2023-07-25

## 2023-07-27 ENCOUNTER — HOSPITAL ENCOUNTER (OUTPATIENT)
Age: 83
Discharge: HOME | End: 2023-07-27
Attending: THORACIC SURGERY (CARDIOTHORACIC VASCULAR SURGERY)
Payer: MEDICARE

## 2023-07-27 ENCOUNTER — HOSPITAL ENCOUNTER (OUTPATIENT)
Dept: CT IMAGING | Age: 83
Discharge: HOME | End: 2023-07-27
Attending: THORACIC SURGERY (CARDIOTHORACIC VASCULAR SURGERY)
Payer: MEDICARE

## 2023-07-27 DIAGNOSIS — Z98.890 STATUS POST ENDOVASCULAR ANEURYSM REPAIR (EVAR): ICD-10-CM

## 2023-07-27 DIAGNOSIS — Z86.79 STATUS POST ENDOVASCULAR ANEURYSM REPAIR (EVAR): ICD-10-CM

## 2023-07-27 DIAGNOSIS — J44.9 CHRONIC OBSTRUCTIVE PULMONARY DISEASE, UNSPECIFIED COPD TYPE (HCC): ICD-10-CM

## 2023-07-27 DIAGNOSIS — I71.40 ABDOMINAL AORTIC ANEURYSM (AAA) WITHOUT RUPTURE, UNSPECIFIED PART (HCC): ICD-10-CM

## 2023-07-27 DIAGNOSIS — I50.22 CHRONIC SYSTOLIC CONGESTIVE HEART FAILURE, NYHA CLASS 2 (HCC): ICD-10-CM

## 2023-07-27 LAB
ANION GAP SERPL CALC-SCNC: 10 MEQ/L (ref 8–16)
BUN SERPL-MCNC: 14 MG/DL (ref 7–22)
CALCIUM SERPL-MCNC: 9.2 MG/DL (ref 8.5–10.5)
CHLORIDE SERPL-SCNC: 101 MEQ/L (ref 98–111)
CO2 SERPL-SCNC: 29 MEQ/L (ref 23–33)
CREAT SERPL-MCNC: 0.5 MG/DL (ref 0.4–1.2)
GFR SERPL CREATININE-BSD FRML MDRD: > 60 ML/MIN/1.73M2
GLUCOSE SERPL-MCNC: 85 MG/DL (ref 70–108)
POTASSIUM SERPL-SCNC: 3.9 MEQ/L (ref 3.5–5.2)
SODIUM SERPL-SCNC: 140 MEQ/L (ref 135–145)

## 2023-07-27 PROCEDURE — 36415 COLL VENOUS BLD VENIPUNCTURE: CPT

## 2023-07-27 PROCEDURE — 80048 BASIC METABOLIC PNL TOTAL CA: CPT

## 2023-07-27 PROCEDURE — 6360000004 HC RX CONTRAST MEDICATION: Performed by: THORACIC SURGERY (CARDIOTHORACIC VASCULAR SURGERY)

## 2023-07-27 PROCEDURE — 74174 CTA ABD&PLVS W/CONTRAST: CPT

## 2023-07-27 RX ADMIN — IOPAMIDOL 85 ML: 755 INJECTION, SOLUTION INTRAVENOUS at 15:11

## 2023-08-08 ENCOUNTER — PROCEDURE VISIT (OUTPATIENT)
Dept: UROLOGY | Age: 83
End: 2023-08-08
Payer: MEDICARE

## 2023-08-08 VITALS — BODY MASS INDEX: 26.84 KG/M2 | WEIGHT: 171 LBS | HEIGHT: 67 IN | RESPIRATION RATE: 16 BRPM

## 2023-08-08 DIAGNOSIS — N35.919 STRICTURE OF MALE URETHRA, UNSPECIFIED STRICTURE TYPE: ICD-10-CM

## 2023-08-08 DIAGNOSIS — Z85.51 HISTORY OF BLADDER CANCER: Primary | ICD-10-CM

## 2023-08-08 DIAGNOSIS — R31.29 OTHER MICROSCOPIC HEMATURIA: ICD-10-CM

## 2023-08-08 PROCEDURE — 52000 CYSTOURETHROSCOPY: CPT | Performed by: UROLOGY

## 2023-08-08 NOTE — PROGRESS NOTES
Cystoscopy    Operative Note    Patient:  Sandy Lovelace  MRN: [id number]  YOB: 1940    Date: 08/08/23  Surgeon: Nevin Berrios MD  Anesthesia: Urojet Local  Indications: bladder cancer  Position: Supine  EBL: 0 ml    Findings:   The patient was prepped and draped in the usual sterile fashion. The flexible cystoscope was advanced through the urethra and into the bladder. The bladder was thoroughly inspected and the following was noted:    Residual Urine: significant\" \" . Urine clear, with no obvious infection  Urethra:  had soft urethral stricture that I bypassed with scope . Urethral dilation was not performed. Prostate: lateral lobe hypertrophy + present, prostate moderately obstructing, intravesical extension of prostate not present. There was no previous TURP defect. Bladder: no tumor noted . Severe trabeculation noted. severeal bladder diverticulum. Ureters: Orifices with normal configuration and location. The cystoscope was removed. The patient tolerated the procedure well.   Incomplete emptying but doing well  Poor ecog  O2 dependent  Cysto annually

## 2023-08-09 ENCOUNTER — OFFICE VISIT (OUTPATIENT)
Dept: CARDIOTHORACIC SURGERY | Age: 83
End: 2023-08-09

## 2023-08-09 VITALS
BODY MASS INDEX: 26.84 KG/M2 | HEART RATE: 66 BPM | WEIGHT: 171 LBS | HEIGHT: 67 IN | DIASTOLIC BLOOD PRESSURE: 78 MMHG | SYSTOLIC BLOOD PRESSURE: 118 MMHG

## 2023-08-09 DIAGNOSIS — Z86.79 STATUS POST ENDOVASCULAR ANEURYSM REPAIR (EVAR): ICD-10-CM

## 2023-08-09 DIAGNOSIS — I71.40 ABDOMINAL AORTIC ANEURYSM (AAA) WITHOUT RUPTURE, UNSPECIFIED PART (HCC): Primary | ICD-10-CM

## 2023-08-09 DIAGNOSIS — Z98.890 STATUS POST ENDOVASCULAR ANEURYSM REPAIR (EVAR): ICD-10-CM

## 2023-08-09 NOTE — PATIENT INSTRUCTIONS
If you receive a survey asking about your care experience, please respond. Your answers will help ensure you receive high-quality care at this office. Thank you! Your Medical Assistant today: Anne Anglin. Thank you for coming to our office! It was a pleasure to serve you.

## 2023-10-03 ENCOUNTER — APPOINTMENT (OUTPATIENT)
Dept: GENERAL RADIOLOGY | Age: 83
End: 2023-10-03
Payer: OTHER GOVERNMENT

## 2023-10-03 ENCOUNTER — APPOINTMENT (OUTPATIENT)
Dept: CT IMAGING | Age: 83
End: 2023-10-03
Payer: OTHER GOVERNMENT

## 2023-10-03 ENCOUNTER — HOSPITAL ENCOUNTER (INPATIENT)
Age: 83
LOS: 2 days | Discharge: HOME | End: 2023-10-05
Attending: EMERGENCY MEDICINE
Payer: OTHER GOVERNMENT

## 2023-10-03 DIAGNOSIS — I71.21 ANEURYSM OF ASCENDING AORTA WITHOUT RUPTURE (HCC): ICD-10-CM

## 2023-10-03 DIAGNOSIS — I35.0 SEVERE AORTIC STENOSIS: ICD-10-CM

## 2023-10-03 DIAGNOSIS — I71.23 ANEURYSM OF DESCENDING THORACIC AORTA WITHOUT RUPTURE (HCC): ICD-10-CM

## 2023-10-03 DIAGNOSIS — R79.89 ELEVATED TROPONIN: ICD-10-CM

## 2023-10-03 DIAGNOSIS — R07.9 CHEST PAIN, UNSPECIFIED TYPE: Primary | ICD-10-CM

## 2023-10-03 LAB
ANION GAP SERPL CALC-SCNC: 9 MEQ/L (ref 8–16)
BASOPHILS ABSOLUTE: 0 THOU/MM3 (ref 0–0.1)
BASOPHILS NFR BLD AUTO: 0.3 %
BUN SERPL-MCNC: 10 MG/DL (ref 7–22)
CALCIUM SERPL-MCNC: 9.3 MG/DL (ref 8.5–10.5)
CHLORIDE SERPL-SCNC: 100 MEQ/L (ref 98–111)
CO2 SERPL-SCNC: 30 MEQ/L (ref 23–33)
CREAT SERPL-MCNC: 0.6 MG/DL (ref 0.4–1.2)
D DIMER PPP IA.FEU-MCNC: 2700 NG/ML FEU (ref 0–500)
DEPRECATED RDW RBC AUTO: 49.5 FL (ref 35–45)
EOSINOPHIL NFR BLD AUTO: 2.6 %
EOSINOPHILS ABSOLUTE: 0.3 THOU/MM3 (ref 0–0.4)
ERYTHROCYTE [DISTWIDTH] IN BLOOD BY AUTOMATED COUNT: 14 % (ref 11.5–14.5)
FLUAV RNA RESP QL NAA+PROBE: NOT DETECTED
FLUBV RNA RESP QL NAA+PROBE: NOT DETECTED
GFR SERPL CREATININE-BSD FRML MDRD: > 60 ML/MIN/1.73M2
GLUCOSE SERPL-MCNC: 156 MG/DL (ref 70–108)
HCT VFR BLD AUTO: 42.1 % (ref 42–52)
HGB BLD-MCNC: 13.3 GM/DL (ref 14–18)
IMM GRANULOCYTES # BLD AUTO: 0.03 THOU/MM3 (ref 0–0.07)
IMM GRANULOCYTES NFR BLD AUTO: 0.3 %
LYMPHOCYTES ABSOLUTE: 1.2 THOU/MM3 (ref 1–4.8)
LYMPHOCYTES NFR BLD AUTO: 12.2 %
MCH RBC QN AUTO: 30.3 PG (ref 26–33)
MCHC RBC AUTO-ENTMCNC: 31.6 GM/DL (ref 32.2–35.5)
MCV RBC AUTO: 95.9 FL (ref 80–94)
MONOCYTES ABSOLUTE: 0.8 THOU/MM3 (ref 0.4–1.3)
MONOCYTES NFR BLD AUTO: 8.4 %
NEUTROPHILS NFR BLD AUTO: 76.2 %
NRBC BLD AUTO-RTO: 0 /100 WBC
NT-PROBNP SERPL IA-MCNC: 637 PG/ML (ref 0–449)
OSMOLALITY SERPL CALC.SUM OF ELEC: 279.8 MOSMOL/KG (ref 275–300)
PLATELET # BLD AUTO: 179 THOU/MM3 (ref 130–400)
PMV BLD AUTO: 10.2 FL (ref 9.4–12.4)
POTASSIUM SERPL-SCNC: 3.9 MEQ/L (ref 3.5–5.2)
RBC # BLD AUTO: 4.39 MILL/MM3 (ref 4.7–6.1)
SARS-COV-2 RNA RESP QL NAA+PROBE: NOT DETECTED
SEGMENTED NEUTROPHILS ABSOLUTE COUNT: 7.6 THOU/MM3 (ref 1.8–7.7)
SODIUM SERPL-SCNC: 139 MEQ/L (ref 135–145)
TROPONIN, HIGH SENSITIVITY: 17 NG/L (ref 0–12)
TROPONIN, HIGH SENSITIVITY: 18 NG/L (ref 0–12)
TROPONIN, HIGH SENSITIVITY: 19 NG/L (ref 0–12)
TROPONIN, HIGH SENSITIVITY: 20 NG/L (ref 0–12)
WBC # BLD AUTO: 10 THOU/MM3 (ref 4.8–10.8)

## 2023-10-03 PROCEDURE — 85379 FIBRIN DEGRADATION QUANT: CPT

## 2023-10-03 PROCEDURE — 2580000003 HC RX 258: Performed by: PHYSICIAN ASSISTANT

## 2023-10-03 PROCEDURE — 6370000000 HC RX 637 (ALT 250 FOR IP)

## 2023-10-03 PROCEDURE — 6360000002 HC RX W HCPCS: Performed by: PHYSICIAN ASSISTANT

## 2023-10-03 PROCEDURE — 96374 THER/PROPH/DIAG INJ IV PUSH: CPT

## 2023-10-03 PROCEDURE — 87636 SARSCOV2 & INF A&B AMP PRB: CPT

## 2023-10-03 PROCEDURE — 6360000002 HC RX W HCPCS: Performed by: EMERGENCY MEDICINE

## 2023-10-03 PROCEDURE — 1200000003 HC TELEMETRY R&B

## 2023-10-03 PROCEDURE — 80048 BASIC METABOLIC PNL TOTAL CA: CPT

## 2023-10-03 PROCEDURE — 93010 ELECTROCARDIOGRAM REPORT: CPT | Performed by: INTERNAL MEDICINE

## 2023-10-03 PROCEDURE — 85025 COMPLETE CBC W/AUTO DIFF WBC: CPT

## 2023-10-03 PROCEDURE — 99223 1ST HOSP IP/OBS HIGH 75: CPT | Performed by: PHYSICIAN ASSISTANT

## 2023-10-03 PROCEDURE — 6370000000 HC RX 637 (ALT 250 FOR IP): Performed by: PHYSICIAN ASSISTANT

## 2023-10-03 PROCEDURE — 36415 COLL VENOUS BLD VENIPUNCTURE: CPT

## 2023-10-03 PROCEDURE — 83880 ASSAY OF NATRIURETIC PEPTIDE: CPT

## 2023-10-03 PROCEDURE — 71275 CT ANGIOGRAPHY CHEST: CPT

## 2023-10-03 PROCEDURE — 71045 X-RAY EXAM CHEST 1 VIEW: CPT

## 2023-10-03 PROCEDURE — 6360000004 HC RX CONTRAST MEDICATION: Performed by: EMERGENCY MEDICINE

## 2023-10-03 PROCEDURE — 84484 ASSAY OF TROPONIN QUANT: CPT

## 2023-10-03 PROCEDURE — 99285 EMERGENCY DEPT VISIT HI MDM: CPT

## 2023-10-03 PROCEDURE — 93005 ELECTROCARDIOGRAM TRACING: CPT | Performed by: EMERGENCY MEDICINE

## 2023-10-03 PROCEDURE — 94640 AIRWAY INHALATION TREATMENT: CPT

## 2023-10-03 RX ORDER — ACETAMINOPHEN 325 MG/1
650 TABLET ORAL EVERY 6 HOURS PRN
Status: DISCONTINUED | OUTPATIENT
Start: 2023-10-03 | End: 2023-10-05 | Stop reason: HOSPADM

## 2023-10-03 RX ORDER — MORPHINE SULFATE 4 MG/ML
4 INJECTION, SOLUTION INTRAMUSCULAR; INTRAVENOUS ONCE
Status: COMPLETED | OUTPATIENT
Start: 2023-10-03 | End: 2023-10-03

## 2023-10-03 RX ORDER — ENOXAPARIN SODIUM 100 MG/ML
40 INJECTION SUBCUTANEOUS DAILY
Status: DISCONTINUED | OUTPATIENT
Start: 2023-10-04 | End: 2023-10-05 | Stop reason: HOSPADM

## 2023-10-03 RX ORDER — ASPIRIN 81 MG/1
324 TABLET, CHEWABLE ORAL ONCE
Status: COMPLETED | OUTPATIENT
Start: 2023-10-03 | End: 2023-10-03

## 2023-10-03 RX ORDER — ONDANSETRON 4 MG/1
4 TABLET, ORALLY DISINTEGRATING ORAL EVERY 8 HOURS PRN
Status: DISCONTINUED | OUTPATIENT
Start: 2023-10-03 | End: 2023-10-05 | Stop reason: HOSPADM

## 2023-10-03 RX ORDER — POLYETHYLENE GLYCOL 3350 17 G/17G
17 POWDER, FOR SOLUTION ORAL DAILY PRN
Status: DISCONTINUED | OUTPATIENT
Start: 2023-10-03 | End: 2023-10-05 | Stop reason: HOSPADM

## 2023-10-03 RX ORDER — ASPIRIN 81 MG/1
81 TABLET ORAL DAILY
Status: DISCONTINUED | OUTPATIENT
Start: 2023-10-04 | End: 2023-10-05 | Stop reason: HOSPADM

## 2023-10-03 RX ORDER — MORPHINE SULFATE 2 MG/ML
2 INJECTION, SOLUTION INTRAMUSCULAR; INTRAVENOUS EVERY 4 HOURS PRN
Status: DISCONTINUED | OUTPATIENT
Start: 2023-10-03 | End: 2023-10-05 | Stop reason: HOSPADM

## 2023-10-03 RX ORDER — ACETAMINOPHEN 650 MG/1
650 SUPPOSITORY RECTAL EVERY 6 HOURS PRN
Status: DISCONTINUED | OUTPATIENT
Start: 2023-10-03 | End: 2023-10-05 | Stop reason: HOSPADM

## 2023-10-03 RX ORDER — CARBOXYMETHYLCELLULOSE SODIUM 5 MG/ML
1 SOLUTION/ DROPS OPHTHALMIC EVERY MORNING
COMMUNITY

## 2023-10-03 RX ORDER — SODIUM CHLORIDE 0.9 % (FLUSH) 0.9 %
5-40 SYRINGE (ML) INJECTION PRN
Status: DISCONTINUED | OUTPATIENT
Start: 2023-10-03 | End: 2023-10-05 | Stop reason: HOSPADM

## 2023-10-03 RX ORDER — NITROGLYCERIN 0.4 MG/1
0.4 TABLET SUBLINGUAL EVERY 5 MIN PRN
Status: DISCONTINUED | OUTPATIENT
Start: 2023-10-03 | End: 2023-10-05

## 2023-10-03 RX ORDER — FUROSEMIDE 20 MG/1
20 TABLET ORAL
Status: DISCONTINUED | OUTPATIENT
Start: 2023-10-05 | End: 2023-10-05 | Stop reason: HOSPADM

## 2023-10-03 RX ORDER — ONDANSETRON 2 MG/ML
4 INJECTION INTRAMUSCULAR; INTRAVENOUS EVERY 6 HOURS PRN
Status: DISCONTINUED | OUTPATIENT
Start: 2023-10-03 | End: 2023-10-05 | Stop reason: HOSPADM

## 2023-10-03 RX ORDER — SODIUM CHLORIDE 0.9 % (FLUSH) 0.9 %
5-40 SYRINGE (ML) INJECTION EVERY 12 HOURS SCHEDULED
Status: DISCONTINUED | OUTPATIENT
Start: 2023-10-03 | End: 2023-10-05 | Stop reason: HOSPADM

## 2023-10-03 RX ORDER — ASPIRIN 81 MG/1
81 TABLET, CHEWABLE ORAL DAILY
Status: DISCONTINUED | OUTPATIENT
Start: 2023-10-04 | End: 2023-10-03 | Stop reason: SDUPTHER

## 2023-10-03 RX ORDER — TAMSULOSIN HYDROCHLORIDE 0.4 MG/1
0.4 CAPSULE ORAL DAILY
Status: DISCONTINUED | OUTPATIENT
Start: 2023-10-03 | End: 2023-10-05 | Stop reason: HOSPADM

## 2023-10-03 RX ORDER — METOPROLOL SUCCINATE 50 MG/1
50 TABLET, EXTENDED RELEASE ORAL DAILY
Status: DISCONTINUED | OUTPATIENT
Start: 2023-10-04 | End: 2023-10-05 | Stop reason: HOSPADM

## 2023-10-03 RX ORDER — ATORVASTATIN CALCIUM 40 MG/1
40 TABLET, FILM COATED ORAL NIGHTLY
Status: DISCONTINUED | OUTPATIENT
Start: 2023-10-03 | End: 2023-10-03 | Stop reason: SDUPTHER

## 2023-10-03 RX ORDER — SODIUM CHLORIDE 9 MG/ML
INJECTION, SOLUTION INTRAVENOUS PRN
Status: DISCONTINUED | OUTPATIENT
Start: 2023-10-03 | End: 2023-10-05 | Stop reason: HOSPADM

## 2023-10-03 RX ORDER — PRAVASTATIN SODIUM 80 MG/1
80 TABLET ORAL NIGHTLY
Status: DISCONTINUED | OUTPATIENT
Start: 2023-10-03 | End: 2023-10-05 | Stop reason: HOSPADM

## 2023-10-03 RX ADMIN — TAMSULOSIN HYDROCHLORIDE 0.4 MG: 0.4 CAPSULE ORAL at 21:29

## 2023-10-03 RX ADMIN — ASPIRIN 81 MG CHEWABLE TABLET 324 MG: 81 TABLET CHEWABLE at 13:31

## 2023-10-03 RX ADMIN — SACUBITRIL AND VALSARTAN 1 TABLET: 24; 26 TABLET, FILM COATED ORAL at 21:29

## 2023-10-03 RX ADMIN — NITROGLYCERIN 0.4 MG: 0.4 TABLET, ORALLY DISINTEGRATING SUBLINGUAL at 13:33

## 2023-10-03 RX ADMIN — IOPAMIDOL 80 ML: 755 INJECTION, SOLUTION INTRAVENOUS at 16:08

## 2023-10-03 RX ADMIN — MORPHINE SULFATE 4 MG: 4 INJECTION, SOLUTION INTRAMUSCULAR; INTRAVENOUS at 14:23

## 2023-10-03 RX ADMIN — PRAVASTATIN SODIUM 80 MG: 80 TABLET ORAL at 21:29

## 2023-10-03 RX ADMIN — MORPHINE SULFATE 2 MG: 2 INJECTION, SOLUTION INTRAMUSCULAR; INTRAVENOUS at 21:30

## 2023-10-03 RX ADMIN — SODIUM CHLORIDE, PRESERVATIVE FREE 10 ML: 5 INJECTION INTRAVENOUS at 21:30

## 2023-10-03 RX ADMIN — Medication 2 PUFF: at 21:22

## 2023-10-03 ASSESSMENT — PAIN DESCRIPTION - LOCATION
LOCATION: CHEST

## 2023-10-03 ASSESSMENT — PAIN DESCRIPTION - ORIENTATION
ORIENTATION: LEFT
ORIENTATION: LEFT

## 2023-10-03 ASSESSMENT — HEART SCORE: ECG: 1

## 2023-10-03 ASSESSMENT — PAIN - FUNCTIONAL ASSESSMENT: PAIN_FUNCTIONAL_ASSESSMENT: 0-10

## 2023-10-03 ASSESSMENT — PAIN DESCRIPTION - ONSET: ONSET: ON-GOING

## 2023-10-03 ASSESSMENT — PAIN SCALES - GENERAL
PAINLEVEL_OUTOF10: 4
PAINLEVEL_OUTOF10: 5
PAINLEVEL_OUTOF10: 4
PAINLEVEL_OUTOF10: 6

## 2023-10-03 ASSESSMENT — PAIN DESCRIPTION - DESCRIPTORS
DESCRIPTORS: SHARP
DESCRIPTORS: SHARP
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
## 2023-10-03 NOTE — ED NOTES
Patient resting in bed. Respirations easy and unlabored. No distress noted. Call light within reach.  Family at Kym Krishna, [address] [place] [address]  10/03/23 6860

## 2023-10-03 NOTE — ED NOTES
Patient resting in bed. Respirations easy and unlabored. No distress noted. Call light within reach.        Josi Saldivar RN  10/03/23 8470

## 2023-10-03 NOTE — PROGRESS NOTES
Pharmacy Medication History Note      List of current medications patient is taking is complete. Source of information: Patient and recent fill history    Changes made to medication list:  Medications removed (include reason, ex. therapy complete or physician discontinued):  None    Medications added/doses adjusted:  Refresh Tears Eye Drops instill 1 drop in each eye every morning    Other notes (ex. Recent course of antibiotics, Coumadin dosing):  Denies use of other OTC or herbal medications.       Allergies reviewed      Electronically signed by Stuart Moreno on 10/3/2023 at 5:11 PM

## 2023-10-03 NOTE — ED NOTES
Bedside report taken from HEATHER Varner, this nurse assuming care    Patient resting in bed. Respirations easy and unlabored. No distress noted. Call light within reach.        Rebekah Garcia RN  10/03/23 9281

## 2023-10-03 NOTE — CONSULTS
Patient with known history of a sending aortic aneurysm. Came into the hospital today with CAT scan showing 5.4 cm ascending aortic aneurysm. Comparing this scan to the one done in April of last year there is no change. Patient has severely calcified aorta with multiple comorbidities and is not a surgical candidate for repair of his ascending aortic aneurysm. Signing off.

## 2023-10-03 NOTE — ED TRIAGE NOTES
Pt in through ED lobby. He states 90 minutes ago he suddenly began having sharp left sided chest pain. He has a history of MI with 2 stents. He states his last MI was 10 years ago. He has history of heart failure and COPD. He is chronically on 3L nasal cannula.

## 2023-10-03 NOTE — ED NOTES
Medication administered per MAR. Patient resting in bed. Respirations easy and unlabored. No distress noted. Call light within reach. Family at bedside.      Lakeisha Soni RN  10/03/23 3142

## 2023-10-04 ENCOUNTER — APPOINTMENT (OUTPATIENT)
Dept: INTERVENTIONAL RADIOLOGY/VASCULAR | Age: 83
End: 2023-10-04
Payer: OTHER GOVERNMENT

## 2023-10-04 LAB
ALBUMIN SERPL BCG-MCNC: 3.5 G/DL (ref 3.5–5.1)
ALP SERPL-CCNC: 85 U/L (ref 38–126)
ALT SERPL W/O P-5'-P-CCNC: 9 U/L (ref 11–66)
ANION GAP SERPL CALC-SCNC: 5 MEQ/L (ref 8–16)
AST SERPL-CCNC: 11 U/L (ref 5–40)
BILIRUB SERPL-MCNC: 0.3 MG/DL (ref 0.3–1.2)
BUN SERPL-MCNC: 15 MG/DL (ref 7–22)
CALCIUM SERPL-MCNC: 9 MG/DL (ref 8.5–10.5)
CHLORIDE SERPL-SCNC: 104 MEQ/L (ref 98–111)
CO2 SERPL-SCNC: 32 MEQ/L (ref 23–33)
CREAT SERPL-MCNC: 0.5 MG/DL (ref 0.4–1.2)
DEPRECATED MEAN GLUCOSE BLD GHB EST-ACNC: 108 MG/DL (ref 70–126)
DEPRECATED RDW RBC AUTO: 51.7 FL (ref 35–45)
EKG ATRIAL RATE: 60 BPM
EKG P AXIS: 53 DEGREES
EKG P-R INTERVAL: 250 MS
EKG Q-T INTERVAL: 450 MS
EKG QRS DURATION: 170 MS
EKG QTC CALCULATION (BAZETT): 450 MS
EKG R AXIS: -67 DEGREES
EKG T AXIS: 82 DEGREES
EKG VENTRICULAR RATE: 60 BPM
ERYTHROCYTE [DISTWIDTH] IN BLOOD BY AUTOMATED COUNT: 14.1 % (ref 11.5–14.5)
GFR SERPL CREATININE-BSD FRML MDRD: > 60 ML/MIN/1.73M2
GLUCOSE SERPL-MCNC: 111 MG/DL (ref 70–108)
HBA1C MFR BLD HPLC: 5.6 % (ref 4.4–6.4)
HCT VFR BLD AUTO: 41 % (ref 42–52)
HGB BLD-MCNC: 13.1 GM/DL (ref 14–18)
MCH RBC QN AUTO: 31.7 PG (ref 26–33)
MCHC RBC AUTO-ENTMCNC: 32 GM/DL (ref 32.2–35.5)
MCV RBC AUTO: 99.3 FL (ref 80–94)
PLATELET # BLD AUTO: 161 THOU/MM3 (ref 130–400)
PMV BLD AUTO: 10 FL (ref 9.4–12.4)
POTASSIUM SERPL-SCNC: 4.4 MEQ/L (ref 3.5–5.2)
PROT SERPL-MCNC: 5.9 G/DL (ref 6.1–8)
RBC # BLD AUTO: 4.13 MILL/MM3 (ref 4.7–6.1)
SODIUM SERPL-SCNC: 141 MEQ/L (ref 135–145)
WBC # BLD AUTO: 7.7 THOU/MM3 (ref 4.8–10.8)

## 2023-10-04 PROCEDURE — 83036 HEMOGLOBIN GLYCOSYLATED A1C: CPT

## 2023-10-04 PROCEDURE — 94640 AIRWAY INHALATION TREATMENT: CPT

## 2023-10-04 PROCEDURE — 36415 COLL VENOUS BLD VENIPUNCTURE: CPT

## 2023-10-04 PROCEDURE — 6360000002 HC RX W HCPCS: Performed by: PHYSICIAN ASSISTANT

## 2023-10-04 PROCEDURE — 94761 N-INVAS EAR/PLS OXIMETRY MLT: CPT

## 2023-10-04 PROCEDURE — 6370000000 HC RX 637 (ALT 250 FOR IP): Performed by: PHYSICIAN ASSISTANT

## 2023-10-04 PROCEDURE — 99232 SBSQ HOSP IP/OBS MODERATE 35: CPT | Performed by: PHYSICIAN ASSISTANT

## 2023-10-04 PROCEDURE — 2700000000 HC OXYGEN THERAPY PER DAY

## 2023-10-04 PROCEDURE — 93880 EXTRACRANIAL BILAT STUDY: CPT

## 2023-10-04 PROCEDURE — 6360000002 HC RX W HCPCS: Performed by: INTERNAL MEDICINE

## 2023-10-04 PROCEDURE — 6370000000 HC RX 637 (ALT 250 FOR IP): Performed by: INTERNAL MEDICINE

## 2023-10-04 PROCEDURE — 80053 COMPREHEN METABOLIC PANEL: CPT

## 2023-10-04 PROCEDURE — 1200000003 HC TELEMETRY R&B

## 2023-10-04 PROCEDURE — 85027 COMPLETE CBC AUTOMATED: CPT

## 2023-10-04 PROCEDURE — 93325 DOPPLER ECHO COLOR FLOW MAPG: CPT

## 2023-10-04 PROCEDURE — 93307 TTE W/O DOPPLER COMPLETE: CPT

## 2023-10-04 PROCEDURE — 93308 TTE F-UP OR LMTD: CPT

## 2023-10-04 PROCEDURE — 99223 1ST HOSP IP/OBS HIGH 75: CPT | Performed by: INTERNAL MEDICINE

## 2023-10-04 PROCEDURE — 2580000003 HC RX 258: Performed by: PHYSICIAN ASSISTANT

## 2023-10-04 RX ORDER — SPIRONOLACTONE 25 MG/1
25 TABLET ORAL DAILY
Status: DISCONTINUED | OUTPATIENT
Start: 2023-10-04 | End: 2023-10-05 | Stop reason: HOSPADM

## 2023-10-04 RX ORDER — ALBUTEROL SULFATE 2.5 MG/3ML
2.5 SOLUTION RESPIRATORY (INHALATION)
Status: DISCONTINUED | OUTPATIENT
Start: 2023-10-04 | End: 2023-10-05 | Stop reason: HOSPADM

## 2023-10-04 RX ORDER — FUROSEMIDE 10 MG/ML
20 INJECTION INTRAMUSCULAR; INTRAVENOUS ONCE
Status: COMPLETED | OUTPATIENT
Start: 2023-10-04 | End: 2023-10-04

## 2023-10-04 RX ORDER — ALBUTEROL SULFATE 2.5 MG/3ML
2.5 SOLUTION RESPIRATORY (INHALATION) EVERY 6 HOURS PRN
Status: DISCONTINUED | OUTPATIENT
Start: 2023-10-04 | End: 2023-10-05 | Stop reason: HOSPADM

## 2023-10-04 RX ADMIN — PRAVASTATIN SODIUM 80 MG: 80 TABLET ORAL at 20:47

## 2023-10-04 RX ADMIN — SACUBITRIL AND VALSARTAN 1 TABLET: 24; 26 TABLET, FILM COATED ORAL at 20:47

## 2023-10-04 RX ADMIN — ASPIRIN 81 MG: 81 TABLET, COATED ORAL at 09:54

## 2023-10-04 RX ADMIN — SODIUM CHLORIDE, PRESERVATIVE FREE 10 ML: 5 INJECTION INTRAVENOUS at 20:47

## 2023-10-04 RX ADMIN — SPIRONOLACTONE 25 MG: 25 TABLET ORAL at 12:50

## 2023-10-04 RX ADMIN — METOPROLOL SUCCINATE 50 MG: 50 TABLET, EXTENDED RELEASE ORAL at 09:54

## 2023-10-04 RX ADMIN — FUROSEMIDE 20 MG: 10 INJECTION, SOLUTION INTRAMUSCULAR; INTRAVENOUS at 12:50

## 2023-10-04 RX ADMIN — ALBUTEROL SULFATE 2.5 MG: 2.5 SOLUTION RESPIRATORY (INHALATION) at 09:17

## 2023-10-04 RX ADMIN — TAMSULOSIN HYDROCHLORIDE 0.4 MG: 0.4 CAPSULE ORAL at 20:47

## 2023-10-04 RX ADMIN — ENOXAPARIN SODIUM 40 MG: 100 INJECTION SUBCUTANEOUS at 09:54

## 2023-10-04 RX ADMIN — TIOTROPIUM BROMIDE INHALATION SPRAY 2 PUFF: 3.12 SPRAY, METERED RESPIRATORY (INHALATION) at 13:59

## 2023-10-04 RX ADMIN — SACUBITRIL AND VALSARTAN 1 TABLET: 24; 26 TABLET, FILM COATED ORAL at 09:54

## 2023-10-04 RX ADMIN — SODIUM CHLORIDE, PRESERVATIVE FREE 10 ML: 5 INJECTION INTRAVENOUS at 09:55

## 2023-10-04 RX ADMIN — Medication 2 PUFF: at 19:48

## 2023-10-04 RX ADMIN — ALBUTEROL SULFATE 2.5 MG: 2.5 SOLUTION RESPIRATORY (INHALATION) at 19:47

## 2023-10-04 RX ADMIN — ALBUTEROL SULFATE 2.5 MG: 2.5 SOLUTION RESPIRATORY (INHALATION) at 13:58

## 2023-10-04 RX ADMIN — Medication 2 PUFF: at 07:35

## 2023-10-04 ASSESSMENT — PAIN SCALES - GENERAL
PAINLEVEL_OUTOF10: 0
PAINLEVEL_OUTOF10: 0
PAINLEVEL_OUTOF10: 2
PAINLEVEL_OUTOF10: 4

## 2023-10-04 ASSESSMENT — PAIN DESCRIPTION - ORIENTATION
ORIENTATION: LEFT
ORIENTATION: LEFT

## 2023-10-04 ASSESSMENT — PAIN DESCRIPTION - LOCATION
LOCATION: CHEST
LOCATION: CHEST

## 2023-10-04 ASSESSMENT — PAIN DESCRIPTION - DESCRIPTORS
DESCRIPTORS: ACHING
DESCRIPTORS: DISCOMFORT

## 2023-10-04 NOTE — PLAN OF CARE
Problem: Discharge Planning  Goal: Discharge to home or other facility with appropriate resources  Outcome: Progressing  Flowsheets (Taken 10/3/2023 2045)  Discharge to home or other facility with appropriate resources: Identify barriers to discharge with patient and caregiver     Problem: Pain  Goal: Verbalizes/displays adequate comfort level or baseline comfort level  Outcome: Progressing     Problem: ABCDS Injury Assessment  Goal: Absence of physical injury  Outcome: Progressing

## 2023-10-04 NOTE — CONSULTS
Yellow 06/10/2022 11:16 AM    PHUR 5.0 06/10/2022 11:16 AM    LABCAST NONE SEEN 06/28/2020 12:46 PM    LABCAST NONE SEEN 06/28/2020 12:46 PM    WBCUA 2-4 06/28/2020 12:46 PM    RBCUA none 06/10/2022 11:16 AM    RBCUA 0-2 06/28/2020 12:46 PM    YEAST NONE SEEN 06/28/2020 12:46 PM    BACTERIA moderate 06/10/2022 11:16 AM    BACTERIA NONE SEEN 06/28/2020 12:46 PM    CLARITYU Cloudy 06/10/2022 11:16 AM    SPECGRAV 1.010 06/10/2022 11:16 AM    LEUKOCYTESUR pos 06/10/2022 11:16 AM    UROBILINOGEN 0.20 07/08/2022 11:04 AM    BILIRUBINUR Negative 07/08/2022 11:04 AM    BLOODU Negative 07/08/2022 11:04 AM    BLOODU Negative 06/10/2022 11:16 AM    GLUCOSEU Negative 07/08/2022 11:04 AM         Physical Exam:  Vitals:    10/04/23 0735   BP:    Pulse:    Resp:    Temp:    SpO2: 96%      Intake/Output Summary (Last 24 hours) at 10/4/2023 0831  Last data filed at 10/3/2023 2122  Gross per 24 hour   Intake 120 ml   Output --   Net 120 ml      General:  No acute distress. On 3 L nasal cannula no respiratory distress. Nontoxic-appearing. Neck: Supple, no JVD, no carotid bruits  Heart: Regular rhythm normal S1 and S2, no rubs, murmurs or gallops  Lungs: clear to ascultation no rales, crackles or rhonchi. Diffuse wheezes throughout  Abdomen: positive bowel sounds, soft, non-tender, non-distended, no bruits, no masses  Extremities:no clubbing, cyanosis or edema  Neurologic: alert and oriented x 3, cranial nerves 2-12 grossly intact, motor and sensory intact, moving all extremities  Skin: No rashes  Psych: AO x 3, no depression/dayami, no pressured speech, normal affect  Lymph: No obvious LAD    Assessment:  - Atypical chest pain, heart score 6  - HFrEF, EF 25-30%, no sign of acute exacerbation.  - History of nonobstructive coronary artery disease  - COPD - 3 L nasal cannula baseline  - Aortic stenosis  - Hyperlipidemia  - Hypertension    Plan:   With patient's recent hx of decrease in EF and new onset chest pain plan for stress test not all key elements of the E/M (history, physical exam, and MDM). Additional findings are as noted. I agree with the chief complaint, past medical history, past surgical history, allergies, medications, social and family history as documented unless otherwise noted. Above findings and plan of care were discussed with patient and his family, questions were answered, agreeable to plan. Thank you for allowing me to participate in the care of this patient. Please let me know if I can be of any further assistance.     Electronically signed by Curtis Rose MD, Luci Rae on 10/4/2023 at 6:19 PM  Interventional Cardiology - [facility]

## 2023-10-04 NOTE — CARE COORDINATION
Case Management Assessment  Initial Evaluation    Date/Time of Evaluation: 10/4/2023 2:31 PM  Assessment Completed by: Balbina Belrte RN    If patient is discharged prior to next notation, then this note serves as note for discharge by case management. Patient Name: Martha Barnard                   YOB: 1940  Diagnosis: Chest pain [R07.9]  Elevated troponin [R79.89]  Chest pain, unspecified type [R07.9]  Aneurysm of descending thoracic aorta without rupture (HCC) [I71.23]  Aneurysm of ascending aorta without rupture ([address]) [I71.21]                   Date / Time: 10/3/2023 12:45 PM  Location: [address] [place] [address]     Patient Admission Status: Inpatient   Readmission Risk Low 0-14, Mod 15-19), High > 20: Readmission Risk Score: 11.8    Current PCP: Chela Krishnamurthy MD  PCP verified by CM? Yes    Chart Reviewed: Yes      History Provided by: Patient  Patient Orientation: Alert and Oriented    Patient Cognition: Alert    Hospitalization in the last 30 days (Readmission):  No    If yes, Readmission Assessment in CM Navigator will be completed. Advance Directives:      Code Status: Full Code   Patient's Primary Decision Maker is: Legal Next of Kin      Discharge Planning:    Patient lives with: Alone Type of Home: House  Primary Care Giver: Self  Patient Support Systems include: Children   Current Financial resources: [employer]), Medicare  Current community resources: None  Current services prior to admission: [employer], Oxygen Therapy (3 L O2 from [place])            Current DME: Home Aerosol            Type of Home Care services:  None    ADLS  Prior functional level: Independent in ADLs/IADLs  Current functional level: Independent in ADLs/IADLs    Family can provide assistance at DC: Yes  Would you like Case Management to discuss the discharge plan with any other family members/significant others, and if so, who?  No  Plans to Return to Present Housing: Yes  Other Identified Issues/Barriers

## 2023-10-04 NOTE — PLAN OF CARE
Problem: Respiratory - Adult  Goal: Clear lung sounds  Description: Clear lung sounds  10/4/2023 1952 by Evelyn Cano RCP  Outcome: Progressing   Patient mutually agrees upon goal.

## 2023-10-04 NOTE — ED NOTES
Patient transported to  21 [phone] by cart in stable condition. Patient monitored on cardiac telemetry. Patient on 3 LPM O2 via nasal canula. IV line is patent.         Valentina Mattson, EMT-P  10/03/23 2014

## 2023-10-04 NOTE — PLAN OF CARE
Problem: Discharge Planning  Goal: Discharge to home or other facility with appropriate resources  Outcome: Progressing  Flowsheets (Taken 10/4/2023 1945)  Discharge to home or other facility with appropriate resources: Identify barriers to discharge with patient and caregiver     Problem: Pain  Goal: Verbalizes/displays adequate comfort level or baseline comfort level  Outcome: Progressing     Problem: ABCDS Injury Assessment  Goal: Absence of physical injury  Outcome: Progressing     Problem: Respiratory - Adult  Goal: Clear lung sounds  Description: Clear lung sounds  10/4/2023 1948 by Varghese Medellin RN  Outcome: Progressing  10/4/2023 1404 by Aleksandr Lan RCP  Outcome: Progressing

## 2023-10-05 ENCOUNTER — APPOINTMENT (OUTPATIENT)
Dept: NON INVASIVE DIAGNOSTICS | Age: 83
End: 2023-10-05
Payer: OTHER GOVERNMENT

## 2023-10-05 VITALS
DIASTOLIC BLOOD PRESSURE: 73 MMHG | WEIGHT: 170 LBS | BODY MASS INDEX: 26.68 KG/M2 | TEMPERATURE: 98.9 F | HEART RATE: 80 BPM | RESPIRATION RATE: 18 BRPM | OXYGEN SATURATION: 93 % | SYSTOLIC BLOOD PRESSURE: 137 MMHG | HEIGHT: 67 IN

## 2023-10-05 PROBLEM — I35.0 SEVERE AORTIC STENOSIS: Status: ACTIVE | Noted: 2023-10-05

## 2023-10-05 PROCEDURE — 6360000002 HC RX W HCPCS

## 2023-10-05 PROCEDURE — 78452 HT MUSCLE IMAGE SPECT MULT: CPT | Performed by: INTERNAL MEDICINE

## 2023-10-05 PROCEDURE — 94640 AIRWAY INHALATION TREATMENT: CPT

## 2023-10-05 PROCEDURE — 99239 HOSP IP/OBS DSCHRG MGMT >30: CPT | Performed by: PHYSICIAN ASSISTANT

## 2023-10-05 PROCEDURE — 6360000002 HC RX W HCPCS: Performed by: PHYSICIAN ASSISTANT

## 2023-10-05 PROCEDURE — 6370000000 HC RX 637 (ALT 250 FOR IP): Performed by: PHYSICIAN ASSISTANT

## 2023-10-05 PROCEDURE — A9500 TC99M SESTAMIBI: HCPCS | Performed by: INTERNAL MEDICINE

## 2023-10-05 PROCEDURE — 94761 N-INVAS EAR/PLS OXIMETRY MLT: CPT

## 2023-10-05 PROCEDURE — 2580000003 HC RX 258: Performed by: PHYSICIAN ASSISTANT

## 2023-10-05 PROCEDURE — 93017 CV STRESS TEST TRACING ONLY: CPT | Performed by: INTERNAL MEDICINE

## 2023-10-05 PROCEDURE — 99232 SBSQ HOSP IP/OBS MODERATE 35: CPT | Performed by: REGISTERED NURSE

## 2023-10-05 PROCEDURE — 3430000000 HC RX DIAGNOSTIC RADIOPHARMACEUTICAL: Performed by: INTERNAL MEDICINE

## 2023-10-05 PROCEDURE — 2700000000 HC OXYGEN THERAPY PER DAY

## 2023-10-05 PROCEDURE — 6370000000 HC RX 637 (ALT 250 FOR IP): Performed by: INTERNAL MEDICINE

## 2023-10-05 RX ORDER — TETRAKIS(2-METHOXYISOBUTYLISOCYANIDE)COPPER(I) TETRAFLUOROBORATE 1 MG/ML
10.5 INJECTION, POWDER, LYOPHILIZED, FOR SOLUTION INTRAVENOUS
Status: COMPLETED | OUTPATIENT
Start: 2023-10-05 | End: 2023-10-05

## 2023-10-05 RX ORDER — SPIRONOLACTONE 25 MG/1
25 TABLET ORAL DAILY
Qty: 30 TABLET | Refills: 1 | Status: SHIPPED | OUTPATIENT
Start: 2023-10-06 | End: 2023-10-12 | Stop reason: SDUPTHER

## 2023-10-05 RX ORDER — SPIRONOLACTONE 25 MG/1
25 TABLET ORAL DAILY
Qty: 30 TABLET | Refills: 3 | Status: SHIPPED | OUTPATIENT
Start: 2023-10-06 | End: 2023-10-05 | Stop reason: SDUPTHER

## 2023-10-05 RX ORDER — TETRAKIS(2-METHOXYISOBUTYLISOCYANIDE)COPPER(I) TETRAFLUOROBORATE 1 MG/ML
35 INJECTION, POWDER, LYOPHILIZED, FOR SOLUTION INTRAVENOUS
Status: COMPLETED | OUTPATIENT
Start: 2023-10-05 | End: 2023-10-05

## 2023-10-05 RX ADMIN — SODIUM CHLORIDE, PRESERVATIVE FREE 10 ML: 5 INJECTION INTRAVENOUS at 11:03

## 2023-10-05 RX ADMIN — ALBUTEROL SULFATE 2.5 MG: 2.5 SOLUTION RESPIRATORY (INHALATION) at 16:20

## 2023-10-05 RX ADMIN — FUROSEMIDE 20 MG: 20 TABLET ORAL at 11:03

## 2023-10-05 RX ADMIN — ASPIRIN 81 MG: 81 TABLET, COATED ORAL at 11:02

## 2023-10-05 RX ADMIN — Medication 10.5 MILLICURIE: at 08:40

## 2023-10-05 RX ADMIN — SPIRONOLACTONE 25 MG: 25 TABLET ORAL at 11:03

## 2023-10-05 RX ADMIN — ALBUTEROL SULFATE 2.5 MG: 2.5 SOLUTION RESPIRATORY (INHALATION) at 11:18

## 2023-10-05 RX ADMIN — ALBUTEROL SULFATE 2.5 MG: 2.5 SOLUTION RESPIRATORY (INHALATION) at 07:45

## 2023-10-05 RX ADMIN — METOPROLOL SUCCINATE 50 MG: 50 TABLET, EXTENDED RELEASE ORAL at 11:02

## 2023-10-05 RX ADMIN — Medication 35 MILLICURIE: at 09:54

## 2023-10-05 RX ADMIN — Medication 2 PUFF: at 07:50

## 2023-10-05 RX ADMIN — TIOTROPIUM BROMIDE INHALATION SPRAY 2 PUFF: 3.12 SPRAY, METERED RESPIRATORY (INHALATION) at 07:51

## 2023-10-05 RX ADMIN — SACUBITRIL AND VALSARTAN 1 TABLET: 24; 26 TABLET, FILM COATED ORAL at 11:03

## 2023-10-05 ASSESSMENT — PAIN SCALES - GENERAL: PAINLEVEL_OUTOF10: 0

## 2023-10-05 NOTE — DISCHARGE SUMMARY
Hospitalist Discharge Note      Patient:  Samantha Zuñiga    Unit/Bed:8A-17/017-A  YOB: 1940  MRN: [id number]   Acct: [de-identified]     PCP: Jeff Roque MD  Date of Admission: 10/3/2023      Discharge date: 10/5/2023    Chief Complaint on presentation :-  Chest Pain     Discharge Assessment and Plan:-   Chest Pain: Cardiology consulted and CTS consulted. Pt admits it is worse with coughing.  & Nitro given. CTA chest showed 5.4cm ascending thoracic aneurysm. Tele. Echo shows severe AS 0.7 cm2.   - CTS does not recommend any surgical intervention at this time. CTS signed off. - Stress test negative. - [address] [place] [address] appointment and referral made. Nonobstructive CAD: Pt follows with Cardiology, [facility] completed on 7/2022. Continue home medications. HFrEF, chronic: Latest echo shows EF 28%. Euvolemic currently. Continue home medications. Pt is on GDMT. PT will follow up with Cardiology. COPD, Chronic Respiratory Failure: Pt wears 3L NC, currently on 3L NC. 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 Carotid Stenosis: Pt complaining of vision, resolved. Carotid US did not show acute issues. HLD: Statin. Bladder CA     Initial H and P and Hospital course:-  Initial H&P \"This is a chronically ill appearing 82year old male who presented to [facility] ED due to chest pain. Pt states that he was driving and came all of the sudden, sharp pain 6/10. Pt denies chest pressure at this time. Pt also admits that this is worse when he takes a deep breath and the pain is worse than when he had his first heart attack. Pt states that he is medically complaint and follows with CTS, Cardiology [facility]. Pt wears 3L NC always and states that he is always wheezy. Pt states that he is still having chest pain as he is resting in the bed. Pt denies cough, HA, heart palpitations.       In the ED, he was found to have a 5.4cm ascending thoracic are present in the thoracic aorta and coronary arteries. Aneurysmal dilation of the ascending thoracic aorta measures up to 5.4 cm (image 113). Aneurysmal dilation of the descending thoracic aorta measures up to 4.8 cm (image 175). There is no pleural or pericardial effusion. Fibrotic and emphysematous changes in the bilateral lungs are stable. There are no lung consolidations. There is no mediastinal, hilar or axillary lymphadenopathy. A small hiatal hernia is suggested. Degenerative changes are present in the thoracic spine without evidence of aggressive osseous lesions. There are calcified granulomas in spleen. Calcifications in the gallbladder incompletely visualized. Exophytic lesions in the bilateral kidneys are incompletely evaluated. There is a partially visualized endovascular stent in the abdominal aorta. 1. No pulmonary embolism. 2. No acute intrathoracic findings. 3. Aneurysms of the ascending and descending thoracic aorta. 4. Chronic lung disease. Final report electronically signed by Dr. Shameka Downs on 10/3/2023 4:22 PM    XR CHEST PORTABLE    Result Date: 10/3/2023  PROCEDURE: XR CHEST PORTABLE CLINICAL INFORMATION: Chest pain, shortness of breath TECHNIQUE: Mobile AP chest radiograph. COMPARISON: PA and lateral chest radiographs 6/28/2020 FINDINGS: Cardiac silhouette is within normal limits. Atherosclerotic calcifications are present in the thoracic aorta. There are minimal reticular opacities the bilateral lung bases. Degenerative and scoliotic changes in the thoracic spine are poorly visualized. Minimal bibasilar atelectasis/infiltrate. **This report has been created using voice recognition software. It may contain minor errors which are inherent in voice recognition technology. ** Final report electronically signed by Dr. Shameka Downs on 10/3/2023 1:45 PM       Follow-up scheduled after discharge :-    in the next few days with Jj Esteban MD  in the next few weeks with

## 2023-10-05 NOTE — PROGRESS NOTES
Physician Progress Note      PATIENT:               Elba Foreman  CSN #:                  [id number]  :                       1940  ADMIT DATE:       10/3/2023 12:45 PM  [facility] DATE:  Janece Cooler  PROVIDER #:        Chanell MARTE          QUERY TEXT:    Pt admitted with chest pain. Pt noted to have non obstructive CAD, pain worse   with coughing and ascending thoracic aneurysm. If possible, please document   in progress notes and discharge summary if you are evaluating and/or treating   any of the following: The medical record reflects the following:  Risk Factors: chest pain  Clinical Indicators: presented with chest pain, found to have ascending   thoracic aneurysm, pain worse with coughing  Treatment: Labs, imaging, monitoring, CVS consult, ECHO  Options provided:  -- Chest pain due to GERD  -- Chest pain due to ascending thoracic aneurysm  -- Chest pain due to costochondritis  -- Chest pain due to pleurisy  -- Chest pain due to, Please document cause. -- Other - I will add my own diagnosis  -- Disagree - Not applicable / Not valid  -- Disagree - Clinically unable to determine / Unknown  -- Refer to Clinical Documentation Reviewer    PROVIDER RESPONSE TEXT:    Provider is clinically unable to determine a response to this query.     Query created by: Ashley Coleman on 10/5/2023 1:24 PM      Electronically signed by:  Lionel MARTE 10/5/2023 3:42 PM

## 2023-10-05 NOTE — PROGRESS NOTES
Cardiology Progress Note      Patient:  Arpita Brothers  YOB: 1940  MRN: [id number]   Acct: [de-identified]  Admit Date:  10/3/2023  Primary Cardiologist: [facility]    Chief Complaint: Chest pain    HPI per Dr. Deven Hanson  This is a pleasant 82 y.o. male with a past medical history nonobstructive coronary artery disease, HFrEF, COPD, bladder cancer, hyperlipidemia, status post EVAR with a right common femoral artery aneurysm resection and repair and known ascending thoracic aneurysm who presented to [facility] on 10/3/2023 for evaluation of chest pain. Patient states that [place] he also developed sudden sharp chest pain located in the center of his chest.  Chest pain did not radiate up into his jaw or down either one of his arms. Patient has no associated symptoms with this sharp chest pain. Patient denies any chest pressure chest tightness and states it does not feel like someone is sitting on his chest.  It is noted patient had worsening of pain with taking a large deep breath. He reports that this sharp chest pain was 4 out of 10. Patient has no worsening shortness of breath, does have COPD and is on 3 L nasal cannula at his baseline. Patient was given sublingual nitro x2 which did not mitigate his symptoms. Patient was given morphine which took the edge off he reports. This morning patient woke up and he is no longer having chest pain at this time. Patient denies any symptoms currently. He does state that this morning for several seconds the vision in his left eye went black but returned. He denies any vision changes numbness or tingling headache at this time. Patient denies worsening shortness of breath, currently on 3 L nasal cannula which is his baseline, palpitations, nausea vomiting, abdominal pain, chest pain, chest pressure, chest tightness, or pleuritic chest pain.   Patient has a good appetite and ate his entire an  early takeoff large vessel with ostial 50% stenosis. Mid LAD has 10% to  20% stenosis. Distal LAD is patent. Lab Data:    Cardiac Enzymes:  No results for input(s): \"CKTOTAL\", \"CKMB\", \"CKMBINDEX\", \"TROPONINI\" in the last 72 hours.     CBC:   Lab Results   Component Value Date/Time    WBC 7.7 10/04/2023 06:23 AM    RBC 4.13 10/04/2023 06:23 AM    HGB 13.1 10/04/2023 06:23 AM    HCT 41.0 10/04/2023 06:23 AM     10/04/2023 06:23 AM       CMP:    Lab Results   Component Value Date/Time     10/04/2023 06:23 AM    K 4.4 10/04/2023 06:23 AM     10/04/2023 06:23 AM    CO2 32 10/04/2023 06:23 AM    BUN 15 10/04/2023 06:23 AM    CREATININE 0.5 10/04/2023 06:23 AM    LABGLOM >60 10/04/2023 06:23 AM    GLUCOSE 111 10/04/2023 06:23 AM    GLUCOSE 95 10/09/2015 01:38 PM    CALCIUM 9.0 10/04/2023 06:23 AM       Hepatic Function Panel:    Lab Results   Component Value Date/Time    ALKPHOS 85 10/04/2023 06:23 AM    ALT 9 10/04/2023 06:23 AM    AST 11 10/04/2023 06:23 AM    PROT 5.9 10/04/2023 06:23 AM    BILITOT 0.3 10/04/2023 06:23 AM    LABALBU 3.5 10/04/2023 06:23 AM       Magnesium:    Lab Results   Component Value Date/Time    MG 2.4 07/10/2023 03:57 PM       PT/INR:    Lab Results   Component Value Date/Time    INR 0.88 07/22/2022 08:02 AM       HgBA1c:    Lab Results   Component Value Date/Time    LABA1C 5.6 10/04/2023 06:23 AM       FLP:    Lab Results   Component Value Date/Time    TRIG 57 07/22/2022 08:02 AM    HDL 44 07/22/2022 08:02 AM    LDLCALC 77 07/22/2022 08:02 AM       TSH:  No results found for: \"TSH\"      Assessment:  Atypical chest pain; resolved  Worsening/severe AS; FRANCISCO 0.7; MG 37 on TTE 10/4/2023  FRANCISCO 1.2 MG 22 in June 2023  HFrEF; EF 40% (was 25-30% in 06/2023); 2/2 NICM  Appears euvolemic on exam  Hx CAD  S/p PCI/stenting of OM in 2014  Last [facility] 2022: patent OM; 60% RCA ISR (neg FFR)   HLD  HTN-well controlled presently   Ascending aorta aneurysm; 5.4cm; evaluated by CTS-not

## 2023-10-05 NOTE — CARE COORDINATION
10/5/23, 12:02 PM EDT    DISCHARGE ON GOING [facility] day: 2  Location: -17/017-A Reason for admit: Chest pain [R07.9]  Elevated troponin [R79.89]  Chest pain, unspecified type [R07.9]  Aneurysm of descending thoracic aorta without rupture (HCC) [I71.23]  Aneurysm of ascending aorta without rupture ([address]) [I71.21]   Procedure: 10/3 CXR: Minimal bibasilar atelectasis/infiltrate. 10/3 CTA Chest W WO: No pulmonary embolism. No acute intrathoracic findings. Aneurysms of the ascending and descending thoracic aorta. Chronic lung disease. 10/4 Carotid US: Arch amount of left-sided and moderate amount of right-sided atherosclerotic plaque resulting in less than 50% stenosis of the internal carotid arteries. Barriers to Discharge: Hospitalist and Cardiology following. Planning stress test today. Asa, Lovenox, Lasix, Toprol XL, Pravachol, Entresto, Aldactone. NPO. Stress test today. PCP: Juan J Ridley MD  Readmission Risk Score: 11.8%  Patient Goals/Plan/Treatment Preferences: Planning to return home alone. 3L O2 from [facility]. Possible discharge later today pending stress results. 10/5/23, 12:07 PM EDT    Patient goals/plan/ treatment preferences discussed by  and . Patient goals/plan/ treatment preferences reviewed with patient/ family. Patient/ family verbalize understanding of discharge plan and are in agreement with goal/plan/treatment preferences. Understanding was demonstrated using the teach back method. AVS provided by RN at time of discharge, which includes all necessary medical information pertaining to the patients current course of illness, treatment, post-discharge goals of care, and treatment preferences.      Services At/After Discharge: None       IMM Letter  IMM Letter given to Patient/Family/Significant other/Guardian/POA/by[de-identified] patient regsitration  IMM Letter date given[de-identified] 10/03/23  IMM Letter time given[de-identified] 5595

## 2023-10-06 ENCOUNTER — CARE COORDINATION (OUTPATIENT)
Dept: FAMILY MEDICINE CLINIC | Age: 83
End: 2023-10-06

## 2023-10-06 NOTE — CARE COORDINATION
has three steps into his home and she was surprised how well he was able to climb them and walk right into his home. Feeling better that at admission for Chest Pain. Tests negative, keeping an eye on known aneurysm. Full month of appointments for October-follow up with Dr. Lei Calles 10/12 and follow up with CHF Clinic, pulmonary function study and follow up with pulmonary. Meds discussed with John Hicks was just leaving house to  new script for Aldactone and deliver groceries to her Dad. She looks after him and takes to all appointments. Will meet with them next Thursday. Encouraged to call with any questions or concerns that may arise prior to follow up.   Marylene Pain, RN

## 2023-10-12 ENCOUNTER — OFFICE VISIT (OUTPATIENT)
Dept: FAMILY MEDICINE CLINIC | Age: 83
End: 2023-10-12

## 2023-10-12 ENCOUNTER — CARE COORDINATION (OUTPATIENT)
Dept: FAMILY MEDICINE CLINIC | Age: 83
End: 2023-10-12

## 2023-10-12 VITALS
HEART RATE: 64 BPM | DIASTOLIC BLOOD PRESSURE: 60 MMHG | RESPIRATION RATE: 20 BRPM | SYSTOLIC BLOOD PRESSURE: 110 MMHG | HEIGHT: 67 IN | BODY MASS INDEX: 25.9 KG/M2 | WEIGHT: 165 LBS

## 2023-10-12 DIAGNOSIS — Z09 HOSPITAL DISCHARGE FOLLOW-UP: ICD-10-CM

## 2023-10-12 DIAGNOSIS — I35.0 SEVERE AORTIC STENOSIS: ICD-10-CM

## 2023-10-12 DIAGNOSIS — J96.11 CHRONIC HYPOXEMIC RESPIRATORY FAILURE (HCC): Primary | ICD-10-CM

## 2023-10-12 DIAGNOSIS — Z23 NEED FOR PROPHYLACTIC VACCINATION AGAINST STREPTOCOCCUS PNEUMONIAE (PNEUMOCOCCUS): ICD-10-CM

## 2023-10-12 RX ORDER — SPIRONOLACTONE 25 MG/1
25 TABLET ORAL DAILY
Qty: 90 TABLET | Refills: 3 | Status: SHIPPED | OUTPATIENT
Start: 2023-10-12

## 2023-10-12 SDOH — ECONOMIC STABILITY: FOOD INSECURITY: WITHIN THE PAST 12 MONTHS, THE FOOD YOU BOUGHT JUST DIDN'T LAST AND YOU DIDN'T HAVE MONEY TO GET MORE.: NEVER TRUE

## 2023-10-12 SDOH — ECONOMIC STABILITY: INCOME INSECURITY: HOW HARD IS IT FOR YOU TO PAY FOR THE VERY BASICS LIKE FOOD, HOUSING, MEDICAL CARE, AND HEATING?: NOT HARD AT ALL

## 2023-10-12 SDOH — ECONOMIC STABILITY: HOUSING INSECURITY
IN THE LAST 12 MONTHS, WAS THERE A TIME WHEN YOU DID NOT HAVE A STEADY PLACE TO SLEEP OR SLEPT IN A SHELTER (INCLUDING NOW)?: NO

## 2023-10-12 SDOH — ECONOMIC STABILITY: FOOD INSECURITY: WITHIN THE PAST 12 MONTHS, YOU WORRIED THAT YOUR FOOD WOULD RUN OUT BEFORE YOU GOT MONEY TO BUY MORE.: NEVER TRUE

## 2023-10-12 ASSESSMENT — PATIENT HEALTH QUESTIONNAIRE - PHQ9
SUM OF ALL RESPONSES TO PHQ QUESTIONS 1-9: 0
SUM OF ALL RESPONSES TO PHQ9 QUESTIONS 1 & 2: 0
SUM OF ALL RESPONSES TO PHQ QUESTIONS 1-9: 0
1. LITTLE INTEREST OR PLEASURE IN DOING THINGS: 0
SUM OF ALL RESPONSES TO PHQ QUESTIONS 1-9: 0
SUM OF ALL RESPONSES TO PHQ QUESTIONS 1-9: 0
2. FEELING DOWN, DEPRESSED OR HOPELESS: 0

## 2023-10-12 NOTE — PROGRESS NOTES
Immunizations Administered       Name Date Dose Route    Influenza, AFLURIA (age 1 yrs+), FLUZONE, (age 10 mo+), MDV, 0.5mL 10/12/2023 0.5 mL Intramuscular    Site: Deltoid- Left    Lot: [id number]    NDC: 44339-096-00    Pneumococcal, PCV20, PREVNAR 20, (age 6w+), IM, 0.5mL 10/12/2023 0.5 mL Intramuscular    Site: Deltoid- Right    Lot: [id number]    NDC: 9083-7948-38
nebulization every 6 hours as needed for Wheezing or Shortness of Breath     aspirin 81 MG EC tablet     fluticasone-salmeterol 250-50 MCG/ACT Aepb diskus inhaler  Commonly known as: Wixela Inhub  Inhale 1 puff into the lungs in the morning and 1 puff in the evening. furosemide 20 MG tablet  Commonly known as: Lasix  Take 1 tablet by mouth every 48 hours     metoprolol succinate 25 MG extended release tablet  Commonly known as: Toprol XL  Take 2 tablets by mouth daily     pravastatin 80 MG tablet  Commonly known as: PRAVACHOL     Refresh Tears 0.5 % Soln ophthalmic solution  Generic drug: carboxymethylcellulose     sacubitril-valsartan 24-26 MG per tablet  Commonly known as: ENTRESTO  Take 1 tablet by mouth 2 times daily     spironolactone 25 MG tablet  Commonly known as: ALDACTONE  Take 1 tablet by mouth daily     tamsulosin 0.4 MG capsule  Commonly known as: FLOMAX     tiotropium 2.5 MCG/ACT Aers inhaler  Commonly known as: SPIRIVA RESPIMAT  Inhale 2 puffs into the lungs daily           * This list has 2 medication(s) that are the same as other medications prescribed for you. Read the directions carefully, and ask your doctor or other care provider to review them with you. Where to Get Your Medications        These medications were sent to  [facility] [address] [facility] [phone]  [address] [place], [place] [id number]      Phone: [phone]   spironolactone 25 MG tablet          Medications marked \"taking\" at this time  Outpatient Medications Marked as Taking for the 10/12/23 encounter (Office Visit) with Jj Esteban MD   Medication Sig Dispense Refill    spironolactone (ALDACTONE) 25 MG tablet Take 1 tablet by mouth daily 90 tablet 3        Medications patient taking as of now reconciled against medications ordered at time of hospital discharge: Yes    Review of Systems  The breathing is at baseline.  No CP or leg swelling  Objective:    BP

## 2023-10-16 ENCOUNTER — OFFICE VISIT (OUTPATIENT)
Dept: CARDIOLOGY CLINIC | Age: 83
End: 2023-10-16
Payer: OTHER GOVERNMENT

## 2023-10-16 VITALS
BODY MASS INDEX: 26.12 KG/M2 | DIASTOLIC BLOOD PRESSURE: 62 MMHG | SYSTOLIC BLOOD PRESSURE: 130 MMHG | OXYGEN SATURATION: 94 % | HEIGHT: 67 IN | HEART RATE: 63 BPM | WEIGHT: 166.4 LBS

## 2023-10-16 DIAGNOSIS — I50.22 CHRONIC SYSTOLIC CONGESTIVE HEART FAILURE, NYHA CLASS 2 (HCC): Primary | ICD-10-CM

## 2023-10-16 DIAGNOSIS — I35.0 SEVERE AORTIC STENOSIS: ICD-10-CM

## 2023-10-16 DIAGNOSIS — I42.8 NONISCHEMIC CARDIOMYOPATHY (HCC): ICD-10-CM

## 2023-10-16 DIAGNOSIS — I51.89 GRADE I DIASTOLIC DYSFUNCTION: ICD-10-CM

## 2023-10-16 PROCEDURE — 1123F ACP DISCUSS/DSCN MKR DOCD: CPT | Performed by: NURSE PRACTITIONER

## 2023-10-16 PROCEDURE — 99214 OFFICE O/P EST MOD 30 MIN: CPT | Performed by: NURSE PRACTITIONER

## 2023-10-16 ASSESSMENT — ENCOUNTER SYMPTOMS
SHORTNESS OF BREATH: 1
COUGH: 0
NAUSEA: 0
ABDOMINAL DISTENTION: 0
VOMITING: 0

## 2023-10-16 NOTE — PROGRESS NOTES
Heart Failure Clinic       Visit Date: 10/16/2023  Cardiologist:  Dr. Porter Vargas  Primary Care Physician: Dr. Mustapha Ennis MD    Obi Estrada is a 82 y.o. male who presents today for:  Chief Complaint   Patient presents with    Congestive Heart Failure       HPI:     TYPE HF: HFrEF 40% 2023 (25-30% 7/2023) (45% 2013)  Cause: mixed nonischemic with underlying ischemic - Structural HF - Severe AS  Device: none  HX:  Aortic aneurysm ([address]), Asthma, CAD s/p PCI 2014 COPD, Hyperlipidemia, Hypertension  Dry Wt:  174 on 8/9/22, 172 on 7/10/23, 166 on 10/16/23      Obi Estrada is a 82 y.o. male who presents to the office for a f/u patient visit in the heart failure clinic. Concerns today: here today for his 3 month f/u. Pt was recently admitted for chest pain. Ischemic workup (stress test) was negative. He states that he has not had an chest pain since hospitalization. His SOB is at baseline - continues on his 3L of his oxygen. He denies bloating, lower leg swelling, orthopnea, and nocturnal dyspnea. Does add salt to his food (has cut back), staying under 2L/day. ECHO completed in house shows severe AS along with improvement of his EF from 25-30% to 40%. Visit on 7/10/23: here today for a \"new\" pt again. He did see me a year ago but did not want to follow. He was sent back d/t a further drop in his EF and needing medication optimization. Pt denies worsening of his SOB and leg swelling. Urinating well on his Bumex EOD. Still eating a high Na/fluid diet. Life vest is ordered. Visit on 8/9/22:  new patient today refrerred by Dr. Porter Vargas. New to Dr. Porter Vargas as well. He was referred by the [place] d/t recent finding of an EF of 35%. Pt denies CHF symptoms today or worsening SOB. Patient follows:      Hospitalization:      Date of Admission: 10/3/2023    Discharge date: 10/5/2023    Discharge Assessment and Plan:-   Chest Pain: Cardiology consulted and CTS consulted. Pt admits it is worse with coughing.  ASA

## 2023-10-16 NOTE — PATIENT INSTRUCTIONS
You may receive a survey regarding the care you received during your visit. Your input is valuable to us. We encourage you to complete and return your survey. We hope you will choose us in the future for your healthcare needs. Your nurses today were Shea and [employer]Dom[employer]ve.   Office hours:   Mon-Thurs 8-4:30  Friday 8-12  Phone: [phone]    Continue:  Continue current medications  Daily weights and record  Fluid restriction of 2 Liters per day  Limit sodium in diet to around 1184-7120 mg/day  Monitor BP  Activity as tolerated     Call the [address] for any of the following symptoms:   Weight gain of 2-3 pounds in 1 day or 5 pounds in 1 week  Increased shortness of breath  Shortness of breath while laying down  Cough  Chest pain  Swelling in feet, ankles or legs  Bloating in abdomen  Fatigue

## 2023-10-17 ENCOUNTER — HOSPITAL ENCOUNTER (OUTPATIENT)
Dept: PULMONOLOGY | Age: 83
Discharge: HOME | End: 2023-10-17
Payer: OTHER GOVERNMENT

## 2023-10-17 DIAGNOSIS — I71.40 ABDOMINAL AORTIC ANEURYSM (AAA) WITHOUT RUPTURE, UNSPECIFIED PART (HCC): ICD-10-CM

## 2023-10-17 DIAGNOSIS — J43.2 CENTRILOBULAR EMPHYSEMA (HCC): ICD-10-CM

## 2023-10-17 DIAGNOSIS — J44.9 STAGE 3 SEVERE COPD BY GOLD CLASSIFICATION (HCC): ICD-10-CM

## 2023-10-17 DIAGNOSIS — Z87.891 PERSONAL HISTORY OF TOBACCO USE: ICD-10-CM

## 2023-10-17 DIAGNOSIS — J96.11 CHRONIC HYPOXEMIC RESPIRATORY FAILURE (HCC): ICD-10-CM

## 2023-10-17 PROCEDURE — 94060 EVALUATION OF WHEEZING: CPT

## 2023-10-20 ENCOUNTER — CARE COORDINATION (OUTPATIENT)
Dept: FAMILY MEDICINE CLINIC | Age: 83
End: 2023-10-20

## 2023-10-20 NOTE — CARE COORDINATION
I spoke with Gerard's daughter Constantin Carter. She said he is doing fine. He has had no further chest pain and is taking his medications as ordered. She is aware of his follow up appointments with pulmonary and cardiology. She said he has no needs or questions at this time.

## 2023-10-23 ENCOUNTER — OFFICE VISIT (OUTPATIENT)
Dept: PULMONOLOGY | Age: 83
End: 2023-10-23
Payer: MEDICARE

## 2023-10-23 VITALS
OXYGEN SATURATION: 94 % | TEMPERATURE: 98.4 F | WEIGHT: 163.4 LBS | SYSTOLIC BLOOD PRESSURE: 125 MMHG | HEIGHT: 67 IN | BODY MASS INDEX: 25.65 KG/M2 | DIASTOLIC BLOOD PRESSURE: 60 MMHG | HEART RATE: 61 BPM

## 2023-10-23 DIAGNOSIS — I35.0 SEVERE AORTIC STENOSIS: ICD-10-CM

## 2023-10-23 DIAGNOSIS — J44.9 STAGE 3 SEVERE COPD BY GOLD CLASSIFICATION (HCC): Primary | ICD-10-CM

## 2023-10-23 DIAGNOSIS — Z87.891 PERSONAL HISTORY OF TOBACCO USE: ICD-10-CM

## 2023-10-23 DIAGNOSIS — J43.2 CENTRILOBULAR EMPHYSEMA (HCC): ICD-10-CM

## 2023-10-23 DIAGNOSIS — I71.20 THORACIC AORTIC ANEURYSM WITHOUT RUPTURE, UNSPECIFIED PART (HCC): ICD-10-CM

## 2023-10-23 DIAGNOSIS — I71.40 ABDOMINAL AORTIC ANEURYSM (AAA) WITHOUT RUPTURE, UNSPECIFIED PART (HCC): ICD-10-CM

## 2023-10-23 DIAGNOSIS — I50.22 CHRONIC HFREF (HEART FAILURE WITH REDUCED EJECTION FRACTION) (HCC): ICD-10-CM

## 2023-10-23 DIAGNOSIS — J96.11 CHRONIC HYPOXEMIC RESPIRATORY FAILURE (HCC): ICD-10-CM

## 2023-10-23 DIAGNOSIS — J44.9 STAGE 3 SEVERE COPD BY GOLD CLASSIFICATION (HCC): ICD-10-CM

## 2023-10-23 PROCEDURE — 1036F TOBACCO NON-USER: CPT | Performed by: NURSE PRACTITIONER

## 2023-10-23 PROCEDURE — 3023F SPIROM DOC REV: CPT | Performed by: NURSE PRACTITIONER

## 2023-10-23 PROCEDURE — G8417 CALC BMI ABV UP PARAM F/U: HCPCS | Performed by: NURSE PRACTITIONER

## 2023-10-23 PROCEDURE — G8482 FLU IMMUNIZE ORDER/ADMIN: HCPCS | Performed by: NURSE PRACTITIONER

## 2023-10-23 PROCEDURE — 1123F ACP DISCUSS/DSCN MKR DOCD: CPT | Performed by: NURSE PRACTITIONER

## 2023-10-23 PROCEDURE — 1111F DSCHRG MED/CURRENT MED MERGE: CPT | Performed by: NURSE PRACTITIONER

## 2023-10-23 PROCEDURE — G8427 DOCREV CUR MEDS BY ELIG CLIN: HCPCS | Performed by: NURSE PRACTITIONER

## 2023-10-23 PROCEDURE — 99215 OFFICE O/P EST HI 40 MIN: CPT | Performed by: NURSE PRACTITIONER

## 2023-10-23 RX ORDER — ALBUTEROL SULFATE 2.5 MG/3ML
2.5 SOLUTION RESPIRATORY (INHALATION) EVERY 6 HOURS PRN
Qty: 120 EACH | Refills: 11 | Status: SHIPPED | OUTPATIENT
Start: 2023-10-23 | End: 2024-10-22

## 2023-10-23 RX ORDER — FLUTICASONE PROPIONATE AND SALMETEROL 250; 50 UG/1; UG/1
1 POWDER RESPIRATORY (INHALATION) EVERY 12 HOURS
Qty: 180 EACH | Refills: 3 | Status: SHIPPED | OUTPATIENT
Start: 2023-10-23

## 2023-10-23 RX ORDER — ALBUTEROL SULFATE 90 UG/1
2 AEROSOL, METERED RESPIRATORY (INHALATION) EVERY 6 HOURS PRN
Qty: 18 G | Refills: 3 | Status: SHIPPED | OUTPATIENT
Start: 2023-10-23

## 2023-10-23 ASSESSMENT — ENCOUNTER SYMPTOMS
CHOKING: 0
WHEEZING: 1
COUGH: 1
SHORTNESS OF BREATH: 1
ALLERGIC/IMMUNOLOGIC NEGATIVE: 1
CHEST TIGHTNESS: 0

## 2023-10-23 NOTE — PROGRESS NOTES
[facility]     Patient: Chely Littlejohn, 82 y.o.   : 1940  10/23/2023    Pt of mine      Subjective     Chief Complaint   Patient presents with    Follow-up     Copd 3 mo f/u with afia 10-        HPI       Patient presents for  3 month COPD follow up with spirometry   Accompanied by his daughter   Previously on Advair, needed step up therapy, trialed Margarita: [place] would not cover, now on Spiriva Respimat with  Wixela   Still requiring albuterol HFA / nebs every 4-6 hours , is using both this often. Denies jitters/ shakiness , does get benefit with use   CHF with low EF 25-30% now 40%  ,  severe AS , follows with cardiology and CHF clinic . Planning for TAVR   Pulm symptoms stable for him, no recent exacerbations requiring steroids      Follows with CT surgery for AAA monitoring - stable on CTA 10/3/2023    Patient is on home oxygen therapy:   Current Oxygen order: 3LPM O2 continuous 24 hours a day. Has POC , is using compliantly     PMH TB  while in [employer] was treated. Known asbestos exposure in [employer] and breathing chemical exposures > 19 years  . Reports he had bronch and lung biopsy in past at [facility] while following with Dr Shiv Sena. Date of Last PFT and FEV1 in Liters: , 0.99L/44%   PFT w/ in 2 years? Yes   6 min walk? Yes Distance: 864 feet on oxygen         SOCIAL HISTORY:  Social History     Tobacco Use    Smoking status: Former     Packs/day: 1.00     Years: 45.00     Additional pack years: 0.00     Total pack years: 45.00     Types: Cigarettes     Quit date: 3/30/2007     Years since quittin.5    Smokeless tobacco: Never   Vaping Use    Vaping Use: Never used   Substance Use Topics    Alcohol use:  Yes     Alcohol/week: 4.0 standard drinks of alcohol     Types: 4 Glasses of wine per week     Comment: couple times per week    Drug use: No       CURRENT MEDICATIONS:  Current Outpatient Medications   Medication Sig Dispense Refill

## 2023-10-24 ENCOUNTER — TELEPHONE (OUTPATIENT)
Dept: CARDIOLOGY CLINIC | Age: 83
End: 2023-10-24

## 2023-10-24 DIAGNOSIS — I42.8 NONISCHEMIC CARDIOMYOPATHY (HCC): Primary | ICD-10-CM

## 2023-10-24 NOTE — TELEPHONE ENCOUNTER
----- Message from CASEY Ordaz - CNP sent at 10/24/2023  7:50 AM EDT -----  Labs reviewed - winsome Rose with labs in 6 weeks

## 2023-10-24 NOTE — TELEPHONE ENCOUNTER
Received message from [address] [facility] not covered,  Jardiance preferred.

## 2023-10-26 ENCOUNTER — TELEPHONE (OUTPATIENT)
Dept: CARDIOLOGY CLINIC | Age: 83
End: 2023-10-26

## 2023-10-26 ENCOUNTER — OFFICE VISIT (OUTPATIENT)
Dept: CARDIOLOGY CLINIC | Age: 83
End: 2023-10-26
Payer: MEDICARE

## 2023-10-26 VITALS
HEIGHT: 67 IN | HEART RATE: 58 BPM | WEIGHT: 163.6 LBS | DIASTOLIC BLOOD PRESSURE: 70 MMHG | BODY MASS INDEX: 25.68 KG/M2 | SYSTOLIC BLOOD PRESSURE: 134 MMHG

## 2023-10-26 DIAGNOSIS — I35.0 SEVERE AORTIC STENOSIS: Primary | ICD-10-CM

## 2023-10-26 DIAGNOSIS — N28.9 RENAL LESION: ICD-10-CM

## 2023-10-26 PROCEDURE — G8427 DOCREV CUR MEDS BY ELIG CLIN: HCPCS | Performed by: INTERNAL MEDICINE

## 2023-10-26 PROCEDURE — 1111F DSCHRG MED/CURRENT MED MERGE: CPT | Performed by: INTERNAL MEDICINE

## 2023-10-26 PROCEDURE — G8482 FLU IMMUNIZE ORDER/ADMIN: HCPCS | Performed by: INTERNAL MEDICINE

## 2023-10-26 PROCEDURE — 99215 OFFICE O/P EST HI 40 MIN: CPT | Performed by: INTERNAL MEDICINE

## 2023-10-26 PROCEDURE — G8417 CALC BMI ABV UP PARAM F/U: HCPCS | Performed by: INTERNAL MEDICINE

## 2023-10-26 PROCEDURE — 1036F TOBACCO NON-USER: CPT | Performed by: INTERNAL MEDICINE

## 2023-10-26 PROCEDURE — 1123F ACP DISCUSS/DSCN MKR DOCD: CPT | Performed by: INTERNAL MEDICINE

## 2023-10-26 NOTE — PROGRESS NOTES
results found for this or any previous visit. Assessment/Plan   Severe Symptomatic Aortic Stenosis - Given the STS scores, frailty, comorbidities, and the imaging findings, the patient is clinically high risk for surgery, but is a candidate for TAVR (See PreTAVR Assesment). Discussed transcatheter aortic valve replacement (TAVR) with the patient/family regarding risks, benefits, alternatives to the procedure. The patient understands the associated visits with CT surgery and also understands the need for TAVR CTA and [facility] +/- PCI. The patient is FULL CODE. The POA is listed in the chart. We will schedule the above as appropriate. Will need DSE for AS. Once complete and appropriate based on the findings, a procedure date will be aligned at [facility], and we will notify the patient of further instructions. We had a long discussion with myself, family, patient, and structural heart coordinators. The family and patient were explained the risks/benefits/alternatives of the procedure, how the procedure works, the work-up, post-procedural care, and all of the possible complications of the procedure, including, but not limited to vascular injury, debilitating stroke, need for permanent pacemaker, and death. The patient/family would like to pursue TAVR at our facility and we will work towards this goal.         The patient and family understand that should there be any findings or issues that arise during the evaluation that would preclude TAVR, that this will need to be evaluated prior to proceeding with a percutaneous approach. Further, a unified heart team approach will be performed prior to proceeding with TAVR which includes the patient's primary care givers, cardiologist, and the entire structural heart team (interventionalist, CT surgeons, structural heart NP).        The patient and family appeared to understand everything, all of their questions were answered to their satisfaction and they are agreeable

## 2023-10-26 NOTE — TELEPHONE ENCOUNTER
Patient sees Dr Fritz Sutherland on 10/26/23 @ 145 pm    Pre-TAVR workup:    Primary Cardiologist: Preethi Rogel MD    History: aortic aneurysm, asthma, CAD s/p PCI 2014, COPD, hyperlipidemia, HTN    ECHO: Obtained on 10/4/23 with the below results:        EKG: Obtained on 10/3/23 resulting in NSR c 1st AV Bloc, LBBB     Cardiac Catheterization:  Obtained on 7/22/22 with the below results:      CHF appointment: sees Yamilka Mccall NP. Last visit 10/23    Dental Clearance: teeth vs dentures?     Labs:             Carotid Ultrasound: 10/4/23      CTA Abd & Pelvis: 7/27/23

## 2023-11-03 ENCOUNTER — CARE COORDINATION (OUTPATIENT)
Dept: FAMILY MEDICINE CLINIC | Age: 83
End: 2023-11-03

## 2023-11-03 NOTE — CARE COORDINATION
Left voice mail for daughter, Negro Javier. Last call post hospital discharge. Being worked up for TAVR procedure with Dr. Isabella Berrios. Will follow up next week to see how it is progressing.

## 2023-11-09 ENCOUNTER — HOSPITAL ENCOUNTER (OUTPATIENT)
Age: 83
Discharge: HOME | End: 2023-11-11
Attending: INTERNAL MEDICINE
Payer: MEDICARE

## 2023-11-09 VITALS — HEIGHT: 64 IN | WEIGHT: 162.04 LBS | BODY MASS INDEX: 27.66 KG/M2

## 2023-11-09 DIAGNOSIS — I35.0 SEVERE AORTIC STENOSIS: ICD-10-CM

## 2023-11-09 PROCEDURE — 2580000003 HC RX 258: Performed by: INTERNAL MEDICINE

## 2023-11-09 PROCEDURE — 6360000002 HC RX W HCPCS: Performed by: INTERNAL MEDICINE

## 2023-11-09 PROCEDURE — 93017 CV STRESS TEST TRACING ONLY: CPT

## 2023-11-09 RX ORDER — SODIUM CHLORIDE 9 MG/ML
INJECTION, SOLUTION INTRAVENOUS CONTINUOUS
Status: DISCONTINUED | OUTPATIENT
Start: 2023-11-09 | End: 2023-11-12 | Stop reason: HOSPADM

## 2023-11-09 RX ORDER — DOBUTAMINE HYDROCHLORIDE 200 MG/100ML
5-20 INJECTION INTRAVENOUS CONTINUOUS
Status: DISCONTINUED | OUTPATIENT
Start: 2023-11-09 | End: 2023-11-12 | Stop reason: HOSPADM

## 2023-11-09 RX ADMIN — DOBUTAMINE HYDROCHLORIDE 5 MCG/KG/MIN: 200 INJECTION INTRAVENOUS at 15:52

## 2023-11-09 RX ADMIN — SODIUM CHLORIDE: 9 INJECTION, SOLUTION INTRAVENOUS at 15:07

## 2023-11-10 LAB
ECHO AV AREA PEAK VELOCITY: 0.7 CM2
ECHO AV AREA VTI: 0.7 CM2
ECHO AV AREA/BSA PEAK VELOCITY: 0.4 CM2/M2
ECHO AV AREA/BSA VTI: 0.4 CM2/M2
ECHO AV MEAN GRADIENT: 40 MMHG
ECHO AV MEAN VELOCITY: 3 M/S
ECHO AV PEAK GRADIENT: 75 MMHG
ECHO AV PEAK VELOCITY: 4.3 M/S
ECHO AV VELOCITY RATIO: 0.21
ECHO AV VTI: 83.8 CM
ECHO BSA: 1.82 M2
ECHO LA DIAMETER INDEX: 2.47 CM/M2
ECHO LA DIAMETER: 4.4 CM
ECHO LV EDV A2C: 123 ML
ECHO LV EDV A4C: 137 ML
ECHO LV EDV INDEX A4C: 77 ML/M2
ECHO LV EDV NDEX A2C: 69 ML/M2
ECHO LV EJECTION FRACTION A2C: 32 %
ECHO LV EJECTION FRACTION A4C: 41 %
ECHO LV EJECTION FRACTION BIPLANE: 36 % (ref 55–100)
ECHO LV ESV A2C: 84 ML
ECHO LV ESV A4C: 81 ML
ECHO LV ESV INDEX A2C: 47 ML/M2
ECHO LV ESV INDEX A4C: 46 ML/M2
ECHO LV FRACTIONAL SHORTENING: 19 % (ref 28–44)
ECHO LV INTERNAL DIMENSION DIASTOLE INDEX: 3.2 CM/M2
ECHO LV INTERNAL DIMENSION DIASTOLIC: 5.7 CM (ref 4.2–5.9)
ECHO LV INTERNAL DIMENSION SYSTOLIC INDEX: 2.58 CM/M2
ECHO LV INTERNAL DIMENSION SYSTOLIC: 4.6 CM
ECHO LV IVSD: 1.5 CM (ref 0.6–1)
ECHO LV MASS 2D: 394.4 G (ref 88–224)
ECHO LV MASS INDEX 2D: 221.6 G/M2 (ref 49–115)
ECHO LV POSTERIOR WALL DIASTOLIC: 1.5 CM (ref 0.6–1)
ECHO LV RELATIVE WALL THICKNESS RATIO: 0.53
ECHO LVOT AREA: 3.1 CM2
ECHO LVOT AV VTI INDEX: 0.21
ECHO LVOT DIAM: 2 CM
ECHO LVOT MEAN GRADIENT: 2 MMHG
ECHO LVOT PEAK GRADIENT: 3 MMHG
ECHO LVOT PEAK VELOCITY: 0.9 M/S
ECHO LVOT STROKE VOLUME INDEX: 31.2 ML/M2
ECHO LVOT SV: 55.6 ML
ECHO LVOT VTI: 17.7 CM
ECHO RV INTERNAL DIMENSION: 3.6 CM
STRESS BASELINE DIAS BP: 58 MMHG
STRESS BASELINE HR: 64 BPM
STRESS BASELINE ST DEPRESSION: 0 MM
STRESS BASELINE SYS BP: 124 MMHG
STRESS ST DEPRESSION: 0 MM
STRESS STAGE 1 BP: ABNORMAL MMHG
STRESS STAGE 1 DURATION: 3 MIN:SEC
STRESS STAGE 1 HR: 55 BPM
STRESS STAGE 2 BP: ABNORMAL MMHG
STRESS STAGE 2 DURATION: 3 MIN:SEC
STRESS STAGE 2 HR: 57 BPM
STRESS STAGE 3 BP: ABNORMAL MMHG
STRESS STAGE 3 DURATION: 3 MIN:SEC
STRESS STAGE 3 HR: 61 BPM
STRESS STAGE RECOVERY 1 BP: ABNORMAL MMHG
STRESS STAGE RECOVERY 1 DURATION: 1 MIN:SEC
STRESS STAGE RECOVERY 1 HR: 63 BPM
STRESS STAGE RECOVERY 2 BP: ABNORMAL MMHG
STRESS STAGE RECOVERY 2 DURATION: 1 MIN:SEC
STRESS STAGE RECOVERY 2 HR: 63 BPM
STRESS STAGE RECOVERY 3 BP: ABNORMAL MMHG
STRESS STAGE RECOVERY 3 DURATION: 3 MIN:SEC
STRESS STAGE RECOVERY 3 HR: 65 BPM
STRESS TARGET HR: 139 BPM

## 2023-11-10 NOTE — TELEPHONE ENCOUNTER
R&L Cath scheduled for 11/20/23 at 9:00am with Dr. Angela Koenig. Elsie Mcclain in cath lab notified. Order faxed. On doc's schedule.

## 2023-11-10 NOTE — TELEPHONE ENCOUNTER
Orders received from Dr. Ange Everett to obtain a CV/CT Surgery appointment, TAVR CTA, Carotid US, Cardiac Catheterization, Dental Clearance and CHF appointment for optimization. CT/CV Surgery appointment scheduled on 11/14/23 at 1300. CTA scheduled on 11/30/23 at 1300. Carotid US completed 10/4/23. Cardiac Catheterization scheduled with Dr. Josette Kelly on 11/20/23 at 0900 with an arrival time of 0700. CHF appointment scheduled on 11/28/23 at 56. Pt has dentures. All appointments and instructions reviewed with pt's dtr Susan Ceron. Verbalized understanding and all questions answered. Dr. Ange Everett, please agree. Dorene, can you schedule heart cath with Dr. Josette Kelly? Debi, can you check prior auth for heart cath?

## 2023-11-11 NOTE — TELEPHONE ENCOUNTER
Agree Medical Necessity Information: It is in your best interest to select a reason for this procedure from the list below. All of these items fulfill various CMS LCD requirements except the new and changing color options. Render Post-Care Instructions In Note?: no Consent: The patient's consent was obtained including but not limited to risks of crusting, scabbing, blistering, scarring, darker or lighter pigmentary change, recurrence, incomplete removal and infection. Post-Care Instructions: I reviewed with the patient in detail post-care instructions. Patient is to wear sunprotection, and avoid picking at any of the treated lesions. Pt may apply Vaseline to crusted or scabbing areas. Detail Level: Detailed Medical Necessity Clause: This procedure was medically necessary because the lesions that were treated were:

## 2023-11-14 ENCOUNTER — TELEPHONE (OUTPATIENT)
Dept: CARDIOLOGY CLINIC | Age: 83
End: 2023-11-14

## 2023-11-14 ENCOUNTER — OFFICE VISIT (OUTPATIENT)
Dept: CARDIOTHORACIC SURGERY | Age: 83
End: 2023-11-14
Payer: MEDICARE

## 2023-11-14 VITALS
DIASTOLIC BLOOD PRESSURE: 62 MMHG | SYSTOLIC BLOOD PRESSURE: 116 MMHG | HEIGHT: 63 IN | WEIGHT: 162 LBS | OXYGEN SATURATION: 93 % | HEART RATE: 67 BPM | BODY MASS INDEX: 28.7 KG/M2

## 2023-11-14 DIAGNOSIS — I35.0 AORTIC VALVE STENOSIS, ETIOLOGY OF CARDIAC VALVE DISEASE UNSPECIFIED: Primary | ICD-10-CM

## 2023-11-14 PROCEDURE — G8482 FLU IMMUNIZE ORDER/ADMIN: HCPCS | Performed by: PHYSICIAN ASSISTANT

## 2023-11-14 PROCEDURE — 99204 OFFICE O/P NEW MOD 45 MIN: CPT | Performed by: PHYSICIAN ASSISTANT

## 2023-11-14 PROCEDURE — 1036F TOBACCO NON-USER: CPT | Performed by: PHYSICIAN ASSISTANT

## 2023-11-14 PROCEDURE — G8427 DOCREV CUR MEDS BY ELIG CLIN: HCPCS | Performed by: PHYSICIAN ASSISTANT

## 2023-11-14 PROCEDURE — 1123F ACP DISCUSS/DSCN MKR DOCD: CPT | Performed by: PHYSICIAN ASSISTANT

## 2023-11-14 PROCEDURE — G8417 CALC BMI ABV UP PARAM F/U: HCPCS | Performed by: PHYSICIAN ASSISTANT

## 2023-11-14 NOTE — TELEPHONE ENCOUNTER
PROCEDURE: cardiac cath     DATE OF SERVICE: 11/20/2023    SERVICE LOCATION: [facility]    CPT CODE: 12280    PHYSICIAN:  DR Tin Gentile      DATE PRIOR AUTH SUBMITTED: 11/14/2023    STATUS: APPROVED    CASE NUMBER: George Benjamin NUMBER: [id number]    VALID: 11/20/2023-02/18/2024

## 2023-11-14 NOTE — PATIENT INSTRUCTIONS
You may receive a survey regarding the care you received during your visit. Your input is valuable to us. We encourage you to complete and return your survey. We hope you will choose us in the future for your healthcare needs. Thank you for choosing [facility]!     Your Medical Assistant Today:  Matt Confer     Your Provider for Today: Branden Quispe PA-C  Ph. [phone] opt 1

## 2023-11-14 NOTE — PROGRESS NOTES
CT surgery New Patient Office Appointment        Patient's Name/Date of Birth: Fransico Damian / 1940 (21 y.o.)    PCP: Chey Barriga MD    Date: November 14, 2023     CC:     Chief Complaint   Patient presents with    New Patient     Ref Retia Nyhan-  JIM STANTON  We had the pleasure of seeing Fransico Damian in the office today, as you know this is a very pleasant 82y.o. year old male with a history of severe symptomatic aortic stenosis with TODD with mild activity for the past few months. He also has noticed chest heaviness at random times lasting minutes in duration. Stress Echo: 11/9/23    Study Impression: The study is non-diagnostic for ischemia. Dobutamine study consistent with low flow/low gradient severe aortic stenosis. Left Ventricle: Moderately reduced left ventricular systolic function with a visually estimated EF of 35 - 40%. Left ventricle size is normal.    Aortic Valve: Trileaflet valve. Severely thickened cusp. Calcified cusp. Classic low flow/low gradient severe aortic stenosis with low EF. V max 3.62, mean gradient 27 mmHg, max gradient 52 mmHg, FRANCISCO 0.78 cm2. 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 ECG: Resting ECG demonstrates interventricular conduction delay. Stress Test: A pharmacological low dose stress test was performed using dobutamine. At 20 mcg/kg/min:  Vmax 4.5 m/s, mean gradient 44 mmHg, max gradient 81 mmHg, FRANCISCO 0.65 cm2 suggestive of severe aortic stenosis with contractile reserve. Severe, Low-Flow, Low-Gradient Aortic Stenosis with Contractile Virgie. 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     Past Surgical History:  The patient  has a past surgical

## 2023-11-17 ENCOUNTER — PREP FOR PROCEDURE (OUTPATIENT)
Dept: CARDIOLOGY CLINIC | Age: 83
End: 2023-11-17

## 2023-11-17 RX ORDER — SODIUM CHLORIDE 0.9 % (FLUSH) 0.9 %
5-40 SYRINGE (ML) INJECTION EVERY 12 HOURS SCHEDULED
Status: CANCELLED | OUTPATIENT
Start: 2023-11-17

## 2023-11-17 RX ORDER — ASPIRIN 325 MG
325 TABLET ORAL ONCE
Status: CANCELLED | OUTPATIENT
Start: 2023-11-17 | End: 2023-11-17

## 2023-11-17 RX ORDER — SODIUM CHLORIDE 9 MG/ML
INJECTION, SOLUTION INTRAVENOUS CONTINUOUS
Status: CANCELLED | OUTPATIENT
Start: 2023-11-17

## 2023-11-17 RX ORDER — SODIUM CHLORIDE 9 MG/ML
INJECTION, SOLUTION INTRAVENOUS PRN
Status: CANCELLED | OUTPATIENT
Start: 2023-11-17

## 2023-11-17 RX ORDER — NITROGLYCERIN 0.4 MG/1
0.4 TABLET SUBLINGUAL EVERY 5 MIN PRN
Status: CANCELLED | OUTPATIENT
Start: 2023-11-17

## 2023-11-17 RX ORDER — SODIUM CHLORIDE 0.9 % (FLUSH) 0.9 %
5-40 SYRINGE (ML) INJECTION PRN
Status: CANCELLED | OUTPATIENT
Start: 2023-11-17

## 2023-11-20 ENCOUNTER — HOSPITAL ENCOUNTER (OUTPATIENT)
Age: 83
Setting detail: OUTPATIENT SURGERY
Discharge: HOME | End: 2023-11-20
Attending: INTERNAL MEDICINE | Admitting: INTERNAL MEDICINE
Payer: MEDICARE

## 2023-11-20 VITALS
WEIGHT: 153.66 LBS | SYSTOLIC BLOOD PRESSURE: 114 MMHG | HEIGHT: 67 IN | DIASTOLIC BLOOD PRESSURE: 51 MMHG | RESPIRATION RATE: 19 BRPM | BODY MASS INDEX: 24.12 KG/M2 | OXYGEN SATURATION: 91 % | HEART RATE: 60 BPM

## 2023-11-20 DIAGNOSIS — I35.0 AORTIC STENOSIS, SEVERE: ICD-10-CM

## 2023-11-20 LAB
ABO: NORMAL
ANION GAP SERPL CALC-SCNC: 10 MEQ/L (ref 8–16)
ANTIBODY SCREEN: NORMAL
BDY SITE: ABNORMAL
BDY SITE: ABNORMAL
BUN SERPL-MCNC: 15 MG/DL (ref 7–22)
CALCIUM SERPL-MCNC: 9.3 MG/DL (ref 8.5–10.5)
CHLORIDE SERPL-SCNC: 105 MEQ/L (ref 98–111)
CHOLEST SERPL-MCNC: 124 MG/DL (ref 100–199)
CO2 SERPL-SCNC: 29 MEQ/L (ref 23–33)
COLLECTED BY:: ABNORMAL
COLLECTED BY:: ABNORMAL
CREAT SERPL-MCNC: 0.6 MG/DL (ref 0.4–1.2)
DEPRECATED MEAN GLUCOSE BLD GHB EST-ACNC: 111 MG/DL (ref 70–126)
DEPRECATED RDW RBC AUTO: 54.1 FL (ref 35–45)
ECHO BSA: 1.82 M2
EKG ATRIAL RATE: 61 BPM
EKG P AXIS: 33 DEGREES
EKG P-R INTERVAL: 228 MS
EKG Q-T INTERVAL: 444 MS
EKG QRS DURATION: 168 MS
EKG QTC CALCULATION (BAZETT): 446 MS
EKG R AXIS: -57 DEGREES
EKG T AXIS: 80 DEGREES
EKG VENTRICULAR RATE: 61 BPM
ERYTHROCYTE [DISTWIDTH] IN BLOOD BY AUTOMATED COUNT: 14.6 % (ref 11.5–14.5)
GFR SERPL CREATININE-BSD FRML MDRD: > 60 ML/MIN/1.73M2
GLUCOSE SERPL-MCNC: 108 MG/DL (ref 70–108)
HBA1C MFR BLD HPLC: 5.7 % (ref 4.4–6.4)
HCT VFR BLD AUTO: 43 % (ref 42–52)
HDLC SERPL-MCNC: 41 MG/DL
HGB BLD-MCNC: 13.2 GM/DL (ref 14–18)
INR PPP: 0.93 (ref 0.85–1.13)
LDLC SERPL CALC-MCNC: 65 MG/DL
MCH RBC QN AUTO: 30.6 PG (ref 26–33)
MCHC RBC AUTO-ENTMCNC: 30.7 GM/DL (ref 32.2–35.5)
MCV RBC AUTO: 99.5 FL (ref 80–94)
PLATELET # BLD AUTO: 213 THOU/MM3 (ref 130–400)
PMV BLD AUTO: 9.8 FL (ref 9.4–12.4)
POTASSIUM SERPL-SCNC: 4.2 MEQ/L (ref 3.5–5.2)
RBC # BLD AUTO: 4.32 MILL/MM3 (ref 4.7–6.1)
RH FACTOR: NORMAL
SAO2 % BLD: 66 % (ref 94–97)
SAO2 % BLD: 86 % (ref 94–97)
SODIUM SERPL-SCNC: 144 MEQ/L (ref 135–145)
TRIGL SERPL-MCNC: 88 MG/DL (ref 0–199)
WBC # BLD AUTO: 7.5 THOU/MM3 (ref 4.8–10.8)

## 2023-11-20 PROCEDURE — 93005 ELECTROCARDIOGRAM TRACING: CPT | Performed by: NURSE PRACTITIONER

## 2023-11-20 PROCEDURE — 2709999900 HC NON-CHARGEABLE SUPPLY: Performed by: INTERNAL MEDICINE

## 2023-11-20 PROCEDURE — 2500000003 HC RX 250 WO HCPCS: Performed by: INTERNAL MEDICINE

## 2023-11-20 PROCEDURE — 93456 R HRT CORONARY ARTERY ANGIO: CPT | Performed by: INTERNAL MEDICINE

## 2023-11-20 PROCEDURE — 86900 BLOOD TYPING SEROLOGIC ABO: CPT

## 2023-11-20 PROCEDURE — 7100000011 HC PHASE II RECOVERY - ADDTL 15 MIN: Performed by: INTERNAL MEDICINE

## 2023-11-20 PROCEDURE — 80048 BASIC METABOLIC PNL TOTAL CA: CPT

## 2023-11-20 PROCEDURE — 6360000002 HC RX W HCPCS: Performed by: INTERNAL MEDICINE

## 2023-11-20 PROCEDURE — C1769 GUIDE WIRE: HCPCS | Performed by: INTERNAL MEDICINE

## 2023-11-20 PROCEDURE — 85610 PROTHROMBIN TIME: CPT

## 2023-11-20 PROCEDURE — 36415 COLL VENOUS BLD VENIPUNCTURE: CPT

## 2023-11-20 PROCEDURE — 82810 BLOOD GASES O2 SAT ONLY: CPT

## 2023-11-20 PROCEDURE — 80061 LIPID PANEL: CPT

## 2023-11-20 PROCEDURE — 86850 RBC ANTIBODY SCREEN: CPT

## 2023-11-20 PROCEDURE — 83036 HEMOGLOBIN GLYCOSYLATED A1C: CPT

## 2023-11-20 PROCEDURE — C1894 INTRO/SHEATH, NON-LASER: HCPCS | Performed by: INTERNAL MEDICINE

## 2023-11-20 PROCEDURE — 2580000003 HC RX 258: Performed by: NURSE PRACTITIONER

## 2023-11-20 PROCEDURE — 7100000010 HC PHASE II RECOVERY - FIRST 15 MIN: Performed by: INTERNAL MEDICINE

## 2023-11-20 PROCEDURE — 6360000004 HC RX CONTRAST MEDICATION: Performed by: INTERNAL MEDICINE

## 2023-11-20 PROCEDURE — 85027 COMPLETE CBC AUTOMATED: CPT

## 2023-11-20 PROCEDURE — 93010 ELECTROCARDIOGRAM REPORT: CPT | Performed by: INTERNAL MEDICINE

## 2023-11-20 PROCEDURE — 99152 MOD SED SAME PHYS/QHP 5/>YRS: CPT | Performed by: INTERNAL MEDICINE

## 2023-11-20 PROCEDURE — 86901 BLOOD TYPING SEROLOGIC RH(D): CPT

## 2023-11-20 PROCEDURE — 99153 MOD SED SAME PHYS/QHP EA: CPT | Performed by: INTERNAL MEDICINE

## 2023-11-20 RX ORDER — FENTANYL CITRATE 50 UG/ML
INJECTION, SOLUTION INTRAMUSCULAR; INTRAVENOUS PRN
Status: DISCONTINUED | OUTPATIENT
Start: 2023-11-20 | End: 2023-11-20 | Stop reason: HOSPADM

## 2023-11-20 RX ORDER — HEPARIN SODIUM 1000 [USP'U]/ML
INJECTION, SOLUTION INTRAVENOUS; SUBCUTANEOUS PRN
Status: DISCONTINUED | OUTPATIENT
Start: 2023-11-20 | End: 2023-11-20 | Stop reason: HOSPADM

## 2023-11-20 RX ORDER — SODIUM CHLORIDE 9 MG/ML
INJECTION, SOLUTION INTRAVENOUS CONTINUOUS
Status: DISCONTINUED | OUTPATIENT
Start: 2023-11-20 | End: 2023-11-20 | Stop reason: HOSPADM

## 2023-11-20 RX ORDER — SODIUM CHLORIDE 0.9 % (FLUSH) 0.9 %
5-40 SYRINGE (ML) INJECTION EVERY 12 HOURS SCHEDULED
Status: DISCONTINUED | OUTPATIENT
Start: 2023-11-20 | End: 2023-11-20 | Stop reason: HOSPADM

## 2023-11-20 RX ORDER — LIDOCAINE HYDROCHLORIDE 20 MG/ML
INJECTION, SOLUTION EPIDURAL; INFILTRATION; INTRACAUDAL; PERINEURAL PRN
Status: DISCONTINUED | OUTPATIENT
Start: 2023-11-20 | End: 2023-11-20 | Stop reason: HOSPADM

## 2023-11-20 RX ORDER — VERAPAMIL HYDROCHLORIDE 2.5 MG/ML
INJECTION, SOLUTION INTRAVENOUS PRN
Status: DISCONTINUED | OUTPATIENT
Start: 2023-11-20 | End: 2023-11-20 | Stop reason: HOSPADM

## 2023-11-20 RX ORDER — NITROGLYCERIN 0.4 MG/1
0.4 TABLET SUBLINGUAL EVERY 5 MIN PRN
Status: DISCONTINUED | OUTPATIENT
Start: 2023-11-20 | End: 2023-11-20 | Stop reason: HOSPADM

## 2023-11-20 RX ORDER — SODIUM CHLORIDE 9 MG/ML
INJECTION, SOLUTION INTRAVENOUS PRN
Status: DISCONTINUED | OUTPATIENT
Start: 2023-11-20 | End: 2023-11-20 | Stop reason: HOSPADM

## 2023-11-20 RX ORDER — MIDAZOLAM HYDROCHLORIDE 1 MG/ML
INJECTION INTRAMUSCULAR; INTRAVENOUS PRN
Status: DISCONTINUED | OUTPATIENT
Start: 2023-11-20 | End: 2023-11-20 | Stop reason: HOSPADM

## 2023-11-20 RX ORDER — ACETAMINOPHEN 325 MG/1
650 TABLET ORAL EVERY 4 HOURS PRN
Status: DISCONTINUED | OUTPATIENT
Start: 2023-11-20 | End: 2023-11-20 | Stop reason: HOSPADM

## 2023-11-20 RX ORDER — ATROPINE SULFATE 0.4 MG/ML
0.5 INJECTION, SOLUTION INTRAVENOUS
Status: DISCONTINUED | OUTPATIENT
Start: 2023-11-20 | End: 2023-11-20 | Stop reason: HOSPADM

## 2023-11-20 RX ORDER — ASPIRIN 325 MG
325 TABLET ORAL ONCE
Status: DISCONTINUED | OUTPATIENT
Start: 2023-11-20 | End: 2023-11-20 | Stop reason: HOSPADM

## 2023-11-20 RX ORDER — SODIUM CHLORIDE 0.9 % (FLUSH) 0.9 %
5-40 SYRINGE (ML) INJECTION PRN
Status: DISCONTINUED | OUTPATIENT
Start: 2023-11-20 | End: 2023-11-20 | Stop reason: HOSPADM

## 2023-11-20 RX ADMIN — SODIUM CHLORIDE: 9 INJECTION, SOLUTION INTRAVENOUS at 07:23

## 2023-11-20 NOTE — FLOWSHEET NOTE
11/20/23 2364   AVS Reviewed   AVS & discharge instructions reviewed with patient and/or representative? Yes   Reviewed instructions with Patient; Other (name and relationship in comment)  (son Stephen Dash)   Level of Understanding Questions answered; Teach back completed;Verbalized understanding

## 2023-11-20 NOTE — H&P
III   Lungs: Clear to auscultation    Abdomen: BS present, without HSM, masses, or tenderness    Extremities: without C,C,E.  Pulses 2+ bilaterally   Mental Status: Alert & Oriented        PLANNED PROCEDURE   [x]Cath  [x]PCI                []Pacemaker/AICD  []GIL             []Cardioversion []Peripheral angiography/PTA  []Other:      SEDATION  Planned agent:[x]Midazolam []Meperidine []Sublimaze []Morphine  []Diazepam  []Other:     ASA Classification:  []1 []2 [x]3 []4 []5   Class 1: A normal healthy patient  Class 2: Pt with mild to moderate systemic disease  Class 3: Severe systemic disease or disturbance  Class 4: Severe systemic disorders that are already life threatening. Class 5: Moribund pt with little chances of survival, for more than 24 hours. Mallampati I Airway Classification:   []1 []2 [x]3 []4     [x]Pre-procedure diagnostic studies complete and results available. Comment:    [x]Previous sedation/anesthesia experiences assessed. Comment:    [x]The patient is an appropriate candidate to undergo the planned procedure sedation and anesthesia. (Refer to nursing sedation/analgesia documentation record)  [x]Formulation and discussion of sedation/procedure plan, risks, and expectations with patient and/or responsible adult completed. [x]Patient examined immediately prior to the procedure.  (Refer to nursing sedation/analgesia documentation record)      Brissa Ambrose MD, Ian Bright    Electronically signed 11/20/2023 at 9:00 AM

## 2023-11-28 ENCOUNTER — OFFICE VISIT (OUTPATIENT)
Dept: CARDIOLOGY CLINIC | Age: 83
End: 2023-11-28
Payer: MEDICARE

## 2023-11-28 VITALS
BODY MASS INDEX: 24.83 KG/M2 | HEART RATE: 66 BPM | DIASTOLIC BLOOD PRESSURE: 70 MMHG | HEIGHT: 67 IN | WEIGHT: 158.2 LBS | OXYGEN SATURATION: 90 % | SYSTOLIC BLOOD PRESSURE: 136 MMHG

## 2023-11-28 DIAGNOSIS — I51.89 GRADE I DIASTOLIC DYSFUNCTION: ICD-10-CM

## 2023-11-28 DIAGNOSIS — I42.8 NONISCHEMIC CARDIOMYOPATHY (HCC): ICD-10-CM

## 2023-11-28 DIAGNOSIS — I50.22 CHRONIC SYSTOLIC CONGESTIVE HEART FAILURE, NYHA CLASS 2 (HCC): ICD-10-CM

## 2023-11-28 DIAGNOSIS — I35.0 AORTIC STENOSIS, SEVERE: Primary | ICD-10-CM

## 2023-11-28 PROCEDURE — G8482 FLU IMMUNIZE ORDER/ADMIN: HCPCS | Performed by: NURSE PRACTITIONER

## 2023-11-28 PROCEDURE — 99214 OFFICE O/P EST MOD 30 MIN: CPT | Performed by: NURSE PRACTITIONER

## 2023-11-28 PROCEDURE — 1036F TOBACCO NON-USER: CPT | Performed by: NURSE PRACTITIONER

## 2023-11-28 PROCEDURE — 1123F ACP DISCUSS/DSCN MKR DOCD: CPT | Performed by: NURSE PRACTITIONER

## 2023-11-28 PROCEDURE — G8420 CALC BMI NORM PARAMETERS: HCPCS | Performed by: NURSE PRACTITIONER

## 2023-11-28 PROCEDURE — G8427 DOCREV CUR MEDS BY ELIG CLIN: HCPCS | Performed by: NURSE PRACTITIONER

## 2023-11-28 ASSESSMENT — ENCOUNTER SYMPTOMS
VOMITING: 0
NAUSEA: 0
ABDOMINAL DISTENTION: 0
SHORTNESS OF BREATH: 1
COUGH: 0

## 2023-11-28 NOTE — PROGRESS NOTES
Heart Failure Clinic       Visit Date: 11/28/2023  Cardiologist:  Dr. Susan Johnson  Primary Care Physician: Dr. Dejan Luke MD    Wang Carter is a 82 y.o. male who presents today for:  Chief Complaint   Patient presents with    Congestive Heart Failure       HPI:     TYPE HF: HFrEF 40% 2023 (25-30% 7/2023) (45% 2013)  Cause: mixed nonischemic with underlying ischemic - Structural HF - Severe AS  Device: none  HX:  Aortic aneurysm ([address]), Asthma, CAD s/p PCI 2014 COPD, Hyperlipidemia, Hypertension  Dry Wt:  174 on 8/9/22, 172 on 7/10/23, 166 on 10/16/23, 158 on 11/23/23      Wang Carter is a 82 y.o. male who presents to the office for a f/u patient visit in the heart failure clinic. Concerns today: here today for his TAVR work up. Weight is down 8lbs since last visit. He denies worsening SOB, lower leg swelling, bloating, or orthopnea. Still on his 3L of NC. Following low Na/fluid diet - with a little added salt. Visit on 10/16/23: here today for his 3 month f/u. Pt was recently admitted for chest pain. Ischemic workup (stress test) was negative. He states that he has not had an chest pain since hospitalization. His SOB is at baseline - continues on his 3L of his oxygen. He denies bloating, lower leg swelling, orthopnea, and nocturnal dyspnea. Does add salt to his food (has cut back), staying under 2L/day. ECHO completed in house shows severe AS along with improvement of his EF from 25-30% to 40%. Visit on 7/10/23: here today for a \"new\" pt again. He did see me a year ago but did not want to follow. He was sent back d/t a further drop in his EF and needing medication optimization. Pt denies worsening of his SOB and leg swelling. Urinating well on his Bumex EOD. Still eating a high Na/fluid diet. Life vest is ordered. Visit on 8/9/22:  new patient today refrerred by Dr. Susan Johnson. New to Dr. Susan Johnson as well. He was referred by the [place] d/t recent finding of an EF of 35%.  Pt denies CHF symptoms today

## 2023-11-28 NOTE — PATIENT INSTRUCTIONS
You may receive a survey regarding the care you received during your visit. Your input is valuable to us. We encourage you to complete and return your survey. We hope you will choose us in the future for your healthcare needs. Your nurses today were Shea and [employer]. Office hours:   Mon-Thurs 8-4:30  Friday 8-12  Phone: [phone]    Continue:  Continue current medications  Daily weights and record  Fluid restriction of 2 Liters per day  Limit sodium in diet to around 7025-4184 mg/day  Monitor BP  Activity as tolerated     Call the [address] for any of the following symptoms:   Weight gain of 2-3 pounds in 1 day or 5 pounds in 1 week  Increased shortness of breath  Shortness of breath while laying down  Cough  Chest pain  Swelling in feet, ankles or legs  Bloating in abdomen  Fatigue        Blood work next week since starting Farxiga. No medication changes today    Continue diet/fluid adherence  Continue daily wts.   F/U w/ Cardiology  F/U in clinic post procedure

## 2023-11-30 ENCOUNTER — HOSPITAL ENCOUNTER (OUTPATIENT)
Dept: CT IMAGING | Age: 83
Discharge: HOME | End: 2023-11-30
Payer: MEDICARE

## 2023-11-30 DIAGNOSIS — I35.0 SEVERE AORTIC STENOSIS: ICD-10-CM

## 2023-11-30 PROCEDURE — 6360000004 HC RX CONTRAST MEDICATION: Performed by: INTERNAL MEDICINE

## 2023-11-30 PROCEDURE — 74174 CTA ABD&PLVS W/CONTRAST: CPT

## 2023-11-30 PROCEDURE — 71275 CT ANGIOGRAPHY CHEST: CPT

## 2023-11-30 RX ADMIN — IOPAMIDOL 125 ML: 755 INJECTION, SOLUTION INTRAVENOUS at 13:23

## 2023-12-03 ENCOUNTER — PATIENT MESSAGE (OUTPATIENT)
Dept: CARDIOLOGY CLINIC | Age: 83
End: 2023-12-03

## 2023-12-04 NOTE — TELEPHONE ENCOUNTER
Called to discuss with pt and pts daughter. Both verbalize understanding. MRI has been scheduled for 12/26 at 0730. Referral was faxed to hematology/oncology office.

## 2023-12-05 ENCOUNTER — NURSE ONLY (OUTPATIENT)
Dept: LAB | Age: 83
End: 2023-12-05

## 2023-12-05 DIAGNOSIS — I42.8 NONISCHEMIC CARDIOMYOPATHY (HCC): ICD-10-CM

## 2023-12-05 LAB
ANION GAP SERPL CALC-SCNC: 3 MEQ/L (ref 8–16)
BUN SERPL-MCNC: 15 MG/DL (ref 7–22)
CALCIUM SERPL-MCNC: 9.3 MG/DL (ref 8.5–10.5)
CHLORIDE SERPL-SCNC: 102 MEQ/L (ref 98–111)
CO2 SERPL-SCNC: 34 MEQ/L (ref 23–33)
CREAT SERPL-MCNC: 0.5 MG/DL (ref 0.4–1.2)
GFR SERPL CREATININE-BSD FRML MDRD: > 60 ML/MIN/1.73M2
GLUCOSE SERPL-MCNC: 107 MG/DL (ref 70–108)
POTASSIUM SERPL-SCNC: 4.3 MEQ/L (ref 3.5–5.2)
SODIUM SERPL-SCNC: 139 MEQ/L (ref 135–145)

## 2023-12-12 ENCOUNTER — OFFICE VISIT (OUTPATIENT)
Dept: CARDIOLOGY CLINIC | Age: 83
End: 2023-12-12
Payer: MEDICARE

## 2023-12-12 VITALS
HEART RATE: 64 BPM | BODY MASS INDEX: 24.71 KG/M2 | WEIGHT: 157.4 LBS | DIASTOLIC BLOOD PRESSURE: 55 MMHG | HEIGHT: 67 IN | SYSTOLIC BLOOD PRESSURE: 100 MMHG

## 2023-12-12 DIAGNOSIS — I25.10 CAD IN NATIVE ARTERY: Primary | ICD-10-CM

## 2023-12-12 PROCEDURE — G8420 CALC BMI NORM PARAMETERS: HCPCS | Performed by: INTERNAL MEDICINE

## 2023-12-12 PROCEDURE — G8427 DOCREV CUR MEDS BY ELIG CLIN: HCPCS | Performed by: INTERNAL MEDICINE

## 2023-12-12 PROCEDURE — G8482 FLU IMMUNIZE ORDER/ADMIN: HCPCS | Performed by: INTERNAL MEDICINE

## 2023-12-12 PROCEDURE — 1123F ACP DISCUSS/DSCN MKR DOCD: CPT | Performed by: INTERNAL MEDICINE

## 2023-12-12 PROCEDURE — 1036F TOBACCO NON-USER: CPT | Performed by: INTERNAL MEDICINE

## 2023-12-12 PROCEDURE — 99214 OFFICE O/P EST MOD 30 MIN: CPT | Performed by: INTERNAL MEDICINE

## 2023-12-12 NOTE — PROGRESS NOTES
were answered.      Disposition:  RTC in 12 months             Electronically signed by Bryon Kathleen MD, [facility] [place], [address]    12/12/2023 at 9:28 AM EST

## 2023-12-26 ENCOUNTER — HOSPITAL ENCOUNTER (OUTPATIENT)
Dept: MRI IMAGING | Age: 83
Discharge: HOME | End: 2023-12-26
Attending: INTERNAL MEDICINE
Payer: MEDICARE

## 2023-12-26 DIAGNOSIS — N28.9 RENAL LESION: ICD-10-CM

## 2023-12-26 DIAGNOSIS — I35.0 SEVERE AORTIC STENOSIS: ICD-10-CM

## 2023-12-26 PROCEDURE — A9579 GAD-BASE MR CONTRAST NOS,1ML: HCPCS | Performed by: INTERNAL MEDICINE

## 2023-12-26 PROCEDURE — 74183 MRI ABD W/O CNTR FLWD CNTR: CPT

## 2023-12-26 PROCEDURE — 6360000004 HC RX CONTRAST MEDICATION: Performed by: INTERNAL MEDICINE

## 2023-12-26 RX ADMIN — GADOTERIDOL 15 ML: 279.3 INJECTION, SOLUTION INTRAVENOUS at 08:12

## 2023-12-29 ENCOUNTER — APPOINTMENT (OUTPATIENT)
Dept: GENERAL RADIOLOGY | Age: 83
End: 2023-12-29
Payer: OTHER GOVERNMENT

## 2023-12-29 ENCOUNTER — TELEPHONE (OUTPATIENT)
Dept: FAMILY MEDICINE CLINIC | Age: 83
End: 2023-12-29

## 2023-12-29 ENCOUNTER — HOSPITAL ENCOUNTER (EMERGENCY)
Age: 83
Discharge: HOME | End: 2023-12-29
Attending: EMERGENCY MEDICINE
Payer: OTHER GOVERNMENT

## 2023-12-29 VITALS
BODY MASS INDEX: 24.01 KG/M2 | TEMPERATURE: 98.2 F | HEART RATE: 67 BPM | WEIGHT: 153 LBS | DIASTOLIC BLOOD PRESSURE: 95 MMHG | SYSTOLIC BLOOD PRESSURE: 121 MMHG | RESPIRATION RATE: 20 BRPM | OXYGEN SATURATION: 95 % | HEIGHT: 67 IN

## 2023-12-29 DIAGNOSIS — I71.40 ABDOMINAL AORTIC ANEURYSM (AAA) 3.0 CM TO 5.5 CM IN DIAMETER IN MALE (HCC): Primary | ICD-10-CM

## 2023-12-29 LAB
ABO: NORMAL
ANION GAP SERPL CALC-SCNC: 8 MEQ/L (ref 8–16)
ANTIBODY SCREEN: NORMAL
BUN SERPL-MCNC: 17 MG/DL (ref 7–22)
CALCIUM SERPL-MCNC: 9.6 MG/DL (ref 8.5–10.5)
CHLORIDE SERPL-SCNC: 100 MEQ/L (ref 98–111)
CO2 SERPL-SCNC: 31 MEQ/L (ref 23–33)
CREAT SERPL-MCNC: 0.5 MG/DL (ref 0.4–1.2)
DEPRECATED RDW RBC AUTO: 52.6 FL (ref 35–45)
ERYTHROCYTE [DISTWIDTH] IN BLOOD BY AUTOMATED COUNT: 14.6 % (ref 11.5–14.5)
GFR SERPL CREATININE-BSD FRML MDRD: > 60 ML/MIN/1.73M2
GLUCOSE SERPL-MCNC: 89 MG/DL (ref 70–108)
HCT VFR BLD AUTO: 45 % (ref 42–52)
HGB BLD-MCNC: 14.1 GM/DL (ref 14–18)
INR PPP: 0.91 (ref 0.85–1.13)
MCH RBC QN AUTO: 30.7 PG (ref 26–33)
MCHC RBC AUTO-ENTMCNC: 31.3 GM/DL (ref 32.2–35.5)
MCV RBC AUTO: 97.8 FL (ref 80–94)
OSMOLALITY SERPL CALC.SUM OF ELEC: 278.6 MOSMOL/KG (ref 275–300)
PLATELET # BLD AUTO: 192 THOU/MM3 (ref 130–400)
PMV BLD AUTO: 9.6 FL (ref 9.4–12.4)
POTASSIUM SERPL-SCNC: 4.5 MEQ/L (ref 3.5–5.2)
POTASSIUM SERPL-SCNC: 4.5 MEQ/L (ref 3.5–5.2)
RBC # BLD AUTO: 4.6 MILL/MM3 (ref 4.7–6.1)
RH FACTOR: NORMAL
SODIUM SERPL-SCNC: 139 MEQ/L (ref 135–145)
TROPONIN, HIGH SENSITIVITY: 19 NG/L (ref 0–12)
WBC # BLD AUTO: 9.4 THOU/MM3 (ref 4.8–10.8)

## 2023-12-29 PROCEDURE — 93005 ELECTROCARDIOGRAM TRACING: CPT

## 2023-12-29 PROCEDURE — 86850 RBC ANTIBODY SCREEN: CPT

## 2023-12-29 PROCEDURE — 71045 X-RAY EXAM CHEST 1 VIEW: CPT

## 2023-12-29 PROCEDURE — 85610 PROTHROMBIN TIME: CPT

## 2023-12-29 PROCEDURE — 84484 ASSAY OF TROPONIN QUANT: CPT

## 2023-12-29 PROCEDURE — 85027 COMPLETE CBC AUTOMATED: CPT

## 2023-12-29 PROCEDURE — 99285 EMERGENCY DEPT VISIT HI MDM: CPT

## 2023-12-29 PROCEDURE — 86901 BLOOD TYPING SEROLOGIC RH(D): CPT

## 2023-12-29 PROCEDURE — 80048 BASIC METABOLIC PNL TOTAL CA: CPT

## 2023-12-29 PROCEDURE — 36415 COLL VENOUS BLD VENIPUNCTURE: CPT

## 2023-12-29 PROCEDURE — 86900 BLOOD TYPING SEROLOGIC ABO: CPT

## 2023-12-29 ASSESSMENT — PAIN - FUNCTIONAL ASSESSMENT
PAIN_FUNCTIONAL_ASSESSMENT: NONE - DENIES PAIN
PAIN_FUNCTIONAL_ASSESSMENT: NONE - DENIES PAIN

## 2023-12-29 NOTE — ED TRIAGE NOTES
Pt from lobby via wheelchair. Pt lives at home alone. Pt states his family Dr called him and told him to come to ER d/t MRI test results. Pt had MRI on Tuesday which revealed concern of aortic aneurysm that may be leaking. Pt reports hx of AAA surgeries in the past with Dr Felice Sherman. Pt on 3L NC baseline. Pt denies CP, SOB, dizziness. Pt denies having any complaints at this time. Pt denies taking blood thinners. Pt reports daily aspirin.

## 2023-12-29 NOTE — DISCHARGE INSTRUCTIONS
Please follow-up this emergency department visit with your vascular surgeon, Dr. Marley Valdes. An appointment has been scheduled for 1/2/2024. Please call the vascular surgery office to confirm this appointment at your earliest convenience. If you have significant worsening of abdominal pain, chest pain, shortness of breath, or any other major concerns for your health, please return to the emergency department for further evaluation. Please follow-up with Dr. Mikki Liao recommendations regarding your TAVR procedure. [facility] ED staff would like to wish you a Happy New Year.

## 2023-12-29 NOTE — TELEPHONE ENCOUNTER
I called Suleiman Arthur myself upon reading the message and said that the patient should go immediately to the ED.

## 2023-12-29 NOTE — ED PROVIDER NOTES
[facility] DEPT  EMERGENCY DEPARTMENT ENCOUNTER          Pt Name: Wang Carter  MRN: [id number]  [address] [place] 1940  Date of evaluation: 12/29/2023  Physician: Wali Marte DO  Supervising Attending Physician: Dr. Dulce Higuera MD      CHIEF COMPLAINT       Chief Complaint   Patient presents with    MRI Tuesday, concern of AAA leaking         HISTORY OF PRESENT ILLNESS    Wang Carter is a 82 y.o. male with past medical history significant for previous aortic aneurysm, asthma, COPD, CAD, HTN, severe aortic stenosis. Patient follows with Dr. Jese Blatazar, previous abdominal aortic aneurysmal stenting 2020. Patient currently being seen by Dr. Carolina Denny for consideration of TAVR, had a CT imaging in preparation for that procedure. Incidentally found renal cyst, worked up further with MRI with and without contrast.  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 PCP saw test result, recommended patient proceed to emergency department for further evaluation of this radiographic finding. Currently, patient is endorsing no complaints. Patient is history of COPD on 3 L at baseline, unchanged oxygen requirements currently. Patient endorses no abdominal pain, no chest pain, no shortness of breath, no extremity coolness or numbness or tingling. Patient is asymptomatic, and feels like his baseline self. He presents to [facility] ED with his family at bedside, they expressed concern regarding the imaging findings. REVIEW OF SYSTEMS   Review of Systems   Constitutional:  Negative for activity change, chills, diaphoresis and fever. HENT:  Negative for congestion, rhinorrhea and sore throat. Respiratory:  Positive for shortness of breath. Negative for chest tightness. Shortness of breath unchanged from his baseline   Cardiovascular:  Negative for chest pain, palpitations and leg swelling.    Gastrointestinal:  Negative for abdominal distention, abdominal pain, blood in

## 2023-12-29 NOTE — TELEPHONE ENCOUNTER
Patient complains of having to void. Unable to void x2 attempts to toilet. Offered to help patient into tub or shower to void; patient declines. Offered straight cath and patient accepted. Lidocaine gel used but unable to straight cath due to patient pain. Patient helped into bath tub where she was able to void. Patient reports feeling better after voiding. Vaginal packing removed per verbal order at 0610.    Wayne Guerrero [address] ([address] until 4:30) - said that pt had a MRI that showed Aortic Aneurysm. He said that they think it may be leaking.   He is wondering if they should send him to the ER?

## 2023-12-29 NOTE — ED PROVIDER NOTES

## 2023-12-30 LAB
EKG ATRIAL RATE: 63 BPM
EKG P AXIS: 68 DEGREES
EKG P-R INTERVAL: 304 MS
EKG Q-T INTERVAL: 438 MS
EKG QRS DURATION: 166 MS
EKG QTC CALCULATION (BAZETT): 448 MS
EKG R AXIS: -61 DEGREES
EKG T AXIS: 81 DEGREES
EKG VENTRICULAR RATE: 63 BPM

## 2024-01-01 NOTE — PROGRESS NOTES
Vascular and valve measurements are made on a dedicated 3-D workstation. Measurements were made in systole with the aortic valve open. Delayed images were also obtained through the   heart.     All CT scans at this facility use dose modulation, iterative reconstruction, and/or weight-based dosing when appropriate to reduce radiation dose to as low as reasonably achievable.        FINDINGS:      MEASUREMENTS:     AORTIC VALVE: Calcium score: 2973.     Trileaflet valve. There is moderate calcification of the aortic valve leaflets.Valvular calcifications do not extend into the LVOT..      AORTIC DIMENSIONS:     Aortic annulus: 31 x 24 mm.  Aortic annulus area: 5.4 cm2.  Aortic annulus perimeter: 90 mm.     Distance of right coronary ostia to the annulus: 25 mm.  Distance of the left coronary ostia to the annulus: 17 mm.     Maximum diameter of the aortic sinus: 40 mm.  Maximum diameter of the sinotubular junction: 34 mm.  Maximum diameter of the mid ascending aorta: 48 mm.     Aortic root orientation: 3 cusp view: Chinese 3, CAU 2;  Anterior view: COOK 0, CAU 8      Coronary anatomy: The right coronary artery arises from the sinus of Valsalva on the right. The left main coronary artery arises from the sinus of Valsalva on the left. No anomalies are noted. There is three-vessel coronary artery disease.     Ascending aorta and arch:  There is mild calcified plaque of the ascending aorta. No soft plaque is noted. There is moderate calcified atherosclerosis of the aortic arch.     Descending thoracic aorta: There is no significant tortuosity of the descending thoracic aorta.  There is mild aneurysmal dilation. There is mild calcified and lobulated noncalcified atherosclerosis of the descending thoracic aorta.      Abdominal aorta:  There is mild tortuosity of the abdominal aorta.  There is an infrarenal abdominal aortic aneurysm which has been treated with stenting.  There is calcified and noncalcified atherosclerosis of the

## 2024-01-02 ENCOUNTER — CLINICAL DOCUMENTATION (OUTPATIENT)
Dept: CASE MANAGEMENT | Age: 84
End: 2024-01-02

## 2024-01-02 ENCOUNTER — HOSPITAL ENCOUNTER (OUTPATIENT)
Dept: INFUSION THERAPY | Age: 84
Discharge: HOME | End: 2024-01-02
Payer: OTHER GOVERNMENT

## 2024-01-02 ENCOUNTER — TELEPHONE (OUTPATIENT)
Age: 84
End: 2024-01-02

## 2024-01-02 ENCOUNTER — CLINICAL DOCUMENTATION (OUTPATIENT)
Dept: SPIRITUAL SERVICES | Facility: CLINIC | Age: 84
End: 2024-01-02

## 2024-01-02 ENCOUNTER — OFFICE VISIT (OUTPATIENT)
Dept: ONCOLOGY | Age: 84
End: 2024-01-02
Payer: OTHER GOVERNMENT

## 2024-01-02 ENCOUNTER — CLINICAL DOCUMENTATION (OUTPATIENT)
Dept: ONCOLOGY | Age: 84
End: 2024-01-02

## 2024-01-02 VITALS
HEIGHT: 67 IN | SYSTOLIC BLOOD PRESSURE: 117 MMHG | OXYGEN SATURATION: 89 % | BODY MASS INDEX: 25.11 KG/M2 | WEIGHT: 160 LBS | DIASTOLIC BLOOD PRESSURE: 56 MMHG | TEMPERATURE: 98 F | RESPIRATION RATE: 16 BRPM | HEART RATE: 66 BPM

## 2024-01-02 VITALS
DIASTOLIC BLOOD PRESSURE: 56 MMHG | SYSTOLIC BLOOD PRESSURE: 117 MMHG | HEART RATE: 66 BPM | RESPIRATION RATE: 16 BRPM | TEMPERATURE: 98 F | OXYGEN SATURATION: 89 %

## 2024-01-02 DIAGNOSIS — N28.89 RENAL MASS, RIGHT: Primary | ICD-10-CM

## 2024-01-02 PROCEDURE — 99211 OFF/OP EST MAY X REQ PHY/QHP: CPT

## 2024-01-02 PROCEDURE — 99204 OFFICE O/P NEW MOD 45 MIN: CPT | Performed by: INTERNAL MEDICINE

## 2024-01-02 PROCEDURE — 1123F ACP DISCUSS/DSCN MKR DOCD: CPT | Performed by: INTERNAL MEDICINE

## 2024-01-02 NOTE — PROGRESS NOTES
Spouse name: Dahiana    Number of children: 3    Years of education: Not on file    Highest education level: Not on file   Occupational History    Not on file   Tobacco Use    Smoking status: Former     Current packs/day: 0.00     Average packs/day: 1 pack/day for 45.0 years (45.0 ttl pk-yrs)     Types: Cigarettes     Start date: 3/30/1962     Quit date: 3/30/2007     Years since quittin.7    Smokeless tobacco: Never   Vaping Use    Vaping Use: Never used   Substance and Sexual Activity    Alcohol use: Yes     Alcohol/week: 4.0 standard drinks of alcohol     Types: 4 Glasses of wine per week     Comment: couple times per week    Drug use: No    Sexual activity: Not Currently   Other Topics Concern    Not on file   Social History Narrative    Not on file     Social Determinants of Health     Financial Resource Strain: Low Risk  (10/12/2023)    Overall Financial Resource Strain (CARDIA)     Difficulty of Paying Living Expenses: Not hard at all   Food Insecurity: Not on file (10/12/2023)   Transportation Needs: Unknown (10/12/2023)    PRAPARE - Transportation     Lack of Transportation (Medical): Not on file     Lack of Transportation (Non-Medical): No   Physical Activity: Insufficiently Active (6/10/2022)    Exercise Vital Sign     Days of Exercise per Week: 3 days     Minutes of Exercise per Session: 30 min   Stress: Not on file   Social Connections: Not on file   Intimate Partner Violence: Not on file   Housing Stability: Unknown (10/12/2023)    Housing Stability Vital Sign     Unable to Pay for Housing in the Last Year: Not on file     Number of Places Lived in the Last Year: Not on file     Unstable Housing in the Last Year: No        Spouse:  in   [place]onnie Rooney     Phone: [phone] GLORIA    [address] [facility] [address]     Employment:  RETIRED [employer]     Immunizations:  Immunization History   Administered Date(s) Administered    COVID-19, PFIZER PURPLE top,  as per pt " I feel like my heart is racing "

## 2024-01-02 NOTE — PATIENT INSTRUCTIONS
-Renal ultrasound please  -Referral to Dr. Graham of urology  -No additional follow-up needed here  -From my standpoint no need for further delay for his TAVR procedure based on this low risk right renal lesion

## 2024-01-02 NOTE — TELEPHONE ENCOUNTER
----- Message from Devora Ortega RN sent at 1/2/2024  8:14 AM EST -----  Regarding: Needs vascular evaluation prior to TAVR  Jered,     This is a patient of Dr. Delgado's.  MRI abdomen showing endoleak s/p EVAR 2020.  Can we schedule him an appt with Dr. Delgado?   ----- Message -----  From: Marcell Mckinney MD  Sent: 12/30/2023  12:36 PM EST  To: Devora Ortega RN    Will need vascualr evaluation as his EVAR is now showing endoleak  ----- Message -----  From: Vasile Cunningham Incoming Radiant Results From [employer]  Sent: 12/26/2023  11:22 AM EST  To: Marcell Mckinney MD

## 2024-01-02 NOTE — FLOWSHEET NOTE
Advance Care Planning   Advance Care Planning Note  Ambulatory Spiritual Care Services    Date:  1/2/2024    Received request from family.    Consultation conversation participants:   Patient's child(vinny)     Goals of ACP Conversation:  Receive documents and assist in getting them into EPIC    Health Care Decision Makers:      Primary Decision Maker: Sandrita Cotton - Child - [phone]    Secondary Decision Maker: Colt Jane - Child - [phone]     Advance Care Planning Documents (Patient Wishes)  Currently on file:   Healthcare Power of /Advance Directive Appointment of Health Care Agent  Living Will/Advance Directive    Assessment:   This spiritual health encounter is in response to request from family to get a copy of patient's advance directives into patients electronic medical record.  received previously completed HCPOA and Living Will documents from patient's daughter, Sandrita, and made copies for medical records, faxed a copy to medical records, and updated agent information in EPIC.  team will remain available for further patient and family support, PRN.    Interventions:  Requested patient/family to submit existing document for our records: Healthcare Power of /Advance Directive Appointment of Health Care Agent  Living Will/Advance Directive    Outcomes:  Returned original document(s) to patient, as well as copies for distribution to appointed agents  Copy of advance directive given to staff to scan into medical record.      Electronically signed by Chaplain Lizette on 1/2/2024 at 12:00 PM.

## 2024-01-05 ENCOUNTER — OFFICE VISIT (OUTPATIENT)
Age: 84
End: 2024-01-05

## 2024-01-05 ENCOUNTER — TELEPHONE (OUTPATIENT)
Dept: CARDIOLOGY CLINIC | Age: 84
End: 2024-01-05

## 2024-01-05 VITALS
DIASTOLIC BLOOD PRESSURE: 56 MMHG | HEIGHT: 67 IN | SYSTOLIC BLOOD PRESSURE: 106 MMHG | BODY MASS INDEX: 25.11 KG/M2 | WEIGHT: 160 LBS | HEART RATE: 63 BPM

## 2024-01-05 DIAGNOSIS — I71.43 INFRARENAL ABDOMINAL AORTIC ANEURYSM (AAA) WITHOUT RUPTURE (HCC): ICD-10-CM

## 2024-01-05 DIAGNOSIS — Z86.79 STATUS POST ENDOVASCULAR ANEURYSM REPAIR (EVAR): Primary | ICD-10-CM

## 2024-01-05 DIAGNOSIS — I35.0 SEVERE AORTIC STENOSIS: Primary | ICD-10-CM

## 2024-01-05 DIAGNOSIS — Z98.890 STATUS POST ENDOVASCULAR ANEURYSM REPAIR (EVAR): Primary | ICD-10-CM

## 2024-01-05 PROCEDURE — 99024 POSTOP FOLLOW-UP VISIT: CPT | Performed by: THORACIC SURGERY (CARDIOTHORACIC VASCULAR SURGERY)

## 2024-01-05 NOTE — PROGRESS NOTES
Name: Gerard Jane  : 1940  MRN: [id number]    Oncology Navigation- Initial Note:    Intake-  Contact Type: Medical Oncology    Diagnosis:     Home Disposition: Lives with other who is able to assist    Patient needs and barriers to care: Coordination of Care, Knowledge deficit, and Symptom Management     Referral Source: Outpatient    Receptive to Advanced Care Planning/ Palliative Care:  deferred     ONC POC:  -Renal US ordered  -Refer to Urology- Dr Graham to monitor Kidney Lesion  -No need for chemotherapy  -No FU with ONC needed at this time, per Dr Knowles     Referrals: Urology     Continuum of Care:  Urology to monitor & will consult ONC if needed in future      Electronically signed by Demi Kenney RN on 2024 at 6:37 PM

## 2024-01-05 NOTE — PROGRESS NOTES
Gerard Jane returned to the cardiovascular clinic for follow up.  He underwent EVAR in June, 2020.  He had MRI recently, which showed type 2 endoleak.  The native AAA sac was measured at 5.2 cm. His last CTA in July, 2023 showed 5.6 cm native sac. There was no endoleak.  He denies any abdominal pain or other symptome suggestive of rapidly expanding native aneurysm sac.    Assessment: Type 2 endoleak only visualized on  MRI. The native aneurysm sac remain stable.    Plan: Follow up in 4 weeks with CTA.

## 2024-01-05 NOTE — PATIENT INSTRUCTIONS
If you receive a survey asking about your care experience, please respond. Your answers will help ensure you receive high-quality care at this office. Thank you!    Your Medical Assistant today: Jered  Thank you for coming to our office! It was a pleasure to serve you.

## 2024-01-05 NOTE — TELEPHONE ENCOUNTER
Orders received from Dr. Mckinney to schedule the patient for a TAVR procedure and have the patient hold the follow medications the morning of the TAVR procedure: Lasic, Jardiance, Spironolactone.    TAVR: 1/17/24 at 1000 with an arrival of 1200.  Discharge home with daughter Sandrita    Valve Rep Carroll notified with [facility]  Pacer Rep not needed.    Post TAVR instructions per Dr. Mckinney: have the patient be seen in the Structural Heart Clinic 7 days post TAVR, obtain a CBC, BMP and ECHO prior to the 30 day post TAVR follow up appointment.    7 day post TAVR appointment: 1/23/24 at 1145  CHF Clinic follow up appointment 1/30/24 at 1000  CBC/BMP/ECHO:2/19/24 at 1100  30 day post TAVR appointment:2/22/24 at 0815    Primary Cardiologist:  Dr. Nicki Mckinney, please agree.    Khushi, can you check prior auth?

## 2024-01-09 NOTE — TELEPHONE ENCOUNTER
PENDING    Precert for TAVR  DOS: 01/17/24      [x] Cath    [x] Echo    [x] Us Carotid    [x] CT chest, abd, pelvis    [x] CT surgery note    [x] Cardiology note    [x] EKG     Clinicals sent to [facility] via [employer]  Reference # [id number]

## 2024-01-10 ENCOUNTER — TELEPHONE (OUTPATIENT)
Age: 84
End: 2024-01-10

## 2024-01-10 NOTE — TELEPHONE ENCOUNTER
I faxed a request for referral and authorization for Gerard's appointment with Dr. Delgado 01/05/2024 and CTA abdomen and pelvis scheduled 02/26/204.  Request faxed and confirmed 01/05/2024.

## 2024-01-10 NOTE — TELEPHONE ENCOUNTER
Dr Moreno,     For authorization requirments for this pt's TAVR can you edit and sign the office note from Doni Suarez from 11/14/23 agreeing to the TAVR precedure?     Thanks!

## 2024-01-11 NOTE — TELEPHONE ENCOUNTER
Referral received from [facility] 01/10/2024. I have faxed a 2nd request for the authorization to [facility] 01/11/2024. Fax confirmed.

## 2024-01-12 NOTE — TELEPHONE ENCOUNTER
Still Pending        Called Elena to attempt to get auth completed. S/w Sandy, ref# [id number], [employer] is who does auth for this procedure.    Called [employer], spoke with Chaitanya to complete auth. Confirmed that the clinical note for CT surgery must be signed by an MD/DO to be accepted. Explained that pt was seen be PA and cleared. Chaitanya stated there must be an attestation with MD signature to be accepted for approval.    Updated note can be uploaded through [employer] portal, faxed to [phone], or emailed to [email or web address].

## 2024-01-16 ENCOUNTER — PREP FOR PROCEDURE (OUTPATIENT)
Dept: CARDIOLOGY CLINIC | Age: 84
End: 2024-01-16

## 2024-01-16 RX ORDER — SODIUM CHLORIDE 9 MG/ML
INJECTION, SOLUTION INTRAVENOUS PRN
Status: CANCELLED | OUTPATIENT
Start: 2024-01-16

## 2024-01-16 RX ORDER — SODIUM CHLORIDE 0.9 % (FLUSH) 0.9 %
5-40 SYRINGE (ML) INJECTION EVERY 12 HOURS SCHEDULED
Status: CANCELLED | OUTPATIENT
Start: 2024-01-16

## 2024-01-16 RX ORDER — SODIUM CHLORIDE 0.9 % (FLUSH) 0.9 %
5-40 SYRINGE (ML) INJECTION PRN
Status: CANCELLED | OUTPATIENT
Start: 2024-01-16

## 2024-01-17 ENCOUNTER — HOSPITAL ENCOUNTER (OUTPATIENT)
Age: 84
Discharge: HOME HEALTH | End: 2024-01-17
Attending: INTERNAL MEDICINE | Admitting: INTERNAL MEDICINE
Payer: OTHER GOVERNMENT

## 2024-01-17 VITALS
DIASTOLIC BLOOD PRESSURE: 65 MMHG | HEIGHT: 67 IN | SYSTOLIC BLOOD PRESSURE: 131 MMHG | RESPIRATION RATE: 22 BRPM | TEMPERATURE: 98.1 F | OXYGEN SATURATION: 94 % | HEART RATE: 61 BPM | BODY MASS INDEX: 23.94 KG/M2 | WEIGHT: 152.56 LBS

## 2024-01-17 DIAGNOSIS — I35.0 AORTIC VALVE STENOSIS, ETIOLOGY OF CARDIAC VALVE DISEASE UNSPECIFIED: ICD-10-CM

## 2024-01-17 LAB
ABO: NORMAL
ALBUMIN SERPL BCG-MCNC: 3.6 G/DL (ref 3.5–5.1)
ALP SERPL-CCNC: 89 U/L (ref 38–126)
ALT SERPL W/O P-5'-P-CCNC: 8 U/L (ref 11–66)
ANION GAP SERPL CALC-SCNC: 10 MEQ/L (ref 8–16)
ANTIBODY SCREEN: NORMAL
APTT PPP: 33.2 SECONDS (ref 22–38)
AST SERPL-CCNC: 10 U/L (ref 5–40)
BILIRUB SERPL-MCNC: 0.4 MG/DL (ref 0.3–1.2)
BUN SERPL-MCNC: 15 MG/DL (ref 7–22)
CALCIUM SERPL-MCNC: 9.5 MG/DL (ref 8.5–10.5)
CHLORIDE SERPL-SCNC: 100 MEQ/L (ref 98–111)
CO2 SERPL-SCNC: 30 MEQ/L (ref 23–33)
CREAT SERPL-MCNC: 0.6 MG/DL (ref 0.4–1.2)
DEPRECATED RDW RBC AUTO: 52.5 FL (ref 35–45)
ECHO BSA: 1.81 M2
ERYTHROCYTE [DISTWIDTH] IN BLOOD BY AUTOMATED COUNT: 14.4 % (ref 11.5–14.5)
GFR SERPL CREATININE-BSD FRML MDRD: > 60 ML/MIN/1.73M2
GLUCOSE SERPL-MCNC: 104 MG/DL (ref 70–108)
HCT VFR BLD AUTO: 46.3 % (ref 42–52)
HGB BLD-MCNC: 14.4 GM/DL (ref 14–18)
INR PPP: 0.93 (ref 0.85–1.13)
MCH RBC QN AUTO: 30.6 PG (ref 26–33)
MCHC RBC AUTO-ENTMCNC: 31.1 GM/DL (ref 32.2–35.5)
MCV RBC AUTO: 98.3 FL (ref 80–94)
NT-PROBNP SERPL IA-MCNC: 325.8 PG/ML (ref 0–449)
PLATELET # BLD AUTO: 181 THOU/MM3 (ref 130–400)
PMV BLD AUTO: 9.4 FL (ref 9.4–12.4)
POTASSIUM SERPL-SCNC: 4.3 MEQ/L (ref 3.5–5.2)
PROT SERPL-MCNC: 6.8 G/DL (ref 6.1–8)
RBC # BLD AUTO: 4.71 MILL/MM3 (ref 4.7–6.1)
RH FACTOR: NORMAL
SODIUM SERPL-SCNC: 140 MEQ/L (ref 135–145)
WBC # BLD AUTO: 10.7 THOU/MM3 (ref 4.8–10.8)

## 2024-01-17 PROCEDURE — 85027 COMPLETE CBC AUTOMATED: CPT

## 2024-01-17 PROCEDURE — 86850 RBC ANTIBODY SCREEN: CPT

## 2024-01-17 PROCEDURE — 86923 COMPATIBILITY TEST ELECTRIC: CPT

## 2024-01-17 PROCEDURE — 93005 ELECTROCARDIOGRAM TRACING: CPT | Performed by: INTERNAL MEDICINE

## 2024-01-17 PROCEDURE — 2709999900 HC NON-CHARGEABLE SUPPLY: Performed by: INTERNAL MEDICINE

## 2024-01-17 PROCEDURE — 2580000003 HC RX 258: Performed by: REGISTERED NURSE

## 2024-01-17 PROCEDURE — 85730 THROMBOPLASTIN TIME PARTIAL: CPT

## 2024-01-17 PROCEDURE — 75710 ARTERY X-RAYS ARM/LEG: CPT | Performed by: INTERNAL MEDICINE

## 2024-01-17 PROCEDURE — 86901 BLOOD TYPING SEROLOGIC RH(D): CPT

## 2024-01-17 PROCEDURE — C1769 GUIDE WIRE: HCPCS | Performed by: INTERNAL MEDICINE

## 2024-01-17 PROCEDURE — 80053 COMPREHEN METABOLIC PANEL: CPT

## 2024-01-17 PROCEDURE — 85610 PROTHROMBIN TIME: CPT

## 2024-01-17 PROCEDURE — 6360000002 HC RX W HCPCS: Performed by: INTERNAL MEDICINE

## 2024-01-17 PROCEDURE — 2500000003 HC RX 250 WO HCPCS: Performed by: INTERNAL MEDICINE

## 2024-01-17 PROCEDURE — 99152 MOD SED SAME PHYS/QHP 5/>YRS: CPT | Performed by: INTERNAL MEDICINE

## 2024-01-17 PROCEDURE — 6360000004 HC RX CONTRAST MEDICATION: Performed by: INTERNAL MEDICINE

## 2024-01-17 PROCEDURE — 6360000002 HC RX W HCPCS: Performed by: REGISTERED NURSE

## 2024-01-17 PROCEDURE — 93005 ELECTROCARDIOGRAM TRACING: CPT | Performed by: REGISTERED NURSE

## 2024-01-17 PROCEDURE — 7100000010 HC PHASE II RECOVERY - FIRST 15 MIN: Performed by: INTERNAL MEDICINE

## 2024-01-17 PROCEDURE — 2720000010 HC SURG SUPPLY STERILE: Performed by: INTERNAL MEDICINE

## 2024-01-17 PROCEDURE — 36415 COLL VENOUS BLD VENIPUNCTURE: CPT

## 2024-01-17 PROCEDURE — C1894 INTRO/SHEATH, NON-LASER: HCPCS | Performed by: INTERNAL MEDICINE

## 2024-01-17 PROCEDURE — 7100000011 HC PHASE II RECOVERY - ADDTL 15 MIN: Performed by: INTERNAL MEDICINE

## 2024-01-17 PROCEDURE — 86900 BLOOD TYPING SEROLOGIC ABO: CPT

## 2024-01-17 PROCEDURE — 83880 ASSAY OF NATRIURETIC PEPTIDE: CPT

## 2024-01-17 PROCEDURE — 99153 MOD SED SAME PHYS/QHP EA: CPT | Performed by: INTERNAL MEDICINE

## 2024-01-17 RX ORDER — SODIUM CHLORIDE 0.9 % (FLUSH) 0.9 %
5-40 SYRINGE (ML) INJECTION EVERY 12 HOURS SCHEDULED
Status: DISCONTINUED | OUTPATIENT
Start: 2024-01-17 | End: 2024-01-17 | Stop reason: HOSPADM

## 2024-01-17 RX ORDER — ALBUTEROL SULFATE 2.5 MG/3ML
2.5 SOLUTION RESPIRATORY (INHALATION) EVERY 6 HOURS PRN
Status: DISCONTINUED | OUTPATIENT
Start: 2024-01-17 | End: 2024-01-17 | Stop reason: HOSPADM

## 2024-01-17 RX ORDER — PRAVASTATIN SODIUM 80 MG/1
80 TABLET ORAL DAILY
Status: DISCONTINUED | OUTPATIENT
Start: 2024-01-17 | End: 2024-01-17 | Stop reason: HOSPADM

## 2024-01-17 RX ORDER — MIDAZOLAM HYDROCHLORIDE 1 MG/ML
INJECTION INTRAMUSCULAR; INTRAVENOUS PRN
Status: DISCONTINUED | OUTPATIENT
Start: 2024-01-17 | End: 2024-01-17 | Stop reason: HOSPADM

## 2024-01-17 RX ORDER — ALBUTEROL SULFATE 90 UG/1
2 AEROSOL, METERED RESPIRATORY (INHALATION) EVERY 6 HOURS PRN
Status: DISCONTINUED | OUTPATIENT
Start: 2024-01-17 | End: 2024-01-17 | Stop reason: HOSPADM

## 2024-01-17 RX ORDER — SODIUM CHLORIDE 0.9 % (FLUSH) 0.9 %
5-40 SYRINGE (ML) INJECTION PRN
Status: DISCONTINUED | OUTPATIENT
Start: 2024-01-17 | End: 2024-01-17 | Stop reason: HOSPADM

## 2024-01-17 RX ORDER — CARBOXYMETHYLCELLULOSE SODIUM 5 MG/ML
1 SOLUTION/ DROPS OPHTHALMIC EVERY MORNING
Status: DISCONTINUED | OUTPATIENT
Start: 2024-01-18 | End: 2024-01-17 | Stop reason: HOSPADM

## 2024-01-17 RX ORDER — SODIUM CHLORIDE 9 MG/ML
INJECTION, SOLUTION INTRAVENOUS PRN
Status: DISCONTINUED | OUTPATIENT
Start: 2024-01-17 | End: 2024-01-17 | Stop reason: HOSPADM

## 2024-01-17 RX ORDER — FENTANYL CITRATE 50 UG/ML
INJECTION, SOLUTION INTRAMUSCULAR; INTRAVENOUS PRN
Status: DISCONTINUED | OUTPATIENT
Start: 2024-01-17 | End: 2024-01-17 | Stop reason: HOSPADM

## 2024-01-17 RX ADMIN — Medication 2000 MG: at 09:28

## 2024-01-17 RX ADMIN — SODIUM CHLORIDE: 9 INJECTION, SOLUTION INTRAVENOUS at 08:40

## 2024-01-17 NOTE — PROGRESS NOTES
1515 Care taken over from Lisa Rn,bedside rounding done, right and left groin stable. Rt neck dressing stable . Patient has a cyst above rt femerol site. States this site is unchanged, soft. Patient reinstructed on bedrest, instructed to keep legs straight, not to cross legs, not to lift head off of pillow, not to laugh hard, if coughs to guard site with hand, voices understanding, taking water without difficulty.           1535 Up in ayers, activity tolerated well.     1635 Discharged per wheelchair, stable condition.

## 2024-01-17 NOTE — PROGRESS NOTES
Returned to 2E15.  Monitor attached showing SR.  Dressing to left, right groin and right SCV  intact.  Hemostasis maintained, no bleeding, swelling, or edema noted.  0.9NSS infusing wih approx 800 ml remnaining

## 2024-01-17 NOTE — PROGRESS NOTES
Patient admitted to 2E15  Ambulatory for TAVR.  Patient NPO. Patient accompanied by Family.  Vital signs obtained.   Assessment and data collection intiated.   Oriented to room.  Policies and procedures for 2E explained.   All questions answered with no further questions at this time.   Fall prevention and safety precautions discussed with patient.

## 2024-01-17 NOTE — DISCHARGE INSTRUCTIONS
Reviewed with the patient post angiogram instructions.  Discussed care of puncture site:  remove dressing in 24 hours, gently clean site with soap and water, pat dry, and apply bandaid daily for 5 days.  Keep site clean and dry.  Do not apply any creams, lotions, or powders to puncture site.  Do not submerse site in water (no tub baths, swimming, or whirlpool) for 5 days.  Take it easy for the next 3-5 days.  No driving for 3 days.  Avoid heaving lifting, pushing, pulling or straining.  Monitor site for bleeding, drainage, or swelling.  Monitor for signs of infection which include:  redness, drainage, soreness, or temp greater than 101 F.  Notify doctor of any abnormal findings as listed.  If bleeding occurs, hold firm pressure at site and call 911.

## 2024-01-17 NOTE — H&P
Amanda Huff APRN - CNP   albuterol sulfate HFA (PROVENTIL HFA) 108 (90 Base) MCG/ACT inhaler Inhale 2 puffs into the lungs every 6 hours as needed for Wheezing 10/23/23   Amanda Huff APRN - CNP   albuterol (PROVENTIL) (2.5 MG/3ML) 0.083% nebulizer solution Take 3 mLs by nebulization every 6 hours as needed for Wheezing or Shortness of Breath 10/23/23 10/22/24  Amanda Huff APRN - CNP   spironolactone (ALDACTONE) 25 MG tablet Take 1 tablet by mouth daily 10/12/23   Mustapha Callahan MD   carboxymethylcellulose (REFRESH TEARS) 0.5 % SOLN ophthalmic solution Place 1 drop into both eyes every morning    Miguel Medina MD   sacubitril-valsartan (ENTRESTO) 24-26 MG per tablet Take 1 tablet by mouth 2 times daily 8/4/23   Linda Mayen APRN - CNP   metoprolol succinate (TOPROL XL) 25 MG extended release tablet Take 2 tablets by mouth daily 7/25/23   Erika Caceres MD   furosemide (LASIX) 20 MG tablet Take 1 tablet by mouth every 48 hours 7/25/23   Erika Caceres MD   tamsulosin (FLOMAX) 0.4 MG capsule Take 1 capsule by mouth daily    Miguel Medina MD   pravastatin (PRAVACHOL) 80 MG tablet Take 1 tablet by mouth daily    Miguel Medina MD   aspirin 81 MG EC tablet Take 1 tablet by mouth daily    ProviderMiguel MD     Additional information:       VITAL SIGNS   There were no vitals filed for this visit.    PHYSICAL:   General: No acute distress  HEENT:  Unremarkable for age  Neck: without increased JVD, carotid pulses 2+ bilaterally without bruits  Heart: RRR, S1 & S2 WNL, S4 gallop, without 3/6 ROMEL  NYHA: 3  Lungs: Clear to auscultation    Abdomen: BS present, without HSM, masses, or tenderness    Extremities: without C,C,E.  Pulses 2+ bilaterally  Mental Status: Alert & Oriented        PLANNED PROCEDURE   []Cath  []PCI                []Pacemaker/AICD  []GIL             []Cardioversion []Peripheral angiography/PTA  [x]Other: TAVR     SEDATION  Planned

## 2024-01-21 LAB
EKG ATRIAL RATE: 56 BPM
EKG ATRIAL RATE: 60 BPM
EKG P AXIS: 62 DEGREES
EKG P AXIS: 67 DEGREES
EKG P-R INTERVAL: 254 MS
EKG P-R INTERVAL: 288 MS
EKG Q-T INTERVAL: 438 MS
EKG Q-T INTERVAL: 448 MS
EKG QRS DURATION: 158 MS
EKG QRS DURATION: 158 MS
EKG QTC CALCULATION (BAZETT): 432 MS
EKG QTC CALCULATION (BAZETT): 438 MS
EKG R AXIS: -66 DEGREES
EKG R AXIS: -66 DEGREES
EKG T AXIS: 79 DEGREES
EKG T AXIS: 84 DEGREES
EKG VENTRICULAR RATE: 56 BPM
EKG VENTRICULAR RATE: 60 BPM

## 2024-01-29 ENCOUNTER — TELEPHONE (OUTPATIENT)
Dept: CARDIOLOGY CLINIC | Age: 84
End: 2024-01-29

## 2024-01-30 ENCOUNTER — TELEPHONE (OUTPATIENT)
Dept: CARDIOLOGY CLINIC | Age: 84
End: 2024-01-30

## 2024-01-30 ENCOUNTER — OFFICE VISIT (OUTPATIENT)
Dept: CARDIOLOGY CLINIC | Age: 84
End: 2024-01-30
Payer: OTHER GOVERNMENT

## 2024-01-30 VITALS
HEIGHT: 67 IN | HEART RATE: 58 BPM | BODY MASS INDEX: 24.74 KG/M2 | DIASTOLIC BLOOD PRESSURE: 68 MMHG | WEIGHT: 157.6 LBS | SYSTOLIC BLOOD PRESSURE: 112 MMHG

## 2024-01-30 DIAGNOSIS — I50.22 CHRONIC SYSTOLIC CONGESTIVE HEART FAILURE, NYHA CLASS 2 (HCC): Primary | ICD-10-CM

## 2024-01-30 DIAGNOSIS — Z95.2 S/P TAVR (TRANSCATHETER AORTIC VALVE REPLACEMENT): ICD-10-CM

## 2024-01-30 DIAGNOSIS — I35.0 SEVERE AORTIC STENOSIS: Primary | ICD-10-CM

## 2024-01-30 DIAGNOSIS — I35.0 SEVERE AORTIC STENOSIS: ICD-10-CM

## 2024-01-30 PROCEDURE — 99213 OFFICE O/P EST LOW 20 MIN: CPT | Performed by: INTERNAL MEDICINE

## 2024-01-30 PROCEDURE — 1123F ACP DISCUSS/DSCN MKR DOCD: CPT | Performed by: INTERNAL MEDICINE

## 2024-01-30 NOTE — TELEPHONE ENCOUNTER
Orders received from Dr. Mckinney to schedule the patient for a TAVR procedure and have the patient hold the follow medications the morning of the TAVR procedure: Lasix, Spironolactone, Entresto and Jardiance.    TAVR: 2/12/24 at 0700 with an arrival of 2000 on 2/11/24  Discharge home with daughter Sandrita.    Valve Rep Carroll notified with [facility]  Pacer Rep not needed.    Post TAVR instructions per Dr. Mckinney: have the patient be seen in the Structural Heart Clinic 7 days post TAVR, obtain a CBC, BMP and ECHO prior to the 30 day post TAVR follow up appointment.    7 day post TAVR appointment:2/20/24 at 1100  CHF Clinic follow up appointment 2/27/24 at 1000  CBC/BMP/ECHO:3/12/24 at 1100  30 day post TAVR appointment:3/14/24 at 0945    Primary Cardiologist: Dr. Nicki Mckinney, please agree.     DOS changed per Khushi.

## 2024-01-30 NOTE — PATIENT INSTRUCTIONS

## 2024-01-30 NOTE — PROGRESS NOTES
01/17/2024 08:21 AM    RBC 4.71 01/17/2024 08:21 AM    HGB 14.4 01/17/2024 08:21 AM    HCT 46.3 01/17/2024 08:21 AM    MCV 98.3 01/17/2024 08:21 AM    MCH 30.6 01/17/2024 08:21 AM    MCHC 31.1 01/17/2024 08:21 AM    RDW 14.7 10/09/2015 01:38 PM     01/17/2024 08:21 AM    MPV 9.4 01/17/2024 08:21 AM       Lab Results   Component Value Date/Time     01/17/2024 08:21 AM    K 4.3 01/17/2024 08:21 AM    K 4.5 12/29/2023 04:19 PM     01/17/2024 08:21 AM    CO2 30 01/17/2024 08:21 AM    BUN 15 01/17/2024 08:21 AM    LABALBU 3.6 01/17/2024 08:21 AM    CREATININE 0.6 01/17/2024 08:21 AM    CALCIUM 9.5 01/17/2024 08:21 AM    LABGLOM >60 01/17/2024 08:21 AM    GLUCOSE 104 01/17/2024 08:21 AM    GLUCOSE 95 10/09/2015 01:38 PM       Lab Results   Component Value Date/Time    ALKPHOS 89 01/17/2024 08:21 AM    ALT 8 01/17/2024 08:21 AM    AST 10 01/17/2024 08:21 AM    PROT 6.8 01/17/2024 08:21 AM    BILITOT 0.4 01/17/2024 08:21 AM    LABALBU 3.6 01/17/2024 08:21 AM       Lab Results   Component Value Date/Time    MG 2.4 07/10/2023 03:57 PM       Lab Results   Component Value Date    INR 0.93 01/17/2024    INR 0.91 12/29/2023    INR 0.93 11/20/2023         Lab Results   Component Value Date/Time    LABA1C 5.7 11/20/2023 07:26 AM       Lab Results   Component Value Date/Time    TRIG 88 11/20/2023 07:26 AM    HDL 41 11/20/2023 07:26 AM    LDLCALC 65 11/20/2023 07:26 AM       No results found for: \"TSH\"      Testing Reviewed:      I have individually reviewed the cardiac test below:    ECHO: No results found for this or any previous visit.       Assessment/Plan   Severe aortic stenosis   Severe bilateral common femoral artery disease  R CFA aneurysm  Discussed percAx.  R/B/A of the procedure discussed.  Discussed with Dr. Delgado as well for open femoral approach, he prefers to avoid, and will do surveillance on the groins.  Proceed percAx TAVR.  Continue GDMT. Discussed symptoms needing emergency care or those which

## 2024-02-01 RX ORDER — FUROSEMIDE 20 MG/1
20 TABLET ORAL
Qty: 45 TABLET | Refills: 3 | Status: SHIPPED | OUTPATIENT
Start: 2024-02-01

## 2024-02-01 NOTE — TELEPHONE ENCOUNTER
Gerard Jane called requesting a refill on the following medications:  Requested Prescriptions     Pending Prescriptions Disp Refills    furosemide (LASIX) 20 MG tablet 45 tablet 1     Sig: Take 1 tablet by mouth every 48 hours     [facility]      Date of last visit: 1/30/24  Date of next visit (if applicable): 3/14/2024

## 2024-02-06 ENCOUNTER — OFFICE VISIT (OUTPATIENT)
Dept: CARDIOLOGY CLINIC | Age: 84
End: 2024-02-06
Payer: OTHER GOVERNMENT

## 2024-02-06 VITALS
DIASTOLIC BLOOD PRESSURE: 64 MMHG | OXYGEN SATURATION: 93 % | HEART RATE: 68 BPM | BODY MASS INDEX: 24.48 KG/M2 | HEIGHT: 67 IN | SYSTOLIC BLOOD PRESSURE: 120 MMHG | WEIGHT: 156 LBS

## 2024-02-06 DIAGNOSIS — I35.0 SEVERE AORTIC STENOSIS: Primary | ICD-10-CM

## 2024-02-06 DIAGNOSIS — I50.22 CHRONIC SYSTOLIC CONGESTIVE HEART FAILURE, NYHA CLASS 2 (HCC): ICD-10-CM

## 2024-02-06 DIAGNOSIS — I51.89 GRADE I DIASTOLIC DYSFUNCTION: ICD-10-CM

## 2024-02-06 DIAGNOSIS — I42.9 CARDIOMYOPATHY, UNSPECIFIED TYPE (HCC): ICD-10-CM

## 2024-02-06 PROCEDURE — 1123F ACP DISCUSS/DSCN MKR DOCD: CPT | Performed by: NURSE PRACTITIONER

## 2024-02-06 PROCEDURE — 99214 OFFICE O/P EST MOD 30 MIN: CPT | Performed by: NURSE PRACTITIONER

## 2024-02-06 ASSESSMENT — ENCOUNTER SYMPTOMS
NAUSEA: 0
COUGH: 0
ABDOMINAL DISTENTION: 0
SHORTNESS OF BREATH: 1
VOMITING: 0

## 2024-02-06 NOTE — PROGRESS NOTES
continue to monitor kidney function and electrolytes. Will optimize as tolerated.   Pt is compliant w/ medications.    Total visit time of 25 minutes has been spent with patient on education of symptoms, management, medication, and plan of care; as well as review of chart: labs, ECHO, radiology reports, etc.   I personally spent more then 50% of the appt time face to face with the patient.  Daily weights  Fluid restriction of 2 Liters per day  Limit sodium in diet to around 4898-3711 mg/day  Monitor BP  Activity as tolerated     Patient was instructed to call the Heart Failure Clinic for any changes in symptoms as noted in AVS.      No follow-ups on file. or sooner if needed     Patient given educational materials - see patient instructions.   We discussed the importance of weighing oneself and recording daily. We also discussed the importance of a low sodium diet, higher sodium foods to avoid and better low sodium food options.   Patient verbalizes understanding of plan of care using teach back method, and is agreeable to the treatment plan.       Electronically signed by CASEY Chua CNP on 2/6/2024 at 8:44 AM

## 2024-02-06 NOTE — PATIENT INSTRUCTIONS
You may receive a survey regarding the care you received during your visit.  Your input is valuable to us.  We encourage you to complete and return your survey.  We hope you will choose us in the future for your healthcare needs.    Your nurses today were Symone.  Office hours:   Mon-Thurs 8-4:30  Friday 8-12  Phone: [phone]    Continue:  Continue current medications  Daily weights and record  Fluid restriction of 2 Liters per day  Limit sodium in diet to around 8310-4229 mg/day  Monitor BP  Activity as tolerated     Call the Heart Failure Clinic for any of the following symptoms:   Weight gain of 2-3 pounds in 1 day or 5 pounds in 1 week  Increased shortness of breath  Shortness of breath while laying down  Cough  Chest pain  Swelling in feet, ankles or legs  Bloating in abdomen  Fatigue        No medication changes today    Continue diet/fluid adherence  Continue daily wts.  F/U w/ Cardiology  F/U in clinic post procedure

## 2024-02-09 ENCOUNTER — PREP FOR PROCEDURE (OUTPATIENT)
Dept: CARDIOLOGY | Age: 84
End: 2024-02-09

## 2024-02-09 RX ORDER — SODIUM CHLORIDE 9 MG/ML
INJECTION, SOLUTION INTRAVENOUS PRN
Status: CANCELLED | OUTPATIENT
Start: 2024-02-09

## 2024-02-09 RX ORDER — SODIUM CHLORIDE 0.9 % (FLUSH) 0.9 %
5-40 SYRINGE (ML) INJECTION PRN
Status: CANCELLED | OUTPATIENT
Start: 2024-02-09

## 2024-02-09 RX ORDER — SODIUM CHLORIDE 0.9 % (FLUSH) 0.9 %
5-40 SYRINGE (ML) INJECTION EVERY 12 HOURS SCHEDULED
Status: CANCELLED | OUTPATIENT
Start: 2024-02-09

## 2024-02-09 NOTE — TELEPHONE ENCOUNTER
TAVR time changed to 1200 on 2/12/2024. Patient is to arrive at 0800 (discussed with Dr. Mckinney). Patient and cath lab notified.

## 2024-02-11 PROBLEM — N28.9 RENAL INSUFFICIENCY: Status: ACTIVE | Noted: 2024-02-11

## 2024-02-12 ENCOUNTER — HOSPITAL ENCOUNTER (INPATIENT)
Age: 84
LOS: 1 days | Discharge: HOME | DRG: 267 | End: 2024-02-13
Attending: INTERNAL MEDICINE | Admitting: INTERNAL MEDICINE
Payer: OTHER GOVERNMENT

## 2024-02-12 DIAGNOSIS — I44.7 LBBB (LEFT BUNDLE BRANCH BLOCK): Primary | ICD-10-CM

## 2024-02-12 DIAGNOSIS — Z95.2 S/P TAVR (TRANSCATHETER AORTIC VALVE REPLACEMENT): ICD-10-CM

## 2024-02-12 DIAGNOSIS — I35.0 NONRHEUMATIC AORTIC VALVE STENOSIS: ICD-10-CM

## 2024-02-12 DIAGNOSIS — I35.0 AORTIC STENOSIS, SEVERE: ICD-10-CM

## 2024-02-12 DIAGNOSIS — I35.0 SEVERE AORTIC STENOSIS: ICD-10-CM

## 2024-02-12 LAB
ABO: NORMAL
ALBUMIN SERPL BCG-MCNC: 4.1 G/DL (ref 3.5–5.1)
ALP SERPL-CCNC: 81 U/L (ref 38–126)
ALT SERPL W/O P-5'-P-CCNC: 9 U/L (ref 11–66)
ANION GAP SERPL CALC-SCNC: 8 MEQ/L (ref 8–16)
ANTIBODY SCREEN: NORMAL
APTT PPP: 33.8 SECONDS (ref 22–38)
AST SERPL-CCNC: 12 U/L (ref 5–40)
BILIRUB SERPL-MCNC: 0.3 MG/DL (ref 0.3–1.2)
BUN SERPL-MCNC: 13 MG/DL (ref 7–22)
CALCIUM SERPL-MCNC: 9.4 MG/DL (ref 8.5–10.5)
CHLORIDE SERPL-SCNC: 104 MEQ/L (ref 98–111)
CO2 SERPL-SCNC: 30 MEQ/L (ref 23–33)
CREAT SERPL-MCNC: 0.6 MG/DL (ref 0.4–1.2)
DEPRECATED RDW RBC AUTO: 52.2 FL (ref 35–45)
ECHO BSA: 1.8 M2
ERYTHROCYTE [DISTWIDTH] IN BLOOD BY AUTOMATED COUNT: 14.3 % (ref 11.5–14.5)
GFR SERPL CREATININE-BSD FRML MDRD: > 60 ML/MIN/1.73M2
GLUCOSE SERPL-MCNC: 100 MG/DL (ref 70–108)
HCT VFR BLD AUTO: 45.9 % (ref 42–52)
HGB BLD-MCNC: 14 GM/DL (ref 14–18)
INR PPP: 0.89 (ref 0.85–1.13)
KCT BLD-ACNC: 287 SECONDS (ref 1–150)
MCH RBC QN AUTO: 30.1 PG (ref 26–33)
MCHC RBC AUTO-ENTMCNC: 30.5 GM/DL (ref 32.2–35.5)
MCV RBC AUTO: 98.7 FL (ref 80–94)
NT-PROBNP SERPL IA-MCNC: 409.3 PG/ML (ref 0–449)
PLATELET # BLD AUTO: 176 THOU/MM3 (ref 130–400)
PMV BLD AUTO: 10.1 FL (ref 9.4–12.4)
POTASSIUM SERPL-SCNC: 4.7 MEQ/L (ref 3.5–5.2)
PROT SERPL-MCNC: 6.8 G/DL (ref 6.1–8)
RBC # BLD AUTO: 4.65 MILL/MM3 (ref 4.7–6.1)
RH FACTOR: NORMAL
SODIUM SERPL-SCNC: 142 MEQ/L (ref 135–145)
WBC # BLD AUTO: 7.8 THOU/MM3 (ref 4.8–10.8)

## 2024-02-12 PROCEDURE — B41D1ZZ FLUOROSCOPY OF AORTA AND BILATERAL LOWER EXTREMITY ARTERIES USING LOW OSMOLAR CONTRAST: ICD-10-PCS | Performed by: THORACIC SURGERY (CARDIOTHORACIC VASCULAR SURGERY)

## 2024-02-12 PROCEDURE — 93005 ELECTROCARDIOGRAM TRACING: CPT | Performed by: INTERNAL MEDICINE

## 2024-02-12 PROCEDURE — 36415 COLL VENOUS BLD VENIPUNCTURE: CPT

## 2024-02-12 PROCEDURE — 33363 REPLACE AORTIC VALVE OPEN: CPT | Performed by: INTERNAL MEDICINE

## 2024-02-12 PROCEDURE — 6370000000 HC RX 637 (ALT 250 FOR IP): Performed by: INTERNAL MEDICINE

## 2024-02-12 PROCEDURE — C1725 CATH, TRANSLUMIN NON-LASER: HCPCS | Performed by: INTERNAL MEDICINE

## 2024-02-12 PROCEDURE — 94761 N-INVAS EAR/PLS OXIMETRY MLT: CPT

## 2024-02-12 PROCEDURE — 6360000004 HC RX CONTRAST MEDICATION: Performed by: INTERNAL MEDICINE

## 2024-02-12 PROCEDURE — 33363 REPLACE AORTIC VALVE OPEN: CPT | Performed by: THORACIC SURGERY (CARDIOTHORACIC VASCULAR SURGERY)

## 2024-02-12 PROCEDURE — 6360000002 HC RX W HCPCS: Performed by: REGISTERED NURSE

## 2024-02-12 PROCEDURE — 85610 PROTHROMBIN TIME: CPT

## 2024-02-12 PROCEDURE — 02RF38Z REPLACEMENT OF AORTIC VALVE WITH ZOOPLASTIC TISSUE, PERCUTANEOUS APPROACH: ICD-10-PCS | Performed by: THORACIC SURGERY (CARDIOTHORACIC VASCULAR SURGERY)

## 2024-02-12 PROCEDURE — A4217 STERILE WATER/SALINE, 500 ML: HCPCS | Performed by: REGISTERED NURSE

## 2024-02-12 PROCEDURE — 86901 BLOOD TYPING SEROLOGIC RH(D): CPT

## 2024-02-12 PROCEDURE — 6360000002 HC RX W HCPCS: Performed by: INTERNAL MEDICINE

## 2024-02-12 PROCEDURE — 85027 COMPLETE CBC AUTOMATED: CPT

## 2024-02-12 PROCEDURE — 2720000010 HC SURG SUPPLY STERILE: Performed by: INTERNAL MEDICINE

## 2024-02-12 PROCEDURE — 2500000003 HC RX 250 WO HCPCS: Performed by: INTERNAL MEDICINE

## 2024-02-12 PROCEDURE — 86900 BLOOD TYPING SEROLOGIC ABO: CPT

## 2024-02-12 PROCEDURE — C1889 IMPLANT/INSERT DEVICE, NOC: HCPCS | Performed by: INTERNAL MEDICINE

## 2024-02-12 PROCEDURE — 86850 RBC ANTIBODY SCREEN: CPT

## 2024-02-12 PROCEDURE — 85730 THROMBOPLASTIN TIME PARTIAL: CPT

## 2024-02-12 PROCEDURE — C1769 GUIDE WIRE: HCPCS | Performed by: INTERNAL MEDICINE

## 2024-02-12 PROCEDURE — 93005 ELECTROCARDIOGRAM TRACING: CPT | Performed by: REGISTERED NURSE

## 2024-02-12 PROCEDURE — 2580000003 HC RX 258: Performed by: REGISTERED NURSE

## 2024-02-12 PROCEDURE — C1894 INTRO/SHEATH, NON-LASER: HCPCS | Performed by: INTERNAL MEDICINE

## 2024-02-12 PROCEDURE — 2700000000 HC OXYGEN THERAPY PER DAY

## 2024-02-12 PROCEDURE — 2580000003 HC RX 258: Performed by: INTERNAL MEDICINE

## 2024-02-12 PROCEDURE — 99153 MOD SED SAME PHYS/QHP EA: CPT | Performed by: INTERNAL MEDICINE

## 2024-02-12 PROCEDURE — 83880 ASSAY OF NATRIURETIC PEPTIDE: CPT

## 2024-02-12 PROCEDURE — 99152 MOD SED SAME PHYS/QHP 5/>YRS: CPT | Performed by: INTERNAL MEDICINE

## 2024-02-12 PROCEDURE — C1887 CATHETER, GUIDING: HCPCS | Performed by: INTERNAL MEDICINE

## 2024-02-12 PROCEDURE — 80053 COMPREHEN METABOLIC PANEL: CPT

## 2024-02-12 PROCEDURE — 94640 AIRWAY INHALATION TREATMENT: CPT

## 2024-02-12 PROCEDURE — 85347 COAGULATION TIME ACTIVATED: CPT

## 2024-02-12 PROCEDURE — 2709999900 HC NON-CHARGEABLE SUPPLY: Performed by: INTERNAL MEDICINE

## 2024-02-12 PROCEDURE — 2140000000 HC CCU INTERMEDIATE R&B

## 2024-02-12 PROCEDURE — C1760 CLOSURE DEV, VASC: HCPCS | Performed by: INTERNAL MEDICINE

## 2024-02-12 DEVICE — EDWARDS SAPIEN 3 ULTRA TRANSCATHETER HEART VALVE
Type: IMPLANTABLE DEVICE | Status: FUNCTIONAL
Brand: EDWARDS SAPIEN 3 ULTRA TRANSCATHETER HEART VALVE

## 2024-02-12 RX ORDER — ASPIRIN 81 MG/1
81 TABLET ORAL DAILY
Status: DISCONTINUED | OUTPATIENT
Start: 2024-02-13 | End: 2024-02-13 | Stop reason: HOSPADM

## 2024-02-12 RX ORDER — SODIUM CHLORIDE 0.9 % (FLUSH) 0.9 %
5-40 SYRINGE (ML) INJECTION EVERY 12 HOURS SCHEDULED
Status: DISCONTINUED | OUTPATIENT
Start: 2024-02-12 | End: 2024-02-12 | Stop reason: SDUPTHER

## 2024-02-12 RX ORDER — POLYETHYLENE GLYCOL 3350 17 G/17G
17 POWDER, FOR SOLUTION ORAL DAILY PRN
Status: DISCONTINUED | OUTPATIENT
Start: 2024-02-12 | End: 2024-02-13 | Stop reason: HOSPADM

## 2024-02-12 RX ORDER — ASPIRIN 81 MG/1
81 TABLET ORAL DAILY
Status: DISCONTINUED | OUTPATIENT
Start: 2024-02-12 | End: 2024-02-12 | Stop reason: SDUPTHER

## 2024-02-12 RX ORDER — SODIUM CHLORIDE 9 MG/ML
INJECTION, SOLUTION INTRAVENOUS CONTINUOUS
Status: DISCONTINUED | OUTPATIENT
Start: 2024-02-12 | End: 2024-02-13 | Stop reason: HOSPADM

## 2024-02-12 RX ORDER — ALBUTEROL SULFATE 90 UG/1
2 AEROSOL, METERED RESPIRATORY (INHALATION) EVERY 6 HOURS PRN
Status: DISCONTINUED | OUTPATIENT
Start: 2024-02-12 | End: 2024-02-13 | Stop reason: HOSPADM

## 2024-02-12 RX ORDER — CLOPIDOGREL BISULFATE 75 MG/1
75 TABLET ORAL DAILY
Status: DISCONTINUED | OUTPATIENT
Start: 2024-02-13 | End: 2024-02-13 | Stop reason: HOSPADM

## 2024-02-12 RX ORDER — HYDRALAZINE HYDROCHLORIDE 20 MG/ML
10 INJECTION INTRAMUSCULAR; INTRAVENOUS EVERY 10 MIN PRN
Status: DISCONTINUED | OUTPATIENT
Start: 2024-02-12 | End: 2024-02-13 | Stop reason: HOSPADM

## 2024-02-12 RX ORDER — CLOPIDOGREL BISULFATE 75 MG/1
TABLET ORAL PRN
Status: DISCONTINUED | OUTPATIENT
Start: 2024-02-12 | End: 2024-02-12 | Stop reason: HOSPADM

## 2024-02-12 RX ORDER — MIDAZOLAM HYDROCHLORIDE 1 MG/ML
INJECTION INTRAMUSCULAR; INTRAVENOUS PRN
Status: DISCONTINUED | OUTPATIENT
Start: 2024-02-12 | End: 2024-02-12 | Stop reason: HOSPADM

## 2024-02-12 RX ORDER — ASPIRIN 81 MG/1
TABLET, CHEWABLE ORAL PRN
Status: DISCONTINUED | OUTPATIENT
Start: 2024-02-12 | End: 2024-02-12 | Stop reason: HOSPADM

## 2024-02-12 RX ORDER — ALBUTEROL SULFATE 2.5 MG/3ML
2.5 SOLUTION RESPIRATORY (INHALATION)
Status: DISCONTINUED | OUTPATIENT
Start: 2024-02-12 | End: 2024-02-13 | Stop reason: HOSPADM

## 2024-02-12 RX ORDER — SODIUM CHLORIDE 9 MG/ML
INJECTION, SOLUTION INTRAVENOUS PRN
Status: DISCONTINUED | OUTPATIENT
Start: 2024-02-12 | End: 2024-02-12 | Stop reason: SDUPTHER

## 2024-02-12 RX ORDER — FENTANYL CITRATE 50 UG/ML
50 INJECTION, SOLUTION INTRAMUSCULAR; INTRAVENOUS
Status: DISPENSED | OUTPATIENT
Start: 2024-02-12 | End: 2024-02-12

## 2024-02-12 RX ORDER — ATROPINE SULFATE 0.4 MG/ML
0.5 INJECTION, SOLUTION INTRAVENOUS
Status: ACTIVE | OUTPATIENT
Start: 2024-02-12 | End: 2024-02-13

## 2024-02-12 RX ORDER — SODIUM CHLORIDE 0.9 % (FLUSH) 0.9 %
5-40 SYRINGE (ML) INJECTION PRN
Status: DISCONTINUED | OUTPATIENT
Start: 2024-02-12 | End: 2024-02-12 | Stop reason: SDUPTHER

## 2024-02-12 RX ORDER — SODIUM CHLORIDE 0.9 % (FLUSH) 0.9 %
5-40 SYRINGE (ML) INJECTION PRN
Status: DISCONTINUED | OUTPATIENT
Start: 2024-02-12 | End: 2024-02-13 | Stop reason: HOSPADM

## 2024-02-12 RX ORDER — TAMSULOSIN HYDROCHLORIDE 0.4 MG/1
0.4 CAPSULE ORAL NIGHTLY
Status: DISCONTINUED | OUTPATIENT
Start: 2024-02-12 | End: 2024-02-13 | Stop reason: HOSPADM

## 2024-02-12 RX ORDER — ACETAMINOPHEN 325 MG/1
650 TABLET ORAL EVERY 4 HOURS PRN
Status: DISCONTINUED | OUTPATIENT
Start: 2024-02-12 | End: 2024-02-13 | Stop reason: HOSPADM

## 2024-02-12 RX ORDER — SODIUM CHLORIDE 9 MG/ML
INJECTION, SOLUTION INTRAVENOUS PRN
Status: DISCONTINUED | OUTPATIENT
Start: 2024-02-12 | End: 2024-02-13 | Stop reason: HOSPADM

## 2024-02-12 RX ORDER — FENTANYL CITRATE 50 UG/ML
INJECTION, SOLUTION INTRAMUSCULAR; INTRAVENOUS PRN
Status: DISCONTINUED | OUTPATIENT
Start: 2024-02-12 | End: 2024-02-12 | Stop reason: HOSPADM

## 2024-02-12 RX ORDER — HEPARIN SODIUM 1000 [USP'U]/ML
INJECTION, SOLUTION INTRAVENOUS; SUBCUTANEOUS PRN
Status: DISCONTINUED | OUTPATIENT
Start: 2024-02-12 | End: 2024-02-12 | Stop reason: HOSPADM

## 2024-02-12 RX ORDER — ALBUTEROL SULFATE 2.5 MG/3ML
2.5 SOLUTION RESPIRATORY (INHALATION) EVERY 6 HOURS PRN
Status: DISCONTINUED | OUTPATIENT
Start: 2024-02-12 | End: 2024-02-13 | Stop reason: HOSPADM

## 2024-02-12 RX ORDER — PROTAMINE SULFATE 10 MG/ML
INJECTION, SOLUTION INTRAVENOUS PRN
Status: DISCONTINUED | OUTPATIENT
Start: 2024-02-12 | End: 2024-02-12 | Stop reason: HOSPADM

## 2024-02-12 RX ORDER — SODIUM CHLORIDE 0.9 % (FLUSH) 0.9 %
5-40 SYRINGE (ML) INJECTION EVERY 12 HOURS SCHEDULED
Status: DISCONTINUED | OUTPATIENT
Start: 2024-02-12 | End: 2024-02-13 | Stop reason: HOSPADM

## 2024-02-12 RX ORDER — BISACODYL 5 MG/1
5 TABLET, DELAYED RELEASE ORAL DAILY PRN
Status: DISCONTINUED | OUTPATIENT
Start: 2024-02-12 | End: 2024-02-13 | Stop reason: HOSPADM

## 2024-02-12 RX ORDER — PRAVASTATIN SODIUM 80 MG/1
80 TABLET ORAL NIGHTLY
Status: DISCONTINUED | OUTPATIENT
Start: 2024-02-12 | End: 2024-02-13 | Stop reason: HOSPADM

## 2024-02-12 RX ORDER — ONDANSETRON 2 MG/ML
4 INJECTION INTRAMUSCULAR; INTRAVENOUS EVERY 6 HOURS PRN
Status: DISCONTINUED | OUTPATIENT
Start: 2024-02-12 | End: 2024-02-13 | Stop reason: HOSPADM

## 2024-02-12 RX ADMIN — SODIUM CHLORIDE: 9 INJECTION, SOLUTION INTRAVENOUS at 12:49

## 2024-02-12 RX ADMIN — VANCOMYCIN HYDROCHLORIDE 250 MG: 1 INJECTION, POWDER, LYOPHILIZED, FOR SOLUTION INTRAVENOUS at 12:50

## 2024-02-12 RX ADMIN — POLYETHYLENE GLYCOL 400 AND PROPYLENE GLYCOL 1 DROP: 4; 3 SOLUTION/ DROPS OPHTHALMIC at 14:49

## 2024-02-12 RX ADMIN — ALBUTEROL SULFATE 2.5 MG: 2.5 SOLUTION RESPIRATORY (INHALATION) at 20:26

## 2024-02-12 RX ADMIN — TAMSULOSIN HYDROCHLORIDE 0.4 MG: 0.4 CAPSULE ORAL at 20:24

## 2024-02-12 RX ADMIN — FENTANYL CITRATE 50 MCG: 50 INJECTION INTRAMUSCULAR; INTRAVENOUS at 14:52

## 2024-02-12 RX ADMIN — SODIUM CHLORIDE, PRESERVATIVE FREE 10 ML: 5 INJECTION INTRAVENOUS at 14:55

## 2024-02-12 RX ADMIN — SODIUM CHLORIDE, PRESERVATIVE FREE 10 ML: 5 INJECTION INTRAVENOUS at 12:50

## 2024-02-12 RX ADMIN — FENTANYL CITRATE 50 MCG: 50 INJECTION INTRAMUSCULAR; INTRAVENOUS at 17:26

## 2024-02-12 RX ADMIN — Medication 2000 MG: at 09:04

## 2024-02-12 RX ADMIN — ALBUTEROL SULFATE 2 PUFF: 90 AEROSOL, METERED RESPIRATORY (INHALATION) at 16:51

## 2024-02-12 RX ADMIN — PRAVASTATIN SODIUM 80 MG: 80 TABLET ORAL at 20:24

## 2024-02-12 RX ADMIN — SODIUM CHLORIDE: 9 INJECTION, SOLUTION INTRAVENOUS at 09:04

## 2024-02-12 NOTE — PROGRESS NOTES
02/12/24 1646   Encounter Summary   Encounter Overview/Reason  Initial Encounter;Spiritual/Emotional Needs   Service Provided For: Family   Referral/Consult From: Nurse   Support System Family members   Last Encounter  02/12/24   Complexity of Encounter High   Begin Time 1630   End Time  1645   Total Time Calculated 15 min   Assessment/Intervention/Outcome   Assessment Coping   Intervention Active listening   Outcome Encouraged   Plan and Referrals   Plan/Referrals Continue to visit, (comment)       ASSESSMENT  The patient is an 84-yr-old who has severe aortic stenosis. This is a spiritual consult case.  The case appears as \"other\" as the reason for spiritual services consult.     INTERVENTION  My inquiry about the consults did not yield any specific information. The patient informed me that he has none. I wished him a speedy recovery.    OUTCOME    His family member was coming in. She thanked me for the visit.     CARE PLAN    Please continue to visit the patient.

## 2024-02-12 NOTE — H&P
[facility]  Sedation/Analgesia History & Physical    Pt Name: Gerard Jane  Account number: [id number]  MRN: [id number]  YOB: 1940  Provider Performing Procedure: Marcell Mckinney MD MD  Referring Provider: Marcell Mckinney MD   Primary Care Physician: Mustapha Callahan MD  Date: 2/12/2024    PRE-PROCEDURE    Code Status: FULL CODE  Brief History/Pre-Procedure Diagnosis:   Severe AS  Not candidate for OHS  Poor femoral access    Consent: : I have discussed with the patient risks, benefits, and alternatives (and relevant risks, benefits, and side effects related to alternatives or not receiving care), and likelihood of the success.   The patient and/or representative appear to understand and agree to proceed.  The discussion encompasses risks, benefits, and side effects related to the alternatives and the risks related to not receiving the proposed care, treatment, and services.     The indication, risks and benefits of the procedure and possible therapeutic consequences and alternatives were discussed with the patient. The patient was given the opportunity to ask questions and to have them answered to his/her satisfaction. Risks of the procedure include but are not limited to the following: Bleeding, hematoma including retroperitoneal hemmorhage, infection, pain and discomfort, injury to the aorta and other blood vessels, rhythm disturbance, low blood pressure, myocardial infarction, stroke, kidney damage/failure, myocardial perforation, allergic reactions to sedatives/contrast material, loss of pulse/vascular compromise requiring surgery, aneurysm/pseudoaneurysm formation, possible loss of a limb/hand/leg, needing blood transfusion, requiring emergent open heart surgery or emergent coronary intervention, the need for intubation/respiratory support, the requirement for defibrillation/cardioversion, and death. Alternatives to and omission of the suggested procedure were discussed.

## 2024-02-12 NOTE — CARE COORDINATION
functional level: Independent in ADLs/IADLs (Per Daughter, pt at procedure.)    Family can provide assistance at DC: Yes (Per Daughter, pt at procedure.)  Would you like Case Management to discuss the discharge plan with any other family members/significant others, and if so, who? Yes (Daughter/Son; Per Daughter, pt at procedure.)  Plans to Return to Present Housing: Yes (Per Daughter, pt at procedure.)  Other Identified Issues/Barriers to RETURNING to current housing: none  Potential Assistance needed at discharge: N/A (Per Daughter, pt at procedure.)            Potential DME:    Patient expects to discharge to: House (Per Daughter, pt at procedure.)  Plan for transportation at discharge: Family (Per Daughter, pt at procedure.)    Financial    Payor: [employer] / Plan: [employer] / Product Type: *No Product type* /     Does insurance require precert for SNF: Yes    Potential assistance Purchasing Medications: No (Per Daughter, pt at procedure; [facility])  Meds-to-Beds request:        [facility] - [facility] [address] [phone] -  [phone]  [address] [address]  Phone: [phone] Fax: [phone]      Notes:    Factors facilitating achievement of predicted outcomes: Family support    Barriers to discharge: TAVR recovery    Additional Case Management Notes: Here for elective procedure, TAVR to be done today with Dr. Mckinney. ECHO to be done in the am.  Anticipate discharge tomorrow if medically ready and after ECHO is read by Dr. Mckinney.     Procedure:   2/12 TAVR procedure with Dr. Mckinney.   2/13 ECHO: to be done.     The Plan for Transition of Care is related to the following treatment goals of Severe aortic stenosis [I35.0]    Patient Goals/Plan/Treatment Preferences: Spoke with pt's daughter. Pt lives at home alone on land that he shares with his son. He is independent, drives and cares for self. Pt has nebulizer and home O2 from [employer] (wears 3L ATC and has an

## 2024-02-12 NOTE — PROGRESS NOTES
Patient admitted to 2E01  via wheelchair for TAVR.  Patient NPO. Patient accompanied by family.  Vital signs obtained.   Assessment and data collection intiated.   Oriented to room.  Policies and procedures for 2E explained.   All questions answered with no further questions at this time.   Fall prevention and safety precautions discussed with patient.

## 2024-02-12 NOTE — PROGRESS NOTES
[facility]   PROGRESS NOTE      Patient: Gerard Jane  Room #: 3B-21/021-A            YOB: 1940  Age: 84 y.o.  Gender: male            Admit Date & Time: 2/12/2024  8:17 AM    Assessment:    The patient was in his bed after a valve replacement. The patient shared his hope while his daughter shared her coping and her time with her father.     Interventions:  The patient was provided prayer for his condition and  encouraged to laugh by sharing stories with his daughter.     Outcomes:  The patient seemed to be in a more humorous spot post visit and he was looking forward to the future.     Plan:  1.Spiritual care will continue to follow the patient according to [facility] spiritual care SOP.       Electronically signed by Chaplain Petey, on 2/12/2024 at 1:46 PM.  Spiritual Care Department  [facility]  [phone]     02/12/24 1343   Encounter Summary   Encounter Overview/Reason  Initial Encounter;Post-Procedural   Service Provided For: Patient;Family;Patient and family together   Referral/Consult From: [facility]   Support System Children   Last Encounter  02/12/24   Complexity of Encounter Moderate   Begin Time 1335   End Time  1344   Total Time Calculated 9 min   Spiritual/Emotional needs   Type Spiritual Support   Assessment/Intervention/Outcome   Assessment Calm;Coping   Intervention Active listening;Explored Coping Skills/Resources;Prayer (assurance of)/[place]   Outcome Encouraged;Expressed Gratitude;Less anxious, Less agitated

## 2024-02-13 ENCOUNTER — HOSPITAL ENCOUNTER (INPATIENT)
Age: 84
Discharge: HOME | DRG: 267 | End: 2024-02-15
Attending: NURSE PRACTITIONER
Payer: OTHER GOVERNMENT

## 2024-02-13 ENCOUNTER — TELEPHONE (OUTPATIENT)
Dept: CARDIOLOGY CLINIC | Age: 84
End: 2024-02-13

## 2024-02-13 ENCOUNTER — APPOINTMENT (OUTPATIENT)
Age: 84
DRG: 267 | End: 2024-02-13
Attending: INTERNAL MEDICINE
Payer: OTHER GOVERNMENT

## 2024-02-13 VITALS
HEART RATE: 73 BPM | SYSTOLIC BLOOD PRESSURE: 107 MMHG | TEMPERATURE: 98.8 F | BODY MASS INDEX: 23.6 KG/M2 | HEIGHT: 67 IN | RESPIRATION RATE: 20 BRPM | OXYGEN SATURATION: 93 % | WEIGHT: 150.35 LBS | DIASTOLIC BLOOD PRESSURE: 59 MMHG

## 2024-02-13 LAB
ANION GAP SERPL CALC-SCNC: 8 MEQ/L (ref 8–16)
APTT PPP: 32.5 SECONDS (ref 22–38)
BUN SERPL-MCNC: 12 MG/DL (ref 7–22)
CALCIUM SERPL-MCNC: 8.7 MG/DL (ref 8.5–10.5)
CHLORIDE SERPL-SCNC: 100 MEQ/L (ref 98–111)
CO2 SERPL-SCNC: 27 MEQ/L (ref 23–33)
CREAT SERPL-MCNC: 0.5 MG/DL (ref 0.4–1.2)
DEPRECATED RDW RBC AUTO: 50.4 FL (ref 35–45)
ECHO AO ASC DIAM: 4.6 CM
ECHO AO ASCENDING AORTA INDEX: 2.57 CM/M2
ECHO AV AREA PEAK VELOCITY: 1.1 CM2
ECHO AV AREA VTI: 1.3 CM2
ECHO AV AREA/BSA PEAK VELOCITY: 0.6 CM2/M2
ECHO AV AREA/BSA VTI: 0.7 CM2/M2
ECHO AV MEAN GRADIENT: 15 MMHG
ECHO AV MEAN VELOCITY: 1.7 M/S
ECHO AV PEAK GRADIENT: 32 MMHG
ECHO AV PEAK VELOCITY: 2.8 M/S
ECHO AV VELOCITY RATIO: 0.32
ECHO AV VTI: 45.6 CM
ECHO BSA: 1.8 M2
ECHO BSA: 1.8 M2
ECHO LA AREA 2C: 17.2 CM2
ECHO LA AREA 4C: 14.2 CM2
ECHO LA DIAMETER INDEX: 2.23 CM/M2
ECHO LA DIAMETER: 4 CM
ECHO LA MAJOR AXIS: 5.1 CM
ECHO LA MINOR AXIS: 5.5 CM
ECHO LA VOL BP: 39 ML (ref 18–58)
ECHO LA VOL MOD A2C: 44 ML (ref 18–58)
ECHO LA VOL MOD A4C: 31 ML (ref 18–58)
ECHO LA VOL/BSA BIPLANE: 22 ML/M2 (ref 16–34)
ECHO LA VOLUME INDEX MOD A2C: 25 ML/M2 (ref 16–34)
ECHO LA VOLUME INDEX MOD A4C: 17 ML/M2 (ref 16–34)
ECHO LV EDV A4C: 114 ML
ECHO LV EDV INDEX A4C: 64 ML/M2
ECHO LV EJECTION FRACTION A4C: 35 %
ECHO LV ESV A4C: 74 ML
ECHO LV ESV INDEX A4C: 41 ML/M2
ECHO LV FRACTIONAL SHORTENING: 17 % (ref 28–44)
ECHO LV INTERNAL DIMENSION DIASTOLE INDEX: 2.91 CM/M2
ECHO LV INTERNAL DIMENSION DIASTOLIC: 5.2 CM (ref 4.2–5.9)
ECHO LV INTERNAL DIMENSION SYSTOLIC INDEX: 2.4 CM/M2
ECHO LV INTERNAL DIMENSION SYSTOLIC: 4.3 CM
ECHO LV IVSD: 1.4 CM (ref 0.6–1)
ECHO LV MASS 2D: 278.4 G (ref 88–224)
ECHO LV MASS INDEX 2D: 155.6 G/M2 (ref 49–115)
ECHO LV POSTERIOR WALL DIASTOLIC: 1.2 CM (ref 0.6–1)
ECHO LV RELATIVE WALL THICKNESS RATIO: 0.46
ECHO LVOT AREA: 3.5 CM2
ECHO LVOT AV VTI INDEX: 0.38
ECHO LVOT DIAM: 2.1 CM
ECHO LVOT MEAN GRADIENT: 2 MMHG
ECHO LVOT PEAK GRADIENT: 3 MMHG
ECHO LVOT PEAK VELOCITY: 0.9 M/S
ECHO LVOT STROKE VOLUME INDEX: 33.7 ML/M2
ECHO LVOT SV: 60.2 ML
ECHO LVOT VTI: 17.4 CM
ECHO MV E VELOCITY: 1.26 M/S
ECHO PV MAX VELOCITY: 0.9 M/S
ECHO PV PEAK GRADIENT: 4 MMHG
ECHO RV INTERNAL DIMENSION: 3.6 CM
ECHO RV TAPSE: 2.2 CM (ref 1.7–?)
ECHO TV E WAVE: 0.6 M/S
ERYTHROCYTE [DISTWIDTH] IN BLOOD BY AUTOMATED COUNT: 14.3 % (ref 11.5–14.5)
GFR SERPL CREATININE-BSD FRML MDRD: > 60 ML/MIN/1.73M2
GLUCOSE SERPL-MCNC: 83 MG/DL (ref 70–108)
HCT VFR BLD AUTO: 44.8 % (ref 42–52)
HGB BLD-MCNC: 14 GM/DL (ref 14–18)
INR PPP: 0.96 (ref 0.85–1.13)
MCH RBC QN AUTO: 30.2 PG (ref 26–33)
MCHC RBC AUTO-ENTMCNC: 31.3 GM/DL (ref 32.2–35.5)
MCV RBC AUTO: 96.8 FL (ref 80–94)
PLATELET # BLD AUTO: 145 THOU/MM3 (ref 130–400)
PMV BLD AUTO: 10 FL (ref 9.4–12.4)
POTASSIUM SERPL-SCNC: 4.3 MEQ/L (ref 3.5–5.2)
RBC # BLD AUTO: 4.63 MILL/MM3 (ref 4.7–6.1)
SODIUM SERPL-SCNC: 135 MEQ/L (ref 135–145)
WBC # BLD AUTO: 9.8 THOU/MM3 (ref 4.8–10.8)

## 2024-02-13 PROCEDURE — 2580000003 HC RX 258: Performed by: INTERNAL MEDICINE

## 2024-02-13 PROCEDURE — 85610 PROTHROMBIN TIME: CPT

## 2024-02-13 PROCEDURE — 93306 TTE W/DOPPLER COMPLETE: CPT

## 2024-02-13 PROCEDURE — 6370000000 HC RX 637 (ALT 250 FOR IP): Performed by: INTERNAL MEDICINE

## 2024-02-13 PROCEDURE — 93005 ELECTROCARDIOGRAM TRACING: CPT | Performed by: INTERNAL MEDICINE

## 2024-02-13 PROCEDURE — 94761 N-INVAS EAR/PLS OXIMETRY MLT: CPT

## 2024-02-13 PROCEDURE — 85027 COMPLETE CBC AUTOMATED: CPT

## 2024-02-13 PROCEDURE — 80048 BASIC METABOLIC PNL TOTAL CA: CPT

## 2024-02-13 PROCEDURE — 2700000000 HC OXYGEN THERAPY PER DAY

## 2024-02-13 PROCEDURE — 6360000002 HC RX W HCPCS: Performed by: INTERNAL MEDICINE

## 2024-02-13 PROCEDURE — 36415 COLL VENOUS BLD VENIPUNCTURE: CPT

## 2024-02-13 PROCEDURE — 93270 REMOTE 30 DAY ECG REV/REPORT: CPT

## 2024-02-13 PROCEDURE — 94640 AIRWAY INHALATION TREATMENT: CPT

## 2024-02-13 PROCEDURE — 85730 THROMBOPLASTIN TIME PARTIAL: CPT

## 2024-02-13 RX ORDER — METOPROLOL SUCCINATE 25 MG/1
50 TABLET, EXTENDED RELEASE ORAL DAILY
Qty: 180 TABLET | Refills: 1 | Status: SHIPPED
Start: 2024-02-21

## 2024-02-13 RX ORDER — CLOPIDOGREL BISULFATE 75 MG/1
75 TABLET ORAL DAILY
Qty: 30 TABLET | Refills: 3 | Status: SHIPPED | OUTPATIENT
Start: 2024-02-14

## 2024-02-13 RX ORDER — CLOPIDOGREL BISULFATE 75 MG/1
75 TABLET ORAL DAILY
Qty: 90 TABLET | OUTPATIENT
Start: 2024-02-13

## 2024-02-13 RX ADMIN — SODIUM CHLORIDE, PRESERVATIVE FREE 10 ML: 5 INJECTION INTRAVENOUS at 07:56

## 2024-02-13 RX ADMIN — ASPIRIN 81 MG: 81 TABLET, COATED ORAL at 07:56

## 2024-02-13 RX ADMIN — TIOTROPIUM BROMIDE INHALATION SPRAY 2 PUFF: 3.12 SPRAY, METERED RESPIRATORY (INHALATION) at 07:22

## 2024-02-13 RX ADMIN — ALBUTEROL SULFATE 2.5 MG: 2.5 SOLUTION RESPIRATORY (INHALATION) at 07:18

## 2024-02-13 RX ADMIN — ALBUTEROL SULFATE 2.5 MG: 2.5 SOLUTION RESPIRATORY (INHALATION) at 01:34

## 2024-02-13 RX ADMIN — POLYETHYLENE GLYCOL 400 AND PROPYLENE GLYCOL 1 DROP: 4; 3 SOLUTION/ DROPS OPHTHALMIC at 07:57

## 2024-02-13 RX ADMIN — ALBUTEROL SULFATE 2.5 MG: 2.5 SOLUTION RESPIRATORY (INHALATION) at 13:42

## 2024-02-13 RX ADMIN — CLOPIDOGREL BISULFATE 75 MG: 75 TABLET ORAL at 07:56

## 2024-02-13 NOTE — PLAN OF CARE
Problem: Safety - Adult  Goal: Free from fall injury  Outcome: Progressing  Flowsheets (Taken 2/13/2024 0807)  Free From Fall Injury: Instruct family/caregiver on patient safety     Problem: Respiratory - Adult  Goal: Achieves optimal ventilation and oxygenation  Outcome: Progressing  Flowsheets (Taken 2/13/2024 0807)  Achieves optimal ventilation and oxygenation:   Assess for changes in respiratory status   Assess for changes in mentation and behavior   Position to facilitate oxygenation and minimize respiratory effort     Problem: Cardiovascular - Adult  Goal: Maintains optimal cardiac output and hemodynamic stability  Outcome: Progressing  Flowsheets (Taken 2/13/2024 0807)  Maintains optimal cardiac output and hemodynamic stability: Monitor blood pressure and heart rate     Problem: Musculoskeletal - Adult  Goal: Return mobility to safest level of function  Outcome: Progressing  Flowsheets (Taken 2/13/2024 0807)  Return Mobility to Safest Level of Function: Assess patient stability and activity tolerance for standing, transferring and ambulating with or without assistive devices   Care plan reviewed with patient.  Patient verbalizes understanding of the care plan and contributed to goal setting.

## 2024-02-13 NOTE — DISCHARGE INSTRUCTIONS
Follow-up in the office on 2/20/2024 as scheduled.     Wear the event monitor a ordered. If feeling tired, weak, passing out or near passing out call our office or go to the ED.         Post Transcatheter Aortic Valve Replacement (TAVR) Discharge Instructions    Your follow-up appointments and echocardiogram will be scheduled by Dr. Mckinney's office and their office staff and will call you with the date and time.  We are here for you! If you have any questions, please call:   Devora ROMERO, [phone]    Do you have the help you need at home? Someone must be with you for the first 7 days or until you are seen in the office post TAVR.      ACTIVITY:  Weigh yourself every day in the morning after using the bathroom.    NO DRIVING UNTIL YOU RETURN TO THE DOCTOR'S OFFICE.  You may ride in a car.   Do not lift more than five pounds for the first week (A gallon of milk is 7 lbs)  Do not raise your left arm above your head or higher than 90 degrees for the first week.   Keep the left arm in a sling for the first couple days for extra support.   Get up and get dressed every day. Do not stay in bed. Set a daily routine. This is an important step in re-gaining your strength.  You may walk up and down a few stairs.   Walk as much as you can.  This will help facilitate the healing process.  Plan rest periods during the day.  Deep breathe and use your incentive spirometer 10 times every hour while you are awake.   Avoid work that increases muscle tension (straining with bowel movements, moving furniture)      WOUND CARE:  Remove Band-Aids after 48 hours of placement.  May shower once Band-Aids are removed. No bathtubs, pools, or hot tubs for the first week you are home.   Cleanse wounds with Hibiclens soap and water. Pat incision dry. Keep wounds open to air after Band-Aids are removed and keep dry.   A small amount of bloody or clear drainage is normal. Call if there is any extensive bruising, oozing or hematoma.  Place

## 2024-02-13 NOTE — PROGRESS NOTES
Inpatient Cardiac Rehabilitation Consult    Received consult for Phase II Cardiac Rehabilitation.  Patient needs cardiac rehab due to TAVR on 2/12/24.  Importance of Cardiac Rehab discussed with patient.  Reviewed cardiac rehab class times.  Patient questions answered.  We will contact patient at home to schedule evaluation appointment.    Cardiac Rehab brochure given.

## 2024-02-13 NOTE — PROGRESS NOTES
Discussed discharge summary with patient. Instructed patient about medications & follow up appointments. Patient denies any additional questions. Patient was discharged with all belongings. No distress noted. Patient discharged to home. Taken down to the vehicle by transporter per wheelchair.

## 2024-02-13 NOTE — OP NOTE
hemodynamic measurements were obtained. The valve was prepped per 's specifications, with one additional ml of saline for the balloon injectate.  A Safari wire was inserted into the angled pigtail, with proper final positioning in the LV apex. With manual pressure controlling the cannulation site, the 14 Fr sheath was removed, and the transcatheter valve was advanced on the wire. Under fluoroscopic guidance, the transcatheter valve was advanced around the arch without difficulty, and positioned appropriately through the annulus under a coplanar view. Adjustment was made to remove parallax. Slow valve deployment was initiated, with angiographic verification of proper position and under rapid ventricular pacing.  The valve released without difficulty. Echocardiography was done thereafter, demonstrating that there was no significant paravalvular leak, that the ejection fraction was unchanged, there was no regional wall motion abnormality, no pericardial effusion, and there was no change in mitral regurgitation. Subsequent hemodynamics confirmed no significant pressure gradient between the LV and the AO, and no change in the left ventricular end-diastolic pressure.     The pigtail was removed from the right femoral artery. A Glidewire Advantage was used to advance a wire into the left axillary artery and into the arm as the valve delivery system was pulled back. An 8-0 Hornbeak balloon was advanced to the valve cannulation site and inflated as the valve was removed over a wire. The two Percloses were completed. Protamine was given. The balloon was kept inflated over the cannulation site for 5 minutes. Hemostasis was verified. Subsequent angiography showed no leak and no stenosis, with good distal flow. The hand and digits were verified as having normal perfusion. The sheath was removed from the right femoral artery and the site closed with a 6 Fr Angioseal.    IMMEDIATE COMPLICATIONS:  None.     MEDICATIONS:

## 2024-02-13 NOTE — DISCHARGE SUMMARY
Structural Heart/Cardiology Discharge Summary       Name:  Gerard Jane  YOB: 1940  Medical Record Number:  [id number]    Date of Admission:  2/12/2024  Date of Discharge:  2/13/2024    Admitting physician: Marcell Mckinney MD  Discharge to: Home  Condition at d/c: Stable    Time spent on discharge: <60 minutes / >60 minutes    Hospital Problem List:  Patient Active Problem List   Diagnosis    Chronic obstructive lung disease (HCC)    Transitional cell carcinoma of bladder (HCC)    Adenomatous polyp    AAA (abdominal aortic aneurysm) (HCC)    Endoleak post endovascular aneurysm repair    Chronic hypoxemic respiratory failure (HCC)    History of tobacco use    Myocardiopathy (HCC)    Acute HFrEF (heart failure with reduced ejection fraction) (HCC)    CAD in native artery    Chest pain    Aortic stenosis, severe    Aortic valve stenosis    Severe aortic stenosis    Renal insufficiency       Procedures: TAVR 2/12/2024    Hospital Course: Underwent successful TAVR on 2/12/2024.  Hgb remained stable post procedure.  No rhythm issues post-procedure.     Discharge physical examination:   Vitals:    02/13/24 1137   BP: (!) 107/59   Pulse: 73   Resp: 20   Temp: 98.8 °F (37.1 °C)   SpO2: 93%     General Appearance: alert ,conversing, in no acute distress  Cardiovascular: normal rate, regular rhythm, normal S1 and S2, no murmurs, rubs, clicks, or gallops  Pulmonary/Chest: clear to auscultation bilaterally- no wheezes, rales or rhonchi, normal air movement, no respiratory distress  Neurological: alert, oriented, normal speech, no focal findings or movement disorder noted.   Pulses: Bilateral radial, femoral, DP, and PT pulses noted  Lower Extremity: No edema noted. Groin incisions healing appropriately-No signs of infection or hematoma.    Discharge Medications:         Medication List        START taking these medications      clopidogrel 75 MG tablet  Commonly known as: PLAVIX  Take 1 tablet by mouth

## 2024-02-13 NOTE — TELEPHONE ENCOUNTER
Cardiac event monitor ordered.   I called [facility]B and spoke to Ana who confirmed this will be placed prior to discharge.

## 2024-02-14 ENCOUNTER — TELEPHONE (OUTPATIENT)
Dept: CARDIOLOGY CLINIC | Age: 84
End: 2024-02-14

## 2024-02-14 ENCOUNTER — CARE COORDINATION (OUTPATIENT)
Dept: FAMILY MEDICINE CLINIC | Age: 84
End: 2024-02-14

## 2024-02-14 DIAGNOSIS — I35.0 SEVERE AORTIC STENOSIS: Primary | ICD-10-CM

## 2024-02-14 DIAGNOSIS — Z95.2 S/P TAVR (TRANSCATHETER AORTIC VALVE REPLACEMENT): ICD-10-CM

## 2024-02-14 LAB
EKG ATRIAL RATE: 55 BPM
EKG ATRIAL RATE: 56 BPM
EKG ATRIAL RATE: 67 BPM
EKG P AXIS: 12 DEGREES
EKG P AXIS: 62 DEGREES
EKG P AXIS: 76 DEGREES
EKG P-R INTERVAL: 240 MS
EKG P-R INTERVAL: 248 MS
EKG P-R INTERVAL: 282 MS
EKG Q-T INTERVAL: 454 MS
EKG Q-T INTERVAL: 482 MS
EKG Q-T INTERVAL: 522 MS
EKG QRS DURATION: 168 MS
EKG QRS DURATION: 184 MS
EKG QRS DURATION: 192 MS
EKG QTC CALCULATION (BAZETT): 438 MS
EKG QTC CALCULATION (BAZETT): 499 MS
EKG QTC CALCULATION (BAZETT): 509 MS
EKG R AXIS: -51 DEGREES
EKG R AXIS: -65 DEGREES
EKG R AXIS: 122 DEGREES
EKG T AXIS: -49 DEGREES
EKG T AXIS: 109 DEGREES
EKG T AXIS: 81 DEGREES
EKG VENTRICULAR RATE: 55 BPM
EKG VENTRICULAR RATE: 56 BPM
EKG VENTRICULAR RATE: 67 BPM

## 2024-02-14 RX ORDER — CLOPIDOGREL BISULFATE 75 MG/1
75 TABLET ORAL DAILY
Qty: 30 TABLET | Refills: 1 | Status: SHIPPED | OUTPATIENT
Start: 2024-02-14

## 2024-02-14 NOTE — CARE COORDINATION
Care Transitions Initial Follow Up Call    Call within 2 business days of discharge: Yes     Patient: Gerard Jane Patient : 1940 MRN: [id number]    Last Discharge Facility       Date Complaint Diagnosis Description Type Department Provider    24  LBBB (left bundle branch block) ... Admission (Discharged) [facility] Marcell Mckinney MD            RARS: Readmission Risk Score: 12.8       Spoke with: Gerard    Discharge department/facility: [facility]    Non-face-to-face services provided:  Scheduled appointment with PCP-yes  Scheduled appointment with Specialist-yes  Obtained and reviewed discharge summary and/or continuity of care documents  Reviewed and followed up on pending diagnostic tests and treatments-yes  Communication with home health agencies or other community services the patient is currently using-yes  Communication with specialists who will assume or re-assume care of the patient's system-specific problems-yes  Education of patient/family/caregiver/guardian to support self-management-yes  Assessment and support for treatment adherence and medication management-yes  Establishment or re-establishment of referrals-yes  Assistance in accessing community resources-na    Follow Up  Future Appointments   Date Time Provider Department Center   2024 11:00 AM Abraham Suarez PA-C [facility] SURG [facility] [place]   2024  2:10 PM Mustapha Callahan MD [facility]   2024  9:10 AM Mustapha Callahan MD [facility]   2024  8:40 AM STR CT IMAGING RM1  [facility] OUT EXP STR Rad/Card   2024 10:00 AM Linda Mayen APRN - CNP N [facility] CHF P - [place]   3/12/2024 11:00 AM STR ECHO 1 [facility] ADRI STR Rad/Card   3/12/2024  2:15 PM Jean Delgado MD [facility] VASC P - [place]   3/14/2024  9:45 AM Marcell Mckinney MD N [facility] Heart P - [place]   2024 10:00 AM Mian Ellsworth MD [facility]   10/28/2024  9:00 AM Amanda Huff APRN - CNP N Pulm Med P - [place]   2024 11:00

## 2024-02-14 NOTE — TELEPHONE ENCOUNTER
Orders received from Dr. Mckinney to schedule the patient for her 1 year post TAVR follow up appointment with an ECHO, CBC, and BMP.    Appointment is scheduled with Vida for 2/18/25 at 0900.  ECHO scheduled for 2/11/25 at ___.    Dr. Mckinney, please agree.    Dorene, can you please schedule this ECHO?

## 2024-02-20 ENCOUNTER — OFFICE VISIT (OUTPATIENT)
Dept: FAMILY MEDICINE CLINIC | Age: 84
End: 2024-02-20

## 2024-02-20 ENCOUNTER — OFFICE VISIT (OUTPATIENT)
Dept: CARDIOTHORACIC SURGERY | Age: 84
End: 2024-02-20
Payer: OTHER GOVERNMENT

## 2024-02-20 VITALS
HEIGHT: 67 IN | BODY MASS INDEX: 24.84 KG/M2 | DIASTOLIC BLOOD PRESSURE: 84 MMHG | RESPIRATION RATE: 20 BRPM | WEIGHT: 158.25 LBS | HEART RATE: 60 BPM | SYSTOLIC BLOOD PRESSURE: 120 MMHG

## 2024-02-20 VITALS
HEART RATE: 76 BPM | DIASTOLIC BLOOD PRESSURE: 84 MMHG | HEIGHT: 67 IN | BODY MASS INDEX: 24.45 KG/M2 | WEIGHT: 155.8 LBS | OXYGEN SATURATION: 92 % | SYSTOLIC BLOOD PRESSURE: 120 MMHG

## 2024-02-20 DIAGNOSIS — J96.11 CHRONIC HYPOXEMIC RESPIRATORY FAILURE (HCC): ICD-10-CM

## 2024-02-20 DIAGNOSIS — Z09 HOSPITAL DISCHARGE FOLLOW-UP: Primary | ICD-10-CM

## 2024-02-20 DIAGNOSIS — I35.0 SEVERE AORTIC STENOSIS: ICD-10-CM

## 2024-02-20 DIAGNOSIS — Z95.2 S/P TAVR (TRANSCATHETER AORTIC VALVE REPLACEMENT): Primary | ICD-10-CM

## 2024-02-20 PROCEDURE — 99495 TRANSJ CARE MGMT MOD F2F 14D: CPT | Performed by: FAMILY MEDICINE

## 2024-02-20 PROCEDURE — 1123F ACP DISCUSS/DSCN MKR DOCD: CPT | Performed by: PHYSICIAN ASSISTANT

## 2024-02-20 PROCEDURE — 99214 OFFICE O/P EST MOD 30 MIN: CPT | Performed by: PHYSICIAN ASSISTANT

## 2024-02-20 RX ORDER — METOPROLOL SUCCINATE 25 MG/1
50 TABLET, EXTENDED RELEASE ORAL DAILY
Qty: 180 TABLET | Refills: 1 | Status: SHIPPED | OUTPATIENT
Start: 2024-02-21

## 2024-02-20 ASSESSMENT — PATIENT HEALTH QUESTIONNAIRE - PHQ9
SUM OF ALL RESPONSES TO PHQ QUESTIONS 1-9: 0
SUM OF ALL RESPONSES TO PHQ9 QUESTIONS 1 & 2: 0
SUM OF ALL RESPONSES TO PHQ QUESTIONS 1-9: 0
SUM OF ALL RESPONSES TO PHQ QUESTIONS 1-9: 0
2. FEELING DOWN, DEPRESSED OR HOPELESS: 0
SUM OF ALL RESPONSES TO PHQ QUESTIONS 1-9: 0
1. LITTLE INTEREST OR PLEASURE IN DOING THINGS: 0

## 2024-02-20 NOTE — PROGRESS NOTES
Structural Heart/Cardiology Follow up    2/20/2024 11:04 AM    Patient's Name/Date of Birth: Gerard Jane / 1940 (84 y.o.)    PCP: Mustapha Callahan MD    Date: February 20, 2024     CC:   Chief Complaint   Patient presents with    Follow Up After Procedure     S/p TAVR axilary access 02.12.2024 with Hank STANTON  We had the pleasure of seeing Gerard Jnae in the office today, as you know this is a very pleasant 84 y.o. year old male with a history of aortic stenosis who underwent a successful TAVR on 2/12/24.  He is doing well and relates that home O2 requirements have decreased compared to prior to TAVR procedure.    PastMedical History:  Gerard  has a past medical history of Adenomatous polyp, Aortic aneurysm (HCC), Asthma, CAD (coronary artery disease), COPD (chronic obstructive pulmonary disease) (HCC), Hyperlipidemia, Hypertension, Prolonged emergence from general anesthesia, and Transitional cell carcinoma of bladder (HCC).    Past Surgical History:  The patient  has a past surgical history that includes Coronary angioplasty with stent; Colonoscopy; Carotid endarterectomy (Right); bladder tumor excision; other surgical history; AAA repair, endovascular (N/A, 6/26/2020); Cataract removal; Cardiac procedure (N/A, 11/20/2023); Cardiac procedure (Left, 1/17/2024); and Cardiac procedure (N/A, 2/12/2024).    Allergies:  The patient has No Known Allergies.    Medications:    Current Outpatient Medications:     clopidogrel (PLAVIX) 75 MG tablet, Take 1 tablet by mouth daily, Disp: 30 tablet, Rfl: 3    furosemide (LASIX) 20 MG tablet, Take 1 tablet by mouth every 48 hours, Disp: 45 tablet, Rfl: 3    OXYGEN, Inhale into the lungs 3 liters, Disp: , Rfl:     empagliflozin (JARDIANCE) 10 MG tablet, Take 1 tablet by mouth daily, Disp: 90 tablet, Rfl: 3    tiotropium (SPIRIVA RESPIMAT) 2.5 MCG/ACT AERS inhaler, Inhale 2 puffs into the lungs daily, Disp: 12 g, Rfl: 3    fluticasone-salmeterol (WIXELA INHUB) 250-50

## 2024-02-20 NOTE — PROGRESS NOTES
Post-Discharge Transitional Care Follow Up      Gerard Jane   YOB: 1940    Date of Office Visit:  2/20/2024  Date of Hospital Admission: 2/12/24  Date of Hospital Discharge: 2/13/24  Readmission Risk Score (high >=14%. Medium >=10%):Readmission Risk Score: 12.8      Care management risk score Rising risk (score 2-5) and Complex Care (Scores >=6): No Risk Score On File     Non face to face  following discharge, date last encounter closed (first attempt may have been earlier): 02/14/2024     Call initiated 2 business days of discharge: Yes     Hospital discharge follow-up  Severe aortic stenosis  Chronic hypoxemic respiratory failure (HCC)      Medical Decision Making: moderate complexity  No follow-ups on file.           Subjective:   HPI    Inpatient course: Discharge summary reviewed- see chart.    Interval history/Current status: he states he is doing well has more exercise ability    Patient Active Problem List   Diagnosis    Chronic obstructive lung disease (HCC)    Transitional cell carcinoma of bladder (HCC)    Adenomatous polyp    AAA (abdominal aortic aneurysm) (HCC)    Endoleak post endovascular aneurysm repair    Chronic hypoxemic respiratory failure (HCC)    History of tobacco use    Myocardiopathy (HCC)    Acute HFrEF (heart failure with reduced ejection fraction) (HCC)    CAD in native artery    Chest pain    Aortic stenosis, severe    Aortic valve stenosis    Severe aortic stenosis    Renal insufficiency       Medications listed as ordered at the time of discharge from hospital     Medication List            Accurate as of February 20, 2024  2:30 PM. If you have any questions, ask your nurse or doctor.                CONTINUE taking these medications      * albuterol sulfate  (90 Base) MCG/ACT inhaler  Commonly known as: Proventil HFA  Inhale 2 puffs into the lungs every 6 hours as needed for Wheezing     * albuterol (2.5 MG/3ML) 0.083% nebulizer solution  Commonly known as:

## 2024-02-20 NOTE — PATIENT INSTRUCTIONS
You may receive a survey regarding the care you received during your visit.  Your input is valuable to us.  We encourage you to complete and return your survey.  We hope you will choose us in the future for your healthcare needs.    Thank you for choosing [facility]!    Your Medical Assistant Today:  Christina MAIER   Your Provider for Today: Joey Suarez PA-C  Ph. [phone]

## 2024-02-22 ENCOUNTER — CARE COORDINATION (OUTPATIENT)
Dept: FAMILY MEDICINE CLINIC | Age: 84
End: 2024-02-22

## 2024-02-22 NOTE — CARE COORDINATION
2/20/24  Week #1 follow up post hospital discharge for TAVR.  Was seen by Dr. Callahan for follow up.  Also seen by cardiology-doing well decreased home 02 to 2L. Will be followed by [facility]F [facility] and has follow up with Dr. Mckinney. Will follow with call next week. Gerard knows to call with any questions or concerns.

## 2024-02-27 ENCOUNTER — OFFICE VISIT (OUTPATIENT)
Dept: CARDIOLOGY CLINIC | Age: 84
End: 2024-02-27
Payer: OTHER GOVERNMENT

## 2024-02-27 ENCOUNTER — HOSPITAL ENCOUNTER (OUTPATIENT)
Dept: CT IMAGING | Age: 84
Discharge: HOME | End: 2024-02-27
Attending: THORACIC SURGERY (CARDIOTHORACIC VASCULAR SURGERY)
Payer: MEDICARE

## 2024-02-27 VITALS
SYSTOLIC BLOOD PRESSURE: 116 MMHG | WEIGHT: 158.2 LBS | DIASTOLIC BLOOD PRESSURE: 68 MMHG | HEART RATE: 60 BPM | RESPIRATION RATE: 18 BRPM | BODY MASS INDEX: 24.83 KG/M2 | OXYGEN SATURATION: 96 % | HEIGHT: 67 IN

## 2024-02-27 DIAGNOSIS — I50.22 CHRONIC SYSTOLIC CONGESTIVE HEART FAILURE, NYHA CLASS 2 (HCC): ICD-10-CM

## 2024-02-27 DIAGNOSIS — I51.89 GRADE I DIASTOLIC DYSFUNCTION: ICD-10-CM

## 2024-02-27 DIAGNOSIS — Z86.79 STATUS POST ENDOVASCULAR ANEURYSM REPAIR (EVAR): ICD-10-CM

## 2024-02-27 DIAGNOSIS — I71.40 ABDOMINAL AORTIC ANEURYSM (AAA) WITHOUT RUPTURE, UNSPECIFIED PART (HCC): ICD-10-CM

## 2024-02-27 DIAGNOSIS — Z95.2 S/P TAVR (TRANSCATHETER AORTIC VALVE REPLACEMENT): Primary | ICD-10-CM

## 2024-02-27 DIAGNOSIS — Z98.890 STATUS POST ENDOVASCULAR ANEURYSM REPAIR (EVAR): ICD-10-CM

## 2024-02-27 DIAGNOSIS — Z91.89 AT RISK FOR FLUID VOLUME OVERLOAD: ICD-10-CM

## 2024-02-27 PROCEDURE — 6360000004 HC RX CONTRAST MEDICATION: Performed by: THORACIC SURGERY (CARDIOTHORACIC VASCULAR SURGERY)

## 2024-02-27 PROCEDURE — 74174 CTA ABD&PLVS W/CONTRAST: CPT

## 2024-02-27 PROCEDURE — 99214 OFFICE O/P EST MOD 30 MIN: CPT | Performed by: NURSE PRACTITIONER

## 2024-02-27 PROCEDURE — 1123F ACP DISCUSS/DSCN MKR DOCD: CPT | Performed by: NURSE PRACTITIONER

## 2024-02-27 RX ADMIN — IOPAMIDOL 80 ML: 755 INJECTION, SOLUTION INTRAVENOUS at 08:53

## 2024-02-27 ASSESSMENT — ENCOUNTER SYMPTOMS
NAUSEA: 0
ABDOMINAL DISTENTION: 0
COUGH: 0
SHORTNESS OF BREATH: 1
VOMITING: 0

## 2024-02-27 NOTE — PATIENT INSTRUCTIONS
You may receive a survey regarding the care you received during your visit.  Your input is valuable to us.  We encourage you to complete and return your survey.  We hope you will choose us in the future for your healthcare needs.    Your nurses today were Symone.  Office hours:   Mon-Thurs 8-4:30  Friday 8-12  Phone: [phone]    Continue:  Continue current medications  Daily weights and record  Fluid restriction of 2 Liters per day  Limit sodium in diet to around 5969-2696 mg/day  Monitor BP  Activity as tolerated     Call the Heart Failure Clinic for any of the following symptoms:   Weight gain of 2-3 pounds in 1 day or 5 pounds in 1 week  Increased shortness of breath  Shortness of breath while laying down  Cough  Chest pain  Swelling in feet, ankles or legs  Bloating in abdomen  Fatigue          No medication changes today    Continue diet/fluid adherence  Continue daily wts.  F/U w/ Cardiology  F/U in 6 month

## 2024-02-27 NOTE — PROGRESS NOTES
daily   Diuretic - Lasix 20 EOD  AA - aldactone 25 daily  SGLT2 -  Jardiance 10 daily   Vasodilator - none  Other - ASA, plavix       HFrEF 35-40 2024 (40% 2023) (25-30% 7/2023) (35% 2022)  Mixed cardiomyopathy   structural - Severe AS - s/p TAVR 2/2024  Suspect some underlying ischemic  RCA 60% in-stent restenosis with negative FFR   Grade 1 DD 2023  Ascending aortic 4.6 cm - stable - not a surgical candidate at this time - Dr Delgado March 12th     Stable, no fluid on exam today. Doing well on his Lasix. Has really improved on his Na intake. No changes today. 6 month f/u. Rehab on March 5th.     GDMT: yes    Lab reviewed - K 4.3 Cr 0.5 mag 2.4 hgb 14.0    ECHO 2024: no atrial dilation, no AI  CATH 2022: LVEDP 7 - 60% in-stent restenosis with negative FFR   CATH 2023: 50-60% in stent restenosis of RCA - unchanged from previous, RA - 7, wedge 8  Stress ECHO: mean gradient 44, FRANCISCO 0.65 - LF/LG AS      No medication changes today    Continue diet/fluid adherence  Continue daily wts.  F/U w/ Cardiology  F/U in 6 month      Tolerating above noted HF meds, no ill side effects noted. Will continue to monitor kidney function and electrolytes. Will optimize as tolerated.   Pt is compliant w/ medications.    Total visit time of 25 minutes has been spent with patient on education of symptoms, management, medication, and plan of care; as well as review of chart: labs, ECHO, radiology reports, etc.   I personally spent more then 50% of the appt time face to face with the patient.  Daily weights  Fluid restriction of 2 Liters per day  Limit sodium in diet to around 3093-8718 mg/day  Monitor BP  Activity as tolerated     Patient was instructed to call the Heart Failure Clinic for any changes in symptoms as noted in AVS.      Return in about 6 months (around 8/27/2024). or sooner if needed     Patient given educational materials - see patient instructions.   We discussed the importance of weighing oneself and recording daily. We also

## 2024-02-28 ENCOUNTER — CARE COORDINATION (OUTPATIENT)
Dept: FAMILY MEDICINE CLINIC | Age: 84
End: 2024-02-28

## 2024-02-28 NOTE — CARE COORDINATION
Spoke with Gerard for follow up post hospital discharge for TAVR. Following closely with cardiology-doing well. Increased energy and decreased SOB. Just got back from shopping. March is full of appointments with cardiology-including ECHO. Follow up with Dr. Callahan in April  Will follow next week Gerard encouraged to call with any questions or concerns.

## 2024-03-05 ENCOUNTER — HOSPITAL ENCOUNTER (OUTPATIENT)
Dept: CARDIAC REHAB | Age: 84
Setting detail: THERAPIES SERIES
Discharge: HOME | End: 2024-03-05

## 2024-03-05 NOTE — PROGRESS NOTES
Cardiac Rehab  Pt and daughter arrived for cardiac rehab eval.  Daughter [facility] cardiac re[facility].  Cardiac rehab staff were unaware that patient had VA insurance so authorization has not been done.  Staff explained that the [facility] sends the rehab dept authorization and they would have to call the [facility] to start that process.  Both pt and daughter understood.  Staff explained to pt and daughter that we would get him rescheduled as soon as the authorization is complete.  Electronically signed by SHITAL France on 3/5/2024 at 2:57 PM

## 2024-03-06 ENCOUNTER — CARE COORDINATION (OUTPATIENT)
Dept: FAMILY MEDICINE CLINIC | Age: 84
End: 2024-03-06

## 2024-03-06 NOTE — CARE COORDINATION
Spoke with Gerard for follow up post hospital discharge for TAVR. Following with Cardiac Rehab. Doing well, feels good. Final call next week. Appreciative of follow up. Encouraged Gerard to call with any questions or concerns.

## 2024-03-12 ENCOUNTER — HOSPITAL ENCOUNTER (OUTPATIENT)
Age: 84
Discharge: HOME | End: 2024-03-14
Attending: INTERNAL MEDICINE
Payer: MEDICARE

## 2024-03-12 ENCOUNTER — HOSPITAL ENCOUNTER (OUTPATIENT)
Age: 84
Discharge: HOME | End: 2024-03-12
Attending: INTERNAL MEDICINE
Payer: MEDICARE

## 2024-03-12 ENCOUNTER — OFFICE VISIT (OUTPATIENT)
Age: 84
End: 2024-03-12
Payer: OTHER GOVERNMENT

## 2024-03-12 VITALS
HEIGHT: 67 IN | SYSTOLIC BLOOD PRESSURE: 91 MMHG | WEIGHT: 158 LBS | HEART RATE: 61 BPM | BODY MASS INDEX: 24.8 KG/M2 | DIASTOLIC BLOOD PRESSURE: 57 MMHG

## 2024-03-12 VITALS
WEIGHT: 158 LBS | HEIGHT: 67 IN | BODY MASS INDEX: 24.8 KG/M2 | SYSTOLIC BLOOD PRESSURE: 116 MMHG | DIASTOLIC BLOOD PRESSURE: 68 MMHG

## 2024-03-12 DIAGNOSIS — Z86.79 STATUS POST ENDOVASCULAR ANEURYSM REPAIR (EVAR): Primary | ICD-10-CM

## 2024-03-12 DIAGNOSIS — I71.43 INFRARENAL ABDOMINAL AORTIC ANEURYSM (AAA) WITHOUT RUPTURE (HCC): ICD-10-CM

## 2024-03-12 DIAGNOSIS — Z98.890 STATUS POST ENDOVASCULAR ANEURYSM REPAIR (EVAR): Primary | ICD-10-CM

## 2024-03-12 DIAGNOSIS — I50.22 CHRONIC SYSTOLIC CONGESTIVE HEART FAILURE, NYHA CLASS 2 (HCC): ICD-10-CM

## 2024-03-12 DIAGNOSIS — Z95.2 S/P TAVR (TRANSCATHETER AORTIC VALVE REPLACEMENT): ICD-10-CM

## 2024-03-12 LAB
ECHO AO ASC DIAM: 4.2 CM
ECHO AO ASCENDING AORTA INDEX: 2.3 CM/M2
ECHO AV AREA PEAK VELOCITY: 1.2 CM2
ECHO AV AREA VTI: 1.2 CM2
ECHO AV AREA/BSA PEAK VELOCITY: 0.7 CM2/M2
ECHO AV AREA/BSA VTI: 0.7 CM2/M2
ECHO AV MEAN GRADIENT: 13 MMHG
ECHO AV MEAN VELOCITY: 1.7 M/S
ECHO AV PEAK GRADIENT: 26 MMHG
ECHO AV PEAK VELOCITY: 2.6 M/S
ECHO AV VELOCITY RATIO: 0.35
ECHO AV VTI: 52.9 CM
ECHO BSA: 1.84 M2
ECHO EST RA PRESSURE: 5 MMHG
ECHO LA AREA 2C: 16 CM2
ECHO LA AREA 4C: 15.9 CM2
ECHO LA DIAMETER INDEX: 2.95 CM/M2
ECHO LA DIAMETER: 5.4 CM
ECHO LA MAJOR AXIS: 5.1 CM
ECHO LA MINOR AXIS: 5.1 CM
ECHO LA VOL BP: 40 ML (ref 18–58)
ECHO LA VOL MOD A2C: 41 ML (ref 18–58)
ECHO LA VOL MOD A4C: 40 ML (ref 18–58)
ECHO LA VOL/BSA BIPLANE: 22 ML/M2 (ref 16–34)
ECHO LA VOLUME INDEX MOD A2C: 22 ML/M2 (ref 16–34)
ECHO LA VOLUME INDEX MOD A4C: 22 ML/M2 (ref 16–34)
ECHO LV E' LATERAL VELOCITY: 5 CM/S
ECHO LV EDV A2C: 167 ML
ECHO LV EDV A4C: 119 ML
ECHO LV EDV INDEX A4C: 65 ML/M2
ECHO LV EDV NDEX A2C: 91 ML/M2
ECHO LV EJECTION FRACTION A2C: 35 %
ECHO LV EJECTION FRACTION A4C: 28 %
ECHO LV EJECTION FRACTION BIPLANE: 31 % (ref 55–100)
ECHO LV ESV A2C: 107 ML
ECHO LV ESV A4C: 86 ML
ECHO LV ESV INDEX A2C: 58 ML/M2
ECHO LV ESV INDEX A4C: 47 ML/M2
ECHO LV FRACTIONAL SHORTENING: 16 % (ref 28–44)
ECHO LV INTERNAL DIMENSION DIASTOLE INDEX: 3.11 CM/M2
ECHO LV INTERNAL DIMENSION DIASTOLIC: 5.7 CM (ref 4.2–5.9)
ECHO LV INTERNAL DIMENSION SYSTOLIC INDEX: 2.62 CM/M2
ECHO LV INTERNAL DIMENSION SYSTOLIC: 4.8 CM
ECHO LV ISOVOLUMETRIC RELAXATION TIME (IVRT): 120 MS
ECHO LV IVSD: 2.2 CM (ref 0.6–1)
ECHO LV MASS 2D: 473.5 G (ref 88–224)
ECHO LV MASS INDEX 2D: 258.7 G/M2 (ref 49–115)
ECHO LV POSTERIOR WALL DIASTOLIC: 1.2 CM (ref 0.6–1)
ECHO LV RELATIVE WALL THICKNESS RATIO: 0.42
ECHO LVOT AREA: 3.5 CM2
ECHO LVOT AV VTI INDEX: 0.35
ECHO LVOT DIAM: 2.1 CM
ECHO LVOT MEAN GRADIENT: 1 MMHG
ECHO LVOT PEAK GRADIENT: 3 MMHG
ECHO LVOT PEAK VELOCITY: 0.9 M/S
ECHO LVOT STROKE VOLUME INDEX: 34.6 ML/M2
ECHO LVOT SV: 63.4 ML
ECHO LVOT VTI: 18.3 CM
ECHO MV A VELOCITY: 0.96 M/S
ECHO MV E DECELERATION TIME (DT): 158 MS
ECHO MV E VELOCITY: 0.5 M/S
ECHO MV E/A RATIO: 0.52
ECHO MV E/E' LATERAL: 10
ECHO PV MAX VELOCITY: 1 M/S
ECHO PV PEAK GRADIENT: 4 MMHG
ECHO RIGHT VENTRICULAR SYSTOLIC PRESSURE (RVSP): 30 MMHG
ECHO RV INTERNAL DIMENSION: 2.9 CM
ECHO RV TAPSE: 2.3 CM (ref 1.7–?)
ECHO TV E WAVE: 0.5 M/S
ECHO TV REGURGITANT MAX VELOCITY: 2.52 M/S
ECHO TV REGURGITANT PEAK GRADIENT: 25 MMHG

## 2024-03-12 PROCEDURE — 1123F ACP DISCUSS/DSCN MKR DOCD: CPT | Performed by: THORACIC SURGERY (CARDIOTHORACIC VASCULAR SURGERY)

## 2024-03-12 PROCEDURE — 93306 TTE W/DOPPLER COMPLETE: CPT

## 2024-03-12 PROCEDURE — 99214 OFFICE O/P EST MOD 30 MIN: CPT | Performed by: THORACIC SURGERY (CARDIOTHORACIC VASCULAR SURGERY)

## 2024-03-12 NOTE — PATIENT INSTRUCTIONS
If you receive a survey asking about your care experience, please respond. Your answers will help ensure you receive high-quality care at this office. Thank you!    Your Medical Assistant today: Adelaida  Thank you for coming to our office! It was a pleasure to serve you.

## 2024-03-12 NOTE — PROGRESS NOTES
CT/CV Surgery Follow Up Office Visit      Patient's Name/Date of Birth: Gerard Jane / 1940 (84 y.o.)    PCP: Mustapha Callahan MD    Date: March 12, 2024    We had the pleasure of seeing Gerard Jane in the office today, as you know this is a very pleasant 84 y.o. 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  He returned to the cardiovascular surgery clinic for follow-up.    He denies any recent episode of abdominal pain or pain radiating to the back.  He reports that he does not have any pain or discomfort in the right groin where the lymphocele has developed.     He had a CTA of abdomen/pelvis on 2/27/2024, which demonstrated patent endograft without endoleak and decreased native abdominal aortic aneurysm sac diameter to 47 mm from 52 mm since June, 2020.    PastMedical History:  Gerard  has a past medical history of Adenomatous polyp, Aortic aneurysm (HCC), Asthma, CAD (coronary artery disease), COPD (chronic obstructive pulmonary disease) (HCC), Hyperlipidemia, Hypertension, Prolonged emergence from general anesthesia, and Transitional cell carcinoma of bladder (HCC).    Past Surgical History:  The patient  has a past surgical history that includes Coronary angioplasty with stent; Colonoscopy; Carotid endarterectomy (Right); bladder tumor excision; other surgical history; AAA repair, endovascular (N/A, 6/26/2020); Cataract removal; Cardiac procedure (N/A, 11/20/2023); Cardiac procedure (Left, 1/17/2024); and Cardiac procedure (N/A, 2/12/2024).    Allergies:  The patient has No Known Allergies.    Medications:    Current Outpatient Medications:     metoprolol succinate (TOPROL XL) 25 MG extended release tablet, Take 2 tablets by mouth daily Do not take this medication until after you have been

## 2024-03-13 ENCOUNTER — CARE COORDINATION (OUTPATIENT)
Dept: FAMILY MEDICINE CLINIC | Age: 84
End: 2024-03-13

## 2024-03-13 ENCOUNTER — NURSE ONLY (OUTPATIENT)
Dept: LAB | Age: 84
End: 2024-03-13

## 2024-03-13 DIAGNOSIS — Z95.2 S/P TAVR (TRANSCATHETER AORTIC VALVE REPLACEMENT): ICD-10-CM

## 2024-03-13 LAB
ANION GAP SERPL CALC-SCNC: 8 MEQ/L (ref 8–16)
BASOPHILS ABSOLUTE: 0.1 THOU/MM3 (ref 0–0.1)
BASOPHILS NFR BLD AUTO: 0.9 %
BUN SERPL-MCNC: 16 MG/DL (ref 7–22)
CALCIUM SERPL-MCNC: 9.7 MG/DL (ref 8.5–10.5)
CHLORIDE SERPL-SCNC: 103 MEQ/L (ref 98–111)
CO2 SERPL-SCNC: 30 MEQ/L (ref 23–33)
CREAT SERPL-MCNC: 0.6 MG/DL (ref 0.4–1.2)
DEPRECATED RDW RBC AUTO: 53.1 FL (ref 35–45)
EOSINOPHIL NFR BLD AUTO: 3.1 %
EOSINOPHILS ABSOLUTE: 0.3 THOU/MM3 (ref 0–0.4)
ERYTHROCYTE [DISTWIDTH] IN BLOOD BY AUTOMATED COUNT: 14.7 % (ref 11.5–14.5)
GFR SERPL CREATININE-BSD FRML MDRD: > 60 ML/MIN/1.73M2
GLUCOSE SERPL-MCNC: 103 MG/DL (ref 70–108)
HCT VFR BLD AUTO: 44.1 % (ref 42–52)
HGB BLD-MCNC: 13.9 GM/DL (ref 14–18)
IMM GRANULOCYTES # BLD AUTO: 0.03 THOU/MM3 (ref 0–0.07)
IMM GRANULOCYTES NFR BLD AUTO: 0.3 %
LYMPHOCYTES ABSOLUTE: 1.8 THOU/MM3 (ref 1–4.8)
LYMPHOCYTES NFR BLD AUTO: 20.4 %
MCH RBC QN AUTO: 30.8 PG (ref 26–33)
MCHC RBC AUTO-ENTMCNC: 31.5 GM/DL (ref 32.2–35.5)
MCV RBC AUTO: 97.6 FL (ref 80–94)
MONOCYTES ABSOLUTE: 0.7 THOU/MM3 (ref 0.4–1.3)
MONOCYTES NFR BLD AUTO: 8.2 %
NEUTROPHILS NFR BLD AUTO: 67.1 %
NRBC BLD AUTO-RTO: 0 /100 WBC
PLATELET # BLD AUTO: 142 THOU/MM3 (ref 130–400)
PMV BLD AUTO: 11.2 FL (ref 9.4–12.4)
POTASSIUM SERPL-SCNC: 4.4 MEQ/L (ref 3.5–5.2)
RBC # BLD AUTO: 4.52 MILL/MM3 (ref 4.7–6.1)
SEGMENTED NEUTROPHILS ABSOLUTE COUNT: 5.9 THOU/MM3 (ref 1.8–7.7)
SODIUM SERPL-SCNC: 141 MEQ/L (ref 135–145)
WBC # BLD AUTO: 8.8 THOU/MM3 (ref 4.8–10.8)

## 2024-03-13 NOTE — CARE COORDINATION
Spoke to Gerard Remy's daughter for final call follow up for post hospital discharge for TAVR procedure. Has one month follow up with Dr. Mckinney, cardiologist, tomorrow. Sandrita states Gerard is doing well, feeling good. Will see Dr. Callahan in April. Sandrita and Gerard know they can call with any questions or concerns.

## 2024-03-14 ENCOUNTER — TELEPHONE (OUTPATIENT)
Dept: CARDIOLOGY CLINIC | Age: 84
End: 2024-03-14

## 2024-03-14 ENCOUNTER — OFFICE VISIT (OUTPATIENT)
Dept: CARDIOLOGY CLINIC | Age: 84
End: 2024-03-14
Payer: OTHER GOVERNMENT

## 2024-03-14 VITALS
DIASTOLIC BLOOD PRESSURE: 64 MMHG | HEIGHT: 67 IN | HEART RATE: 66 BPM | BODY MASS INDEX: 24.67 KG/M2 | WEIGHT: 157.2 LBS | SYSTOLIC BLOOD PRESSURE: 122 MMHG

## 2024-03-14 DIAGNOSIS — Z95.2 S/P TAVR (TRANSCATHETER AORTIC VALVE REPLACEMENT): Primary | ICD-10-CM

## 2024-03-14 PROCEDURE — 99213 OFFICE O/P EST LOW 20 MIN: CPT | Performed by: INTERNAL MEDICINE

## 2024-03-14 PROCEDURE — 93000 ELECTROCARDIOGRAM COMPLETE: CPT | Performed by: INTERNAL MEDICINE

## 2024-03-14 PROCEDURE — 1123F ACP DISCUSS/DSCN MKR DOCD: CPT | Performed by: INTERNAL MEDICINE

## 2024-03-14 NOTE — PROGRESS NOTES
AM    LDLCALC 65 11/20/2023 07:26 AM       No results found for: \"TSH\"      Testing Reviewed:      I have individually reviewed the cardiac test below:    ECHO: No results found for this or any previous visit.       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  He tried LifeVest before, he wants to hold.  Reassess EF in 3 months post TAVR.  CHF clinic following.  He still has event monitor, will f/u.  Discussed symptoms needing emergency care or those which require further medical attention.   Discussed diet/exercise/BP/weight loss/health lifestyle choices/lipids; the patient understands the goals and will try to comply.    Disposition:  6 months         Electronically signed by Marcell Mckinney MD   3/14/2024 at 11:09 AM EDT

## 2024-03-14 NOTE — TELEPHONE ENCOUNTER
OV note from 3/14/2024:  Reassess EF in 3 months post TAVR. CHF clinic following.   Dr. Mckinney wanted CHF to order but he doesn't see them until September.    TAVR done 2/12/2024.    Can you make sure Echo gets done 3 months after TAVR.  Thanks!

## 2024-03-15 NOTE — TELEPHONE ENCOUNTER
[facility] to [facility] for [facility].      Lata with community services: [phone] ext [phone]  Fax: [phone]

## 2024-03-21 LAB — ECHO BSA: 1.8 M2

## 2024-04-15 ENCOUNTER — OFFICE VISIT (OUTPATIENT)
Dept: FAMILY MEDICINE CLINIC | Age: 84
End: 2024-04-15

## 2024-04-15 VITALS
HEIGHT: 67 IN | HEART RATE: 64 BPM | DIASTOLIC BLOOD PRESSURE: 60 MMHG | WEIGHT: 155.5 LBS | SYSTOLIC BLOOD PRESSURE: 102 MMHG | RESPIRATION RATE: 20 BRPM | BODY MASS INDEX: 24.4 KG/M2

## 2024-04-15 DIAGNOSIS — Z00.00 MEDICARE ANNUAL WELLNESS VISIT, SUBSEQUENT: Primary | ICD-10-CM

## 2024-04-15 DIAGNOSIS — J96.11 CHRONIC HYPOXEMIC RESPIRATORY FAILURE (HCC): ICD-10-CM

## 2024-04-15 PROCEDURE — 1123F ACP DISCUSS/DSCN MKR DOCD: CPT | Performed by: FAMILY MEDICINE

## 2024-04-15 PROCEDURE — G8427 DOCREV CUR MEDS BY ELIG CLIN: HCPCS | Performed by: FAMILY MEDICINE

## 2024-04-15 PROCEDURE — G0439 PPPS, SUBSEQ VISIT: HCPCS | Performed by: FAMILY MEDICINE

## 2024-04-15 PROCEDURE — 99213 OFFICE O/P EST LOW 20 MIN: CPT | Performed by: FAMILY MEDICINE

## 2024-04-15 PROCEDURE — G8420 CALC BMI NORM PARAMETERS: HCPCS | Performed by: FAMILY MEDICINE

## 2024-04-15 PROCEDURE — 1036F TOBACCO NON-USER: CPT | Performed by: FAMILY MEDICINE

## 2024-04-15 ASSESSMENT — PATIENT HEALTH QUESTIONNAIRE - PHQ9
SUM OF ALL RESPONSES TO PHQ QUESTIONS 1-9: 0
SUM OF ALL RESPONSES TO PHQ QUESTIONS 1-9: 0
SUM OF ALL RESPONSES TO PHQ9 QUESTIONS 1 & 2: 0
SUM OF ALL RESPONSES TO PHQ QUESTIONS 1-9: 0
1. LITTLE INTEREST OR PLEASURE IN DOING THINGS: NOT AT ALL
2. FEELING DOWN, DEPRESSED OR HOPELESS: NOT AT ALL
SUM OF ALL RESPONSES TO PHQ QUESTIONS 1-9: 0

## 2024-04-15 NOTE — PATIENT INSTRUCTIONS
vaping. If you need help quitting, talk to your doctor about stop-smoking programs and medicines. These can increase your chances of quitting for good. Quitting is one of the most important things you can do to protect your heart. It is never too late to quit. Try to avoid secondhand smoke too.     Stay at a weight that's healthy for you. Talk to your doctor if you need help losing weight.     Try to get 7 to 9 hours of sleep each night.     Limit alcohol to 2 drinks a day for men and 1 drink a day for women. Too much alcohol can cause health problems.     Manage other health problems such as diabetes, high blood pressure, and high cholesterol. If you think you may have a problem with alcohol or drug use, talk to your doctor.   Medicines    Take your medicines exactly as prescribed. Call your doctor if you think you are having a problem with your medicine.     If your doctor recommends aspirin, take the amount directed each day. Make sure you take aspirin and not another kind of pain reliever, such as acetaminophen (Tylenol).   When should you call for help?   Call 911 if you have symptoms of a heart attack. These may include:    Chest pain or pressure, or a strange feeling in the chest.     Sweating.     Shortness of breath.     Pain, pressure, or a strange feeling in the back, neck, jaw, or upper belly or in one or both shoulders or arms.     Lightheadedness or sudden weakness.     A fast or irregular heartbeat.   After you call 911, the  may tell you to chew 1 adult-strength or 2 to 4 low-dose aspirin. Wait for an ambulance. Do not try to drive yourself.  Watch closely for changes in your health, and be sure to contact your doctor if you have any problems.  Where can you learn more?  Go to https://www.[employer].net/patientEd and enter F075 to learn more about \"A Healthy Heart: Care Instructions.\"  Current as of: June 24, 2023               Content Version: 14.0  © 7952-3999 Healthwise, Incorporated.

## 2024-04-15 NOTE — PROGRESS NOTES
movements intact, conjunctivae normal  ENT: tympanic membrane, external ear and ear canal normal bilaterally, oropharynx clear and moist with normal mucous membranes  Neck: neck supple and non tender without mass, no thyromegaly or thyroid nodules, no cervical lymphadenopathy   Pulmonary/Chest: rhonchi present- throughout and decreased breath sounds noted- throughout  Cardiovascular: normal rate, regular rhythm, normal S1 and S2, no murmurs, and no gallops  Abdomen: soft, non-tender, non-distended, normal bowel sounds, no masses or organomegaly  Extremities: no cyanosis and no clubbing  Musculoskeletal: normal range of motion, no joint swelling, deformity or tenderness  Neurologic: gait and coordination normal and speech normal       No Known Allergies  Prior to Visit Medications    Medication Sig Taking? Authorizing Provider   metoprolol succinate (TOPROL XL) 25 MG extended release tablet Take 2 tablets by mouth daily Do not take this medication until after you have been seen in the office on 2/20/2024. Yes Erika Caceres MD   clopidogrel (PLAVIX) 75 MG tablet Take 1 tablet by mouth daily Yes Marcell Mckinney MD   furosemide (LASIX) 20 MG tablet Take 1 tablet by mouth every 48 hours Yes Erika Caceres MD   OXYGEN Inhale into the lungs 3 liters Yes Provider, MD Miguel   empagliflozin (JARDIANCE) 10 MG tablet Take 1 tablet by mouth daily Yes Linda Mayen APRN - CNP   tiotropium (SPIRIVA RESPIMAT) 2.5 MCG/ACT AERS inhaler Inhale 2 puffs into the lungs daily Yes Amanda Huff APRN - CNP   fluticasone-salmeterol (WIXELA INHUB) 250-50 MCG/ACT AEPB diskus inhaler Inhale 1 puff into the lungs in the morning and 1 puff in the evening. Yes Amanda Huff APRN - CNP   albuterol sulfate HFA (PROVENTIL HFA) 108 (90 Base) MCG/ACT inhaler Inhale 2 puffs into the lungs every 6 hours as needed for Wheezing Yes Amanda Huff APRN - CNP   albuterol (PROVENTIL) (2.5 MG/3ML) 0.083% nebulizer solution Take 3 mLs by

## 2024-04-29 RX ORDER — CLOPIDOGREL BISULFATE 75 MG/1
75 TABLET ORAL DAILY
Qty: 90 TABLET | Refills: 3 | Status: SHIPPED | OUTPATIENT
Start: 2024-04-29

## 2024-04-29 RX ORDER — CLOPIDOGREL BISULFATE 75 MG/1
75 TABLET ORAL DAILY
Qty: 5 TABLET | Refills: 0 | Status: SHIPPED | OUTPATIENT
Start: 2024-04-29

## 2024-04-29 NOTE — TELEPHONE ENCOUNTER
Gerard Jane called requesting a refill on the following medications:  Requested Prescriptions     Pending Prescriptions Disp Refills    clopidogrel (PLAVIX) 75 MG tablet 30 tablet 1     Sig: Take 1 tablet by mouth daily     [facility]arm[facility]: [facility]      Date of last visit: 3/14/2024  Date of next visit (if applicable): 2/18/2025

## 2024-05-06 ENCOUNTER — TELEPHONE (OUTPATIENT)
Dept: CARDIAC REHAB | Age: 84
End: 2024-05-06

## 2024-05-06 NOTE — TELEPHONE ENCOUNTER
Call placed to patient's daughter, Sandrita (primary contact) to confirm cardiac rehab evaluation for 5/7/24 at 8:00am. Appointment confirmed, questions answered.     Per daughter, [facility] Auth# [id number]. Expires 9/2024.

## 2024-05-07 ENCOUNTER — HOSPITAL ENCOUNTER (OUTPATIENT)
Dept: CARDIAC REHAB | Age: 84
Setting detail: THERAPIES SERIES
Discharge: HOME | End: 2024-05-07
Payer: OTHER GOVERNMENT

## 2024-05-07 PROCEDURE — G0423 INTENS CARDIAC REHAB NO EXER: HCPCS

## 2024-05-07 PROCEDURE — G0422 INTENS CARDIAC REHAB W/EXERC: HCPCS

## 2024-05-07 ASSESSMENT — PATIENT HEALTH QUESTIONNAIRE - PHQ9
SUM OF ALL RESPONSES TO PHQ QUESTIONS 1-9: 5
SUM OF ALL RESPONSES TO PHQ QUESTIONS 1-9: 5
6. FEELING BAD ABOUT YOURSELF - OR THAT YOU ARE A FAILURE OR HAVE LET YOURSELF OR YOUR FAMILY DOWN: NOT AT ALL
4. FEELING TIRED OR HAVING LITTLE ENERGY: MORE THAN HALF THE DAYS
9. THOUGHTS THAT YOU WOULD BE BETTER OFF DEAD, OR OF HURTING YOURSELF: NOT AT ALL
8. MOVING OR SPEAKING SO SLOWLY THAT OTHER PEOPLE COULD HAVE NOTICED. OR THE OPPOSITE, BEING SO FIGETY OR RESTLESS THAT YOU HAVE BEEN MOVING AROUND A LOT MORE THAN USUAL: NOT AT ALL
2. FEELING DOWN, DEPRESSED OR HOPELESS: NOT AT ALL
SUM OF ALL RESPONSES TO PHQ QUESTIONS 1-9: 5
SUM OF ALL RESPONSES TO PHQ9 QUESTIONS 1 & 2: 0
5. POOR APPETITE OR OVEREATING: NOT AT ALL
SUM OF ALL RESPONSES TO PHQ QUESTIONS 1-9: 5
7. TROUBLE CONCENTRATING ON THINGS, SUCH AS READING THE NEWSPAPER OR WATCHING TELEVISION: NOT AT ALL
10. IF YOU CHECKED OFF ANY PROBLEMS, HOW DIFFICULT HAVE THESE PROBLEMS MADE IT FOR YOU TO DO YOUR WORK, TAKE CARE OF THINGS AT HOME, OR GET ALONG WITH OTHER PEOPLE: NOT DIFFICULT AT ALL
1. LITTLE INTEREST OR PLEASURE IN DOING THINGS: NOT AT ALL
3. TROUBLE FALLING OR STAYING ASLEEP: NEARLY EVERY DAY

## 2024-05-07 NOTE — PLAN OF CARE
with an RPE of <12/13. [] Yes      [] No  Upper and Lower body strength training 2x/wk    Wt: #       Reps:  8-15    *Increase wt. after completing 15 reps with an RPE of <12/13. [] Yes      [] No  Upper and Lower body strength training 2x/wk    Wt: #       Reps:  8-15    *Increase wt. after completing 15 reps with an RPE of <12/13. [] Yes      [] No  Upper and Lower body strength training 2x/wk    Wt: #       Reps:  8-15    *Increase wt. after completing 15 reps with an RPE of <12/13. Continue Strength Training at home   [] Exercise Log & Strength training handout given     Wt: #       Reps:  8-15    *Increase wt. after completing 15 reps with an RPE of <12/13.   Flexibility Flexibility Flexibility Flexibility Flexibility Flexibility   [x] Yes      [] No  25 min session of Core Strength & Flexibility 1x/per week  Attends Core Strength & Flexibility   [] Yes      [] No Attends Core Strength & Flexibility   [] Yes      [] No Attends Core Strength & Flexibility   [] Yes      [] No Attends Core Strength & Flexibility   [] Yes      [] No Continue Core Strength & Flexibility at home   Home Exercise  *Gerard verbalizes planning to walk 3-4 days/week for 10-15 min on days not in rehab. Home Exercise  *Gerard plans to ** ** days/week for ** min on days not in rehab. Home Exercise  *Gerard plans to ** ** days/week for ** min on days not in rehab. Home Exercise  *Gerard plans to ** ** days/week for ** min on days not in rehab. Home Exercise  *Gerard plans to ** ** days/week for ** min on days not in rehab. Home Exercise  *Gerard plans to    *Education* *Education* *Education* *Education* *Education* *Education*   RPE Scale  [x] Yes      [] No  Exercise Safety  [x] Yes      [] No  Equipment Orientation  [x] Yes      [] No  S/S to Report  [x] Yes      [] No  Warm Up/Cool Down  [x] Yes      [] No  Home Exercise  [x] Yes      [] No     All Exercise Education Completed  [] Yes      [] No   Exercise Education Recommended    Workshops  [x]

## 2024-05-09 ENCOUNTER — HOSPITAL ENCOUNTER (OUTPATIENT)
Dept: CARDIAC REHAB | Age: 84
Setting detail: THERAPIES SERIES
Discharge: HOME | End: 2024-05-09
Payer: OTHER GOVERNMENT

## 2024-05-09 PROCEDURE — G0422 INTENS CARDIAC REHAB W/EXERC: HCPCS

## 2024-05-09 PROCEDURE — G0423 INTENS CARDIAC REHAB NO EXER: HCPCS

## 2024-05-09 NOTE — PROGRESS NOTES
Video Education Report - ICR/CR  Name:  Gerard Jane     Date:  5/9/2024  MRN: [id number]     Session #:  2  Session Length: 40 min      Exercise      Healthy Mind-Set  []Improving Performance    []Smoking Cessation    [x]Introduction to Yoga    Medical      Culinary  []Aging-Enhancing the Quality of Your Life  []Becoming a Pritikin   []Biology of Weight Control    []Cooking Breakfasts   []Decoding Lab Results    and Snacks  []Diseases of Our Time - Overview   []Cooking Dinner and   []Sleep Disorders     Sides  []Targeting Your Nutrition Priorities   []Healthy Salads &   Dressings  Nutrition      []Cooking Soups and   []Dining Out - Part 2     Desserts  []Fueling a Healthy Body   []Menu Workshop     Overview  []Planning Your Eating Strategy   []The Pritikin Solution  []Vitamins and Minerals    Comments:  Video completed, group discussion

## 2024-05-13 ENCOUNTER — TELEPHONE (OUTPATIENT)
Dept: CARDIOLOGY CLINIC | Age: 84
End: 2024-05-13

## 2024-05-14 ENCOUNTER — HOSPITAL ENCOUNTER (OUTPATIENT)
Dept: CARDIAC REHAB | Age: 84
Setting detail: THERAPIES SERIES
Discharge: HOME | End: 2024-05-14
Payer: OTHER GOVERNMENT

## 2024-05-14 PROCEDURE — G0423 INTENS CARDIAC REHAB NO EXER: HCPCS

## 2024-05-14 PROCEDURE — G0422 INTENS CARDIAC REHAB W/EXERC: HCPCS

## 2024-05-14 NOTE — PROGRESS NOTES
Gerard Jane YOB: 1940    Acct Number: [id number]   MRN:  [id number]                             [facility] NUTRITION WORKSHOP             Date: 2024        Session # _______    Today’s class covered:    [x]   Fueling a Healthy Body  []   Mindful Eating  []   Targeting Nutrition Priorities  []   Dining Out :  Making the Most of a Menu  []   Label Reading    Readiness to change:    []  Pre-contemplative   []  Contemplative - ambivalent about change    [x]  Action - ready to set action plan and implement   []  Maintenance - has made change and is trying, and or practicing different alternative         behaviors     Gerard was in the Workshop with the Dietitian for 55 minutes.  The content was presented via Powerpoint, lecture, and patient participation based format.  Motivational interviewing was utilized when needed, to promote change.  Patient voiced understanding.    Electronically signed by Korin Marvin RD on 2024 at 10:56 AM

## 2024-05-15 ENCOUNTER — TELEPHONE (OUTPATIENT)
Dept: CARDIOLOGY CLINIC | Age: 84
End: 2024-05-15

## 2024-05-15 NOTE — TELEPHONE ENCOUNTER
This patient has completed 3 months of DAPT post TAVR (2/12/24). According to the guidelines Plavix is only recommended for 3 months post implant. After reviewing the chart it does not appear that there any other indication to continue Plavix. Dr. Mckinney are you ok with stopping Plavix and continuing Aspirin 81 mg daily?

## 2024-05-16 ENCOUNTER — HOSPITAL ENCOUNTER (OUTPATIENT)
Dept: CARDIAC REHAB | Age: 84
Setting detail: THERAPIES SERIES
Discharge: HOME | End: 2024-05-16
Payer: OTHER GOVERNMENT

## 2024-05-16 PROCEDURE — G0423 INTENS CARDIAC REHAB NO EXER: HCPCS

## 2024-05-16 PROCEDURE — G0422 INTENS CARDIAC REHAB W/EXERC: HCPCS

## 2024-05-16 NOTE — PROGRESS NOTES
Video Education Report - ICR/CR  Name:  Gerard Jane     Date:  5/16/2024  MRN: [id number]     Session #:    Session Length: 40 min    Core Videos        []Heart Disease Risk Reduction       []Overview of Pritikin Eating Plan      []Move it          []Calorie Density         []Healthy Minds, Bodies, Hearts        []Label Reading - Part 1       [x]Metabolic Syndrome and Belly Fat        []How Our Thoughts Can Heal Our Hearts   []Dining Out - Part 1      []Biomechanical Limitations  []Facts on Fat        []Hypertension & Heart Disease    []Diseases of Our Time - Focusing on Diabetes   []Body Composition  []Nutrition Action Plan    []Exercise Action Plan      Comments:  Video completed, group discussion

## 2024-05-16 NOTE — TELEPHONE ENCOUNTER
Dr Caceres,   Patient from Structrual standpoint does not require Plavix any longer. Based off patient's history how would you like to continue with his DAPT.

## 2024-05-20 ENCOUNTER — HOSPITAL ENCOUNTER (OUTPATIENT)
Age: 84
Discharge: HOME | End: 2024-05-22
Payer: OTHER GOVERNMENT

## 2024-05-20 ENCOUNTER — TELEPHONE (OUTPATIENT)
Dept: CARDIOLOGY CLINIC | Age: 84
End: 2024-05-20

## 2024-05-20 VITALS — SYSTOLIC BLOOD PRESSURE: 106 MMHG | DIASTOLIC BLOOD PRESSURE: 50 MMHG

## 2024-05-20 DIAGNOSIS — Z95.2 S/P TAVR (TRANSCATHETER AORTIC VALVE REPLACEMENT): ICD-10-CM

## 2024-05-20 LAB
ECHO AO ASC DIAM: 4.7 CM
ECHO AR MAX VEL PISA: 3.4 M/S
ECHO AV REGURGITANT PHT: 536 MS
ECHO LA AREA 4C: 17.6 CM2
ECHO LA DIAMETER: 4.6 CM
ECHO LA MAJOR AXIS: 5.9 CM
ECHO LA VOL MOD A4C: 43 ML (ref 18–58)
ECHO LV EDV A2C: 135 ML
ECHO LV EDV A4C: 130 ML
ECHO LV EJECTION FRACTION A2C: 38 %
ECHO LV EJECTION FRACTION A4C: 39 %
ECHO LV EJECTION FRACTION BIPLANE: 41 % (ref 55–100)
ECHO LV ESV A2C: 84 ML
ECHO LV ESV A4C: 80 ML
ECHO LV FRACTIONAL SHORTENING: 12 % (ref 28–44)
ECHO LV INTERNAL DIMENSION DIASTOLIC: 5.7 CM (ref 4.2–5.9)
ECHO LV INTERNAL DIMENSION SYSTOLIC: 5 CM
ECHO LV IVSD: 1.4 CM (ref 0.6–1)
ECHO LV MASS 2D: 339.6 G (ref 88–224)
ECHO LV POSTERIOR WALL DIASTOLIC: 1.3 CM (ref 0.6–1)
ECHO LV RELATIVE WALL THICKNESS RATIO: 0.46
ECHO RV INTERNAL DIMENSION: 3.8 CM
ECHO RV TAPSE: 2.1 CM (ref 1.7–?)

## 2024-05-20 PROCEDURE — 93307 TTE W/O DOPPLER COMPLETE: CPT

## 2024-05-20 PROCEDURE — 93307 TTE W/O DOPPLER COMPLETE: CPT | Performed by: INTERNAL MEDICINE

## 2024-05-20 NOTE — TELEPHONE ENCOUNTER
No recent PCI  Patent stent on most recent Cath  ASA is suitable from my standpoint  S/p TAVR, Plavix was stopped per structural heart recommendations (see recent encounter)

## 2024-05-20 NOTE — TELEPHONE ENCOUNTER
Regarding: Gerard Jane/ Plavix  Contact: [phone]  ----- Message from Mecca Mon RN sent at 5/20/2024  8:27 AM EDT -----       ----- Message from Gerard Jane to Linda Mayen APRN - CNP sent at 5/20/2024  7:33 AM -----   Good morning.  My father Gerard Madsene called me this past week and stated Dr. Caceres took my dad off of Plavix.  It was my last understanding that because of his surgery he would be on the medication for the long term duration.  I would like clarification of this medicine change and could it be noted that that I need to be contacted when these changes are made.      Sandrita Lula (Daughter) [facility]

## 2024-05-21 ENCOUNTER — HOSPITAL ENCOUNTER (OUTPATIENT)
Dept: CARDIAC REHAB | Age: 84
Setting detail: THERAPIES SERIES
Discharge: HOME | End: 2024-05-21
Payer: OTHER GOVERNMENT

## 2024-05-21 PROCEDURE — G0423 INTENS CARDIAC REHAB NO EXER: HCPCS

## 2024-05-21 PROCEDURE — G0422 INTENS CARDIAC REHAB W/EXERC: HCPCS

## 2024-05-21 NOTE — PROGRESS NOTES
Gerard BAXTER.:  1940    [id number]   MRN:  [id number]                             [facility] HEALTHY MIND-SET WORKSHOP             Date: 2024        Session #________    Today’s class covered:    []  New Thoughts New Behaviors Workshop:  Patient will learn and practice techniques for developing effective health and lifestyle goals. Patient will be able to effectively apply the goal setting process learned to develop at least one new personal goal.     []  Managing Moods & Relationships Workshop:  Patient will learn how emotional and chronic stress factors can impact their hearts. They will learn healthy ways to handle stress and utilize positive coping mechanisms. In addition, [facility] patient will learn ways to improve communication skills.    []  Healthy Sleep for a healthy Heart:  Patients will learn the importance of sleep to overall health, well-being, and quality of life. They will understand the symptoms of, and treatments for, common sleep disorders. Patients will also be able to identify daytime and nighttime behaviors which impact sleep, and they will be able to apply these tools to help manage sleep-related challenges.     [x]  Recognizing and Reducing Stress:  Patients will learn about stress and how to recognize stress. Patients will gain insight into the toll that chronic stress takes on their health, both emotionally and physically.  Patients will learn and practice a variety of stress management techniques. Patients will be able to effectively apply coping mechanisms in perceived stressful situations.    Gerard actively participated and verbalized understanding.     Total time in the Healthy Mind-Set class was 60 minutes.    Electronically signed by NOLAN Martin on 2024 at 12:57 PM

## 2024-05-23 ENCOUNTER — APPOINTMENT (OUTPATIENT)
Dept: CARDIAC REHAB | Age: 84
End: 2024-05-23
Payer: OTHER GOVERNMENT

## 2024-05-23 ENCOUNTER — HOSPITAL ENCOUNTER (OUTPATIENT)
Dept: CARDIAC REHAB | Age: 84
Setting detail: THERAPIES SERIES
End: 2024-05-23
Payer: OTHER GOVERNMENT

## 2024-05-24 ENCOUNTER — TELEPHONE (OUTPATIENT)
Dept: FAMILY MEDICINE CLINIC | Age: 84
End: 2024-05-24

## 2024-05-24 DIAGNOSIS — J44.9 STAGE 3 SEVERE COPD BY GOLD CLASSIFICATION (HCC): ICD-10-CM

## 2024-05-24 RX ORDER — PREDNISONE 20 MG/1
40 TABLET ORAL DAILY
Qty: 10 TABLET | Refills: 0 | Status: SHIPPED | OUTPATIENT
Start: 2024-05-24 | End: 2024-05-29
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
## 2024-05-28 ENCOUNTER — HOSPITAL ENCOUNTER (OUTPATIENT)
Dept: CARDIAC REHAB | Age: 84
Setting detail: THERAPIES SERIES
Discharge: HOME | End: 2024-05-28
Payer: OTHER GOVERNMENT

## 2024-05-28 PROCEDURE — G0422 INTENS CARDIAC REHAB W/EXERC: HCPCS

## 2024-05-28 PROCEDURE — G0423 INTENS CARDIAC REHAB NO EXER: HCPCS

## 2024-05-28 NOTE — PROGRESS NOTES
Gerard STOKES Buck ALICEAB.:  1940    Acct Number: [id number]   MRN:  [id number]                             [facility] NUTRITION 1:1 Counseling             Date: 2024       Session # _______   1:1 Counseling Session    GOALS:  Improve cardiovascularly  2.   Prevent diabetes  Gerard reports making some changes since starting [facility] including cutting way back on fried foods such as chicken and sausage and shelton from [employer].    Reported Weight Trends: Was 213# and is now in the 150's  Edema:   Lab Trends:  A1C 5.7 (prediabetes), Chol 124, LDL 65, HDL 41, Tga 88 (on statin)  Rate Your Plate: 36    Receptiveness to education/goals:  (x) Agreeable ( ) No Interest   ( ) Refused  Evaluation of education:  (x) Indicates understanding   ( ) Needs reinforcement     () Unsuccessful     Readiness to change:    ( ) Pre-contemplative   ( ) Contemplative - ambivalent about change    ( ) Action - ready to set action plan and implement   (x) Maintenance - has made change and is trying, and or practicing different alternative behaviors     Exercise Habits:  Gerard reports on days off he goes fishing and hunting (3 days a week).  Catches perch, bluegill, walleye and medium bass.    Food Recall:  Breakfast:  Ham and eggs, fried potatoes  Lunch:  Big Mac and french fries  Dinner:  Fish, tomatoes, french fries, coleslaw  Snacks:  n/a  Main Beverages:  Bud light (1/wk), [employer]a (1/night), water 3-10oz glasses/day    Grocery Shopping: self  Meal Prep/Cooking: self  Dining Out: 1X/wk w/ granddaughter and son (otherwise he cooks dinner for them as they live close). [place], cupies, [employer] hamburgers, steak    Gerard was counseled by the Dietitian for 55 minutes. Motivational Interviewing was used to help promote change.  Patient voiced understanding. Next steps discussed include decreasing portion sizes of animal protein and increasing whole grains, fruits, veggies and legumes.  Discussed ways to add flavor w/o

## 2024-05-30 ENCOUNTER — APPOINTMENT (OUTPATIENT)
Dept: CARDIAC REHAB | Age: 84
End: 2024-05-30
Payer: OTHER GOVERNMENT

## 2024-05-30 ENCOUNTER — HOSPITAL ENCOUNTER (OUTPATIENT)
Dept: CARDIAC REHAB | Age: 84
Setting detail: THERAPIES SERIES
Discharge: HOME | End: 2024-05-30
Payer: OTHER GOVERNMENT

## 2024-05-30 PROCEDURE — G0422 INTENS CARDIAC REHAB W/EXERC: HCPCS

## 2024-05-31 ENCOUNTER — TELEPHONE (OUTPATIENT)
Dept: CARDIOLOGY CLINIC | Age: 84
End: 2024-05-31

## 2024-05-31 NOTE — TELEPHONE ENCOUNTER
Spoke to pt daughter Sandrita, she is going to get [facility] to obtain a new authorization number for appt on 6/6/24

## 2024-06-03 ENCOUNTER — OFFICE VISIT (OUTPATIENT)
Dept: CARDIOLOGY CLINIC | Age: 84
End: 2024-06-03
Payer: MEDICARE

## 2024-06-03 ENCOUNTER — HOSPITAL ENCOUNTER (OUTPATIENT)
Dept: GENERAL RADIOLOGY | Age: 84
Discharge: HOME | End: 2024-06-03
Payer: MEDICARE

## 2024-06-03 ENCOUNTER — HOSPITAL ENCOUNTER (OUTPATIENT)
Age: 84
Discharge: HOME | End: 2024-06-03
Payer: MEDICARE

## 2024-06-03 VITALS
WEIGHT: 154 LBS | SYSTOLIC BLOOD PRESSURE: 98 MMHG | OXYGEN SATURATION: 91 % | HEART RATE: 62 BPM | DIASTOLIC BLOOD PRESSURE: 48 MMHG | BODY MASS INDEX: 24.17 KG/M2 | HEIGHT: 67 IN

## 2024-06-03 DIAGNOSIS — I50.22 CHRONIC SYSTOLIC CONGESTIVE HEART FAILURE, NYHA CLASS 2 (HCC): ICD-10-CM

## 2024-06-03 DIAGNOSIS — Z95.2 S/P TAVR (TRANSCATHETER AORTIC VALVE REPLACEMENT): ICD-10-CM

## 2024-06-03 DIAGNOSIS — I50.22 CHRONIC SYSTOLIC CONGESTIVE HEART FAILURE, NYHA CLASS 2 (HCC): Primary | ICD-10-CM

## 2024-06-03 DIAGNOSIS — Z91.89 AT RISK FOR FLUID VOLUME OVERLOAD: ICD-10-CM

## 2024-06-03 DIAGNOSIS — I51.89 GRADE I DIASTOLIC DYSFUNCTION: ICD-10-CM

## 2024-06-03 PROCEDURE — 1036F TOBACCO NON-USER: CPT | Performed by: NURSE PRACTITIONER

## 2024-06-03 PROCEDURE — G8420 CALC BMI NORM PARAMETERS: HCPCS | Performed by: NURSE PRACTITIONER

## 2024-06-03 PROCEDURE — 1123F ACP DISCUSS/DSCN MKR DOCD: CPT | Performed by: NURSE PRACTITIONER

## 2024-06-03 PROCEDURE — 71046 X-RAY EXAM CHEST 2 VIEWS: CPT

## 2024-06-03 PROCEDURE — 99214 OFFICE O/P EST MOD 30 MIN: CPT | Performed by: NURSE PRACTITIONER

## 2024-06-03 PROCEDURE — G8427 DOCREV CUR MEDS BY ELIG CLIN: HCPCS | Performed by: NURSE PRACTITIONER

## 2024-06-03 RX ORDER — FUROSEMIDE 20 MG/1
20 TABLET ORAL DAILY
Qty: 14 TABLET | Refills: 0 | Status: SHIPPED | OUTPATIENT
Start: 2024-06-03

## 2024-06-03 ASSESSMENT — ENCOUNTER SYMPTOMS
ABDOMINAL DISTENTION: 0
NAUSEA: 0
SHORTNESS OF BREATH: 1
COUGH: 0
VOMITING: 0

## 2024-06-03 NOTE — PROGRESS NOTES
03/13/2024 10:53 AM    LABGLOM >60 03/13/2024 10:53 AM    GLUCOSE 103 03/13/2024 10:53 AM    GLUCOSE 95 10/09/2015 01:38 PM    CALCIUM 9.7 03/13/2024 10:53 AM     Hepatic Function Panel:    Lab Results   Component Value Date/Time    ALKPHOS 81 02/12/2024 08:40 AM    ALT 9 02/12/2024 08:40 AM    AST 12 02/12/2024 08:40 AM    BILITOT 0.3 02/12/2024 08:40 AM     Magnesium:    Lab Results   Component Value Date/Time    MG 2.4 07/10/2023 03:57 PM     PT/INR:    Lab Results   Component Value Date/Time    INR 0.96 02/13/2024 05:21 AM     Lipids:    Lab Results   Component Value Date/Time    TRIG 88 11/20/2023 07:26 AM    HDL 41 11/20/2023 07:26 AM       ASSESSMENT AND PLAN:   The patient's condition/symptoms are stable        Diagnosis Orders   1. Chronic systolic congestive heart failure, NYHA class 2, stage C (HCC)  furosemide (LASIX) 20 MG tablet    XR CHEST (2 VW)    Basic Metabolic Panel    Magnesium      2. Grade I diastolic dysfunction        3. S/P TAVR (transcatheter aortic valve replacement)        4. At risk for fluid volume overload            Continue:  GDMT:   ACE/ARB/ARNI - Entresto 24/26 BID (need pt assistance)   BB - Toprol 50 daily   Diuretic - Lasix 20 EOD  AA - aldactone 25 daily  SGLT2 -  Jardiance 10 daily   Vasodilator - none  Other - ASA, plavix       HFimpEF 40-45% 5/2024 (35-40 2024) (40% 2023) (25-30% 7/2023) (35% 2022)  Mixed cardiomyopathy   structural - Severe AS - s/p TAVR 2/2024  Suspect some underlying ischemic  RCA 60% in-stent restenosis with negative FFR   Grade 1 DD 2023  Ascending aortic 4.6 cm - stable - not a surgical candidate at this time - Dr Sandy Contreras monitoring    Stable, trace lower leg swelling on exam today with worsening SOB. Urinating well on his lasix. Will do lasix daily for two weeks and chest xray. 6 week f/u with labs    GDMT: yes    Lab reviewed - K 4.4 Cr 0.6 mag 2.4 hgb 13.9    ECHO 2024: no atrial dilation, trace paravalvular regurgitation   CATH 2022: LVEDP 7 -

## 2024-06-03 NOTE — PATIENT INSTRUCTIONS
You may receive a survey regarding the care you received during your visit.  Your input is valuable to us.  We encourage you to complete and return your survey.  We hope you will choose us in the future for your healthcare needs.    Your nurses today were Symone.  Office hours:   Mon-Thurs 8-4:30  Friday 8-12  Phone: [phone]    Continue:  Continue current medications  Daily weights and record  Fluid restriction of 2 Liters per day  Limit sodium in diet to around 8649-4381 mg/day  Monitor BP  Activity as tolerated     Call the Heart Failure Clinic for any of the following symptoms:   Weight gain of -3 pounds in 1 day or 5 pounds in 1 week  Increased shortness of breath  Shortness of breath while laying down  Cough  Chest pain  Swelling in feet, ankles or legs  Bloating in abdomen  Fatigue      Blood work prior to next appointment     Take lasix daily with a banana for two weeks    Chest xray today    Continue diet/fluid adherence  Continue daily wts.  F/U w/ Cardiology  F/U in 6 weeks

## 2024-06-04 ENCOUNTER — HOSPITAL ENCOUNTER (OUTPATIENT)
Dept: CARDIAC REHAB | Age: 84
Setting detail: THERAPIES SERIES
Discharge: HOME | End: 2024-06-04
Payer: OTHER GOVERNMENT

## 2024-06-04 PROCEDURE — G0422 INTENS CARDIAC REHAB W/EXERC: HCPCS

## 2024-06-04 PROCEDURE — G0423 INTENS CARDIAC REHAB NO EXER: HCPCS

## 2024-06-04 NOTE — PLAN OF CARE
[facility] Cardiac [facility] Facility-Based Program  Individualized Cardiac Treatment Plan    [] 72 Sessions   [x] 36 Sessions  Patient Name:  Gerard Jane  :  1940  Age:  84 y.o.  MRN:  [id number]  Diagnosis: TAVR  Date of Event: 24   Physician:  Nicki  Next Office Visit:  24  Date Entered Program: 24  Risk Stratifications: [] Low [] Intermediate [x] High  Allergies: No Known Allergies    [x][facility] [place] Resource Folder & Patient Guidebook, given and explained to Gerard.    Individual Cardiac Treatment Plan -EXERCISE  INITIAL 30 DAY 60 DAY 90  DAY FINAL DAY   EXERCISE  ASSESSMENT/PLAN EXERCISE  REASSESSMENT EXERCISE   REASSESSMENT EXERCISE   REASSESSMENT EXERCISE   REASSESSMENT EXERCISE  DISCHARGE/FOLLOW-UP   Date: 24 Date:24 Date: Date: Date: Date:   Session #1   Total Session # 16  First Exercise Session:  24 Total Session #  Total Session #  Total Session #  Total Session #   Last Exercise Session:     Stages of Change Stages of Change Stages of Change Stages of Change Stages of Change Stages of Change   [] Pre Contemplation  [] Contemplation  [x] Preparation  [] Action  [] Maintenance  [] Relapse [] Pre Contemplation  [] Contemplation  [x] Preparation  [] Action  [] Maintenance  [] Relapse [] Pre Contemplation  [] Contemplation  [] Preparation  [] Action  [] Maintenance  [] Relapse [] Pre Contemplation  [] Contemplation  [] Preparation  [] Action  [] Maintenance  [] Relapse [] Pre Contemplation  [] Contemplation  [] Preparation  [] Action  [] Maintenance  [] Relapse [] Pre Contemplation  [] Contemplation  [] Preparation  [] Action  [] Maintenance  [] Relapse   EXERCISE ASSESSMENT EXERCISE ASSESSMENT EXERCISE ASSESSMENT EXERCISE ASSESSMENT EXERCISE ASSESSMENT EXERCISE ASSESSMENT   6 Min Walk Test    Pt completed test on NS d/t balance issues. Pt wears 3L O2 with activity.    Distance:0.20 miles  Avg schwartz 37  2.2 METs  Max HR: 65 BPM      RPE:  13

## 2024-06-04 NOTE — PROGRESS NOTES
Gerard Jane YOB: 1940    Acct Number: [id number]   MRN:  [id number]                             [facility] NUTRITION WORKSHOP             Date: 2024        Session # _______    Today’s class covered:    []   Fueling a Healthy Body  [x]   Mindful Eating  []   Targeting Nutrition Priorities  []   Dining Out :  Making the Most of a Menu  []   Label Reading    Readiness to change:    []  Pre-contemplative   []  Contemplative - ambivalent about change    []  Action - ready to set action plan and implement   [x]  Maintenance - has made change and is trying, and or practicing different alternative         behaviors     Gerard was in the Workshop with the Dietitian for 45 minutes.  The content was presented via Powerpoint, lecture, and patient participation based format.  Motivational interviewing was utilized when needed, to promote change.  Patient voiced understanding.    Electronically signed by Korin Marvin RD on 2024 at 9:50 AM

## 2024-06-06 ENCOUNTER — HOSPITAL ENCOUNTER (OUTPATIENT)
Dept: CARDIAC REHAB | Age: 84
Setting detail: THERAPIES SERIES
Discharge: HOME | End: 2024-06-06
Payer: OTHER GOVERNMENT

## 2024-06-06 PROCEDURE — G0422 INTENS CARDIAC REHAB W/EXERC: HCPCS

## 2024-06-06 PROCEDURE — G0423 INTENS CARDIAC REHAB NO EXER: HCPCS

## 2024-06-06 NOTE — PROGRESS NOTES
Video Education Report - ICR/CR  Name:  Gerard Jane     Date:  6/6/2024  MRN: [id number]     Session #:    Session Length: 40 min    Core Videos        [x]Heart Disease Risk Reduction       []Overview of Pritikin Eating Plan      []Move it          []Calorie Density         []Healthy Minds, Bodies, Hearts        []Label Reading - Part 1       []Metabolic Syndrome and Belly Fat        []How Our Thoughts Can Heal Our Hearts   []Dining Out - Part 1      []Biomechanical Limitations  []Facts on Fat        []Hypertension & Heart Disease    []Diseases of Our Time - Focusing on Diabetes   []Body Composition  []Nutrition Action Plan    []Exercise Action Plan        Comments:  Video completed, group discussion

## 2024-06-07 ENCOUNTER — TELEPHONE (OUTPATIENT)
Dept: FAMILY MEDICINE CLINIC | Age: 84
End: 2024-06-07

## 2024-06-07 RX ORDER — NITROFURANTOIN 25; 75 MG/1; MG/1
100 CAPSULE ORAL 2 TIMES DAILY
Qty: 14 CAPSULE | Refills: 0 | Status: SHIPPED | OUTPATIENT
Start: 2024-06-07 | End: 2024-06-14

## 2024-06-07 NOTE — TELEPHONE ENCOUNTER
Pt daughter stopped in, he needs an antibotic and he had bacteria in his [facility], [facility].   Pt is having heavy mucus, itts yellowish.    Moris saini      Call daughter Nevin, [phone]

## 2024-06-11 ENCOUNTER — HOSPITAL ENCOUNTER (OUTPATIENT)
Dept: CARDIAC REHAB | Age: 84
Setting detail: THERAPIES SERIES
Discharge: HOME | End: 2024-06-11
Payer: OTHER GOVERNMENT

## 2024-06-11 PROCEDURE — G0422 INTENS CARDIAC REHAB W/EXERC: HCPCS

## 2024-06-11 PROCEDURE — G0423 INTENS CARDIAC REHAB NO EXER: HCPCS

## 2024-06-11 NOTE — PROGRESS NOTES
Gerard BAXTER.:  1940    [id number]   MRN:  [id number]                             [facility] HEALTHY MIND-SET WORKSHOP             Date: 2024        Session #________    Today’s class covered:    [x]  New Thoughts New Behaviors Workshop:  Patient will learn and practice techniques for developing effective health and lifestyle goals. Patient will be able to effectively apply the goal setting process learned to develop at least one new personal goal.     []  Managing Moods & Relationships Workshop:  Patient will learn how emotional and chronic stress factors can impact their hearts. They will learn healthy ways to handle stress and utilize positive coping mechanisms. In addition, [facility] patient will learn ways to improve communication skills.    []  Healthy Sleep for a healthy Heart:  Patients will learn the importance of sleep to overall health, well-being, and quality of life. They will understand the symptoms of, and treatments for, common sleep disorders. Patients will also be able to identify daytime and nighttime behaviors which impact sleep, and they will be able to apply these tools to help manage sleep-related challenges.     []  Recognizing and Reducing Stress:  Patients will learn about stress and how to recognize stress. Patients will gain insight into the toll that chronic stress takes on their health, both emotionally and physically.  Patients will learn and practice a variety of stress management techniques. Patients will be able to effectively apply coping mechanisms in perceived stressful situations.    Gerard actively participated and verbalized understanding.     Total time in the Healthy Mind-Set class was 45   minutes.    Electronically signed by NOLAN Martin on 2024 at 1:26 PM

## 2024-06-13 ENCOUNTER — HOSPITAL ENCOUNTER (OUTPATIENT)
Dept: CARDIAC REHAB | Age: 84
Setting detail: THERAPIES SERIES
Discharge: HOME | End: 2024-06-13
Payer: OTHER GOVERNMENT

## 2024-06-13 ENCOUNTER — TELEPHONE (OUTPATIENT)
Dept: FAMILY MEDICINE CLINIC | Age: 84
End: 2024-06-13

## 2024-06-13 PROCEDURE — G0423 INTENS CARDIAC REHAB NO EXER: HCPCS

## 2024-06-13 PROCEDURE — G0422 INTENS CARDIAC REHAB W/EXERC: HCPCS

## 2024-06-13 NOTE — TELEPHONE ENCOUNTER
Gerard informed by Phone  he was given atb by you on 6/7/2024.  And is bringing in urine sample 2 weeks later.  .

## 2024-06-13 NOTE — PROGRESS NOTES
Video Education Report - ICR/CR  Name:  Gerard Jane     Date:  6/13/2024  MRN: [id number]     Session #:  10  Session Length: 40 min    Core Videos        []Heart Disease Risk Reduction       []Overview of Pritikin Eating Plan      []Move it          [x]Calorie Density         []Healthy Minds, Bodies, Hearts        []Label Reading - Part 1       []Metabolic Syndrome and Belly Fat        []How Our Thoughts Can Heal Our Hearts   []Dining Out - Part 1      []Biomechanical Limitations  []Facts on Fat        []Hypertension & Heart Disease    []Diseases of Our Time - Focusing on Diabetes   []Body Composition  []Nutrition Action Plan    []Exercise Action Plan      Comments:  Video completed, group discussion

## 2024-06-13 NOTE — TELEPHONE ENCOUNTER
[facility]. The urine specimen from that day suggests that a urine infection was present. Did they give him any antibiotic for that? Was he having any bladder sx then or now?

## 2024-06-18 ENCOUNTER — HOSPITAL ENCOUNTER (OUTPATIENT)
Dept: CARDIAC REHAB | Age: 84
Setting detail: THERAPIES SERIES
Discharge: HOME | End: 2024-06-18
Payer: OTHER GOVERNMENT

## 2024-06-18 PROCEDURE — G0423 INTENS CARDIAC REHAB NO EXER: HCPCS

## 2024-06-18 PROCEDURE — G0422 INTENS CARDIAC REHAB W/EXERC: HCPCS

## 2024-06-18 NOTE — PROGRESS NOTES
Video Education Report - ICR/CR  Name:  Gerard Jane     Date:  6/18/2024  MRN: [id number]     Session #:    Session Length: 40 min    Core Videos        []Heart Disease Risk Reduction       [x]Overview of Pritikin Eating Plan      []Move it          []Calorie Density         []Healthy Minds, Bodies, Hearts        []Label Reading - Part 1       []Metabolic Syndrome and Belly Fat        []How Our Thoughts Can Heal Our Hearts   []Dining Out - Part 1      []Biomechanical Limitations  []Facts on Fat        []Hypertension & Heart Disease    []Diseases of Our Time - Focusing on Diabetes   []Body Composition  []Nutrition Action Plan    []Exercise Action Plan        Comments:  Video completed, group discussion

## 2024-06-20 ENCOUNTER — HOSPITAL ENCOUNTER (OUTPATIENT)
Dept: CARDIAC REHAB | Age: 84
Setting detail: THERAPIES SERIES
Discharge: HOME | End: 2024-06-20
Payer: OTHER GOVERNMENT

## 2024-06-20 ENCOUNTER — NURSE ONLY (OUTPATIENT)
Dept: FAMILY MEDICINE CLINIC | Age: 84
End: 2024-06-20

## 2024-06-20 DIAGNOSIS — R35.0 URINARY FREQUENCY: Primary | ICD-10-CM

## 2024-06-20 LAB
BACTERIA URINE, POC: ABNORMAL
BILIRUBIN URINE: 0 MG/DL
BLOOD, URINE: POSITIVE
CASTS URINE, POC: ABNORMAL
CLARITY: ABNORMAL
COLOR: YELLOW
CRYSTALS URINE, POC: ABNORMAL
EPI CELLS URINE, POC: ABNORMAL
GLUCOSE URINE: POSITIVE
KETONES, URINE: POSITIVE
LEUKOCYTE EST, POC: ABNORMAL
NITRITE, URINE: NEGATIVE
PH UA: 6 (ref 4.5–8)
PROTEIN UA: NEGATIVE
RBC URINE, POC: ABNORMAL
SPECIFIC GRAVITY UA: 1.01 (ref 1–1.03)
UROBILINOGEN, URINE: NORMAL
WBC URINE, POC: ABNORMAL
YEAST URINE, POC: ABNORMAL

## 2024-06-20 PROCEDURE — G0423 INTENS CARDIAC REHAB NO EXER: HCPCS

## 2024-06-20 PROCEDURE — G0422 INTENS CARDIAC REHAB W/EXERC: HCPCS

## 2024-06-20 RX ORDER — CEPHALEXIN 500 MG/1
500 CAPSULE ORAL 3 TIMES DAILY
Qty: 30 CAPSULE | Refills: 0 | Status: SHIPPED | OUTPATIENT
Start: 2024-06-20

## 2024-06-20 RX ORDER — CEPHALEXIN 500 MG/1
500 CAPSULE ORAL 3 TIMES DAILY
Qty: 30 CAPSULE | Refills: 0 | Status: SHIPPED | OUTPATIENT
Start: 2024-06-20 | End: 2024-06-20 | Stop reason: SDUPTHER

## 2024-06-20 NOTE — PROGRESS NOTES
Video Education Report - ICR/CR  Name:  Gerard Jane     Date:  6/20/2024  MRN: [id number]     Session #:  12  Session Length: 40 min    Core Videos        []Heart Disease Risk Reduction       []Overview of Pritikin Eating Plan      []Move it          []Calorie Density         []Healthy Minds, Bodies, Hearts        []Label Reading - Part 1       []Metabolic Syndrome and Belly Fat        []How Our Thoughts Can Heal Our Hearts   []Dining Out - Part 1      []Biomechanical Limitations  []Facts on Fat        [x]Hypertension & Heart Disease    []Diseases of Our Time - Focusing on Diabetes   []Body Composition  []Nutrition Action Plan    []Exercise Action Plan    Comments:  Video completed, group discussion

## 2024-06-23 LAB — URINE CULTURE, ROUTINE: ABNORMAL

## 2024-06-24 ENCOUNTER — TELEPHONE (OUTPATIENT)
Dept: FAMILY MEDICINE CLINIC | Age: 84
End: 2024-06-24

## 2024-06-24 NOTE — TELEPHONE ENCOUNTER
Sandrita informed. She was not aware of 2nd round of antibiotics. She is going to check with pt to see if he picked up prescription

## 2024-06-24 NOTE — TELEPHONE ENCOUNTER
Sandrita called regarding the urine results she viewed on My Chart.  Wants to know if there is any treatment?    Please call her back

## 2024-06-24 NOTE — TELEPHONE ENCOUNTER
This result note is from Dr Osei Franz, Andreas JETT MD  [facility] Clinical Staff  Please call patient urine shows Aeromonas, needs to have a recheck urinalysis after all the antibiotic is done

## 2024-06-25 ENCOUNTER — HOSPITAL ENCOUNTER (OUTPATIENT)
Dept: CARDIAC REHAB | Age: 84
Setting detail: THERAPIES SERIES
Discharge: HOME | End: 2024-06-25
Payer: OTHER GOVERNMENT

## 2024-06-25 PROCEDURE — G0422 INTENS CARDIAC REHAB W/EXERC: HCPCS

## 2024-06-25 PROCEDURE — G0423 INTENS CARDIAC REHAB NO EXER: HCPCS

## 2024-06-25 NOTE — PROGRESS NOTES
Video Education Report - ICR/CR  Name:  Gerard Jane     Date:  6/25/2024  MRN: [id number]     Session #:    Session Length: 40 min    Core Videos        []Heart Disease Risk Reduction       []Overview of Pritikin Eating Plan      []Move it          []Calorie Density         [x]Healthy Minds, Bodies, Hearts        []Label Reading - Part 1       []Metabolic Syndrome and Belly Fat        []How Our Thoughts Can Heal Our Hearts   []Dining Out - Part 1      []Biomechanical Limitations  []Facts on Fat        []Hypertension & Heart Disease    []Diseases of Our Time - Focusing on Diabetes   []Body Composition  []Nutrition Action Plan    []Exercise Action Plan    Exercise      Healthy Mind-Set  []Improving Performance    []Smoking Cessation    []Introduction to Yoga    Medical      Culinary  []Aging-Enhancing the Quality of Your Life  []Becoming a Pritikin   []Biology of Weight Control    []Cooking Breakfasts   []Decoding Lab Results    and Snacks  []Diseases of Our Time - Overview   []Cooking Dinner and   []Sleep Disorders     Sides  []Targeting Your Nutrition Priorities   []Healthy Salads &   Dressings  Nutrition      []Cooking Soups and   []Dining Out - Part 2     Desserts  []Fueling a Healthy Body   []Menu Workshop     Overview  []Planning Your Eating Strategy   []The Pritikin Solution  []Vitamins and Minerals    Comments:  Video completed, group discussion

## 2024-06-27 ENCOUNTER — HOSPITAL ENCOUNTER (OUTPATIENT)
Dept: CARDIAC REHAB | Age: 84
Setting detail: THERAPIES SERIES
Discharge: HOME | End: 2024-06-27
Payer: OTHER GOVERNMENT

## 2024-06-27 PROCEDURE — G0423 INTENS CARDIAC REHAB NO EXER: HCPCS

## 2024-06-27 PROCEDURE — G0422 INTENS CARDIAC REHAB W/EXERC: HCPCS

## 2024-06-27 NOTE — PROGRESS NOTES
Gerard BAXTER.:  1940    Acct Number: [id number]   MRN:  [id number]                             [facility] NUTRITION WORKSHOP             Date: 2024        Session # _______    Today’s class covered:    []   Fueling a Healthy Body  []   Mindful Eating  [x]   Targeting Nutrition Priorities  []   Dining Out :  Making the Most of a Menu  []   Label Reading    Readiness to change:    []  Pre-contemplative   []  Contemplative - ambivalent about change    []  Action - ready to set action plan and implement   [x]  Maintenance - has made change and is trying, and or practicing different alternative         behaviors     Gerard was in the Workshop with the Dietitian for 55 minutes.  The content was presented via Powerpoint, lecture, and patient participation based format.  Motivational interviewing was utilized when needed, to promote change.  Patient voiced understanding.    Electronically signed by Korin Marvin RD on 2024 at 9:50 AM

## 2024-07-02 ENCOUNTER — HOSPITAL ENCOUNTER (OUTPATIENT)
Dept: CARDIAC REHAB | Age: 84
Setting detail: THERAPIES SERIES
Discharge: HOME | End: 2024-07-02
Payer: OTHER GOVERNMENT

## 2024-07-02 PROCEDURE — G0423 INTENS CARDIAC REHAB NO EXER: HCPCS

## 2024-07-02 PROCEDURE — G0422 INTENS CARDIAC REHAB W/EXERC: HCPCS

## 2024-07-02 NOTE — PLAN OF CARE
[facility] Cardiac Rehabilitation  [facility] Facility-Based Program  Individualized Cardiac Treatment Plan    [] 72 Sessions   [x] 36 Sessions  Patient Name:  Gerard Jane  :  1940  Age:  84 y.o.  MRN:  [id number]  Diagnosis: TAVR  Date of Event: 24   Physician:  Nicki  Next Office Visit:  24  Date Entered Program: 24  Risk Stratifications: [] Low [] Intermediate [x] High  Allergies: No Known Allergies    [x][facility] Resource Folder & Patient Guidebook, given and explained to Gerard.    Individual Cardiac Treatment Plan -EXERCISE  INITIAL 30 DAY FINAL DAY   EXERCISE  ASSESSMENT/PLAN EXERCISE  REASSESSMENT EXERCISE  DISCHARGE/FOLLOW-UP   Date: 24 Date:24 Date: 24   Session #1   Total Session # 16  First Exercise Session:  24 Total Session # 30  Last Exercise Session:     Stages of Change Stages of Change Stages of Change   [] Pre Contemplation  [] Contemplation  [x] Preparation  [] Action  [] Maintenance  [] Relapse [] Pre Contemplation  [] Contemplation  [x] Preparation  [] Action  [] Maintenance  [] Relapse [] Pre Contemplation  [] Contemplation  [x] Preparation  [] Action  [] Maintenance  [] Relapse   EXERCISE ASSESSMENT EXERCISE ASSESSMENT EXERCISE ASSESSMENT   6 Min Walk Test    Pt completed test on NS d/t balance issues. Pt wears 3L O2 with activity.    Distance:0.20 miles  Avg schwartz 37  2.2 METs  Max HR: 65 BPM      RPE:  13    THR:    Rhythm:  NSR    6 Min Walk Test  Distance walked:    miles   ft   METs  Max HR: BPM      RPE:    %Change ft=     Rhythm:     DASI: 4.39 METs DASI: 5.38 METs DASI: 4.4 METs   Return to Work  Gerard plans on returning to work?   [] Yes              [] No   [] Disabled     [x] Retired   Return to work:  Retired    Return to work:  Has Gerard returned to work?  Retired   Orthopedic Limitations/  [x] Yes    [] No  If yes please list:  back, hip pain     Orthopedic Limitations  *If patient has orthopedic issue:   Actions/  accomodations

## 2024-07-02 NOTE — PROGRESS NOTES
Gerard BAXTER.:  1940    Acct Number: [id number]   MRN:  [id number]                             [facility] HEALTHY MIND-SET WORKSHOP             Date: 2024        Session #________    Today’s class covered:    []  New Thoughts New Behaviors Workshop:  Patient will learn and practice techniques for developing effective health and lifestyle goals. Patient will be able to effectively apply the goal setting process learned to develop at least one new personal goal.     [x]  Managing Moods & Relationships Workshop:  Patient will learn how emotional and chronic stress factors can impact their hearts. They will learn healthy ways to handle stress and utilize positive coping mechanisms. In addition, [facility] patient will learn ways to improve communication skills.    []  Healthy Sleep for a healthy Heart:  Patients will learn the importance of sleep to overall health, well-being, and quality of life. They will understand the symptoms of, and treatments for, common sleep disorders. Patients will also be able to identify daytime and nighttime behaviors which impact sleep, and they will be able to apply these tools to help manage sleep-related challenges.     []  Recognizing and Reducing Stress:  Patients will learn about stress and how to recognize stress. Patients will gain insight into the toll that chronic stress takes on their health, both emotionally and physically.  Patients will learn and practice a variety of stress management techniques. Patients will be able to effectively apply coping mechanisms in perceived stressful situations.    Gerard actively participated and verbalized understanding.     Total time in the Healthy Mind-Set class was 45 minutes.    Electronically signed by NOLAN Martin on 2024 at 10:35 AM

## 2024-07-04 ENCOUNTER — APPOINTMENT (OUTPATIENT)
Dept: CARDIAC REHAB | Age: 84
End: 2024-07-04
Payer: OTHER GOVERNMENT

## 2024-07-04 ENCOUNTER — HOSPITAL ENCOUNTER (OUTPATIENT)
Dept: CARDIAC REHAB | Age: 84
Setting detail: THERAPIES SERIES
End: 2024-07-04
Payer: OTHER GOVERNMENT

## 2024-07-09 ENCOUNTER — HOSPITAL ENCOUNTER (OUTPATIENT)
Dept: CARDIAC REHAB | Age: 84
Setting detail: THERAPIES SERIES
Discharge: HOME | End: 2024-07-09
Payer: OTHER GOVERNMENT

## 2024-07-09 PROCEDURE — G0423 INTENS CARDIAC REHAB NO EXER: HCPCS

## 2024-07-09 PROCEDURE — G0422 INTENS CARDIAC REHAB W/EXERC: HCPCS

## 2024-07-09 NOTE — PROGRESS NOTES
Video Education Report - ICR/CR  Name:  Gerard Jane     Date:  7/9/2024  MRN: [id number]     Session #:    Session Length: 40 min    Core Videos        []Heart Disease Risk Reduction       []Overview of Pritikin Eating Plan      []Move it          []Calorie Density         []Healthy Minds, Bodies, Hearts        [x]Label Reading - Part 1       []Metabolic Syndrome and Belly Fat        []How Our Thoughts Can Heal Our Hearts   []Dining Out - Part 1      []Biomechanical Limitations  []Facts on Fat        []Hypertension & Heart Disease    []Diseases of Our Time - Focusing on Diabetes   []Body Composition  []Nutrition Action Plan    []Exercise Action Plan        Comments:  Video completed, group discussion

## 2024-07-11 ENCOUNTER — HOSPITAL ENCOUNTER (OUTPATIENT)
Dept: CARDIAC REHAB | Age: 84
Setting detail: THERAPIES SERIES
Discharge: HOME | End: 2024-07-11
Payer: OTHER GOVERNMENT

## 2024-07-11 VITALS — BODY MASS INDEX: 24.11 KG/M2 | HEIGHT: 67 IN | WEIGHT: 153.6 LBS

## 2024-07-11 PROCEDURE — G0423 INTENS CARDIAC REHAB NO EXER: HCPCS

## 2024-07-11 PROCEDURE — G0422 INTENS CARDIAC REHAB W/EXERC: HCPCS

## 2024-07-11 NOTE — PROGRESS NOTES
Video Education Report - ICR/CR  Name:  Gerard Jane     Date:  7/11/2024  MRN: [id number]     Session #:    Session Length: 40 min    Core Videos        []Heart Disease Risk Reduction       []Overview of Pritikin Eating Plan      []Move it          []Calorie Density         []Healthy Minds, Bodies, Hearts        []Label Reading - Part 1       []Metabolic Syndrome and Belly Fat        []How Our Thoughts Can Heal Our Hearts   [x]Dining Out - Part 1      []Biomechanical Limitations  []Facts on Fat        []Hypertension & Heart Disease    []Diseases of Our Time - Focusing on Diabetes   []Body Composition  []Nutrition Action Plan    []Exercise Action Plan        Comments:  Video completed, program completed.

## 2024-07-11 NOTE — PLAN OF CARE
[facility] Cardiac Rehabilitation  [facility] Facility-Based Program  Individualized Cardiac Treatment Plan    [] 72 Sessions   [x] 36 Sessions  Patient Name:  Gerard Jane  :  1940  Age:  84 y.o.  MRN:  [id number]  Diagnosis: TAVR  Date of Event: 24   Physician:  Nicki  Next Office Visit:  24  Date Entered Program: 24  Risk Stratifications: [] Low [] Intermediate [x] High  Allergies: No Known Allergies    [x][facility] Resource Folder & Patient Guidebook, given and explained to Gerard.    Individual Cardiac Treatment Plan -EXERCISE  INITIAL 30 DAY FINAL DAY   EXERCISE  ASSESSMENT/PLAN EXERCISE  REASSESSMENT EXERCISE  DISCHARGE/FOLLOW-UP   Date: 24 Date:24 Date: 24   Session #1   Total Session # 16  First Exercise Session:  24 Total Session # 30  Last Exercise Session:     Stages of Change Stages of Change Stages of Change   [] Pre Contemplation  [] Contemplation  [x] Preparation  [] Action  [] Maintenance  [] Relapse [] Pre Contemplation  [] Contemplation  [x] Preparation  [] Action  [] Maintenance  [] Relapse [] Pre Contemplation  [] Contemplation  [x] Preparation  [] Action  [] Maintenance  [] Relapse   EXERCISE ASSESSMENT EXERCISE ASSESSMENT EXERCISE ASSESSMENT   6 Min Walk Test    Pt completed test on NS d/t balance issues. Pt wears 3L O2 with activity.    Distance:0.20 miles  Avg schwartz 37  2.2 METs  Max HR: 65 BPM      RPE:  13    THR:    Rhythm:  NSR    6 Min Walk Test    6MWT not completed. Patient only uses [employer].   DASI: 4.39 METs DASI: 5.38 METs DASI: 4.4 METs   Return to Work  Gerard plans on returning to work?   [] Yes              [] No   [] Disabled     [x] Retired   Return to work:  Retired    Return to work:  Has Gerard returned to work?  Retired   Orthopedic Limitations/  [x] Yes    [] No  If yes please list:  back, hip pain     Orthopedic Limitations  *If patient has orthopedic issue:   Actions/  accomodations needed to make Gerard successful: No TM or

## 2024-07-16 ENCOUNTER — OFFICE VISIT (OUTPATIENT)
Dept: CARDIOLOGY CLINIC | Age: 84
End: 2024-07-16
Payer: OTHER GOVERNMENT

## 2024-07-16 VITALS
SYSTOLIC BLOOD PRESSURE: 102 MMHG | WEIGHT: 150.25 LBS | HEART RATE: 67 BPM | DIASTOLIC BLOOD PRESSURE: 60 MMHG | BODY MASS INDEX: 23.58 KG/M2 | OXYGEN SATURATION: 90 % | HEIGHT: 67 IN

## 2024-07-16 DIAGNOSIS — Z95.2 S/P TAVR (TRANSCATHETER AORTIC VALVE REPLACEMENT): ICD-10-CM

## 2024-07-16 DIAGNOSIS — Z91.89 AT RISK FOR FLUID VOLUME OVERLOAD: ICD-10-CM

## 2024-07-16 DIAGNOSIS — I51.89 GRADE I DIASTOLIC DYSFUNCTION: ICD-10-CM

## 2024-07-16 DIAGNOSIS — I50.22 CHRONIC SYSTOLIC CONGESTIVE HEART FAILURE, NYHA CLASS 2 (HCC): Primary | ICD-10-CM

## 2024-07-16 PROCEDURE — 1123F ACP DISCUSS/DSCN MKR DOCD: CPT | Performed by: NURSE PRACTITIONER

## 2024-07-16 PROCEDURE — 99214 OFFICE O/P EST MOD 30 MIN: CPT | Performed by: NURSE PRACTITIONER

## 2024-07-16 ASSESSMENT — ENCOUNTER SYMPTOMS
NAUSEA: 0
COUGH: 0
ABDOMINAL DISTENTION: 0
VOMITING: 0
SHORTNESS OF BREATH: 1

## 2024-07-16 NOTE — PATIENT INSTRUCTIONS
You may receive a survey regarding the care you received during your visit.  Your input is valuable to us.  We encourage you to complete and return your survey.  We hope you will choose us in the future for your healthcare needs.    Your nurses today were Symone.  Office hours:   Mon-Thurs 8-4:30  Friday 8-12  Phone: [phone]    Continue:  Continue current medications  Daily weights and record  Fluid restriction of 2 Liters per day  Limit sodium in diet to around 4300-0402 mg/day  Monitor BP  Activity as tolerated     Call the [facility] for any of the following symptoms:   Weight gain of -3 pounds in 1 day or 5 pounds in 1 week  Increased shortness of breath  Shortness of breath while laying down  Cough  Chest pain  Swelling in feet, ankles or legs  Bloating in abdomen  Fatigue        No medication changes today    Continue diet/fluid adherence  Continue daily wts.  F/U w/ Cardiology  F/U in 8 months

## 2024-07-16 NOTE — PROGRESS NOTES
(WIXELA INHUB) 250-50 MCG/ACT AEPB diskus inhaler Inhale 1 puff into the lungs in the morning and 1 puff in the evening. 180 each 3    albuterol sulfate HFA (PROVENTIL HFA) 108 (90 Base) MCG/ACT inhaler Inhale 2 puffs into the lungs every 6 hours as needed for Wheezing 18 g 3    albuterol (PROVENTIL) (2.5 MG/3ML) 0.083% nebulizer solution Take 3 mLs by nebulization every 6 hours as needed for Wheezing or Shortness of Breath 120 each 11    spironolactone (ALDACTONE) 25 MG tablet Take 1 tablet by mouth daily 90 tablet 3    carboxymethylcellulose (REFRESH TEARS) 0.5 % SOLN ophthalmic solution Place 1 drop into both eyes every morning      sacubitril-valsartan (ENTRESTO) 24-26 MG per tablet Take 1 tablet by mouth 2 times daily 180 tablet 3    tamsulosin (FLOMAX) 0.4 MG capsule Take 1 capsule by mouth daily      pravastatin (PRAVACHOL) 80 MG tablet Take 1 tablet by mouth daily      aspirin 81 MG EC tablet Take 1 tablet by mouth daily       No current facility-administered medications for this visit.     No Known Allergies    SUBJECTIVE:   Review of Systems   Constitutional:  Positive for fatigue. Negative for activity change, appetite change and diaphoresis.   Respiratory:  Positive for shortness of breath (chronic - no worse). Negative for cough.    Cardiovascular:  Negative for chest pain, palpitations and leg swelling.   Gastrointestinal:  Negative for abdominal distention, nausea and vomiting.   Neurological:  Negative for weakness, light-headedness and headaches.   Hematological:  Negative for adenopathy.   Psychiatric/Behavioral:  Negative for sleep disturbance.        OBJECTIVE:   Today's Vitals:  /60   Pulse 67   Ht 1.702 m (5' 7\")   Wt 68.2 kg (150 lb 4 oz)   SpO2 90%   BMI 23.53 kg/m²     Physical Exam  Vitals reviewed.   Constitutional:       General: He is not in acute distress.     Appearance: Normal appearance. He is well-developed. He is not diaphoretic.   HENT:      Head: Normocephalic and

## 2024-07-22 ENCOUNTER — LAB (OUTPATIENT)
Dept: FAMILY MEDICINE CLINIC | Age: 84
End: 2024-07-22

## 2024-07-22 ENCOUNTER — TELEPHONE (OUTPATIENT)
Dept: FAMILY MEDICINE CLINIC | Age: 84
End: 2024-07-22

## 2024-07-22 DIAGNOSIS — R35.0 URINARY FREQUENCY: Primary | ICD-10-CM

## 2024-07-22 LAB
BACTERIA URINE, POC: ABNORMAL
BILIRUBIN URINE: 0 MG/DL
BLOOD, URINE: NEGATIVE
CASTS URINE, POC: ABNORMAL
CLARITY, UA: ABNORMAL
COLOR, UA: ABNORMAL
CRYSTALS URINE, POC: ABNORMAL
EPI CELLS URINE, POC: ABNORMAL
GLUCOSE URINE: ABNORMAL
KETONES, URINE: NEGATIVE
LEUKOCYTE EST, POC: ABNORMAL
NITRITE, URINE: NEGATIVE
PH UA: 5 (ref 4.5–8)
PROTEIN UA: POSITIVE
RBC URINE, POC: ABNORMAL
SPECIFIC GRAVITY UA: 1.02 (ref 1–1.03)
UROBILINOGEN, URINE: NORMAL
WBC URINE, POC: ABNORMAL
YEAST URINE, POC: ABNORMAL

## 2024-07-22 PROCEDURE — 81000 URINALYSIS NONAUTO W/SCOPE: CPT | Performed by: FAMILY MEDICINE

## 2024-07-22 NOTE — TELEPHONE ENCOUNTER
Urine brought into office for recheck and doctor viewed and is without abnormalities.  Patient informed via phone.

## 2024-08-06 ENCOUNTER — TELEPHONE (OUTPATIENT)
Dept: PULMONOLOGY | Age: 84
End: 2024-08-06

## 2024-08-06 DIAGNOSIS — J44.9 STAGE 3 SEVERE COPD BY GOLD CLASSIFICATION (HCC): ICD-10-CM

## 2024-08-06 RX ORDER — FLUTICASONE PROPIONATE AND SALMETEROL 250; 50 UG/1; UG/1
1 POWDER RESPIRATORY (INHALATION) EVERY 12 HOURS
Qty: 60 EACH | Refills: 0 | Status: SHIPPED | OUTPATIENT
Start: 2024-08-06

## 2024-08-06 RX ORDER — FLUTICASONE PROPIONATE AND SALMETEROL 250; 50 UG/1; UG/1
POWDER RESPIRATORY (INHALATION)
Qty: 180 EACH | OUTPATIENT
Start: 2024-08-06

## 2024-08-06 RX ORDER — FLUTICASONE PROPIONATE AND SALMETEROL 250; 50 UG/1; UG/1
1 POWDER RESPIRATORY (INHALATION) EVERY 12 HOURS
Qty: 180 EACH | Refills: 3 | Status: SHIPPED | OUTPATIENT
Start: 2024-08-06

## 2024-08-06 NOTE — TELEPHONE ENCOUNTER
Patient is calling asking if you could sent the [facility] this refill... he is also down to two days left and was asking if you could have them send to a local pharm so he doesn't go without...please advise

## 2024-08-12 ENCOUNTER — TELEPHONE (OUTPATIENT)
Dept: PULMONOLOGY | Age: 84
End: 2024-08-12

## 2024-08-12 NOTE — TELEPHONE ENCOUNTER
Yes I did see that. He was not happy he had to pay $180.00 for 1 month of medicine when he only needed 2 days.  He wanted to make sure we didn't send anymore to [facility]'s. He just doesn't want that to happen again. Thank you

## 2024-08-12 NOTE — TELEPHONE ENCOUNTER
Gerard called in today regarding his [facility] Thank you

## 2024-08-12 NOTE — TELEPHONE ENCOUNTER
He requested a 1 month to [facility] since he only had 2 days left.    I sent a 30 day supply to [facility] and a script was printed back on 8/6/24 [facility] ( see previous Telephone encounter)

## 2024-08-15 NOTE — TELEPHONE ENCOUNTER
Gerard Jane called requesting a refill on the following medications:  Requested Prescriptions     Pending Prescriptions Disp Refills    metoprolol succinate (TOPROL XL) 25 MG extended release tablet 180 tablet 1     Sig: Take 2 tablets by mouth daily Do not take this medication until after you have been seen in the office on 2/20/2024.     Pharmacy [facility]: [facility]      Date of last visit: 3/14/2024  Date of next visit (if applicable): 12/17/2024

## 2024-08-16 RX ORDER — METOPROLOL SUCCINATE 25 MG/1
50 TABLET, EXTENDED RELEASE ORAL DAILY
Qty: 180 TABLET | Refills: 1 | Status: SHIPPED | OUTPATIENT
Start: 2024-08-16

## 2024-08-16 RX ORDER — TAMSULOSIN HYDROCHLORIDE 0.4 MG/1
0.4 CAPSULE ORAL DAILY
Qty: 30 CAPSULE | Refills: 0 | Status: SHIPPED | OUTPATIENT
Start: 2024-08-16

## 2024-08-16 NOTE — TELEPHONE ENCOUNTER
Gerard Jane called requesting a refill on the following medications:  Requested Prescriptions     Pending Prescriptions Disp Refills    tamsulosin (FLOMAX) 0.4 MG capsule 30 capsule      Sig: Take 1 capsule by mouth daily     Pharmacy verified:  [facility]  Fax # [phone]    Date of last visit:   Date of next visit (if applicable): 8/20/2024

## 2024-08-20 ENCOUNTER — PROCEDURE VISIT (OUTPATIENT)
Dept: UROLOGY | Age: 84
End: 2024-08-20
Payer: OTHER GOVERNMENT

## 2024-08-20 VITALS — HEIGHT: 67 IN | BODY MASS INDEX: 23.54 KG/M2 | RESPIRATION RATE: 16 BRPM | WEIGHT: 150 LBS

## 2024-08-20 DIAGNOSIS — Z85.51 HISTORY OF BLADDER CANCER: Primary | ICD-10-CM

## 2024-08-20 PROCEDURE — 52000 CYSTOURETHROSCOPY: CPT | Performed by: UROLOGY

## 2024-08-20 NOTE — PROGRESS NOTES
Cystoscopy    Operative Note    Patient:  Gerard Jane  MRN: [id number]  YOB: 1940    Date: 08/20/24  Surgeon: CHRISTIAN CHIRINOS MD  Anesthesia: Urojet Local  Indications: bladder cancer  Position: Supine  EBL: 0 ml    Findings:   The patient was prepped and draped in the usual sterile fashion.  The flexible cystoscope was advanced through the urethra and into the bladder.  The bladder was thoroughly inspected and the following was noted:    Residual Urine: significant\" \" .  Urine clear, with no obvious infection  Urethra:  had soft urethral stricture that I bypassed with scope . Urethral dilation was not performed.    Prostate: lateral lobe hypertrophy + present, prostate moderately obstructing, intravesical extension of prostate not present. There was no previous TURP defect.   Bladder: no tumor noted .   Severe trabeculation noted.  severeal bladder diverticulum.  Ureters: Orifices with normal configuration and location.      The cystoscope was removed.  The patient tolerated the procedure well.  Incomplete emptying but doing well  Poor ecog  O2 dependent  Cysto annually

## 2024-08-26 ENCOUNTER — TELEPHONE (OUTPATIENT)
Age: 84
End: 2024-08-26

## 2024-09-16 ENCOUNTER — HOSPITAL ENCOUNTER (OUTPATIENT)
Dept: ULTRASOUND IMAGING | Age: 84
Discharge: HOME | End: 2024-09-16
Attending: THORACIC SURGERY (CARDIOTHORACIC VASCULAR SURGERY)
Payer: OTHER GOVERNMENT

## 2024-09-16 DIAGNOSIS — Z98.890 STATUS POST ENDOVASCULAR ANEURYSM REPAIR (EVAR): ICD-10-CM

## 2024-09-16 DIAGNOSIS — Z86.79 STATUS POST ENDOVASCULAR ANEURYSM REPAIR (EVAR): ICD-10-CM

## 2024-09-16 DIAGNOSIS — I71.43 INFRARENAL ABDOMINAL AORTIC ANEURYSM (AAA) WITHOUT RUPTURE (HCC): ICD-10-CM

## 2024-09-16 PROCEDURE — 76775 US EXAM ABDO BACK WALL LIM: CPT

## 2024-09-24 ENCOUNTER — OFFICE VISIT (OUTPATIENT)
Age: 84
End: 2024-09-24
Payer: OTHER GOVERNMENT

## 2024-09-24 VITALS
BODY MASS INDEX: 24.23 KG/M2 | HEIGHT: 67 IN | DIASTOLIC BLOOD PRESSURE: 50 MMHG | HEART RATE: 67 BPM | SYSTOLIC BLOOD PRESSURE: 88 MMHG | WEIGHT: 154.4 LBS

## 2024-09-24 DIAGNOSIS — I71.43 INFRARENAL ABDOMINAL AORTIC ANEURYSM (AAA) WITHOUT RUPTURE (HCC): Primary | ICD-10-CM

## 2024-09-24 PROCEDURE — 99214 OFFICE O/P EST MOD 30 MIN: CPT | Performed by: THORACIC SURGERY (CARDIOTHORACIC VASCULAR SURGERY)

## 2024-09-24 PROCEDURE — 1123F ACP DISCUSS/DSCN MKR DOCD: CPT | Performed by: THORACIC SURGERY (CARDIOTHORACIC VASCULAR SURGERY)

## 2024-10-08 ENCOUNTER — APPOINTMENT (OUTPATIENT)
Dept: CT IMAGING | Age: 84
End: 2024-10-08
Payer: OTHER GOVERNMENT

## 2024-10-08 ENCOUNTER — HOSPITAL ENCOUNTER (INPATIENT)
Age: 84
LOS: 4 days | Discharge: HOME | End: 2024-10-12
Attending: EMERGENCY MEDICINE | Admitting: STUDENT IN AN ORGANIZED HEALTH CARE EDUCATION/TRAINING PROGRAM
Payer: OTHER GOVERNMENT

## 2024-10-08 ENCOUNTER — APPOINTMENT (OUTPATIENT)
Dept: ULTRASOUND IMAGING | Age: 84
End: 2024-10-08
Payer: OTHER GOVERNMENT

## 2024-10-08 ENCOUNTER — APPOINTMENT (OUTPATIENT)
Dept: GENERAL RADIOLOGY | Age: 84
End: 2024-10-08
Payer: OTHER GOVERNMENT

## 2024-10-08 DIAGNOSIS — R65.21 SEPTIC SHOCK (HCC): ICD-10-CM

## 2024-10-08 DIAGNOSIS — A41.9 SEPSIS, DUE TO UNSPECIFIED ORGANISM, UNSPECIFIED WHETHER ACUTE ORGAN DYSFUNCTION PRESENT (HCC): Primary | ICD-10-CM

## 2024-10-08 DIAGNOSIS — A41.9 SEPTIC SHOCK (HCC): ICD-10-CM

## 2024-10-08 DIAGNOSIS — N45.3 EPIDIDYMO-ORCHITIS: ICD-10-CM

## 2024-10-08 DIAGNOSIS — N49.0: ICD-10-CM

## 2024-10-08 PROBLEM — I95.9 HYPOTENSION: Status: ACTIVE | Noted: 2024-10-08

## 2024-10-08 LAB
ABO: NORMAL
ALBUMIN SERPL BCG-MCNC: 3.4 G/DL (ref 3.5–5.1)
ALP SERPL-CCNC: 88 U/L (ref 38–126)
ALT SERPL W/O P-5'-P-CCNC: 9 U/L (ref 11–66)
ANION GAP SERPL CALC-SCNC: 12 MEQ/L (ref 8–16)
ANTIBODY SCREEN: NORMAL
AST SERPL-CCNC: 13 U/L (ref 5–40)
BASOPHILS ABSOLUTE: 0 THOU/MM3 (ref 0–0.1)
BASOPHILS NFR BLD AUTO: 0.2 %
BILIRUB CONJ SERPL-MCNC: < 0.1 MG/DL (ref 0.1–13.8)
BILIRUB SERPL-MCNC: 0.4 MG/DL (ref 0.3–1.2)
BUN SERPL-MCNC: 27 MG/DL (ref 7–22)
CALCIUM SERPL-MCNC: 9.4 MG/DL (ref 8.5–10.5)
CHLORIDE SERPL-SCNC: 98 MEQ/L (ref 98–111)
CO2 SERPL-SCNC: 27 MEQ/L (ref 23–33)
CREAT SERPL-MCNC: 0.7 MG/DL (ref 0.4–1.2)
DEPRECATED RDW RBC AUTO: 50.1 FL (ref 35–45)
EOSINOPHIL NFR BLD AUTO: 0.1 %
EOSINOPHILS ABSOLUTE: 0 THOU/MM3 (ref 0–0.4)
ERYTHROCYTE [DISTWIDTH] IN BLOOD BY AUTOMATED COUNT: 14.6 % (ref 11.5–14.5)
GFR SERPL CREATININE-BSD FRML MDRD: > 90 ML/MIN/1.73M2
GLUCOSE SERPL-MCNC: 96 MG/DL (ref 70–108)
HCT VFR BLD AUTO: 35.9 % (ref 42–52)
HGB BLD-MCNC: 11.5 GM/DL (ref 14–18)
IMM GRANULOCYTES # BLD AUTO: 0.1 THOU/MM3 (ref 0–0.07)
IMM GRANULOCYTES NFR BLD AUTO: 0.5 %
LACTIC ACID, SEPSIS: 1 MMOL/L (ref 0.5–1.9)
LYMPHOCYTES ABSOLUTE: 0.9 THOU/MM3 (ref 1–4.8)
LYMPHOCYTES NFR BLD AUTO: 3.9 %
MCH RBC QN AUTO: 29.9 PG (ref 26–33)
MCHC RBC AUTO-ENTMCNC: 32 GM/DL (ref 32.2–35.5)
MCV RBC AUTO: 93.2 FL (ref 80–94)
MONOCYTES ABSOLUTE: 1.7 THOU/MM3 (ref 0.4–1.3)
MONOCYTES NFR BLD AUTO: 7.6 %
NEUTROPHILS ABSOLUTE: 19.5 THOU/MM3 (ref 1.8–7.7)
NEUTROPHILS NFR BLD AUTO: 87.7 %
NRBC BLD AUTO-RTO: 0 /100 WBC
NT-PROBNP SERPL IA-MCNC: 448.3 PG/ML (ref 0–449)
OSMOLALITY SERPL CALC.SUM OF ELEC: 278.8 MOSMOL/KG (ref 275–300)
PLATELET # BLD AUTO: 227 THOU/MM3 (ref 130–400)
PMV BLD AUTO: 10 FL (ref 9.4–12.4)
POTASSIUM SERPL-SCNC: 4.2 MEQ/L (ref 3.5–5.2)
PROCALCITONIN SERPL IA-MCNC: 0.09 NG/ML (ref 0.01–0.09)
PROT SERPL-MCNC: 6.9 G/DL (ref 6.1–8)
RBC # BLD AUTO: 3.85 MILL/MM3 (ref 4.7–6.1)
RH FACTOR: NORMAL
SODIUM SERPL-SCNC: 137 MEQ/L (ref 135–145)
TROPONIN, HIGH SENSITIVITY: 24 NG/L (ref 0–12)
TROPONIN, HIGH SENSITIVITY: 26 NG/L (ref 0–12)
TSH SERPL DL<=0.005 MIU/L-ACNC: 1.48 UIU/ML (ref 0.4–4.2)
WBC # BLD AUTO: 22.2 THOU/MM3 (ref 4.8–10.8)

## 2024-10-08 PROCEDURE — 6360000002 HC RX W HCPCS: Performed by: EMERGENCY MEDICINE

## 2024-10-08 PROCEDURE — 6360000004 HC RX CONTRAST MEDICATION: Performed by: EMERGENCY MEDICINE

## 2024-10-08 PROCEDURE — 82248 BILIRUBIN DIRECT: CPT

## 2024-10-08 PROCEDURE — 81001 URINALYSIS AUTO W/SCOPE: CPT

## 2024-10-08 PROCEDURE — 93005 ELECTROCARDIOGRAM TRACING: CPT | Performed by: EMERGENCY MEDICINE

## 2024-10-08 PROCEDURE — 86900 BLOOD TYPING SEROLOGIC ABO: CPT

## 2024-10-08 PROCEDURE — 96365 THER/PROPH/DIAG IV INF INIT: CPT

## 2024-10-08 PROCEDURE — 2500000003 HC RX 250 WO HCPCS: Performed by: EMERGENCY MEDICINE

## 2024-10-08 PROCEDURE — 86850 RBC ANTIBODY SCREEN: CPT

## 2024-10-08 PROCEDURE — 84443 ASSAY THYROID STIM HORMONE: CPT

## 2024-10-08 PROCEDURE — 71045 X-RAY EXAM CHEST 1 VIEW: CPT

## 2024-10-08 PROCEDURE — 80053 COMPREHEN METABOLIC PANEL: CPT

## 2024-10-08 PROCEDURE — 96366 THER/PROPH/DIAG IV INF ADDON: CPT

## 2024-10-08 PROCEDURE — 2000000000 HC ICU R&B

## 2024-10-08 PROCEDURE — 86901 BLOOD TYPING SEROLOGIC RH(D): CPT

## 2024-10-08 PROCEDURE — 83605 ASSAY OF LACTIC ACID: CPT

## 2024-10-08 PROCEDURE — 36415 COLL VENOUS BLD VENIPUNCTURE: CPT

## 2024-10-08 PROCEDURE — 76870 US EXAM SCROTUM: CPT

## 2024-10-08 PROCEDURE — 2580000003 HC RX 258: Performed by: EMERGENCY MEDICINE

## 2024-10-08 PROCEDURE — 84484 ASSAY OF TROPONIN QUANT: CPT

## 2024-10-08 PROCEDURE — 85025 COMPLETE CBC W/AUTO DIFF WBC: CPT

## 2024-10-08 PROCEDURE — 87086 URINE CULTURE/COLONY COUNT: CPT

## 2024-10-08 PROCEDURE — 83880 ASSAY OF NATRIURETIC PEPTIDE: CPT

## 2024-10-08 PROCEDURE — 84145 PROCALCITONIN (PCT): CPT

## 2024-10-08 PROCEDURE — 99285 EMERGENCY DEPT VISIT HI MDM: CPT

## 2024-10-08 PROCEDURE — 96367 TX/PROPH/DG ADDL SEQ IV INF: CPT

## 2024-10-08 PROCEDURE — 74176 CT ABD & PELVIS W/O CONTRAST: CPT

## 2024-10-08 PROCEDURE — 51798 US URINE CAPACITY MEASURE: CPT

## 2024-10-08 PROCEDURE — 74174 CTA ABD&PLVS W/CONTRAST: CPT

## 2024-10-08 PROCEDURE — 87040 BLOOD CULTURE FOR BACTERIA: CPT

## 2024-10-08 RX ORDER — SODIUM CHLORIDE, SODIUM LACTATE, POTASSIUM CHLORIDE, AND CALCIUM CHLORIDE .6; .31; .03; .02 G/100ML; G/100ML; G/100ML; G/100ML
1000 INJECTION, SOLUTION INTRAVENOUS ONCE
Status: COMPLETED | OUTPATIENT
Start: 2024-10-08 | End: 2024-10-08

## 2024-10-08 RX ORDER — NOREPINEPHRINE BITARTRATE 0.06 MG/ML
1-100 INJECTION, SOLUTION INTRAVENOUS CONTINUOUS
Status: DISCONTINUED | OUTPATIENT
Start: 2024-10-08 | End: 2024-10-10

## 2024-10-08 RX ORDER — IOPAMIDOL 755 MG/ML
80 INJECTION, SOLUTION INTRAVASCULAR
Status: COMPLETED | OUTPATIENT
Start: 2024-10-08 | End: 2024-10-08

## 2024-10-08 RX ADMIN — IOPAMIDOL 80 ML: 755 INJECTION, SOLUTION INTRAVENOUS at 21:32

## 2024-10-08 RX ADMIN — SODIUM CHLORIDE, POTASSIUM CHLORIDE, SODIUM LACTATE AND CALCIUM CHLORIDE 1000 ML: 600; 310; 30; 20 INJECTION, SOLUTION INTRAVENOUS at 19:43

## 2024-10-08 RX ADMIN — PIPERACILLIN AND TAZOBACTAM 4500 MG: 4; .5 INJECTION, POWDER, FOR SOLUTION INTRAVENOUS at 19:55

## 2024-10-08 RX ADMIN — Medication 5 MCG/MIN: at 20:38

## 2024-10-08 ASSESSMENT — PAIN SCALES - GENERAL: PAINLEVEL_OUTOF10: 8

## 2024-10-08 ASSESSMENT — PAIN DESCRIPTION - LOCATION: LOCATION: GROIN

## 2024-10-08 NOTE — ED NOTES
Pt returned from CT scan in stable condition. Placed in trendelenburg. Fluids started per order. Lab to bedside to collect 2nd blood culture.

## 2024-10-09 PROBLEM — A41.9 SEPSIS (HCC): Status: ACTIVE | Noted: 2024-10-09

## 2024-10-09 PROBLEM — N45.3 EPIDIDYMO-ORCHITIS: Status: ACTIVE | Noted: 2024-10-09

## 2024-10-09 LAB
ANION GAP SERPL CALC-SCNC: 14 MEQ/L (ref 8–16)
BACTERIA: ABNORMAL
BASOPHILS ABSOLUTE: 0.1 THOU/MM3 (ref 0–0.1)
BASOPHILS NFR BLD AUTO: 0.2 %
BILIRUB UR QL STRIP: NEGATIVE
BUN SERPL-MCNC: 25 MG/DL (ref 7–22)
CALCIUM SERPL-MCNC: 9 MG/DL (ref 8.5–10.5)
CASTS #/AREA URNS LPF: ABNORMAL /LPF
CASTS #/AREA URNS LPF: ABNORMAL /LPF
CHARACTER UR: ABNORMAL
CHARCOAL URNS QL MICRO: ABNORMAL
CHLORIDE SERPL-SCNC: 100 MEQ/L (ref 98–111)
CO2 SERPL-SCNC: 24 MEQ/L (ref 23–33)
COLOR UR: YELLOW
CORTIS SERPL-MCNC: 23.3 UG/DL
CORTIS SERPL-MCNC: 25.57 UG/DL
CORTIS SERPL-MCNC: 30.97 UG/DL
CORTIS SERPL-MCNC: 37.73 UG/DL
CORTISOL COLLECTION INFO: NORMAL
CREAT SERPL-MCNC: 0.7 MG/DL (ref 0.4–1.2)
CRYSTALS URNS QL MICRO: ABNORMAL
DEPRECATED RDW RBC AUTO: 49.3 FL (ref 35–45)
EKG ATRIAL RATE: 86 BPM
EKG P AXIS: 67 DEGREES
EKG P-R INTERVAL: 274 MS
EKG Q-T INTERVAL: 416 MS
EKG QRS DURATION: 164 MS
EKG QTC CALCULATION (BAZETT): 497 MS
EKG R AXIS: -41 DEGREES
EKG T AXIS: 89 DEGREES
EKG VENTRICULAR RATE: 86 BPM
EOSINOPHIL NFR BLD AUTO: 0.5 %
EOSINOPHILS ABSOLUTE: 0.1 THOU/MM3 (ref 0–0.4)
EPITHELIAL CELLS, UA: ABNORMAL /HPF
ERYTHROCYTE [DISTWIDTH] IN BLOOD BY AUTOMATED COUNT: 14.5 % (ref 11.5–14.5)
GFR SERPL CREATININE-BSD FRML MDRD: > 90 ML/MIN/1.73M2
GLUCOSE SERPL-MCNC: 118 MG/DL (ref 70–108)
GLUCOSE UR QL STRIP.AUTO: >= 1000 MG/DL
HCT VFR BLD AUTO: 37 % (ref 42–52)
HGB BLD-MCNC: 11.8 GM/DL (ref 14–18)
HGB UR QL STRIP.AUTO: ABNORMAL
IMM GRANULOCYTES # BLD AUTO: 0.16 THOU/MM3 (ref 0–0.07)
IMM GRANULOCYTES NFR BLD AUTO: 0.6 %
KETONES UR QL STRIP.AUTO: 15
LACTATE SERPL-SCNC: 0.8 MMOL/L (ref 0.5–2)
LEUKOCYTE ESTERASE UR QL STRIP.AUTO: ABNORMAL
LYMPHOCYTES ABSOLUTE: 0.4 THOU/MM3 (ref 1–4.8)
LYMPHOCYTES NFR BLD AUTO: 1.6 %
MCH RBC QN AUTO: 29.6 PG (ref 26–33)
MCHC RBC AUTO-ENTMCNC: 31.9 GM/DL (ref 32.2–35.5)
MCV RBC AUTO: 92.7 FL (ref 80–94)
MONOCYTES ABSOLUTE: 1.2 THOU/MM3 (ref 0.4–1.3)
MONOCYTES NFR BLD AUTO: 4.5 %
NEUTROPHILS ABSOLUTE: 25.3 THOU/MM3 (ref 1.8–7.7)
NEUTROPHILS NFR BLD AUTO: 92.6 %
NITRITE UR QL STRIP.AUTO: POSITIVE
NRBC BLD AUTO-RTO: 0 /100 WBC
PH UR STRIP.AUTO: 6 [PH] (ref 5–9)
PLATELET # BLD AUTO: 207 THOU/MM3 (ref 130–400)
PLATELET BLD QL SMEAR: ADEQUATE
PMV BLD AUTO: 9.9 FL (ref 9.4–12.4)
POTASSIUM SERPL-SCNC: 3.9 MEQ/L (ref 3.5–5.2)
PROT UR STRIP.AUTO-MCNC: 30 MG/DL
RBC # BLD AUTO: 3.99 MILL/MM3 (ref 4.7–6.1)
RBC #/AREA URNS HPF: > 200 /HPF
RENAL EPI CELLS #/AREA URNS HPF: ABNORMAL /[HPF]
SCAN OF BLOOD SMEAR: NORMAL
SODIUM SERPL-SCNC: 138 MEQ/L (ref 135–145)
SP GR UR REFRACT.AUTO: > 1.03 (ref 1–1.03)
UROBILINOGEN UR QL STRIP.AUTO: 0.2 EU/DL (ref 0–1)
WBC # BLD AUTO: 27.3 THOU/MM3 (ref 4.8–10.8)
WBC #/AREA URNS HPF: > 200 /HPF
YEAST LIKE FUNGI URNS QL MICRO: ABNORMAL

## 2024-10-09 PROCEDURE — 2000000000 HC ICU R&B

## 2024-10-09 PROCEDURE — 85025 COMPLETE CBC W/AUTO DIFF WBC: CPT

## 2024-10-09 PROCEDURE — 94640 AIRWAY INHALATION TREATMENT: CPT

## 2024-10-09 PROCEDURE — 94761 N-INVAS EAR/PLS OXIMETRY MLT: CPT

## 2024-10-09 PROCEDURE — 6370000000 HC RX 637 (ALT 250 FOR IP)

## 2024-10-09 PROCEDURE — 93010 ELECTROCARDIOGRAM REPORT: CPT | Performed by: INTERNAL MEDICINE

## 2024-10-09 PROCEDURE — 36415 COLL VENOUS BLD VENIPUNCTURE: CPT

## 2024-10-09 PROCEDURE — 82533 TOTAL CORTISOL: CPT

## 2024-10-09 PROCEDURE — 2580000003 HC RX 258

## 2024-10-09 PROCEDURE — 2700000000 HC OXYGEN THERAPY PER DAY

## 2024-10-09 PROCEDURE — 83605 ASSAY OF LACTIC ACID: CPT

## 2024-10-09 PROCEDURE — 2500000003 HC RX 250 WO HCPCS

## 2024-10-09 PROCEDURE — 6360000002 HC RX W HCPCS

## 2024-10-09 PROCEDURE — 6360000002 HC RX W HCPCS: Performed by: STUDENT IN AN ORGANIZED HEALTH CARE EDUCATION/TRAINING PROGRAM

## 2024-10-09 PROCEDURE — 6360000002 HC RX W HCPCS: Performed by: INTERNAL MEDICINE

## 2024-10-09 PROCEDURE — 80048 BASIC METABOLIC PNL TOTAL CA: CPT

## 2024-10-09 RX ORDER — TAMSULOSIN HYDROCHLORIDE 0.4 MG/1
0.4 CAPSULE ORAL DAILY
Status: DISCONTINUED | OUTPATIENT
Start: 2024-10-09 | End: 2024-10-12 | Stop reason: HOSPADM

## 2024-10-09 RX ORDER — PRAVASTATIN SODIUM 80 MG/1
80 TABLET ORAL DAILY
Status: DISCONTINUED | OUTPATIENT
Start: 2024-10-09 | End: 2024-10-12 | Stop reason: HOSPADM

## 2024-10-09 RX ORDER — SODIUM CHLORIDE, SODIUM LACTATE, POTASSIUM CHLORIDE, CALCIUM CHLORIDE 600; 310; 30; 20 MG/100ML; MG/100ML; MG/100ML; MG/100ML
INJECTION, SOLUTION INTRAVENOUS CONTINUOUS
Status: ACTIVE | OUTPATIENT
Start: 2024-10-09 | End: 2024-10-09

## 2024-10-09 RX ORDER — ALBUTEROL SULFATE 0.83 MG/ML
2.5 SOLUTION RESPIRATORY (INHALATION) EVERY 4 HOURS PRN
Status: DISCONTINUED | OUTPATIENT
Start: 2024-10-09 | End: 2024-10-12 | Stop reason: HOSPADM

## 2024-10-09 RX ORDER — ALBUTEROL SULFATE 0.83 MG/ML
2.5 SOLUTION RESPIRATORY (INHALATION)
Status: DISCONTINUED | OUTPATIENT
Start: 2024-10-09 | End: 2024-10-09

## 2024-10-09 RX ORDER — COSYNTROPIN 0.25 MG/ML
250 INJECTION, POWDER, FOR SOLUTION INTRAMUSCULAR; INTRAVENOUS ONCE
Status: COMPLETED | OUTPATIENT
Start: 2024-10-09 | End: 2024-10-09

## 2024-10-09 RX ORDER — BUDESONIDE AND FORMOTEROL FUMARATE DIHYDRATE 160; 4.5 UG/1; UG/1
2 AEROSOL RESPIRATORY (INHALATION)
Status: DISCONTINUED | OUTPATIENT
Start: 2024-10-09 | End: 2024-10-12 | Stop reason: HOSPADM

## 2024-10-09 RX ORDER — ASPIRIN 81 MG/1
81 TABLET ORAL DAILY
Status: DISCONTINUED | OUTPATIENT
Start: 2024-10-09 | End: 2024-10-12 | Stop reason: HOSPADM

## 2024-10-09 RX ORDER — IPRATROPIUM BROMIDE AND ALBUTEROL SULFATE 2.5; .5 MG/3ML; MG/3ML
1 SOLUTION RESPIRATORY (INHALATION) ONCE
Status: COMPLETED | OUTPATIENT
Start: 2024-10-09 | End: 2024-10-09

## 2024-10-09 RX ORDER — ALBUTEROL SULFATE 0.83 MG/ML
2.5 SOLUTION RESPIRATORY (INHALATION)
Status: DISCONTINUED | OUTPATIENT
Start: 2024-10-09 | End: 2024-10-11

## 2024-10-09 RX ADMIN — ALBUTEROL SULFATE 2.5 MG: 2.5 SOLUTION RESPIRATORY (INHALATION) at 08:53

## 2024-10-09 RX ADMIN — BUDESONIDE AND FORMOTEROL FUMARATE DIHYDRATE 2 PUFF: 160; 4.5 AEROSOL RESPIRATORY (INHALATION) at 08:42

## 2024-10-09 RX ADMIN — IPRATROPIUM BROMIDE AND ALBUTEROL SULFATE 1 DOSE: .5; 3 SOLUTION RESPIRATORY (INHALATION) at 00:20

## 2024-10-09 RX ADMIN — ASPIRIN 81 MG: 81 TABLET, COATED ORAL at 07:55

## 2024-10-09 RX ADMIN — BUDESONIDE AND FORMOTEROL FUMARATE DIHYDRATE 2 PUFF: 160; 4.5 AEROSOL RESPIRATORY (INHALATION) at 21:40

## 2024-10-09 RX ADMIN — PIPERACILLIN AND TAZOBACTAM 3375 MG: 3; .375 INJECTION, POWDER, FOR SOLUTION INTRAVENOUS at 10:14

## 2024-10-09 RX ADMIN — HYDROCORTISONE SODIUM SUCCINATE 100 MG: 100 INJECTION, POWDER, FOR SOLUTION INTRAMUSCULAR; INTRAVENOUS at 17:06

## 2024-10-09 RX ADMIN — DOXYCYCLINE 100 MG: 100 INJECTION, POWDER, LYOPHILIZED, FOR SOLUTION INTRAVENOUS at 18:03

## 2024-10-09 RX ADMIN — ALBUTEROL SULFATE 2.5 MG: 2.5 SOLUTION RESPIRATORY (INHALATION) at 21:40

## 2024-10-09 RX ADMIN — PRAVASTATIN SODIUM 80 MG: 80 TABLET ORAL at 07:52

## 2024-10-09 RX ADMIN — TAMSULOSIN HYDROCHLORIDE 0.4 MG: 0.4 CAPSULE ORAL at 07:52

## 2024-10-09 RX ADMIN — TIOTROPIUM BROMIDE INHALATION SPRAY 2 PUFF: 3.12 SPRAY, METERED RESPIRATORY (INHALATION) at 08:41

## 2024-10-09 RX ADMIN — COSYNTROPIN 250 MCG: 0.25 INJECTION, POWDER, LYOPHILIZED, FOR SOLUTION INTRAMUSCULAR; INTRAVENOUS at 07:57

## 2024-10-09 RX ADMIN — SODIUM CHLORIDE, POTASSIUM CHLORIDE, SODIUM LACTATE AND CALCIUM CHLORIDE: 600; 310; 30; 20 INJECTION, SOLUTION INTRAVENOUS at 01:40

## 2024-10-09 RX ADMIN — WATER 1000 MG: 1 INJECTION INTRAMUSCULAR; INTRAVENOUS; SUBCUTANEOUS at 18:03

## 2024-10-09 ASSESSMENT — PAIN DESCRIPTION - PAIN TYPE: TYPE: ACUTE PAIN;CHRONIC PAIN

## 2024-10-09 ASSESSMENT — PAIN SCALES - GENERAL
PAINLEVEL_OUTOF10: 5
PAINLEVEL_OUTOF10: 0
PAINLEVEL_OUTOF10: 0
PAINLEVEL_OUTOF10: 6
PAINLEVEL_OUTOF10: 0

## 2024-10-09 ASSESSMENT — PAIN - FUNCTIONAL ASSESSMENT: PAIN_FUNCTIONAL_ASSESSMENT: ACTIVITIES ARE NOT PREVENTED

## 2024-10-09 ASSESSMENT — PAIN DESCRIPTION - DESCRIPTORS: DESCRIPTORS: ACHING;DISCOMFORT

## 2024-10-09 ASSESSMENT — PAIN DESCRIPTION - ORIENTATION: ORIENTATION: RIGHT

## 2024-10-09 ASSESSMENT — PAIN DESCRIPTION - LOCATION: LOCATION: GROIN

## 2024-10-09 NOTE — ED NOTES
Straight cath attempted at this time. Catheter unable to pass prostate. Bleeding noted with removal of catheter. Pressure applied. Bleeding controlled at this time. Dr. Aponte updated on procedure. Pt in stable condition

## 2024-10-09 NOTE — PROGRESS NOTES
Pt was in bed and alone at the time of the visit. He said that his family will come lately. He was dealing with hypotension but wanted prayer to cope and heal. She was anointed   10/09/24 1331   Encounter Summary   Encounter Overview/Reason Initial Encounter   Service Provided For Patient   Referral/Consult From [facility]   Support System Family members   Last Encounter  10/09/24  (Anointed)   Complexity of Encounter Low   Begin Time 1010   End Time  1016   Total Time Calculated 6 min   Spiritual/Emotional needs   Type Spiritual Support   Rituals, Rites and Sacraments   Type Anointing   Assessment/Intervention/Outcome   Assessment Hopeful   Intervention Empowerment   Outcome Encouraged;Engaged in conversation     .

## 2024-10-09 NOTE — H&P
CRITICAL CARE H&P    Patient:  Gerard Jane    Unit/Bed:15/015A  MRN: [id number]   PCP: Mustapha Callahan MD  Date of Admission: 10/8/2024    Assessment and Plan(All pulmonary edema, renal failure, PE, and respiratory failure diagnoses are acute in nature unless otherwise specified):        Hypotension requiring pressor support: Chronic history of low blood pressure with systolic ranging in 80s to 110s.  On antihypertensives and diuretics with compliance of medications.  Patient reports recent history of diarrhea about 3 weeks ago.  In ED, SBP as low as 70s.  Patient was administered 1 L NS without appreciable improvement.  Levophed initiated with patient's extensive cardiac history.  Patient does not appear fluid overloaded and CXR did not demonstrate pleural effusions.  Patient mentating appropriately with GCS 15 and AAO x 3.  Lactic acid 1.0 .  No signs of endorgan damage.  No central line placed.  Will monitor in ICU and wean off pressors.  Right epididymo-orchitis: Patient presented to the ED with a chief complaint of right groin pain, onset morning of 10/9.  Patient denied trauma or recent sexual activity.  Known history of BPH on Flomax.  Bladder scan in .  Attempt to straight cath unsuccessful with traumatic outcome.  Patient however was able to subsequently void spontaneously.  Ultrasound scrotum and testicles 10/9 demonstrated right epididymo-orchitis with bilateral epididymal cysts/hydroceles.  Zosyn administered in the ED.  Leukocytosis: WBC 22.2.  Lactic acid 1.0.  Procal 0.09.  Patient denies fevers or worsening productive cough.  CTA abdominal pelvis without contrast demonstrated inflammatory changes involving the prostate gland extending posterolaterally to the right.  UTI: Urinalysis positive for nitrites and leukocytes.  Patient on Jardiance with marked glucosuria.  Hematuria probably secondary to traumatic attempt with straight catheterization.  Patient received Zosyn in the ED.   line at 7 mcg/min with MAP in the 80s.  Patient endorsed SOB which is chronic.  Patient denied chest pain, abdominal pain, recent changes in bowel movement, fever, nausea/vomiting, focal weakness.  Review of systems unremarkable except for aforementioned.    ROS: All other systems negative unless otherwise noted in the chart     PMH:  Per HPI  SHX:  Previous smoker with a 45-pack-year smoking history.  Endorses alcohol intake but denies illicit drug use.  FHX: Mother (breast cancer), father (colon cancer)  Allergies: NKDA  Medications:     norepinephrine 7 mcg/min (10/08/24 2100)         Vital Signs:   T: 99.4: P: 81 RR: 16 B/P: 118/64: O2 Sat: 95% on 3 L NC: I/O: Pending GCS: 15  There is no height or weight on file to calculate BMI..    General:   Patient resting in bed and appears to be in pain and in mild respiratory distress  HEENT:  normocephalic and atraumatic.  No scleral icterus. PERR  Neck: supple.  No Thyromegaly.  Lungs: Diffuse wheezing, mild  Cardiac: RRR.  No JVD.  Abdomen: soft.  Nontender.  Extremities:  No clubbing, cyanosis, or edema x 4.    Vasculature: capillary refill < 3 seconds. Palpable dorsalis pedis pulses.  Skin:  warm and dry.  Fluctuant, nontender, nonreducible mass right groin  : Scrotal sac appears edematous and erythematous.  Penis uncircumcised with blood noted on skin.  No active bleeding or discharge from meatus.  Psych:  Alert and oriented x3.  Affect appropriate  Lymph:  No supraclavicular adenopathy.  Neurologic:  No focal deficit. No seizures.    Data: (All radiographs, tracings, PFTs, and imaging are personally viewed and interpreted unless otherwise noted).   Sodium 137 potassium 4.2 chloride 92 CO2 27 BUN 27 creatinine 0.7 lactic acid 1.0 glucose 96 calcium 9.4 Pro-Mark 0.09 proBNP 448 albumin 3.4 alk phos 88 ALT 9 AST 13 total bili 0.4  WBC 22.2 hemoglobin 11.5 hematocrit 35.9 platelets 227  Urinalysis positive for nitrites and leukocytes with moderate bacteria  US

## 2024-10-09 NOTE — PROGRESS NOTES
Patient arrived to unit from ed via BED. Patient transferred to ICU bed and placed on continuous ICU bedside monitor. 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 Vitals obtained. See flowsheets. Patient's IV access includes 20 g R fa, 20g l ac. Current infusions and rates of infusion include LEVO @ 7. Assessment completed by Alida. Two nurse skin assessment completed by Alida and Maria Luisa . See flowsheets for assessment details. Policies and procedures of ICU able to be explained to patient at this time. Family member(s)/representative(s) present at time of admission include none. Patient rights explained to family member(s)/representatives and patient, as able. Patient/patient's family member(s)/representative(s) N/A to have physician notified of their admission. All questions posed by patient's family member(s)/representative(s) and patient answered at this time.

## 2024-10-09 NOTE — ED PROVIDER NOTES
[facility]  EMERGENCY DEPARTMENT  - TRANSFER OF CARE NOTE    Patient Name: Gerard Jane  MRN: [id number]  YOB: 1940  Date of Evaluation: 10/9/2024    TRANSFER OF CARE:   Clinician Signing out: Rafat Membreno DO  Receiving Physician: Jesse Aponte MD  Sign out time: 9:11 PM EDT     Brief history:  Testicular pain, hypotension,     Items pending that need to be checked:  CTA abdomen/pelvis  Clinical re-evaluation  Ultrasound testicles    Tentative Impression of patient:  Urinary tract infection  Sepsis  Orchi-epididymitis    Expected disposition of patient:  Admitted    PHYSICAL EXAM:   Physical Exam  Vitals and nursing note reviewed.   Constitutional:       General: He is not in acute distress.     Appearance: Normal appearance. He is normal weight. He is ill-appearing. He is not toxic-appearing or diaphoretic.   HENT:      Head: Normocephalic and atraumatic.      Nose: Nose normal.   Eyes:      Conjunctiva/sclera: Conjunctivae normal.   Cardiovascular:      Rate and Rhythm: Normal rate.   Pulmonary:      Effort: Pulmonary effort is normal.   Abdominal:      General: Abdomen is flat.   Skin:     General: Skin is warm and dry.   Neurological:      Mental Status: He is alert and oriented to person, place, and time.           RECENT VITALS:     Temp: 98.8 °F (37.1 °C),  Pulse: (!) 101, Respirations: 29, BP: (!) 110/53, SpO2: 99 %    FORMAL RESULTS:   RADIOLOGY: Interpretation per the Radiologist below, if available at the time of this note (none if blank):  CTA ABDOMEN PELVIS W WO CONTRAST   Final Result   1. Severe calcific atheromatous change.   2. Patent aortoiliac circulation including inlaid stent graft within the    infrarenal aortic aneurysm.   3. 75% stenosis involving the distal right external iliac artery.   4. 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   5. Stable appearing of benign-appearing fluid collection in the right   details documented in ED Course.  Radiology: ordered and independent interpretation performed.  ECG/medicine tests: ordered and independent interpretation performed.  Discussion of management or test interpretation with external provider(s): Critical care    Risk  Drug therapy requiring intensive monitoring for toxicity.  Decision regarding hospitalization.                    ED COURSE   ED Medications administered this visit (None if left blank):   norepinephrine (LEVOPHED) 16 mg in sodium chloride 0.9 % 250 mL infusion (20 mcg/min IntraVENous Rate/Dose Verify 10/9/24 0205)   lactated ringers bolus 1,000 mL (0 mLs IntraVENous Stopped 10/8/24 2149)   piperacillin-tazobactam (ZOSYN) 4,500 mg in sodium chloride 0.9 % 100 mL IVPB (mini-bag) (0 mg IntraVENous Stopped 10/8/24 2031)   iopamidol (ISOVUE-370) 76 % injection 80 mL (80 mLs IntraVENous Given 10/8/24 2132)       ED Course as of 10/09/24 0259   Tue Oct 08, 2024   2011 EKG 12 Lead  Left bundle monitor prior.LBB   [DD]   2039 Hemoglobin Quant(!): 11.5  30 [DD]   2144 Patient signed out to Dr. Aponte at end of shift.  [DD]   2304 352 ml in bladder, patient will try to urinate. If not able will place catheter.  [SC]   2305 Call to ICU -discussed with Dr. Tarik Glasgow - ICU Resident will come down and assess. [SC]      ED Course User Index  [DD] Rafat Membreno DO  [SC] Jesse Aponte MD       Procedures: (None if left blank)  Procedures:     Consultants:  None    Documentation:  Sepsis Reassessment:    The patient meets criteria for Septic Shock due to a suspected source being UTI/Genitourinary  Cultures and antibiotics were initiated sequentially and appropriately as per orders.   Fluid resuscitation volume with 30ml/kg crystalloid bolus was: CONTRAINDICATED: the patient received LESS than the standard 30ml/kg bolus due to concern for harm as the patient has Concern for fluid overload and Heart failure.Therefore, a crystalloid bolus volume of 1000 ml was given.  I

## 2024-10-09 NOTE — CARE COORDINATION
Case Management Assessment Initial Evaluation    Date/Time of Evaluation: 10/9/2024 2:30 PM  Assessment Completed by: Rajwinder Nayak RN    If patient is discharged prior to next notation, then this note serves as note for discharge by case management.    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]  Septic shock (HCC) [A41.9, R65.21]  Sepsis, due to unspecified organism, unspecified whether acute organ dysfunction present (HCC) [A41.9]                   Date / Time: 10/8/2024  6:48 PM  [facility]: [facility]     Patient Admission Status: Inpatient   Readmission Risk Low 0-14, Mod 15-19), High > 20: Readmission Risk Score: 14.8    Current PCP: Mustapha Callahan MD  Health Care Decision Makers:   Primary Decision Maker: Sandrita Cotton - Child - [phone]    Secondary Decision Maker: Colt Jane - Child - [phone]    Additional Case Management Notes: Presented to ED with c/o right groin pain. Noted to have scrotal sac edema and erythema. SBP 70's. Received 1L fld and started on levophed drip. Admitted to ICU. Pt has chronic right groin fluid pocket following surgery that he states he has drained occasionally by Dr Delgado. CT revealed right epididymo-orchitis with bilateral cysts/hydrocele. +orthostatic hypotension.     Tmax 99.4. 1st degree AVB rate 80's. Sats 99% on 3L O2 (baseline O2). Ox4. Follows commands. PT/OT. Telemetry, SCDs. Levo @ 4 mcg/min, nebs, asa, inhaler, IV zosyn Q8H, pravastatin, flomax. Received 250 mcg IV cortrosyn x1 today. Blood and urine cultures sent. Trop 24, alb 3.4, wbc 22.2 - now 27.3, hgb 11.5 - now 11.8. Urine +nitrates and mod leukocytes.     Procedures: none    Imaging:   10/8 CT Abd/pelvis: 1. Fluid collection in the right groin measures 5.5 x 4.3 x 7.1 cm. This   may reflect a seroma. There is no inguinal hernia.  2. Enlarged heterogeneous  Document   PCP Verified by CM Yes   Prior Functional Level Independent in ADLs/IADLs   Current Functional Level Independent in ADLs/IADLs   Can patient return to prior living arrangement Yes   Ability to make needs known: Good   Family able to assist with home care needs: Yes   Would you like for me to discuss the discharge plan with any other family members/significant others, and if so, who? No   [place]);[place]   Community Resources Other (Comment)  ([facility]; [place])   Social/Functional History   Active  Yes   Discharge Planning   Type of Residence House   Living Arrangements Alone   Current Services Prior To Admission Oxygen Therapy;Durable Medical Equipment  (Wears 3L O2 ATC thru [employer])   Current DME Prior to Arrival Other (Comment);Oxygen Therapy (Comment)  (Nebs)   Potential Assistance Needed N/A   DME Ordered? No   Potential Assistance Purchasing Medications No   Type of Home Care Services None   Patient expects to be discharged to: House   Services At/After Discharge   Confirm Follow Up Transport Family   Condition of Participation: Discharge Planning   The Plan for Transition of Care is related to the following treatment goals: \"Go home at discharge.\"

## 2024-10-09 NOTE — RT PROTOCOL NOTE
RT Inhaler-Nebulizer Bronchodilator Protocol Note    There is a bronchodilator order in the chart from a provider indicating to follow the RT Bronchodilator Protocol and there is an “Initiate RT Inhaler-Nebulizer Bronchodilator Protocol” order as well (see protocol at bottom of note).    CXR Findings:  XR CHEST PORTABLE    Result Date: 10/8/2024  Impression: 1. Chronic interstitial changes/scarring bilaterally similar to previous. No acute process. This document has been electronically signed by: Mustapha Gao MD on 10/08/2024 08:56 PM      The findings from the last RT Protocol Assessment were as follows:   History Pulmonary Disease: Chronic pulmonary disease  Respiratory Pattern: Regular pattern and RR 12-20 bpm  Breath Sounds: Intermittent or unilateral wheezes  Cough: Strong, spontaneous, non-productive  Indication for Bronchodilator Therapy: Decreased or absent breath sounds, On home bronchodilators, Wheezing associated with pulm disorder  Bronchodilator Assessment Score: 6    Aerosolized bronchodilator medication orders have been revised according to the RT Inhaler-Nebulizer Bronchodilator Protocol below.    Respiratory Therapist to perform RT Therapy Protocol Assessment initially then follow the protocol.  Repeat RT Therapy Protocol Assessment PRN for score 0-3 or on second treatment, BID, and PRN for scores above 3.    No Indications - adjust the frequency to every 6 hours PRN wheezing or bronchospasm, if no treatments needed after 48 hours then discontinue using Per Protocol order mode.     If indication present, adjust the RT bronchodilator orders based on the Bronchodilator Assessment Score as indicated below.  Use Inhaler orders unless patient has one or more of the following: on home nebulizer, not able to hold breath for 10 seconds, is not alert and oriented, cannot activate and use MDI correctly, or respiratory rate 25 breaths per minute or more, then use the equivalent nebulizer order(s) with same

## 2024-10-09 NOTE — ED NOTES
ED to inpatient nurses report      Chief Complaint:  Chief Complaint   Patient presents with    Groin Swelling     Present to ED from: home    MOA:     LOC: alert and orientated to name, place, date  Mobility: Requires assistance * 1  Oxygen Baseline: 2L via NC    Current needs required: 2l via nc      Code Status:   Prior    What abnormal results were found and what did you give/do to treat them? Labs, imaging, vitals  Any procedures or intervention occur? Levo, fluids, atbs     Mental Status:       Psych Assessment:        Vitals:  Patient Vitals for the past 24 hrs:   BP Temp Temp src Pulse Resp SpO2   10/08/24 2316 (!) 129/58 -- -- 82 19 95 %   10/08/24 2312 (!) 71/48 -- -- -- -- --   10/08/24 2301 115/80 -- -- 80 19 95 %   10/08/24 2246 111/61 -- -- 77 15 94 %   10/08/24 2231 100/69 -- -- 76 21 94 %   10/08/24 2206 94/62 -- -- 100 17 94 %   10/08/24 2146 (!) 121/59 -- -- 92 20 92 %   10/08/24 2121 110/63 -- -- 85 17 --   10/08/24 2106 (!) 122/50 -- -- 76 16 --   10/08/24 2101 (!) 72/58 -- -- 88 24 --   10/08/24 2054 (!) 76/51 -- -- 91 21 95 %   10/08/24 2051 (!) 96/56 -- -- 92 17 94 %   10/08/24 2046 (!) 95/50 -- -- 97 19 96 %   10/08/24 2028 (!) 73/45 -- -- 77 26 95 %   10/08/24 2000 (!) 81/58 -- -- 90 25 --   10/08/24 1930 (!) 96/56 -- -- 92 22 94 %   10/08/24 1917 (!) 88/61 -- -- -- -- 94 %   10/08/24 1845 93/60 99.4 °F (37.4 °C) Oral 96 20 94 %        LDAs:   Peripheral IV 10/08/24 Left Antecubital (Active)   Site Assessment Clean, dry & intact 10/08/24 1930   Phlebitis Assessment No symptoms 10/08/24 1930   Infiltration Assessment 0 10/08/24 1930       Peripheral IV 10/08/24 Distal;Right Forearm (Active)       Ambulatory Status:  No data recorded    Diagnosis:  DISPOSITION Decision To Admit 10/08/2024 11:10:22 PM   Final diagnoses:   Sepsis, due to unspecified organism, unspecified whether acute organ dysfunction present (HCC)   Septic shock (HCC)   Epididymo-orchitis   Inflammatory disorder of seminal

## 2024-10-09 NOTE — PROGRESS NOTES
CRITICAL CARE PROGRESS NOTE      Patient:  Gerard Jane    Unit/Bed:4D-17/017-A  YOB: 1940  MRN: [id number]   PCP: Mustahpa Callahan MD  Date of Admission: 10/8/2024  Chief Complaint:- Right Testicular Pain    Assessment and Plan:    Right-sided testicular pain: Patient came into the ED with right-sided groin pain and has chronic right groin mass following surgery with Dr. Delgado.  He he has a history of right groin swelling post CFA surgery with Dr. Delgado.  Patient described the pain as dull and achy with constant sharp towards the right testicle.  Patient pain is nonradiating and there are no alleviating or aggravating factors.  Patient recently had a urinary tract infection and it can likely be due to ascending infection due to it.  Patient has a leukocyte count of 22.2 on admission increasing to 27.3 on 10/9.  Patient bolused with Zosyn after urinalysis positive for infection.  Continued inpatient pending cultures.  Hypotension: Patient was found to be hypotensive and started on Levophed in the ED.  Patient on Lasix and metoprolol along with Entresto and Aldactone outpatient reporting good compliance.  SBP in the 70s on admission.  Patient was found to be fluid nonresponsive on pressors initiated.  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  Patient weaned off Levophed, currently on 2 mcg/min.  Patient was orthostatically positive with systolic pressure going from 113 to 70s upon sitting up.  EKG nonsignificant for any changes, Cardiology to be consulted  Urinary tract infection/leukocytosis: Patient With History of UTI and Urinalysis Showing Evidence of Infection.  Patient Had a Leukocyte Count of 22.2 Which Has Increased to 27.3 on 10/9 Patient Was Bolused in the ED on Zosyn, Continued Inpatient Pending Cultures.  Chronic combined heart failure:

## 2024-10-09 NOTE — ED PROVIDER NOTES
[facility]  EMERGENCY DEPARTMENT ENCOUNTER        Pt Name: Gerard Jane  MRN: [id number]  Birthdate 1940  Date of evaluation: 10/8/2024  Emergency Physician: Rafat Membreno DO    Gerard Jane is a 84 y.o. male who presents with right testicular pain. Onset was early today. Patient is localized to the right testicle. The duration has been constant since the onset. The 8/10 pain is described as dull and achy. The patient's pain is not associated with other symptoms. There are no alleviating factors. The context is that the symptoms started this morning spontaneously, without any known precipitants. Patient has a history of a fluid collection in the right groin after an aneurysm surgery with Dr. Delgado he reports Dr. Delgado drains fluid from in intermittently. No recent abx use. He reports the when he was seen in Dr. Delgado office last week his BP was in the 80s.      REVIEW OF SYSTEMS   GI: See history of present illness   Cardiac: No chest pain or syncope   Pulmonary: No difficulty breathing or new cough   General: No fevers   : No hematuria or dysuria   See HPI for further details.   All other review of systems are reviewed and are otherwise negative.     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

## 2024-10-09 NOTE — ED NOTES
Pt able to void  150 mls urine. Sample collected per order. Pt denied dizziness while standing at bedside, pt bp noted to drop while standing. Pt assisted back into bed. Pt remains in stable condition

## 2024-10-10 ENCOUNTER — TELEPHONE (OUTPATIENT)
Dept: UROLOGY | Age: 84
End: 2024-10-10

## 2024-10-10 PROBLEM — I50.42 CHRONIC COMBINED SYSTOLIC AND DIASTOLIC HEART FAILURE (HCC): Status: ACTIVE | Noted: 2024-10-10

## 2024-10-10 PROBLEM — I50.22 CHRONIC SYSTOLIC HEART FAILURE (HCC): Status: ACTIVE | Noted: 2024-10-10

## 2024-10-10 PROBLEM — R65.21 SEPTIC SHOCK (HCC): Status: ACTIVE | Noted: 2024-10-09

## 2024-10-10 PROBLEM — Z95.2 S/P TAVR (TRANSCATHETER AORTIC VALVE REPLACEMENT): Status: ACTIVE | Noted: 2024-02-01

## 2024-10-10 LAB
ANION GAP SERPL CALC-SCNC: 11 MEQ/L (ref 8–16)
BACTERIA UR CULT: ABNORMAL
BUN SERPL-MCNC: 26 MG/DL (ref 7–22)
CALCIUM SERPL-MCNC: 9.1 MG/DL (ref 8.5–10.5)
CHLORIDE SERPL-SCNC: 101 MEQ/L (ref 98–111)
CO2 SERPL-SCNC: 23 MEQ/L (ref 23–33)
CREAT SERPL-MCNC: 0.5 MG/DL (ref 0.4–1.2)
DEPRECATED RDW RBC AUTO: 50.2 FL (ref 35–45)
ERYTHROCYTE [DISTWIDTH] IN BLOOD BY AUTOMATED COUNT: 14.5 % (ref 11.5–14.5)
GFR SERPL CREATININE-BSD FRML MDRD: > 90 ML/MIN/1.73M2
GLUCOSE SERPL-MCNC: 117 MG/DL (ref 70–108)
HCT VFR BLD AUTO: 35 % (ref 42–52)
HGB BLD-MCNC: 11.1 GM/DL (ref 14–18)
MAGNESIUM SERPL-MCNC: 2.3 MG/DL (ref 1.6–2.4)
MCH RBC QN AUTO: 30 PG (ref 26–33)
MCHC RBC AUTO-ENTMCNC: 31.7 GM/DL (ref 32.2–35.5)
MCV RBC AUTO: 94.6 FL (ref 80–94)
ORGANISM: ABNORMAL
PHOSPHATE SERPL-MCNC: 2.5 MG/DL (ref 2.4–4.7)
PLATELET # BLD AUTO: 183 THOU/MM3 (ref 130–400)
PMV BLD AUTO: 10 FL (ref 9.4–12.4)
POTASSIUM SERPL-SCNC: 3.8 MEQ/L (ref 3.5–5.2)
RBC # BLD AUTO: 3.7 MILL/MM3 (ref 4.7–6.1)
SODIUM SERPL-SCNC: 135 MEQ/L (ref 135–145)
WBC # BLD AUTO: 13.6 THOU/MM3 (ref 4.8–10.8)

## 2024-10-10 PROCEDURE — 97530 THERAPEUTIC ACTIVITIES: CPT

## 2024-10-10 PROCEDURE — 97162 PT EVAL MOD COMPLEX 30 MIN: CPT

## 2024-10-10 PROCEDURE — 2580000003 HC RX 258

## 2024-10-10 PROCEDURE — 84100 ASSAY OF PHOSPHORUS: CPT

## 2024-10-10 PROCEDURE — 94761 N-INVAS EAR/PLS OXIMETRY MLT: CPT

## 2024-10-10 PROCEDURE — 2140000000 HC CCU INTERMEDIATE R&B

## 2024-10-10 PROCEDURE — 36415 COLL VENOUS BLD VENIPUNCTURE: CPT

## 2024-10-10 PROCEDURE — 2700000000 HC OXYGEN THERAPY PER DAY

## 2024-10-10 PROCEDURE — 97166 OT EVAL MOD COMPLEX 45 MIN: CPT

## 2024-10-10 PROCEDURE — 2500000003 HC RX 250 WO HCPCS

## 2024-10-10 PROCEDURE — 6360000002 HC RX W HCPCS: Performed by: INTERNAL MEDICINE

## 2024-10-10 PROCEDURE — 6370000000 HC RX 637 (ALT 250 FOR IP)

## 2024-10-10 PROCEDURE — 94640 AIRWAY INHALATION TREATMENT: CPT

## 2024-10-10 PROCEDURE — 80048 BASIC METABOLIC PNL TOTAL CA: CPT

## 2024-10-10 PROCEDURE — 85027 COMPLETE CBC AUTOMATED: CPT

## 2024-10-10 PROCEDURE — 97535 SELF CARE MNGMENT TRAINING: CPT

## 2024-10-10 PROCEDURE — 83735 ASSAY OF MAGNESIUM: CPT

## 2024-10-10 PROCEDURE — 6360000002 HC RX W HCPCS

## 2024-10-10 RX ORDER — FUROSEMIDE 20 MG
20 TABLET ORAL
Status: DISCONTINUED | OUTPATIENT
Start: 2024-10-10 | End: 2024-10-11

## 2024-10-10 RX ORDER — ENOXAPARIN SODIUM 100 MG/ML
40 INJECTION SUBCUTANEOUS DAILY
Status: DISCONTINUED | OUTPATIENT
Start: 2024-10-10 | End: 2024-10-12 | Stop reason: HOSPADM

## 2024-10-10 RX ORDER — METOPROLOL SUCCINATE 50 MG/1
50 TABLET, EXTENDED RELEASE ORAL DAILY
Status: DISCONTINUED | OUTPATIENT
Start: 2024-10-10 | End: 2024-10-12 | Stop reason: HOSPADM

## 2024-10-10 RX ORDER — SPIRONOLACTONE 25 MG/1
25 TABLET ORAL DAILY
Status: DISCONTINUED | OUTPATIENT
Start: 2024-10-10 | End: 2024-10-12 | Stop reason: HOSPADM

## 2024-10-10 RX ADMIN — TIOTROPIUM BROMIDE INHALATION SPRAY 2 PUFF: 3.12 SPRAY, METERED RESPIRATORY (INHALATION) at 09:02

## 2024-10-10 RX ADMIN — ASPIRIN 81 MG: 81 TABLET, COATED ORAL at 08:22

## 2024-10-10 RX ADMIN — HYDROCORTISONE SODIUM SUCCINATE 100 MG: 100 INJECTION, POWDER, FOR SOLUTION INTRAMUSCULAR; INTRAVENOUS at 02:19

## 2024-10-10 RX ADMIN — DOXYCYCLINE 100 MG: 100 INJECTION, POWDER, LYOPHILIZED, FOR SOLUTION INTRAVENOUS at 06:27

## 2024-10-10 RX ADMIN — ALBUTEROL SULFATE 2.5 MG: 2.5 SOLUTION RESPIRATORY (INHALATION) at 09:02

## 2024-10-10 RX ADMIN — PRAVASTATIN SODIUM 80 MG: 80 TABLET ORAL at 08:22

## 2024-10-10 RX ADMIN — ENOXAPARIN SODIUM 40 MG: 100 INJECTION SUBCUTANEOUS at 11:43

## 2024-10-10 RX ADMIN — BUDESONIDE AND FORMOTEROL FUMARATE DIHYDRATE 2 PUFF: 160; 4.5 AEROSOL RESPIRATORY (INHALATION) at 09:02

## 2024-10-10 RX ADMIN — DOXYCYCLINE 100 MG: 100 INJECTION, POWDER, LYOPHILIZED, FOR SOLUTION INTRAVENOUS at 18:33

## 2024-10-10 RX ADMIN — HYDROCORTISONE SODIUM SUCCINATE 100 MG: 100 INJECTION, POWDER, FOR SOLUTION INTRAMUSCULAR; INTRAVENOUS at 08:22

## 2024-10-10 RX ADMIN — WATER 1000 MG: 1 INJECTION INTRAMUSCULAR; INTRAVENOUS; SUBCUTANEOUS at 18:00

## 2024-10-10 RX ADMIN — TAMSULOSIN HYDROCHLORIDE 0.4 MG: 0.4 CAPSULE ORAL at 08:22

## 2024-10-10 RX ADMIN — ALBUTEROL SULFATE 2.5 MG: 2.5 SOLUTION RESPIRATORY (INHALATION) at 19:59

## 2024-10-10 RX ADMIN — BUDESONIDE AND FORMOTEROL FUMARATE DIHYDRATE 2 PUFF: 160; 4.5 AEROSOL RESPIRATORY (INHALATION) at 19:59

## 2024-10-10 RX ADMIN — HYDROCORTISONE SODIUM SUCCINATE 100 MG: 100 INJECTION, POWDER, FOR SOLUTION INTRAMUSCULAR; INTRAVENOUS at 17:17

## 2024-10-10 NOTE — PROGRESS NOTES
aortic fusiform aneurysm measuring 4.9 cm AP x 5.7 cm transverse containing an inlaid stent graft from above the aneurysm to the mid common iliac arteries (patency can not be assessed without contrast). No adrenal mass. Normal-sized kidneys. Right kidney superior pole cortical cyst measuring 15 mm. Mid right kidney exophytic moderate density lesion, 44 HU, measuring 15 mm. No hydronephrosis or nephroureterolithiasis. Normal volume urinary bladder. Bilateral small Hutch diverticula without obstruction. Enlarged heterogeneous prostate gland with mass posterolaterally exophytic from the prostate gland or originating from the right seminal vesicle, measuring about 4 cm. No pathologic pelvic lymphadenopathy. There is a superficial right groin mass measuring 5.5 x 4.3 x 7.1 cm, water density, 6 HU. 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 Bowel/Mesentery: There is normal stool volume within the colon. No evidence of mass/obstruction. Moderate diverticulosis of the descending and proximal sigmoid colon segments without diverticulitis. Appendix is normal. Small bowel loops are nondilated, without evidence of mural thickening. No mesenteric mass or pathologic adenopathy. No focal gastric abnormality. No free air or fluid. Bone/Extraperitoneal ST: Multilevel mild-to-moderate degenerative disc disease. Severe facet arthropathy bilaterally at L4-5 and L5-S1 with mild anterolisthesis of L4 on L5 and L5 on S1. No destructive osseous lesion. Unremarkable bony pelvis. No ventral wall hernia.     1. Fluid collection in the right groin measures 5.5 x 4.3 x 7.1 cm. This may reflect a seroma. There is no inguinal hernia. 2. Enlarged heterogeneous prostate gland. Exophytic mass posterolaterally versus mass arising from the right seminal vesicle, measuring 4 cm. Findings may be inflammatory or neoplastic. 3. Diverticulosis coli without diverticulitis. 4.  Severe calcific atheromatous change. Infrarenal abdominal aortic aneurysm status post stent graft. 5. Other findings above. This document has been electronically signed by: Mustapha Gao MD on 10/08/2024 08:00 PM All CTs at this facility use dose modulation techniques and iterative reconstructions, and/or weight-based dosing when appropriate to reduce radiation to a low as reasonably achievable.    US ABDOMINAL AORTA LIMITED    Result Date: 9/16/2024  PROCEDURE: US ABDOMINAL AORTA LIMITED CLINICAL INFORMATION: Other specified postprocedural states; Personal history of other diseases of the circulatory system; Infrarenal abdominal aortic aneurysm, without rupture COMPARISON: CT abdomen and pelvis, 2/27/2024 TECHNIQUE: Multiple grayscale and color flow sonographic images of the abdominal aorta and common iliac arteries were obtained. Spectral Doppler waveforms were also generated for each of these vessels. FINDINGS: Bilobed aneurysm involving the entire abdominal aorta with the proximal diameter 4.1 cm in distal diameter 5.7 cm. An aortic endograft is present. There is been no significant change in size from prior CT scan. No evidence for an endoleak. 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 Left common iliac artery is ectatic, also unchanged from prior study.. Proximal Abdominal Aorta Trans - 4.1 cm AP - 3.9 cm   Mid Abdominal Aorta Trans - 3.1 cm AP - 3.2 cm   Distal Abdominal Aorta Trans - 5.7 cm AP - 5.5 cm   Right Common Iliac Artery Trans = 2.4 cm AP = 2.3 cm   Left Common Iliac Artery Trans - 1.5 cm AP - 1.5 cm     Stable appearing fusiform aneurysm of the abdominal aorta and right common iliac artery with endograft in place. **This report has been created using voice recognition software.  It may contain minor errors which are inherent in voice recognition technology.** Electronically signed by Dr. Pancho Villa      This note was dictated using M*Modal Fluency

## 2024-10-10 NOTE — PROGRESS NOTES
[facility]  INPATIENT PHYSICAL THERAPY  EVALUATION  [facility] ICU 4D - 4D-17/017-A    Discharge Recommendations: Home with assist PRN  Equipment Recommendations: No               Time In: 925  Time Out: 48  Timed Code Treatment Minutes: 15 Minutes  Minutes: 23          Date: 10/10/2024  Patient Name: Gerard Jane,  Gender:  male        MRN: [id number]  : 1940  (84 y.o.)      Referring Practitioner: Lia Ellsworth MD  Diagnosis: Hypotension  Additional Pertinent Hx: Gerard Jane is a 84 y.o. male who presents with right testicular pain. Onset was early today. Patient is localized to the right testicle. The duration has been constant since the onset. The 8/10 pain is described as dull and achy. The patient's pain is not associated with other symptoms. There are no alleviating factors. The context is that the symptoms started this morning spontaneously, without any known precipitants. Patient has a history of a fluid collection in the right groin after an aneurysm surgery with Dr. Delgado he reports Dr. Delgado drains fluid from in intermittently. No recent abx use. He reports the when he was seen in Dr. Delgado office last week his BP was in the 80s.  Patient seen in ICU with continued low BP readings.     Restrictions/Precautions:  Restrictions/Precautions: Fall Risk    Subjective:  Chart Reviewed: Yes  Patient assessed for rehabilitation services?: Yes  Subjective: Nurse approved session. Patient up in chair on arrival.  Patient denies s/s of hypotension although low BP readings observed during treatment.    General:  Orientation Level: Oriented X4  Vision: Within Functional Limits  Hearing: Exceptions to WFL  Hearing Exceptions: Bilateral hearing aid       Pain: 0/10: denies pain    Vitals: Blood Pressure: sitting 90/49  Standing 84/47, 83/59  Returned to sitting and increased 99/47    Social/Functional History:    Lives With: Alone (although family lives within ~50 yards of him)  Type of Home: House  Home  testing, formal and informal observation of tasks, assessment of data and development of plan of care and goals.  Treatment time included skilled education and facilitation of tasks to increase safety and independence with functional mobility for improved independence and quality of life.    Assessment:  Body Structures, Functions, Activity Limitations Requiring Skilled Therapeutic Intervention: Decreased functional mobility , Decreased strength, Decreased safe awareness, Decreased endurance, Decreased balance  Assessment: Gerard Jane is a 84 y.o. male that presents with hypotension and elevated fall risk with decreased activity tolerance. Pt demonstrates a decrease in baseline by way of bed mobility, transfers and ambulation secondary to decreased activity tolerance, strength, fatigue, and balance deficits. Pt will benefit from skilled PT services throughout admission and beyond hospital discharge for improvements in functional mobility and in order to decrease fall risk and return pt to PLOF.    Therapy Prognosis: Good    Requires PT Follow-Up: Yes    Patient Education:      .    Patient Education  Education Given To: Patient  Education Provided: Precautions, Fall Prevention Strategies  Education Method: Verbal  Barriers to Learning: None  Education Outcome: Verbalized understanding       Plan:  Current Treatment Recommendations: Strengthening, Balance training, Functional mobility training, Transfer training, Gait training, Neuromuscular re-education, Therapeutic activities, Safety education & training  General Plan:  (5x GM)    Goals:  Patient Goals : \"Get out of here\"-return to (I) living.  Short Term Goals  Time Frame for Short Term Goals: By discharge  Short Term Goal 1: Patient to demonstrate (I) bed mobility w/o rails.  Short Term Goal 2: Patient to transfer sit to stand (I)ly.  Short Term Goal 3: Patient to ambulate 100 feet without AD (I)ly.  Short Term Goal 4: Patient to ascend/descend 3 steps

## 2024-10-10 NOTE — PROGRESS NOTES
[facility]  INPATIENT OCCUPATIONAL THERAPY  STRZ ICU 4D  EVALUATION      Discharge Recommendations: Home with assist PRN  Equipment Recommendations:   will continue to assess pending progress    Time In: 813  Time Out: 844  Timed Code Treatment Minutes: 23 Minutes  Minutes: 31          Date: 10/10/2024  Patient Name: Gerard Jane,   Gender: male      MRN: [id number]  : 1940  (84 y.o.)  Referring Practitioner: Lia Ellsworth MD  Diagnosis: epididymo-orchitis  Additional Pertinent Hx: Per EMR: The patient is a 84 y.o. male with significant past medical history of HLD, HTN, post treated TB, COPD and chronic hypoxic respiratory failure on home oxygen 3 L along with a history of bladder cancer s/p removal and CFA aneurysm s/p repair and severe aortic stenosis s/p TAVR  and HFREF he came in for right testicular pain to [facility] ED 10/8 that is dull and achy, no aggravating or alleviating factors. Patient has a history of right leg swelling due to fluid collection post CFA surgery with Dr. Delgado, drained occasionally.  Patient was found to be hypotensive and scrotal sac was observed to be erythematous and edematous. Patient was fluid unresponsive and was started on Levophed.    Restrictions/Precautions:  Restrictions/Precautions: Fall Risk    Subjective  Chart Reviewed: Yes, Orders, Progress Notes, History and Physical, Imaging  Patient assessed for rehabilitation services?: Yes  Family / Caregiver Present: No    Subjective: Pt seated in bed upon arrival, reporting he feels much better and agreeable to OT session. Pt motivated to participate.    Pain: 0/10:     Vitals: Blood Pressure: 146/64  Oxygen: 98% on 3L O2 via NC  Heart Rate: 60 bpm  All vitals remained stable throughout    Social/Functional History:  Lives With: Alone (although family lives within ~50 yards of him)  Type of Home: House  Home Layout: One level  Home Access: Stairs to enter with rails  Entrance Stairs - Number of Steps: 3  Home  Recommendations: Strengthening, Balance training, Functional mobility training, Endurance training, Safety education & training, Patient/Caregiver education & training, Equipment evaluation, education, & procurement, Self-Care / ADL, Home management training.  See long-term goal time frame for expected duration of plan of care.  If no long-term goals established, a short length of stay is anticipated.    Goals:     Short Term Goals  Time Frame for Short Term Goals: by discharge  Short Term Goal 1: Pt will complete BADL/light IADL tasks with MI to increase independence with self care/homemaking tasks.  Short Term Goal 2: Pt will increase activity tolerance for functional mobility household distances with MI while demoing safe item retrieval/transport in prep for ADL/IADL completion.  Short Term Goal 3: Pt will tolerate dynamic standing > 5 minutes with MI in prep for showering/IADL completion.  Long Term Goals  Time Frame for Long Term Goals : not set due to ELOS    [facility] Inpatient Daily Activity Raw Score: 22  AM-PAC Inpatient ADL T-Scale Score : 47.1    Following session, patient left in safe position with all fall risk precautions in place.

## 2024-10-10 NOTE — PROGRESS NOTES
Patient received from ICU. Vitals taken. Telemetry initiated. Patient assessed. Patient provided with water. Patient resting comfortably in chair. Denies needs a this time.

## 2024-10-10 NOTE — PROGRESS NOTES
Pt is an 84y.o. male, sitting up in bed eating lunch, in 4D-17. Gerard is pleasant and talkative, as is his daughter present. He shared he is feeling much better, and looking forward to transfer off the floor soon.  provided active listening, conversation on his health challenge and pleasant nature and prayer-met with gratitude by both present.     10/09/24 1259   Encounter Summary   Encounter Overview/Reason Spiritual/Emotional Needs   Service Provided For Patient and family together   Referral/Consult From [facility]   Support System Family members   Last Encounter  10/09/24   Complexity of Encounter Low   Begin Time 1259   End Time  1308   Total Time Calculated 9 min   Spiritual/Emotional needs   Type Spiritual Support   Assessment/Intervention/Outcome   Assessment Calm;Coping;Hopeful;Peaceful   Intervention Active listening;Prayer (assurance of)/[place];Explored/Affirmed feelings, thoughts, concerns;Discussed illness injury and it’s impact   Outcome Expressed Gratitude;Engaged in conversation;Expressed feelings, needs, and concerns;Receptive

## 2024-10-10 NOTE — PLAN OF CARE
Problem: Respiratory - Adult  Goal: Lung sounds clear or within normal limits for patient  Note: Patient receiving inhalers to maintain and manage respiratory status. Will be continued as ordered to improve aeration.

## 2024-10-10 NOTE — PROGRESS NOTES
CRITICAL CARE PROGRESS NOTE      Patient:  Gerard Jane    Unit/Bed:4D-17/017-A  YOB: 1940  MRN: [id number]   PCP: Mustapha Callahan MD  Date of Admission: 10/8/2024  Chief Complaint:- Right Testicular Pain      Assessment and Plan:    Right-sided testicular pain: Patient came into the ED with right-sided groin pain and has chronic right groin mass following surgery with Dr. Delgado.  He he has a history of right groin swelling post CFA surgery with Dr. Delgado.  Patient described the pain as dull and achy with constant sharp towards the right testicle.  Patient pain was nonradiating and there are no alleviating or aggravating factors.  Patient recently had a urinary tract infection and it can likely be due to ascending infection due to it.  Patient had a leukocyte count of 27.3 on 10/9.  Patient bolused with Zosyn after urinalysis positive for infection.  Patient transition to Rocephin and doxycycline after consultation with urology for epididymo-orchitis.  Repeat bite cell count today 13.6, patient has been afebrile and reports doing much better.  Pain has resolved and and patient is passing urine, no evidence of hematuria.  Patient chronic swelling in the right inguinal area doing well, no pain, soreness or redness.  Patient can be transferred to stepdown.  Hypotension, due to septic shock: Patient was found to be hypotensive and started on Levophed in the ED.  Patient on Lasix and metoprolol along with Entresto and Aldactone outpatient reporting good compliance.  SBP in the 70s on admission.  Patient was found to be fluid nonresponsive on pressors initiated.  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  Patient weaned off Levophed, current pressures have been normal, this morning 126/51 with MAP of 75.  Patient was orthostatically positive with  RT    cefTRIAXone (ROCEPHIN) IV  1,000 mg IntraVENous Q24H    doxycycline (VIBRAMYCIN) IV  100 mg IntraVENous Q12H    hydrocortisone sodium succinate PF  100 mg IntraVENous Q8H     Continuous Infusions:   norepinephrine Stopped (10/09/24 5988)       PHYSICAL EXAMINATION:  T:  97.8.  P:  65. RR:  19. B/P:  126/51.  FiO2:  NA. O2 Sat:  97%.  I/O:  -84.9 10/9, 346 SINCE ADMIT 10/8,   Body mass index is 20.13 kg/m².   GCS:   15    General:   Ill appearing  male sitting up in bed   HEENT:  normocephalic and atraumatic.  No scleral icterus. PERR  Neck: supple.  No Thyromegaly.  Lungs: clear to auscultation.  No retractions  Cardiac: RRR.  No JVD.  Abdomen: soft.  Nontender.  Extremities:  No clubbing, cyanosis, or edema x 4.    Vasculature: capillary refill < 3 seconds. Palpable dorsalis pedis pulses.  Skin:  warm and dry.  Psych:  Alert and oriented x3.  Affect appropriate  Lymph:  No supraclavicular adenopathy.  Neurologic:  No focal deficit. No seizures.      Data: (All radiographs, tracings, PFTs, and imaging are personally viewed and interpreted unless otherwise noted).   Sodium 135, Potassium 3.8, Chloride 101, BUN 26, Cr 0.5  WBC 13.6, HgB 11.1, Hct 35.0, Plt 183  Telemetry shows sinus rhythm  US Scrotum and testicles show enlarges heterogenous right testicle with hypervascularity, likely right epididymo-orchitis  CT AP showed fluid collection in right groin 5.5 x 4.3 x 7.1 cm likely seroma and enlarged prostate gland.   CTA AP shows inlaid stent graft within infrarenal aortic aneurysm, patent circulation with severe calcific atheromatous change.   CXR showed chronic interstitial changes           Seen with multidisciplinary ICU team .  Meets Continued ICU Level Care Criteria:    [] Yes   [x] No - Transfer Planned to listed location: StepDown  [] HOSPITALIST CONTACTED-      Case and plan discussed with Dr. Roberts.        Electronically signed by Lia Ellsworth MD  CRITICAL CARE SPECIALIST   Patient seen by

## 2024-10-10 NOTE — PROGRESS NOTES
Physician Progress Note      PATIENT:               JANETTE SMILEY  CSN #:                  [id number]  :                       1940  ADMIT DATE:       10/8/2024 6:48 PM  DISCH DATE:  RESPONDING  PROVIDER #:        Lia Ellsworth MD          QUERY TEXT:    Patient admitted with Sepsis due to Epididymo-orchitis suspect 2/2 ascending   UTI. Documentation reflects \"Septic shock\"  \"Sepsis Reassessment: The patient   meets criteria for Septic Shock due to a suspected source being   UTI/Genitourinary\" in ED final diagnosis notes dated 10-8.  If possible,   please document in the progress notes and discharge summary if Septic Shock   was:    The medical record reflects the following:  Risk Factors: Sepsis due to Epididymo-orchitis suspect 2/2 ascending UTI.  Clinical Indicators: Hypotensive w/o signs of hypoperfusion / EOD -> normally   runs low, however not to this degree, remained Asx, but did appear to have   some Orthostatic propensity - s/p 1L IVF in ED; started on Levophed gtt -> c/w   and wean to MAP >65. Cosyntropin test in AM. IVF 100cc/hr x10hrs  Treatment: administered 1 L NS without appreciable improvement.  Levophed   initiated    Thank you.  Nadine Balderrama RN, Clinical Documentation Integrity, [employer], [facility], CRCR  Options provided:  -- septic shock confirmed after study and was present on admission  -- septic shock treated and resolved  -- septic shock ruled out after study  -- Other - I will add my own diagnosis  -- Disagree - Not applicable / Not valid  -- Disagree - Clinically unable to determine / Unknown  -- Refer to Clinical Documentation Reviewer    PROVIDER RESPONSE TEXT:    septic shock confirmed after study and was present on admission    Query created by: Nadine Balderrama on 10/9/2024 1:51 PM      Electronically signed by:  Lia Ellsworth MD 10/10/2024 5:00 AM

## 2024-10-10 NOTE — CARE COORDINATION
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 Order to transfer to [facility].     Afebrile. 1st degree AVB rate 80's. Sats 93% on 3L O2 (baseline O2). Ox4. Follows commands. PT/OT. Telemetry, SCDs. Nebs, asa, inhaler, IV rocephin daily IV doxycycline Q12H, lovenox, IV solucortef 100 mg Q8H, pravastatin, flomax. WBC 13.6, hgb 11.1.     PCP: Mustapha Callahan MD  Readmission Risk Score: 16.2    Patient Goals/Plan/Treatment Preferences: Home alone. Wears 3L O2 ATC thru [employer]. Has nebs. [facility]. Declines HH. [facility].     Pt transferring to [facility]. Handoff report given to CHAVA Irwin CM.

## 2024-10-10 NOTE — CONSULTS
[facility]  STRZ ICU 4D  [address] [address]  Dept: [phone]  Loc: [phone]  Visit Date: 10/8/2024    Urology Consult Note    Reason for Consult:  orchitis and urinary retention   Requesting Physician:  ICU    History Obtained From:  patient, electronic medical record    Chief Complaint: R testicular pain    HISTORY OF PRESENT ILLNESS:                The patient is a 84 y.o. male with significant past medical history of HLD, HTN, post treated TB, COPD and chronic hypoxic respiratory failure on home oxygen 3 L along with a history of bladder cancer s/p removal and CFA aneurysm s/p repair and severe aortic stenosis s/p TAVR 2/24 and HFREF he came in for right testicular pain to [facility] ED 10/8 that is dull and achy, no aggravating or alleviating factors. Patient has a history of right leg swelling due to fluid collection post CFA surgery with Dr. Delgado, drained occasionally.  Patient was found to be hypotensive and scrotal sac was observed to be erythematous and edematous. Patient was fluid unresponsive and was started on Levophed.     URO consulted for above  Seen 8/20  Cysto showed: Findings:   The patient was prepped and draped in the usual sterile fashion.  The flexible cystoscope was advanced through the urethra and into the bladder.  The bladder was thoroughly inspected and the following was noted:     Residual Urine: significant\" \" .  Urine clear, with no obvious infection  Urethra:  had soft urethral stricture that I bypassed with scope . Urethral dilation was not performed.    Prostate: lateral lobe hypertrophy + present, prostate moderately obstructing, intravesical extension of prostate not present. There was no previous TURP defect.   Bladder: no tumor noted .   Severe trabeculation noted.  severeal bladder diverticulum.  Ureters: Orifices with normal configuration and location.       The cystoscope was removed.  The patient tolerated the procedure  representative static images   were saved for review.     Findings:  Right testicle measures 2.5 x 3.1 x 2.4 cm demonstrating heterogeneous   echogenicity without discrete mass and diffuse hypervascularity.  Right epididymis is enlarged containing a 16 x 10 mm cyst. There is a   large right hydrocele measuring about 5 x 3 x 4 cm. There is   hypervascularity in the region of the epididymis . There is a   moderate-sized varicocele.     In the right groin is a unilocular fluid collection measuring 7.4 x 4.2 x   4.9 cm.     Left testicle measures 2.3 x 3.2 x 1.8 cm, appears normal and demonstrates   normal intrinsic blood flow.  The epididymis contains a complex cyst measuring 19 x 12 x 7 mm, otherwise   unremarkable. Moderate-sized left hydrocele measures 5 0.1 x 1.4 x 1.7 cm.   No varicocele.     IMPRESSION:  Impression:  1. Enlarged heterogeneous right testicle with hypervascularity. Findings   consistent with hypervascular epididymis. Findings consistent with right   epididymo-orchitis.  2. Normal-appearing left testicle.  3. Bilateral epididymal cysts.  4. Bilateral hydroceles.  5. Moderate-sized right varicocele.  6. Unilocular fluid collection in the right groin measuring 7.4 x 4.2 x   4.9 cm.    IMPRESSION/Plan:    Urinary retention - voiding well, low bladder scan per nurse, insert smith if elevated, can be discharged with smith  EO - abx switched to Rocephin and Doxy, plan to reevaluate in the upcoming wks     OV in 2-3 wks   URO to follow peripherally, call with questions      Thank you for including us in the care of CASEY Hahn - CNP, CASEY  10/10/24 8:06 AM  Urology

## 2024-10-11 LAB
BASOPHILS ABSOLUTE: 0 THOU/MM3 (ref 0–0.1)
BASOPHILS NFR BLD AUTO: 0.2 %
DEPRECATED RDW RBC AUTO: 50.2 FL (ref 35–45)
EOSINOPHIL NFR BLD AUTO: 0 %
EOSINOPHILS ABSOLUTE: 0 THOU/MM3 (ref 0–0.4)
ERYTHROCYTE [DISTWIDTH] IN BLOOD BY AUTOMATED COUNT: 14.5 % (ref 11.5–14.5)
HCT VFR BLD AUTO: 37.5 % (ref 42–52)
HGB BLD-MCNC: 11.7 GM/DL (ref 14–18)
IMM GRANULOCYTES # BLD AUTO: 0.13 THOU/MM3 (ref 0–0.07)
IMM GRANULOCYTES NFR BLD AUTO: 0.9 %
LYMPHOCYTES ABSOLUTE: 0.8 THOU/MM3 (ref 1–4.8)
LYMPHOCYTES NFR BLD AUTO: 6.2 %
MCH RBC QN AUTO: 29.4 PG (ref 26–33)
MCHC RBC AUTO-ENTMCNC: 31.2 GM/DL (ref 32.2–35.5)
MCV RBC AUTO: 94.2 FL (ref 80–94)
MONOCYTES ABSOLUTE: 0.5 THOU/MM3 (ref 0.4–1.3)
MONOCYTES NFR BLD AUTO: 3.7 %
NEUTROPHILS ABSOLUTE: 12.2 THOU/MM3 (ref 1.8–7.7)
NEUTROPHILS NFR BLD AUTO: 89 %
NRBC BLD AUTO-RTO: 0 /100 WBC
PLATELET # BLD AUTO: 212 THOU/MM3 (ref 130–400)
PMV BLD AUTO: 10.4 FL (ref 9.4–12.4)
RBC # BLD AUTO: 3.98 MILL/MM3 (ref 4.7–6.1)
WBC # BLD AUTO: 13.7 THOU/MM3 (ref 4.8–10.8)

## 2024-10-11 PROCEDURE — 94761 N-INVAS EAR/PLS OXIMETRY MLT: CPT

## 2024-10-11 PROCEDURE — 6360000002 HC RX W HCPCS: Performed by: INTERNAL MEDICINE

## 2024-10-11 PROCEDURE — 36415 COLL VENOUS BLD VENIPUNCTURE: CPT

## 2024-10-11 PROCEDURE — 97110 THERAPEUTIC EXERCISES: CPT

## 2024-10-11 PROCEDURE — 6370000000 HC RX 637 (ALT 250 FOR IP): Performed by: INTERNAL MEDICINE

## 2024-10-11 PROCEDURE — 94640 AIRWAY INHALATION TREATMENT: CPT

## 2024-10-11 PROCEDURE — 2700000000 HC OXYGEN THERAPY PER DAY

## 2024-10-11 PROCEDURE — 2500000003 HC RX 250 WO HCPCS

## 2024-10-11 PROCEDURE — 2140000000 HC CCU INTERMEDIATE R&B

## 2024-10-11 PROCEDURE — 85025 COMPLETE CBC W/AUTO DIFF WBC: CPT

## 2024-10-11 PROCEDURE — 6370000000 HC RX 637 (ALT 250 FOR IP)

## 2024-10-11 PROCEDURE — 6360000002 HC RX W HCPCS

## 2024-10-11 PROCEDURE — 2580000003 HC RX 258

## 2024-10-11 RX ORDER — ALBUTEROL SULFATE 0.83 MG/ML
2.5 SOLUTION RESPIRATORY (INHALATION)
Status: DISCONTINUED | OUTPATIENT
Start: 2024-10-11 | End: 2024-10-12 | Stop reason: HOSPADM

## 2024-10-11 RX ORDER — FUROSEMIDE 20 MG
20 TABLET ORAL
Status: DISCONTINUED | OUTPATIENT
Start: 2024-10-12 | End: 2024-10-11

## 2024-10-11 RX ORDER — FUROSEMIDE 20 MG
20 TABLET ORAL
Status: DISCONTINUED | OUTPATIENT
Start: 2024-10-12 | End: 2024-10-12 | Stop reason: HOSPADM

## 2024-10-11 RX ADMIN — ENOXAPARIN SODIUM 40 MG: 100 INJECTION SUBCUTANEOUS at 10:21

## 2024-10-11 RX ADMIN — ASPIRIN 81 MG: 81 TABLET, COATED ORAL at 10:19

## 2024-10-11 RX ADMIN — ALBUTEROL SULFATE 2.5 MG: 2.5 SOLUTION RESPIRATORY (INHALATION) at 20:43

## 2024-10-11 RX ADMIN — METOPROLOL SUCCINATE 50 MG: 50 TABLET, EXTENDED RELEASE ORAL at 10:19

## 2024-10-11 RX ADMIN — WATER 1000 MG: 1 INJECTION INTRAMUSCULAR; INTRAVENOUS; SUBCUTANEOUS at 17:31

## 2024-10-11 RX ADMIN — TAMSULOSIN HYDROCHLORIDE 0.4 MG: 0.4 CAPSULE ORAL at 10:20

## 2024-10-11 RX ADMIN — TIOTROPIUM BROMIDE INHALATION SPRAY 2 PUFF: 3.12 SPRAY, METERED RESPIRATORY (INHALATION) at 09:01

## 2024-10-11 RX ADMIN — HYDROCORTISONE SODIUM SUCCINATE 100 MG: 100 INJECTION, POWDER, FOR SOLUTION INTRAMUSCULAR; INTRAVENOUS at 10:21

## 2024-10-11 RX ADMIN — HYDROCORTISONE SODIUM SUCCINATE 100 MG: 100 INJECTION, POWDER, FOR SOLUTION INTRAMUSCULAR; INTRAVENOUS at 02:18

## 2024-10-11 RX ADMIN — BUDESONIDE AND FORMOTEROL FUMARATE DIHYDRATE 2 PUFF: 160; 4.5 AEROSOL RESPIRATORY (INHALATION) at 09:10

## 2024-10-11 RX ADMIN — ALBUTEROL SULFATE 2.5 MG: 2.5 SOLUTION RESPIRATORY (INHALATION) at 16:35

## 2024-10-11 RX ADMIN — PRAVASTATIN SODIUM 80 MG: 80 TABLET ORAL at 10:20

## 2024-10-11 RX ADMIN — DOXYCYCLINE 100 MG: 100 INJECTION, POWDER, LYOPHILIZED, FOR SOLUTION INTRAVENOUS at 06:23

## 2024-10-11 RX ADMIN — ALBUTEROL SULFATE 2.5 MG: 2.5 SOLUTION RESPIRATORY (INHALATION) at 12:18

## 2024-10-11 RX ADMIN — BUDESONIDE AND FORMOTEROL FUMARATE DIHYDRATE 2 PUFF: 160; 4.5 AEROSOL RESPIRATORY (INHALATION) at 20:43

## 2024-10-11 RX ADMIN — ALBUTEROL SULFATE 2.5 MG: 2.5 SOLUTION RESPIRATORY (INHALATION) at 09:02

## 2024-10-11 RX ADMIN — SACUBITRIL AND VALSARTAN 1 TABLET: 24; 26 TABLET, FILM COATED ORAL at 20:54

## 2024-10-11 NOTE — PLAN OF CARE
Problem: Respiratory - Adult  Goal: Lung sounds clear or within normal limits for patient  10/10/2024 2001 by Cecil Pelaez RCP  Outcome: Progressing   Patient mutually agrees with goal of care

## 2024-10-11 NOTE — PROGRESS NOTES
[facility]  [facility] CCU-STEPDOWN 3B  Occupational Therapy  Daily Note    Discharge Recommendations: Home with assist as needed  Equipment Recommendations:   will continue to assess pending progress      Time In: 0755  Time Out: 08  Timed Code Treatment Minutes: 25 Minutes  Minutes: 25          Date: 10/11/2024  Patient Name: Gerard Jane,   Gender: male      Room: [address] [place] [address]  MRN: [id number]  : 1940  (84 y.o.)  Referring Practitioner: Lia Ellsworth MD  Diagnosis: Hypotension  Additional Pertinent Hx: Per EMR: The patient is a 84 y.o. male with significant past medical history of HLD, HTN, post treated TB, COPD and chronic hypoxic respiratory failure on home oxygen 3 L along with a history of bladder cancer s/p removal and CFA aneurysm s/p repair and severe aortic stenosis s/p TAVR  and HFREF he came in for right testicular pain to [facility] ED 10/8 that is dull and achy, no aggravating or alleviating factors. Patient has a history of right leg swelling due to fluid collection post CFA surgery with Dr. Delgado, drained occasionally.  Patient was found to be hypotensive and scrotal sac was observed to be erythematous and edematous. Patient was fluid unresponsive and was started on Levophed.    Restrictions/Precautions:  Restrictions/Precautions: Fall Risk     Social/Functional History:  Lives With: Alone (although family lives within ~50 yards of him)  Type of Home: House  Home Layout: One level  Home Access: Stairs to enter with rails  Entrance Stairs - Number of Steps: 3  Entrance Stairs - Rails: Both  Home Equipment: Walker - Rolling, Cane, Oxygen, Crutches   Bathroom Shower/Tub: Tub/Shower unit  Bathroom Toilet: Standard  Bathroom Equipment: None       ADL Assistance: Independent  Homemaking Assistance: Needs assistance (family will assist with cleaning, laundry; pt independent with P)  Ambulation Assistance: Independent  Transfer Assistance: Independent    Active : Yes     Additional

## 2024-10-11 NOTE — RT PROTOCOL NOTE
RT Inhaler-Nebulizer Bronchodilator Protocol Note    There is a bronchodilator order in the chart from a provider indicating to follow the RT Bronchodilator Protocol and there is an “Initiate RT Inhaler-Nebulizer Bronchodilator Protocol” order as well (see protocol at bottom of note).    CXR Findings:  No results found.    The findings from the last RT Protocol Assessment were as follows:   History Pulmonary Disease: Chronic pulmonary disease  Respiratory Pattern: Dyspnea on exertion or RR 21-25 bpm  Breath Sounds: Inspiratory and expiratory or bilateral wheezing and/or rhonchi  Cough: Strong, spontaneous, non-productive  Indication for Bronchodilator Therapy: Decreased or absent breath sounds, On home bronchodilators, Wheezing associated with pulm disorder  Bronchodilator Assessment Score: 10    Aerosolized bronchodilator medication orders have been revised according to the RT Inhaler-Nebulizer Bronchodilator Protocol below.    Respiratory Therapist to perform RT Therapy Protocol Assessment initially then follow the protocol.  Repeat RT Therapy Protocol Assessment PRN for score 0-3 or on second treatment, BID, and PRN for scores above 3.    No Indications - adjust the frequency to every 6 hours PRN wheezing or bronchospasm, if no treatments needed after 48 hours then discontinue using Per Protocol order mode.     If indication present, adjust the RT bronchodilator orders based on the Bronchodilator Assessment Score as indicated below.  Use Inhaler orders unless patient has one or more of the following: on home nebulizer, not able to hold breath for 10 seconds, is not alert and oriented, cannot activate and use MDI correctly, or respiratory rate 25 breaths per minute or more, then use the equivalent nebulizer order(s) with same Frequency and PRN reasons based on the score.  If a patient is on this medication at home then do not decrease Frequency below that used at home.    0-3 - enter or revise RT bronchodilator  order(s) to equivalent RT Bronchodilator order with Frequency of every 4 hours PRN for wheezing or increased work of breathing using Per Protocol order mode.        4-6 - enter or revise RT Bronchodilator order(s) to two equivalent RT bronchodilator orders with one order with BID Frequency and one order with Frequency of every 4 hours PRN wheezing or increased work of breathing using Per Protocol order mode.        7-10 - enter or revise RT Bronchodilator order(s) to two equivalent RT bronchodilator orders with one order with TID Frequency and one order with Frequency of every 4 hours PRN wheezing or increased work of breathing using Per Protocol order mode.       11-13 - enter or revise RT Bronchodilator order(s) to one equivalent RT bronchodilator order with QID Frequency and an Albuterol order with Frequency of every 4 hours PRN wheezing or increased work of breathing using Per Protocol order mode.      Greater than 13 - enter or revise RT Bronchodilator order(s) to one equivalent RT bronchodilator order with every 4 hours Frequency and an Albuterol order with Frequency of every 2 hours PRN wheezing or increased work of breathing using Per Protocol order mode.     RT to enter RT Home Evaluation for COPD & MDI Assessment order using Per Protocol order mode.    Electronically signed by Anita Hampton RCP on 10/11/2024 at 9:17 AM

## 2024-10-11 NOTE — PLAN OF CARE
Problem: Discharge Planning  Goal: Discharge to home or other facility with appropriate resources  Outcome: Progressing  Flowsheets (Taken 10/11/2024 0921)  Discharge to home or other facility with appropriate resources: Identify barriers to discharge with patient and caregiver     Problem: Pain  Goal: Verbalizes/displays adequate comfort level or baseline comfort level  Outcome: Progressing     Problem: Safety - Adult  Goal: Free from fall injury  Outcome: Progressing     Problem: ABCDS Injury Assessment  Goal: Absence of physical injury  Outcome: Progressing     Problem: Chronic Conditions and Co-morbidities  Goal: Patient's chronic conditions and co-morbidity symptoms are monitored and maintained or improved  Outcome: Progressing  Flowsheets (Taken 10/11/2024 0921)  Care Plan - Patient's Chronic Conditions and Co-Morbidity Symptoms are Monitored and Maintained or Improved: Monitor and assess patient's chronic conditions and comorbid symptoms for stability, deterioration, or improvement     Problem: Skin/Tissue Integrity  Goal: Absence of new skin breakdown  Description: 1.  Monitor for areas of redness and/or skin breakdown  2.  Assess vascular access sites hourly  3.  Every 4-6 hours minimum:  Change oxygen saturation probe site  4.  Every 4-6 hours:  If on nasal continuous positive airway pressure, respiratory therapy assess nares and determine need for appliance change or resting period.  Outcome: Progressing

## 2024-10-11 NOTE — PROGRESS NOTES
disc disease. Severe facet arthropathy bilaterally at L4-5 and L5-S1 with mild anterolisthesis of L4 on L5 and L5 on S1. No destructive osseous lesion. Unremarkable bony pelvis. No ventral wall hernia.     1. Fluid collection in the right groin measures 5.5 x 4.3 x 7.1 cm. This may reflect a seroma. There is no inguinal hernia. 2. Enlarged heterogeneous prostate gland. Exophytic mass posterolaterally versus mass arising from the right seminal vesicle, measuring 4 cm. Findings may be inflammatory or neoplastic. 3. Diverticulosis coli without diverticulitis. 4. Severe calcific atheromatous change. Infrarenal abdominal aortic aneurysm status post stent graft. 5. Other findings above. This document has been electronically signed by: Mustapha Gao MD on 10/08/2024 08:00 PM All CTs at this facility use dose modulation techniques and iterative reconstructions, and/or weight-based dosing when appropriate to reduce radiation to a low as reasonably achievable.    US ABDOMINAL AORTA LIMITED    Result Date: 9/16/2024  PROCEDURE: US ABDOMINAL AORTA LIMITED CLINICAL INFORMATION: Other specified postprocedural states; Personal history of other diseases of the circulatory system; Infrarenal abdominal aortic aneurysm, without rupture COMPARISON: CT abdomen and pelvis, 2/27/2024 TECHNIQUE: Multiple grayscale and color flow sonographic images of the abdominal aorta and common iliac arteries were obtained. Spectral Doppler waveforms were also generated for each of these vessels. FINDINGS: Bilobed aneurysm involving the entire abdominal aorta with the proximal diameter 4.1 cm in distal diameter 5.7 cm. An aortic endograft is present. There is been no significant change in size from prior CT scan. No evidence for an endoleak. 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 Left common iliac artery is ectatic, also unchanged from prior study.. Proximal Abdominal Aorta Trans - 4.1 cm AP - 3.9 cm    Mid Abdominal Aorta Trans - 3.1 cm AP - 3.2 cm   Distal Abdominal Aorta Trans - 5.7 cm AP - 5.5 cm   Right Common Iliac Artery Trans = 2.4 cm AP = 2.3 cm   Left Common Iliac Artery Trans - 1.5 cm AP - 1.5 cm     Stable appearing fusiform aneurysm of the abdominal aorta and right common iliac artery with endograft in place. **This report has been created using voice recognition software.  It may contain minor errors which are inherent in voice recognition technology.** Electronically signed by Dr. Pancho Villa      This note was dictated using M*Modal Fluency Direct so please excuse any grammatical or syntax errors as no guarantees can be provided that every mistake has been identified and corrected by editing.      Electronically signed by Brent Khalil MD on 10/11/2024 at 2:31 PM

## 2024-10-11 NOTE — CARE COORDINATION
10/11/24, 11:49 AM EDT    DISCHARGE ON GOING EVALUATION    Gerard STOKES Buck       [facility] day: 3  Location: [facility]24/024-A Reason for admit: Epididymo-orchitis [N45.3]  Hypotension [I95.9]  Inflammatory disorder of seminal vesicle [N49.0]  Septic shock (HCC) [A41.9, R65.21]  Sepsis, due to unspecified organism, unspecified whether acute organ dysfunction present (HCC) [A41.9]     Procedures: none    Imaging since last note: none    Barriers to Discharge: Pt has chronic right groin fluid pocket following surgery that he states he has drained occasionally by Dr Delgado. Transfer from ICU, initially required pressor support. Hospitalist and urology following. New ID consult for epididymoorchitis. IV rocephin and doxycycline. Using baseline O2. PT/OT. Per report eager for discharge.     PCP: Mustapha Callahan MD  Readmission Risk Score: 16.5    Patient Goals/Plan/Treatment Preferences:  Home alone. Wears 3L O2 ATC thru [employer]. Has nebs. [facility]. Declines HH. [facility]-discussed in IDRs having script sent and filled at [facility] pharmacy today if anticipated weekend discharge and [facility].

## 2024-10-11 NOTE — PLAN OF CARE
Problem: Discharge Planning  Goal: Discharge to home or other facility with appropriate resources  Outcome: Progressing  Flowsheets (Taken 10/10/2024 0800 by Chance Mcallister, RN)  Discharge to home or other facility with appropriate resources: Identify barriers to discharge with patient and caregiver     Problem: Pain  Goal: Verbalizes/displays adequate comfort level or baseline comfort level  Outcome: Progressing     Problem: Safety - Adult  Goal: Free from fall injury  Outcome: Progressing

## 2024-10-11 NOTE — CONSULTS
CONSULTATION NOTE :ID       Patient - Gerard Jane,  Age - 84 y.o.    - 1940      Room Number - 3B-24/024-A   N -  [id number]   [facility] # - [id number]  Date of Admission -  10/8/2024  6:48 PM  Patient's PCP: Mustapha Callahan MD     Requesting Physician: Brent Khalil MD    REASON FOR CONSULTATION   Epididymoorchitis  CHIEF COMPLAINT   Right scrotal pain    HISTORY OF PRESENT ILLNESS       This is a very pleasant 84 y.o. male who was admitted to the hospital with a chief complaints of pain over his right scrotum of 1 day duration prior to admission.  He reports that all of a sudden he started to have severe pain on his right scrotal area was brought to the hospital he reports that he has been having trouble urinating related to enlarged prostate.  He denies any fever or chills has not any abdominal pain patient has very extensive medical history including atherosclerosis with aneurysm that required surgery he had postsurgical seroma on his right groin for which she had been having aspirations.  He denies any rectal pain he denies any hematuria.  He had cystoscopy which was normal other than enlarged prostate.  He had history of bladder cancer many years ago that required resection.  He has COPD had long history of smoking has abdominal aortic aneurysm coronary artery disease congestive heart failure and previous history of aortic valve replacement [TAVR].  Currently he is on ceftriaxone and doxycycline he is also on steroid that was started while he was in ICU.    PAST MEDICAL  HISTORY       Past Medical History:   Diagnosis Date    Adenomatous polyp 2015    Aortic aneurysm (HCC)     Asthma     Bladder trabeculation     Severe (bladder walls thicken & lose ability to expand & contract)    BPH (benign prostatic hyperplasia)     CAD (coronary artery disease)     CHF (congestive heart failure) ([facility])     EF 40-45%    COPD (chronic obstructive pulmonary disease) ([facility])      Value Date/Time    BC No growth 24 hours. No growth 48 hours. 10/08/2024 07:47 PM       Problem list of patient      Patient Active Problem List   Diagnosis Code    Chronic obstructive lung disease ([facility]) J44.9    Transitional cell carcinoma of bladder ([facility]) C67.9    Adenomatous polyp D36.9    AAA (abdominal aortic aneurysm) ([facility]) I71.40    Endoleak post endovascular aneurysm repair [id number]    Chronic hypoxemic respiratory failure J96.11    History of tobacco use Z87.891    Myocardiopathy ([facility]) I42.9    Acute HFrEF (heart failure with reduced ejection fraction) ([facility]) I50.21    CAD in native artery I25.10    Chest pain R07.9    Aortic stenosis, severe I35.0    Aortic valve stenosis I35.0    Severe aortic stenosis I35.0    Renal insufficiency N28.9    Hypotension I95.9    Septic shock ([facility]) A41.9, R65.21    Epididymo-orchitis N45.3    BPH (benign prostatic hyperplasia) N40.0    COPD (chronic obstructive pulmonary disease) ([facility]) J44.9    Hyperlipidemia E78.5    Hypertension I10    S/P TAVR (transcatheter aortic valve replacement) Z95.2    LBBB (left bundle branch block) I44.7    Chronic combined systolic and diastolic heart failure ([facility]) I50.42           Impression and Recommendation:   Right-sided epididymoorchitis likely related to enteric infection: He will continue IV ceftriaxone.  Will stop doxycycline and steroid  Antibiotic can be transitioned to bactrim to complete a total of 10 days   He needs follow up with urology for his BPH       Thank you Brent Khalil MD for allowing me to participate in this patient's care.    Alfred Pickard MD, 10/11/2024 12:18 PM

## 2024-10-11 NOTE — RT PROTOCOL NOTE
RT Inhaler-Nebulizer Bronchodilator Protocol Note    There is a bronchodilator order in the chart from a provider indicating to follow the RT Bronchodilator Protocol and there is an “Initiate RT Inhaler-Nebulizer Bronchodilator Protocol” order as well (see protocol at bottom of note).    CXR Findings:  XR CHEST PORTABLE    Result Date: 10/8/2024  Impression: 1. Chronic interstitial changes/scarring bilaterally similar to previous. No acute process. This document has been electronically signed by: Mustapha Gao MD on 10/08/2024 08:56 PM      The findings from the last RT Protocol Assessment were as follows:   History Pulmonary Disease: Chronic pulmonary disease  Respiratory Pattern: Regular pattern and RR 12-20 bpm  Breath Sounds: Intermittent or unilateral wheezes  Cough: Strong, spontaneous, non-productive  Indication for Bronchodilator Therapy: Decreased or absent breath sounds, On home bronchodilators  Bronchodilator Assessment Score: 6    Aerosolized bronchodilator medication orders have been revised according to the RT Inhaler-Nebulizer Bronchodilator Protocol below.    Respiratory Therapist to perform RT Therapy Protocol Assessment initially then follow the protocol.  Repeat RT Therapy Protocol Assessment PRN for score 0-3 or on second treatment, BID, and PRN for scores above 3.    No Indications - adjust the frequency to every 6 hours PRN wheezing or bronchospasm, if no treatments needed after 48 hours then discontinue using Per Protocol order mode.     If indication present, adjust the RT bronchodilator orders based on the Bronchodilator Assessment Score as indicated below.  Use Inhaler orders unless patient has one or more of the following: on home nebulizer, not able to hold breath for 10 seconds, is not alert and oriented, cannot activate and use MDI correctly, or respiratory rate 25 breaths per minute or more, then use the equivalent nebulizer order(s) with same Frequency and PRN reasons based on the  score.  If a patient is on this medication at home then do not decrease Frequency below that used at home.    0-3 - enter or revise RT bronchodilator order(s) to equivalent RT Bronchodilator order with Frequency of every 4 hours PRN for wheezing or increased work of breathing using Per Protocol order mode.        4-6 - enter or revise RT Bronchodilator order(s) to two equivalent RT bronchodilator orders with one order with BID Frequency and one order with Frequency of every 4 hours PRN wheezing or increased work of breathing using Per Protocol order mode.        7-10 - enter or revise RT Bronchodilator order(s) to two equivalent RT bronchodilator orders with one order with TID Frequency and one order with Frequency of every 4 hours PRN wheezing or increased work of breathing using Per Protocol order mode.       11-13 - enter or revise RT Bronchodilator order(s) to one equivalent RT bronchodilator order with QID Frequency and an Albuterol order with Frequency of every 4 hours PRN wheezing or increased work of breathing using Per Protocol order mode.      Greater than 13 - enter or revise RT Bronchodilator order(s) to one equivalent RT bronchodilator order with every 4 hours Frequency and an Albuterol order with Frequency of every 2 hours PRN wheezing or increased work of breathing using Per Protocol order mode.     RT to enter RT Home Evaluation for COPD & MDI Assessment order using Per Protocol order mode.    Electronically signed by Cecil Pelaez RCP on 10/10/2024 at 8:01 PM

## 2024-10-12 ENCOUNTER — TELEPHONE (OUTPATIENT)
Dept: UROLOGY | Age: 84
End: 2024-10-12

## 2024-10-12 VITALS
HEIGHT: 67 IN | HEART RATE: 66 BPM | RESPIRATION RATE: 18 BRPM | TEMPERATURE: 98.5 F | SYSTOLIC BLOOD PRESSURE: 115 MMHG | BODY MASS INDEX: 20.17 KG/M2 | OXYGEN SATURATION: 94 % | DIASTOLIC BLOOD PRESSURE: 70 MMHG | WEIGHT: 128.53 LBS

## 2024-10-12 DIAGNOSIS — N45.3 EPIDIDYMO-ORCHITIS: Primary | ICD-10-CM

## 2024-10-12 PROBLEM — R10.31 RIGHT INGUINAL PAIN: Status: ACTIVE | Noted: 2024-10-12

## 2024-10-12 LAB
BACTERIA BLD AEROBE CULT: ABNORMAL
BACTERIA BLD AEROBE CULT: ABNORMAL
BASOPHILS ABSOLUTE: 0 THOU/MM3 (ref 0–0.1)
BASOPHILS NFR BLD AUTO: 0.3 %
DEPRECATED RDW RBC AUTO: 51.6 FL (ref 35–45)
EOSINOPHIL NFR BLD AUTO: 0.1 %
EOSINOPHILS ABSOLUTE: 0 THOU/MM3 (ref 0–0.4)
ERYTHROCYTE [DISTWIDTH] IN BLOOD BY AUTOMATED COUNT: 14.6 % (ref 11.5–14.5)
HCT VFR BLD AUTO: 34.7 % (ref 42–52)
HGB BLD-MCNC: 10.6 GM/DL (ref 14–18)
IMM GRANULOCYTES # BLD AUTO: 0.13 THOU/MM3 (ref 0–0.07)
IMM GRANULOCYTES NFR BLD AUTO: 1 %
LYMPHOCYTES ABSOLUTE: 1.5 THOU/MM3 (ref 1–4.8)
LYMPHOCYTES NFR BLD AUTO: 11.8 %
MCH RBC QN AUTO: 29.2 PG (ref 26–33)
MCHC RBC AUTO-ENTMCNC: 30.5 GM/DL (ref 32.2–35.5)
MCV RBC AUTO: 95.6 FL (ref 80–94)
MONOCYTES ABSOLUTE: 1.1 THOU/MM3 (ref 0.4–1.3)
MONOCYTES NFR BLD AUTO: 8.7 %
NEUTROPHILS ABSOLUTE: 9.8 THOU/MM3 (ref 1.8–7.7)
NEUTROPHILS NFR BLD AUTO: 78.1 %
NRBC BLD AUTO-RTO: 0 /100 WBC
ORGANISM: ABNORMAL
PLATELET # BLD AUTO: 209 THOU/MM3 (ref 130–400)
PMV BLD AUTO: 10 FL (ref 9.4–12.4)
RBC # BLD AUTO: 3.63 MILL/MM3 (ref 4.7–6.1)
WBC # BLD AUTO: 12.6 THOU/MM3 (ref 4.8–10.8)

## 2024-10-12 PROCEDURE — 85025 COMPLETE CBC W/AUTO DIFF WBC: CPT

## 2024-10-12 PROCEDURE — 6370000000 HC RX 637 (ALT 250 FOR IP)

## 2024-10-12 PROCEDURE — 6370000000 HC RX 637 (ALT 250 FOR IP): Performed by: INTERNAL MEDICINE

## 2024-10-12 PROCEDURE — 97110 THERAPEUTIC EXERCISES: CPT

## 2024-10-12 PROCEDURE — 36415 COLL VENOUS BLD VENIPUNCTURE: CPT

## 2024-10-12 PROCEDURE — 94640 AIRWAY INHALATION TREATMENT: CPT

## 2024-10-12 PROCEDURE — 99232 SBSQ HOSP IP/OBS MODERATE 35: CPT

## 2024-10-12 PROCEDURE — 6360000002 HC RX W HCPCS: Performed by: INTERNAL MEDICINE

## 2024-10-12 PROCEDURE — 97116 GAIT TRAINING THERAPY: CPT

## 2024-10-12 RX ORDER — SULFAMETHOXAZOLE/TRIMETHOPRIM 800-160 MG
1 TABLET ORAL EVERY 12 HOURS SCHEDULED
Status: DISCONTINUED | OUTPATIENT
Start: 2024-10-12 | End: 2024-10-12

## 2024-10-12 RX ORDER — SULFAMETHOXAZOLE/TRIMETHOPRIM 800-160 MG
1 TABLET ORAL 2 TIMES DAILY
Qty: 10 TABLET | Refills: 0 | Status: SHIPPED | OUTPATIENT
Start: 2024-10-12 | End: 2024-10-17

## 2024-10-12 RX ORDER — SULFAMETHOXAZOLE/TRIMETHOPRIM 800-160 MG
1 TABLET ORAL EVERY 12 HOURS SCHEDULED
Status: DISCONTINUED | OUTPATIENT
Start: 2024-10-12 | End: 2024-10-12 | Stop reason: HOSPADM

## 2024-10-12 RX ADMIN — METOPROLOL SUCCINATE 50 MG: 50 TABLET, EXTENDED RELEASE ORAL at 08:23

## 2024-10-12 RX ADMIN — ASPIRIN 81 MG: 81 TABLET, COATED ORAL at 08:22

## 2024-10-12 RX ADMIN — ALBUTEROL SULFATE 2.5 MG: 2.5 SOLUTION RESPIRATORY (INHALATION) at 07:38

## 2024-10-12 RX ADMIN — TAMSULOSIN HYDROCHLORIDE 0.4 MG: 0.4 CAPSULE ORAL at 08:23

## 2024-10-12 RX ADMIN — SACUBITRIL AND VALSARTAN 1 TABLET: 24; 26 TABLET, FILM COATED ORAL at 08:23

## 2024-10-12 RX ADMIN — SPIRONOLACTONE 25 MG: 25 TABLET ORAL at 08:23

## 2024-10-12 RX ADMIN — PRAVASTATIN SODIUM 80 MG: 80 TABLET ORAL at 08:22

## 2024-10-12 RX ADMIN — BUDESONIDE AND FORMOTEROL FUMARATE DIHYDRATE 2 PUFF: 160; 4.5 AEROSOL RESPIRATORY (INHALATION) at 07:38

## 2024-10-12 RX ADMIN — ENOXAPARIN SODIUM 40 MG: 100 INJECTION SUBCUTANEOUS at 08:22

## 2024-10-12 RX ADMIN — TIOTROPIUM BROMIDE INHALATION SPRAY 2 PUFF: 3.12 SPRAY, METERED RESPIRATORY (INHALATION) at 07:38

## 2024-10-12 ASSESSMENT — PAIN SCALES - GENERAL: PAINLEVEL_OUTOF10: 0

## 2024-10-12 NOTE — PLAN OF CARE
Problem: Chronic Conditions and Co-morbidities  Goal: Patient's chronic conditions and co-morbidity symptoms are monitored and maintained or improved  10/12/2024 1026 by Juani Downs RN  Outcome: Not Progressing  10/12/2024 0216 by Stanley Paredes RN  Outcome: Progressing  Flowsheets (Taken 10/12/2024 0216)  Care Plan - Patient's Chronic Conditions and Co-Morbidity Symptoms are Monitored and Maintained or Improved:   Monitor and assess patient's chronic conditions and comorbid symptoms for stability, deterioration, or improvement   Collaborate with multidisciplinary team to address chronic and comorbid conditions and prevent exacerbation or deterioration   Update acute care plan with appropriate goals if chronic or comorbid symptoms are exacerbated and prevent overall improvement and discharge     Problem: Discharge Planning  Goal: Discharge to home or other facility with appropriate resources  10/12/2024 1027 by Juani Downs RN  Outcome: Progressing  Flowsheets (Taken 10/12/2024 0216 by Stanley Paredes RN)  Discharge to home or other facility with appropriate resources:   Identify barriers to discharge with patient and caregiver   Identify discharge learning needs (meds, wound care, etc)  Note: He is from home alone and plans on returning there at discharge. His son does live close to him and checks on him daily.    10/12/2024 1026 by Juani Downs RN  Outcome: Not Progressing  10/12/2024 0216 by Stanley Paredes RN  Outcome: Progressing  Flowsheets (Taken 10/12/2024 0216)  Discharge to home or other facility with appropriate resources:   Identify barriers to discharge with patient and caregiver   Identify discharge learning needs (meds, wound care, etc)     Problem: Pain  Goal: Verbalizes/displays adequate comfort level or baseline comfort level  10/12/2024 1027 by Juani Downs RN  Outcome: Progressing  Flowsheets (Taken 10/12/2024 0216 by Stanley Paredes RN)  Verbalizes/displays adequate comfort level  RN  Outcome: Not Progressing  10/12/2024 0216 by Stanley Paredes RN  Outcome: Progressing  Flowsheets (Taken 10/12/2024 0216)  Care Plan - Patient's Chronic Conditions and Co-Morbidity Symptoms are Monitored and Maintained or Improved:   Monitor and assess patient's chronic conditions and comorbid symptoms for stability, deterioration, or improvement   Collaborate with multidisciplinary team to address chronic and comorbid conditions and prevent exacerbation or deterioration   Update acute care plan with appropriate goals if chronic or comorbid symptoms are exacerbated and prevent overall improvement and discharge     Problem: Skin/Tissue Integrity  Goal: Absence of new skin breakdown  Description: 1.  Monitor for areas of redness and/or skin breakdown  2.  Assess vascular access sites hourly  3.  Every 4-6 hours minimum:  Change oxygen saturation probe site  4.  Every 4-6 hours:  If on nasal continuous positive airway pressure, respiratory therapy assess nares and determine need for appliance change or resting period.  10/12/2024 1027 by Juani Downs RN  Outcome: Progressing  Note: Ongoing assessment & interventions provided throughout shift.  Skin assessments provided.  Encouraging/assisting patient to turn as needed.    10/12/2024 1026 by Juani Downs RN  Outcome: Not Progressing  10/12/2024 0216 by Stanley Paredes, RN  Outcome: Progressing  Note: Ongoing assessment & interventions provided throughout shift.  Skin assessments provided.  Encouraging/assisting patient to turn as needed.     Care plan reviewed with patient.  Patient verbalizes understanding of the care plan and contributed to goal setting.

## 2024-10-12 NOTE — PROGRESS NOTES
Progress note: Infectious diseases    Patient - Gerard Jane,  Age - 84 y.o.    - 1940      Room Number - 3B-24/024-A   N -  [id number]   [facility] # - [id number]  Date of Admission -  10/8/2024  6:48 PM    SUBJECTIVE:   He is feeling well, he wants to go home  OBJECTIVE   VITALS    height is 1.702 m (5' 7\") and weight is 58.3 kg (128 lb 8.5 oz). His oral temperature is 98.7 °F (37.1 °C). His blood pressure is 133/83 and his pulse is 71. His respiration is 20 and oxygen saturation is 94%.       Wt Readings from Last 3 Encounters:   10/09/24 58.3 kg (128 lb 8.5 oz)   24 70 kg (154 lb 6.4 oz)   24 68 kg (150 lb)       I/O (24 Hours)    Intake/Output Summary (Last 24 hours) at 10/12/2024 1103  Last data filed at 10/12/2024 0902  Gross per 24 hour   Intake 718 ml   Output 0 ml   Net 718 ml       General Appearance  Awake, alert, oriented,  not  In acute distress  HEENT - normocephalic, atraumatic, pink conjunctiva,  anicteric sclera  Neck - Supple, no mass  Lungs -  Bilateral   air entry, diminished breath sound   Cardiovascular - Heart sounds are normal.  Regular rate and rhythm without murmur, gallop or rub.  Abdomen - soft, not distended, nontender, right groin swelling   Neurologic -oriented  Skin - No bruising or bleeding  Extremities - No edema, no cyanosis, clubbing   Swollen right hemscotrum. Non tender    MEDICATIONS:      albuterol  2.5 mg Nebulization 4x Daily RT    furosemide  20 mg Oral Q48H    metoprolol succinate  50 mg Oral Daily    enoxaparin  40 mg SubCUTAneous Daily    spironolactone  25 mg Oral Daily    sacubitril-valsartan  1 tablet Oral BID    aspirin  81 mg Oral Daily    budesonide-formoterol  2 puff Inhalation BID RT    pravastatin  80 mg Oral Daily    tamsulosin  0.4 mg Oral Daily    tiotropium  2 puff Inhalation Daily RT    cefTRIAXone (ROCEPHIN) IV  1,000 mg IntraVENous Q24H

## 2024-10-12 NOTE — DISCHARGE INSTRUCTIONS
Follow-up with your primary care provider (PCP) in 1 to 2 weeks.   Follow-up with Urology office to ensure you are emptying your bladder. Our office to call and coordinate.Take antibiotics to completion for epididymo-orchitis. Will assess at follow-up visit.    Low Blood Pressure: Care Instructions  Overview     Blood pressure is a measure of how hard blood pushes against the walls of your arteries as it moves through your body. Low blood pressure is also called hypotension. It means that your blood pressure is much lower than normal. Some people may have slightly low blood pressure without symptoms. But in many people, low blood pressure can make you feel dizzy or lightheaded. When your blood pressure is too low, your heart, brain, and other organs do not get enough blood.  Low blood pressure can be caused by many things, including heart problems and some medicines. Diabetes that is not under control can cause your blood pressure to drop. And so can a severe allergic reaction or infection. Another cause is dehydration, which is when your body loses too much fluid.  Treatment for low blood pressure depends on the cause.  Follow-up care is a key part of your treatment and safety. Be sure to make and go to all appointments, and call your doctor if you are having problems. It's also a good idea to know your test results and keep a list of the medicines you take.  How can you care for yourself at home?  Be safe with medicines. Call your doctor if you think you are having a problem with your medicine. You will get more details on the specific medicines your doctor prescribes.  If you feel dizzy or lightheaded, sit down or lie down for a few minutes. Or you can sit down and put your head between your knees. This will help your blood pressure go back to normal and help your symptoms go away.  Follow your doctor's suggestions for ways to prevent symptoms like dizziness. These suggestions may include:  Get up slowly from bed

## 2024-10-12 NOTE — PROGRESS NOTES
[facility]  INPATIENT PHYSICAL THERAPY  DAILY NOTE  [facility] CCU-STEPDOWN 3B - 3B-24/024-A      Discharge Recommendations: Home with Assist as Needed  Equipment Recommendations: No               Time In: 907  Time Out: 931  Timed Code Treatment Minutes: 24 Minutes  Minutes: 24          Date: 10/12/2024  Patient Name: Gerard Jane,  Gender:  male        MRN: [id number]  : 1940  (84 y.o.)     Referring Practitioner: Lia Ellsworth MD  Diagnosis: Hypotension  Additional Pertinent Hx: Gerard Jane is a 84 y.o. male who presents with right testicular pain. Onset was early today. Patient is localized to the right testicle. The duration has been constant since the onset. The 8/10 pain is described as dull and achy. The patient's pain is not associated with other symptoms. There are no alleviating factors. The context is that the symptoms started this morning spontaneously, without any known precipitants. Patient has a history of a fluid collection in the right groin after an aneurysm surgery with Dr. Delgado he reports Dr. Delgado drains fluid from in intermittently. No recent abx use. He reports the when he was seen in Dr. Delgado office last week his BP was in the 80s.  Patient seen in ICU with continued low BP readings.     Prior Level of Function:  Lives With: Alone (although family lives within ~50 yards of him)  Type of Home: House  Home Layout: One level  Home Access: Stairs to enter with rails  Entrance Stairs - Number of Steps: 3  Entrance Stairs - Rails: Both  Home Equipment: Walker - Rolling, Cane, Oxygen, Crutches   Bathroom Shower/Tub: Tub/Shower unit  Bathroom Toilet: Standard  Bathroom Equipment: None    ADL Assistance: Independent  Homemaking Assistance: Needs assistance (family will assist with cleaning, laundry; pt independent with P)  Ambulation Assistance: Independent  Transfer Assistance: Independent  Active : Yes  Additional Comments: Pt reported was not using any AD prior to

## 2024-10-12 NOTE — PLAN OF CARE
Problem: Respiratory - Adult  Goal: Lung sounds clear or within normal limits for patient  10/12/2024 0743 by Leslie Yo, RCTHAD  Outcome: Progressing   Pt continues on aerosols and MDI's to clear lung sounds, maintenance of COPD and pt does MDI's and txs at home. Patient mutually agreed on goals.

## 2024-10-12 NOTE — PROGRESS NOTES
[facility]  STRZ CCU-STEPDOWN 3B  [address] [facility] [address]  Dept: [phone]  Loc: [phone]  Visit Date: 10/8/2024    Urology Progress Note    Chief Complaint: Right Groin Pain    Impression/Plan:  Epididymo-Orchitis  Right Testicular Pain  Scrotal Swelling  Right Varicocele  Bilateral Hydroceles  - Consulted for difficulty with urination 2/2 to scrotal swelling and BPH. Receiving Flomax 0.4 mg QD and voiding without difficulty.  - Rocephin for EO. Improving. Pain is mild and erythema, firmness, and scrotal swelling is slowly improving. Transition to Bactrim at discharge.  - Will follow-up in the office to ensure resolution of EO and to discuss options for management of right varicocele and bilateral hydroceles.    Okay for discharge from URO standpoint with OV in the coming weeks.    Subjective:  Pleasant and conversive patient seated upright in bedside chair. Denies significant testicular pain at this time. Voiding without difficulty, yellow urine per patient account.            Vitals:  /83   Pulse 71   Temp 98.7 °F (37.1 °C) (Oral)   Resp 20   Ht 1.702 m (5' 7\")   Wt 58.3 kg (128 lb 8.5 oz)   SpO2 94%   BMI 20.13 kg/m²   Temp  Av.4 °F (36.9 °C)  Min: 97.9 °F (36.6 °C)  Max: 98.8 °F (37.1 °C)    Intake/Output Summary (Last 24 hours) at 10/12/2024 0903  Last data filed at 10/12/2024 0902  Gross per 24 hour   Intake 718 ml   Output 0 ml   Net 718 ml       Social History     Socioeconomic History    Marital status:      Spouse name: Dahiana    Number of children: 3    Years of education: Not on file    Highest education level: Not on file   Occupational History    Not on file   Tobacco Use    Smoking status: Former     Current packs/day: 0.00     Average packs/day: 1 pack/day for 45.0 years (45.0 ttl pk-yrs)     Types: Cigarettes     Start date: 3/30/1962     Quit date: 3/30/2007     Years since quittin.5    Smokeless tobacco: Never

## 2024-10-12 NOTE — PLAN OF CARE
Problem: Respiratory - Adult  Goal: Lung sounds clear or within normal limits for patient  Outcome: Progressing   Patient mutually agreed on goals.

## 2024-10-12 NOTE — PROGRESS NOTES
Facility) [] Assisted living facility [] LTAC ([facility])      /O (24Hr):    Intake/Output Summary (Last 24 hours) at 10/12/2024 0851  Last data filed at 10/12/2024 0557  Gross per 24 hour   Intake 600 ml   Output 0 ml   Net 600 ml         Patient Vitals for the past 96 hrs (Last 3 readings):   Weight   10/09/24 0000 58.3 kg (128 lb 8.5 oz)     Admission weight: 58.3 kg (128 lb 8.5 oz)  Wt Readings from Last 3 Encounters:   10/09/24 58.3 kg (128 lb 8.5 oz)   09/24/24 70 kg (154 lb 6.4 oz)   08/20/24 68 kg (150 lb)    (encounters)    /83   Pulse 71   Temp 98.7 °F (37.1 °C) (Oral)   Resp 20   Ht 1.702 m (5' 7\")   Wt 58.3 kg (128 lb 8.5 oz)   SpO2 94%   BMI 20.13 kg/m²   Vitals:    10/11/24 2349 10/12/24 0244 10/12/24 0738 10/12/24 0800   BP: 90/67 136/79  133/83   Pulse: 73 71  71   Resp: 24 25  20   Temp: 97.9 °F (36.6 °C) 98.6 °F (37 °C)  98.7 °F (37.1 °C)   TempSrc: Oral Oral  Oral   SpO2: 91% 92% 95% 94%   Weight:       Height:           CBC:   Recent Labs     10/10/24  0417 10/11/24  0735 10/12/24  0414   WBC 13.6* 13.7* 12.6*   RBC 3.70* 3.98* 3.63*   HGB 11.1* 11.7* 10.6*   HCT 35.0* 37.5* 34.7*   MCV 94.6* 94.2* 95.6*   MCH 30.0 29.4 29.2   MCHC 31.7* 31.2* 30.5*    212 209   MPV 10.0 10.4 10.0         MAG:   Recent Labs     10/10/24  0417   MG 2.3       CMP:   Recent Labs     10/10/24  0417      K 3.8      CO2 23   BUN 26*   CREATININE 0.5   GLUCOSE 117*   CALCIUM 9.1      No results for input(s): \"LIPASE\", \"AMYLASE\" in the last 72 hours.    Phosphorus:    Recent Labs     10/10/24  0417   PHOS 2.5       Folate and B12: No results found for: \"HGBYPWRU23\", No results found for: \"FOLATE\"  Thyroid Studies:   Lab Results   Component Value Date    TSH 1.480 10/08/2024     D-Dimer:   Lab Results   Component Value Date    DDIMER 2700.00 (H) 10/03/2023       Lactic acid: No components found for: \"LACT\"     Troponin T   No results for input(s): \"TROPHS\" in the last 72  demonstrating heterogeneous echogenicity without discrete mass and diffuse hypervascularity. Right epididymis is enlarged containing a 16 x 10 mm cyst. There is a large right hydrocele measuring about 5 x 3 x 4 cm. There is hypervascularity in the region of the epididymis . There is a moderate-sized varicocele. In the right groin is a unilocular fluid collection measuring 7.4 x 4.2 x 4.9 cm. Left testicle measures 2.3 x 3.2 x 1.8 cm, appears normal and demonstrates normal intrinsic blood flow. The epididymis contains a complex cyst measuring 19 x 12 x 7 mm, otherwise unremarkable. Moderate-sized left hydrocele measures 5 0.1 x 1.4 x 1.7 cm. No varicocele.     Impression: 1. Enlarged heterogeneous right testicle with hypervascularity. Findings consistent with hypervascular epididymis. Findings consistent with right epididymo-orchitis. 2. Normal-appearing left testicle. 3. Bilateral epididymal cysts. 4. Bilateral hydroceles. 5. Moderate-sized right varicocele. 6. Unilocular fluid collection in the right groin measuring 7.4 x 4.2 x 4.9 cm. This document has been electronically signed by: Mustapha Gao MD on 10/08/2024 10:23 PM    CTA ABDOMEN PELVIS W WO CONTRAST    Result Date: 10/8/2024  CTA abdomen and pelvis with IV contrast and MIP/3D reconstructions. Comparison: CT/SR - CT ABDOMEN PELVIS WO CONTRAST - 10/08/2024 07:33 PM EDT FINDINGS: Abdominal aorta and its branches: Marked calcific atheromatous change of the distal descending thoracic aorta, the abdominal aorta and bilateral iliac arteries. Infrarenal abdominal aortic aneurysm measures 4.9 x 5.7 cm contains an inlaid graft which is patent. Patent bilateral iliac arteries demonstrating severe calcific atheromatous change. Severe stenosis of the distal right external iliac artery measuring approximately 75%. 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 Visualized proximal superficial femoral

## 2024-10-12 NOTE — FLOWSHEET NOTE
Discussed discharge summary with patient. Instructed patient about medications & follow up appointments. Patient denies any additional questions. Patient was discharged with all belongings. No distress noted. Patient discharged to home. Taken down to his son's vehicle by tech per wheelchair.

## 2024-10-12 NOTE — DISCHARGE SUMMARY
[facility]--HOSPITALIST GROUP    Hospitalist DISCHARGE SUMMARY dictated by rBent Khalil MD on 10/12/2024      DEMOGRAPHICS     Date of Service: 10/12/2024  11:42 AM   Patient ID:Gerard Jane is84 y.o.   : 1940 MRN:[id number]  Code Status:  Prior  Admit date: 10/8/2024  Discharge date: 10/12/2024     Primary Care Physician - Mustapha Callahan MD   Patient Care Team:  Mustapha Callahan MD as PCP - General (Family Medicine)  Mustapha Callahan MD as PCP - Empaneled Provider  Maddi Evangelista DO (Internal Medicine)  Marvin Maurice MD as Consulting Physician (Urology)  Kelby Thompson MD as Consulting Physician (Pulmonology)  Loren Milian, RN as Care Transitions Nurse  Demi Kenney, RN as Nurse Navigator (Oncology)  Marisol Charles RN as Care Transitions Nurse  Tel: [phone]  IP CONSULT TO UROLOGY  IP CONSULT TO INFECTIOUS DISEASES  Admitting Physician: Thong Prince MD   Discharge Physician: Brent Khalil MD      MEDICAL SYNOPSIS     FOLLOW UP APPOINTMENTS:   Mustapha Callahan MD  [address] [place] [address]  [phone]    Follow up on 10/17/2024  9:10 am      Code Status:  Prior  Diet: ADULT DIET; Regular; Low Sodium (2 gm)    Final diagnoses:       Principal Problem:    Hypotension  Active Problems:    CAD in native artery    Transitional cell carcinoma of bladder (HCC)    Septic shock (HCC)    Epididymo-orchitis    BPH (benign prostatic hyperplasia)    COPD (chronic obstructive pulmonary disease) (HCC)    Hyperlipidemia    Hypertension    S/P TAVR (transcatheter aortic valve replacement)    LBBB (left bundle branch block)    Chronic combined systolic and diastolic heart failure (HCC)  Resolved Problems:    * No resolved hospital problems. *  Right-sided testicular pain   Hypotension, due to septic shock   chronic hypoxic respiratory failure on 3 L home O2 via NC  H=h/o severe aortic stenosis s/p TAVR 2024  CAD s/p

## 2024-10-12 NOTE — PLAN OF CARE
Problem: Discharge Planning  Goal: Discharge to home or other facility with appropriate resources  10/12/2024 0216 by Stanley Paredes RN  Outcome: Progressing  Flowsheets (Taken 10/12/2024 0216)  Discharge to home or other facility with appropriate resources:   Identify barriers to discharge with patient and caregiver   Identify discharge learning needs (meds, wound care, etc)     Problem: Pain  Goal: Verbalizes/displays adequate comfort level or baseline comfort level  10/12/2024 0216 by Stanley Paredes RN  Outcome: Progressing  Flowsheets (Taken 10/12/2024 0216)  Verbalizes/displays adequate comfort level or baseline comfort level:   Encourage patient to monitor pain and request assistance   Assess pain using appropriate pain scale     Problem: Safety - Adult  Goal: Free from fall injury  10/12/2024 0216 by Stanley Paredes RN  Outcome: Progressing  Note: Bed locked & in low position, call light in reach, side-rails up x2, bed/chair alarm utilized, non-slip socks on when ambulating, reminded patient to use call light to call for assistance.      Problem: ABCDS Injury Assessment  Goal: Absence of physical injury  10/12/2024 0216 by Stanley Paredes RN  Outcome: Progressing  Flowsheets (Taken 10/12/2024 0216)  Absence of Physical Injury: Implement safety measures based on patient assessment     Problem: Chronic Conditions and Co-morbidities  Goal: Patient's chronic conditions and co-morbidity symptoms are monitored and maintained or improved  10/12/2024 0216 by Stanley Paredes RN  Outcome: Progressing  Flowsheets (Taken 10/12/2024 0216)  Care Plan - Patient's Chronic Conditions and Co-Morbidity Symptoms are Monitored and Maintained or Improved:   Monitor and assess patient's chronic conditions and comorbid symptoms for stability, deterioration, or improvement   Collaborate with multidisciplinary team to address chronic and comorbid conditions and prevent exacerbation or deterioration   Update acute care plan with

## 2024-10-13 LAB — BACTERIA BLD AEROBE CULT: NORMAL

## 2024-10-14 ENCOUNTER — CARE COORDINATION (OUTPATIENT)
Dept: FAMILY MEDICINE CLINIC | Age: 84
End: 2024-10-14

## 2024-10-14 NOTE — CARE COORDINATION
Care Transitions Initial Follow Up Call    Call within 2 business days of discharge: Yes     Patient: Gerard Jane Patient : 1940 MRN: [id number]    Last Discharge Facility       Date Complaint Diagnosis Description Type Department Provider    10/8/24 Groin Swelling Sepsis, due to unspecified organism, unspecified whether acute organ dysfunction present (HCC) ... ED to Hosp-Admission (Discharged) (ADMITTED) [facility] 3B Brent Khalil MD; Rafat Membreno...            RARS: Readmission Risk Score: 17.4       Spoke with: Gerard    Discharge department/facility: [facility]    Non-face-to-face services provided:  Scheduled appointment with PCP-yes  Scheduled appointment with Specialist-yes  Obtained and reviewed discharge summary and/or continuity of care documents  Reviewed and followed up on pending diagnostic tests and treatments-yes  Communication with home health agencies or other community services the patient is currently using-na  Communication with specialists who will assume or re-assume care of the patient's system-specific problems-yes  Education of patient/family/caregiver/guardian to support self-management-yes  Assessment and support for treatment adherence and medication management-yes  Establishment or re-establishment of referrals-yes  Assistance in accessing community resources-na    Follow Up  Future Appointments   Date Time Provider Department Center   10/17/2024  9:10 AM Andreas Franz MD [facility]   10/28/2024  9:00 AM Amanda Huff APRN - CNP N Pulm Med P - [place]   2024 11:00 AM Erika Caceres MD N [facility] Heart P - [place]   2025 11:00 AM STR ECHO 1 [facility] ADRI STR Rad/Card   2025  9:00 AM Vida Matias APRN - CNP N [facility] CHF [facility] - [place]   2025  9:20 AM Mustapha Callahan MD [facility]   2025  8:30 AM Linda Mayen APRN - CNP N [facility] CHF [facility] - [place]   2025  9:00 AM Mian Ellsworth MD N [place] Uro [facility] [place]   2025  9:20 AM STR CT

## 2024-10-17 ENCOUNTER — OFFICE VISIT (OUTPATIENT)
Dept: FAMILY MEDICINE CLINIC | Age: 84
End: 2024-10-17

## 2024-10-17 VITALS
HEIGHT: 67 IN | WEIGHT: 142 LBS | DIASTOLIC BLOOD PRESSURE: 60 MMHG | RESPIRATION RATE: 14 BRPM | BODY MASS INDEX: 22.29 KG/M2 | SYSTOLIC BLOOD PRESSURE: 102 MMHG | HEART RATE: 68 BPM

## 2024-10-17 DIAGNOSIS — R65.21 SEPTIC SHOCK (HCC): ICD-10-CM

## 2024-10-17 DIAGNOSIS — I86.1 RIGHT VARICOCELE: ICD-10-CM

## 2024-10-17 DIAGNOSIS — Z09 HOSPITAL DISCHARGE FOLLOW-UP: Primary | ICD-10-CM

## 2024-10-17 DIAGNOSIS — N49.2 SCROTAL ABSCESS: ICD-10-CM

## 2024-10-17 DIAGNOSIS — Z99.81 SUPPLEMENTAL OXYGEN DEPENDENT: ICD-10-CM

## 2024-10-17 DIAGNOSIS — J44.9 CHRONIC OBSTRUCTIVE PULMONARY DISEASE, UNSPECIFIED COPD TYPE (HCC): ICD-10-CM

## 2024-10-17 DIAGNOSIS — N45.3 EPIDIDYMO-ORCHITIS: ICD-10-CM

## 2024-10-17 DIAGNOSIS — A41.9 SEPTIC SHOCK (HCC): ICD-10-CM

## 2024-10-17 DIAGNOSIS — J96.11 CHRONIC HYPOXEMIC RESPIRATORY FAILURE: ICD-10-CM

## 2024-10-17 RX ORDER — SULFAMETHOXAZOLE AND TRIMETHOPRIM 800; 160 MG/1; MG/1
1 TABLET ORAL 2 TIMES DAILY
Qty: 20 TABLET | Refills: 0 | Status: SHIPPED | OUTPATIENT
Start: 2024-10-17 | End: 2024-10-27

## 2024-10-17 SDOH — ECONOMIC STABILITY: FOOD INSECURITY: WITHIN THE PAST 12 MONTHS, YOU WORRIED THAT YOUR FOOD WOULD RUN OUT BEFORE YOU GOT MONEY TO BUY MORE.: NEVER TRUE

## 2024-10-17 SDOH — ECONOMIC STABILITY: INCOME INSECURITY: HOW HARD IS IT FOR YOU TO PAY FOR THE VERY BASICS LIKE FOOD, HOUSING, MEDICAL CARE, AND HEATING?: NOT HARD AT ALL

## 2024-10-17 SDOH — ECONOMIC STABILITY: FOOD INSECURITY: WITHIN THE PAST 12 MONTHS, THE FOOD YOU BOUGHT JUST DIDN'T LAST AND YOU DIDN'T HAVE MONEY TO GET MORE.: NEVER TRUE

## 2024-10-17 NOTE — PROGRESS NOTES
DS  Take 1 tablet by mouth 2 times daily for 10 days     tamsulosin 0.4 MG capsule  Commonly known as: FLOMAX  Take 1 capsule by mouth daily     tiotropium 2.5 MCG/ACT Aers inhaler  Commonly known as: SPIRIVA RESPIMAT  Inhale 2 puffs into the lungs daily           * This list has 2 medication(s) that are the same as other medications prescribed for you. Read the directions carefully, and ask your doctor or other care provider to review them with you.                   Where to Get Your Medications        These medications were sent to [facility] #[id number] - [place], [place] - [address] - P [phone] - F [phone] [address] [place] [address]      Phone: [phone]   sulfamethoxazole-trimethoprim 800-160 MG per tablet          Medications marked \"taking\" at this time  Outpatient Medications Marked as Taking for the 10/17/24 encounter (Office Visit) with Andreas Franz MD   Medication Sig Dispense Refill    sulfamethoxazole-trimethoprim (BACTRIM DS;SEPTRA DS) 800-160 MG per tablet Take 1 tablet by mouth 2 times daily for 10 days 20 tablet 0    tamsulosin (FLOMAX) 0.4 MG capsule Take 1 capsule by mouth daily 30 capsule 0    fluticasone-salmeterol (WIXELA INHUB) 250-50 MCG/ACT AEPB diskus inhaler Inhale 1 puff into the lungs in the morning and 1 puff in the evening. 180 each 3    sacubitril-valsartan (ENTRESTO) 24-26 MG per tablet Take 1 tablet by mouth 2 times daily 180 tablet 3    furosemide (LASIX) 20 MG tablet Take 1 tablet by mouth every 48 hours 45 tablet 3    OXYGEN Inhale into the lungs 3 liters      empagliflozin (JARDIANCE) 10 MG tablet Take 1 tablet by mouth daily 90 tablet 3    tiotropium (SPIRIVA RESPIMAT) 2.5 MCG/ACT AERS inhaler Inhale 2 puffs into the lungs daily 12 g 3    albuterol sulfate HFA (PROVENTIL HFA) 108 (90 Base) MCG/ACT inhaler Inhale 2 puffs into the lungs every 6 hours as needed for Wheezing 18 g 3    albuterol (PROVENTIL) (2.5 MG/3ML) 0.083% nebulizer solution

## 2024-10-18 DIAGNOSIS — I42.8 NONISCHEMIC CARDIOMYOPATHY (HCC): ICD-10-CM

## 2024-10-24 ENCOUNTER — LAB (OUTPATIENT)
Dept: LAB | Age: 84
End: 2024-10-24

## 2024-10-24 DIAGNOSIS — R65.21 SEPTIC SHOCK (HCC): ICD-10-CM

## 2024-10-24 DIAGNOSIS — A41.9 SEPTIC SHOCK (HCC): ICD-10-CM

## 2024-10-24 DIAGNOSIS — N45.3 EPIDIDYMO-ORCHITIS: ICD-10-CM

## 2024-10-24 LAB
BASOPHILS ABSOLUTE: 0.1 THOU/MM3 (ref 0–0.1)
BASOPHILS NFR BLD AUTO: 1 %
DEPRECATED RDW RBC AUTO: 55.4 FL (ref 35–45)
EOSINOPHIL NFR BLD AUTO: 1.3 %
EOSINOPHILS ABSOLUTE: 0.1 THOU/MM3 (ref 0–0.4)
ERYTHROCYTE [DISTWIDTH] IN BLOOD BY AUTOMATED COUNT: 16 % (ref 11.5–14.5)
HCT VFR BLD AUTO: 37.6 % (ref 42–52)
HGB BLD-MCNC: 11.8 GM/DL (ref 14–18)
IMM GRANULOCYTES # BLD AUTO: 0.03 THOU/MM3 (ref 0–0.07)
IMM GRANULOCYTES NFR BLD AUTO: 0.4 %
LYMPHOCYTES ABSOLUTE: 1.5 THOU/MM3 (ref 1–4.8)
LYMPHOCYTES NFR BLD AUTO: 22.8 %
MCH RBC QN AUTO: 29.7 PG (ref 26–33)
MCHC RBC AUTO-ENTMCNC: 31.4 GM/DL (ref 32.2–35.5)
MCV RBC AUTO: 94.7 FL (ref 80–94)
MONOCYTES ABSOLUTE: 0.5 THOU/MM3 (ref 0.4–1.3)
MONOCYTES NFR BLD AUTO: 8.2 %
NEUTROPHILS ABSOLUTE: 4.4 THOU/MM3 (ref 1.8–7.7)
NEUTROPHILS NFR BLD AUTO: 66.3 %
NRBC BLD AUTO-RTO: 0 /100 WBC
PLATELET # BLD AUTO: 224 THOU/MM3 (ref 130–400)
PMV BLD AUTO: 10.3 FL (ref 9.4–12.4)
RBC # BLD AUTO: 3.97 MILL/MM3 (ref 4.7–6.1)
WBC # BLD AUTO: 6.7 THOU/MM3 (ref 4.8–10.8)

## 2024-10-28 ENCOUNTER — OFFICE VISIT (OUTPATIENT)
Dept: UROLOGY | Age: 84
End: 2024-10-28
Payer: OTHER GOVERNMENT

## 2024-10-28 ENCOUNTER — OFFICE VISIT (OUTPATIENT)
Dept: PULMONOLOGY | Age: 84
End: 2024-10-28
Payer: OTHER GOVERNMENT

## 2024-10-28 VITALS
TEMPERATURE: 98.2 F | WEIGHT: 140 LBS | OXYGEN SATURATION: 97 % | HEART RATE: 70 BPM | DIASTOLIC BLOOD PRESSURE: 60 MMHG | HEIGHT: 67 IN | SYSTOLIC BLOOD PRESSURE: 116 MMHG | BODY MASS INDEX: 21.97 KG/M2

## 2024-10-28 VITALS — RESPIRATION RATE: 20 BRPM | BODY MASS INDEX: 21.97 KG/M2 | HEIGHT: 67 IN | WEIGHT: 140 LBS

## 2024-10-28 DIAGNOSIS — I86.1 RIGHT VARICOCELE: ICD-10-CM

## 2024-10-28 DIAGNOSIS — J44.1 COPD EXACERBATION (HCC): Primary | ICD-10-CM

## 2024-10-28 DIAGNOSIS — N40.1 BPH WITH OBSTRUCTION/LOWER URINARY TRACT SYMPTOMS: ICD-10-CM

## 2024-10-28 DIAGNOSIS — R10.31 RIGHT INGUINAL PAIN: ICD-10-CM

## 2024-10-28 DIAGNOSIS — N13.8 BPH WITH OBSTRUCTION/LOWER URINARY TRACT SYMPTOMS: ICD-10-CM

## 2024-10-28 DIAGNOSIS — J96.11 CHRONIC HYPOXEMIC RESPIRATORY FAILURE: ICD-10-CM

## 2024-10-28 DIAGNOSIS — Z85.51 HISTORY OF BLADDER CANCER: ICD-10-CM

## 2024-10-28 DIAGNOSIS — N43.3 BILATERAL HYDROCELE: ICD-10-CM

## 2024-10-28 DIAGNOSIS — N45.3 EPIDIDYMO-ORCHITIS: Primary | ICD-10-CM

## 2024-10-28 DIAGNOSIS — J44.9 STAGE 3 SEVERE COPD BY GOLD CLASSIFICATION (HCC): ICD-10-CM

## 2024-10-28 PROCEDURE — 1111F DSCHRG MED/CURRENT MED MERGE: CPT | Performed by: NURSE PRACTITIONER

## 2024-10-28 PROCEDURE — G8484 FLU IMMUNIZE NO ADMIN: HCPCS | Performed by: NURSE PRACTITIONER

## 2024-10-28 PROCEDURE — 1123F ACP DISCUSS/DSCN MKR DOCD: CPT

## 2024-10-28 PROCEDURE — 3078F DIAST BP <80 MM HG: CPT | Performed by: NURSE PRACTITIONER

## 2024-10-28 PROCEDURE — 3023F SPIROM DOC REV: CPT | Performed by: NURSE PRACTITIONER

## 2024-10-28 PROCEDURE — 99214 OFFICE O/P EST MOD 30 MIN: CPT

## 2024-10-28 PROCEDURE — 1036F TOBACCO NON-USER: CPT | Performed by: NURSE PRACTITIONER

## 2024-10-28 PROCEDURE — G8420 CALC BMI NORM PARAMETERS: HCPCS | Performed by: NURSE PRACTITIONER

## 2024-10-28 PROCEDURE — 99214 OFFICE O/P EST MOD 30 MIN: CPT | Performed by: NURSE PRACTITIONER

## 2024-10-28 PROCEDURE — 1123F ACP DISCUSS/DSCN MKR DOCD: CPT | Performed by: NURSE PRACTITIONER

## 2024-10-28 PROCEDURE — G8427 DOCREV CUR MEDS BY ELIG CLIN: HCPCS | Performed by: NURSE PRACTITIONER

## 2024-10-28 PROCEDURE — 3074F SYST BP LT 130 MM HG: CPT | Performed by: NURSE PRACTITIONER

## 2024-10-28 RX ORDER — ALBUTEROL SULFATE 90 UG/1
2 INHALANT RESPIRATORY (INHALATION) EVERY 6 HOURS PRN
Qty: 18 G | Refills: 3 | Status: SHIPPED | OUTPATIENT
Start: 2024-10-28

## 2024-10-28 RX ORDER — ALBUTEROL SULFATE 0.83 MG/ML
2.5 SOLUTION RESPIRATORY (INHALATION) EVERY 6 HOURS PRN
Qty: 120 EACH | Refills: 11 | Status: SHIPPED | OUTPATIENT
Start: 2024-10-28 | End: 2025-10-28

## 2024-10-28 RX ORDER — FLUTICASONE PROPIONATE AND SALMETEROL 250; 50 UG/1; UG/1
1 POWDER RESPIRATORY (INHALATION) EVERY 12 HOURS
Qty: 180 EACH | Refills: 3 | Status: SHIPPED | OUTPATIENT
Start: 2024-10-28

## 2024-10-28 RX ORDER — PREDNISONE 10 MG/1
40 TABLET ORAL DAILY
Qty: 20 TABLET | Refills: 0 | Status: SHIPPED | OUTPATIENT
Start: 2024-10-28 | End: 2024-11-02

## 2024-10-28 ASSESSMENT — ENCOUNTER SYMPTOMS
SHORTNESS OF BREATH: 1
WHEEZING: 1

## 2024-10-28 NOTE — PROGRESS NOTES
Conjunctiva/sclera: Conjunctivae normal.   Neck:      Vascular: No JVD.   Cardiovascular:      Rate and Rhythm: Normal rate and regular rhythm.      Heart sounds: No murmur heard.     No friction rub.   Pulmonary:      Effort: Pulmonary effort is normal. No tachypnea, bradypnea, accessory muscle usage or respiratory distress.      Breath sounds: Examination of the right-upper field reveals wheezing and rales. Examination of the left-upper field reveals wheezing and rales. Examination of the right-middle field reveals wheezing and rales. Examination of the left-middle field reveals wheezing and rales. Examination of the right-lower field reveals wheezing and rales. Examination of the left-lower field reveals wheezing and rales. Wheezing (inspiratory) and rales present. No rhonchi.   Chest:      Chest wall: No tenderness.   Musculoskeletal:      Right lower leg: No edema.      Left lower leg: No edema.   Skin:     General: Skin is warm and dry.      Capillary Refill: Capillary refill takes less than 2 seconds.      Findings: No erythema or rash.      Nails: There is no clubbing.   Neurological:      Mental Status: He is alert and oriented to person, place, and time.   Psychiatric:         Attention and Perception: Attention normal.         Mood and Affect: Mood normal.         Speech: Speech normal.         Behavior: Behavior normal.         Thought Content: Thought content normal.         Cognition and Memory: Cognition normal.         Judgment: Judgment normal.           Test results   Lung Nodule Screening     [] Qualifies    [x]Does not qualify   [] Declined    [] Completed    CXR:   XR CHEST PORTABLE 10/08/2024    Narrative  2 view chest x-ray.    Comparison: DX/SR - XR CHEST STANDARD (2 VW) - 06/03/2024 01:35 PM EDT    Findings:  Normal lung volumes.  Chronic interstitial changes bilaterally, without consolidation.  No pleural effusion or pneumothorax.    Heart size normal. Tortuous thoracic aorta containing

## 2024-10-28 NOTE — PROGRESS NOTES
[facility] [place] [facility]  [facility] UROLOGY  [address]  [address]  [facility] [address]  Dept: [phone]  Loc: [phone]  Visit Date: 10/28/2024      HPI  Gerard Jane is a 84 y.o. male that presents to the urology clinic for hospital follow-up.    Epididymo-Orchitis  Right-sided EO noted during IP stay. Received IV Rocephin x 4 doses while IP and was transitioned to Bactrim OP. Now RESOLVED. AKIRA with additional findings of moderate (Grade II) right-sided varicocele and small bilateral hydroceles, neither of which are overly bothersome to the patient.    BPH & History of Bladder CA  Voiding without difficulty with Flomax 0.4 mg QD. Receiving via PCP. IPSS of 1/0. History of Bladder CA. Used to follow with Dr. Maurice. Negative Cystoscopy in August. Mild LLH w/ obstruction.    PSA of 0.39 in October of 2011.    Inguinal Pain  Right inguinal lymphocele, periodically drained by Dr. Delgado (Vascular). Bothersome.       I independently reviewed and verified the images and reports from:    US SCROTUM AND TESTICLES  Result Date: 10/8/2024    Findings:     Right testicle measures 2.5 x 3.1 x 2.4 cm demonstrating heterogeneous echogenicity without discrete mass and diffuse hypervascularity.     Right epididymis is enlarged containing a 16 x 10 mm cyst.     There is a large right hydrocele measuring about 5 x 3 x 4 cm.     There is hypervascularity in the region of the epididymis.     There is a moderate-sized varicocele. In the right groin is a unilocular fluid collection measuring 7.4 x 4.2 x 4.9 cm.     Left testicle measures 2.3 x 3.2 x 1.8 cm, appears normal and demonstrates normal intrinsic blood flow.     The epididymis contains a complex cyst measuring 19 x 12 x 7 mm, otherwise unremarkable.     Moderate-sized left hydrocele measures 5 0.1 x 1.4 x 1.7 cm. No varicocele.     Impression:   1. Enlarged heterogeneous right testicle with hypervascularity. Findings consistent with

## 2024-10-29 ENCOUNTER — CARE COORDINATION (OUTPATIENT)
Dept: FAMILY MEDICINE CLINIC | Age: 84
End: 2024-10-29

## 2024-10-29 ENCOUNTER — TELEPHONE (OUTPATIENT)
Dept: CARDIOLOGY CLINIC | Age: 84
End: 2024-10-29

## 2024-10-29 NOTE — CARE COORDINATION
Spoke too Gerard's daughter, Sandrita, Week #2 follow up post hospital follow up for hypotension and epididymoorchitis. Was seen in follow up since last call-with Dr. Franz for Dr. Callahan. Yesterday seen by pulmonary and urology follow ups. Placed on Prednisone by pulmonary for COPD exacerbation due to neighbors pulling off crops from fields. Urology follow up went well-will follow in May. Gerard has follow up with Dr. Callahan 10/31. Will follow next week. Sandrita asked about urine results collected in urology as Gerard has been on two rounds of ATB but has not heard of any results. Encouraged her to ask at appointment with  Dr. Callahan Thursday-did not find any documentation. Appreciative of call.

## 2024-10-29 NOTE — TELEPHONE ENCOUNTER
Dtr called and LMOM that patient was started yesterday on prednisone by pulmonary. She knows this can cause swelling and just wanted Linda aware he was taking it.  If any issues okay to call dtr or patient

## 2024-10-31 ENCOUNTER — OFFICE VISIT (OUTPATIENT)
Dept: FAMILY MEDICINE CLINIC | Age: 84
End: 2024-10-31

## 2024-10-31 VITALS
HEIGHT: 67 IN | DIASTOLIC BLOOD PRESSURE: 60 MMHG | BODY MASS INDEX: 21.97 KG/M2 | HEART RATE: 60 BPM | RESPIRATION RATE: 20 BRPM | WEIGHT: 140 LBS | SYSTOLIC BLOOD PRESSURE: 118 MMHG

## 2024-10-31 DIAGNOSIS — I50.42 CHRONIC COMBINED SYSTOLIC AND DIASTOLIC HEART FAILURE (HCC): Primary | ICD-10-CM

## 2024-10-31 PROCEDURE — G8427 DOCREV CUR MEDS BY ELIG CLIN: HCPCS | Performed by: FAMILY MEDICINE

## 2024-10-31 PROCEDURE — 99214 OFFICE O/P EST MOD 30 MIN: CPT | Performed by: FAMILY MEDICINE

## 2024-10-31 PROCEDURE — 1036F TOBACCO NON-USER: CPT | Performed by: FAMILY MEDICINE

## 2024-10-31 PROCEDURE — G8420 CALC BMI NORM PARAMETERS: HCPCS | Performed by: FAMILY MEDICINE

## 2024-10-31 PROCEDURE — 1123F ACP DISCUSS/DSCN MKR DOCD: CPT | Performed by: FAMILY MEDICINE

## 2024-10-31 RX ORDER — SPIRONOLACTONE 25 MG/1
25 TABLET ORAL DAILY
Qty: 90 TABLET | Refills: 3 | Status: SHIPPED | OUTPATIENT
Start: 2024-10-31

## 2024-10-31 ASSESSMENT — ENCOUNTER SYMPTOMS
CONSTIPATION: 0
SHORTNESS OF BREATH: 1
COUGH: 1
SINUS PRESSURE: 0

## 2024-10-31 NOTE — PATIENT INSTRUCTIONS
Consider getting the RSV vaccination at the pharmacy  Stay in close contact with the pulmonary office with how your lungs are doing  See me in April still

## 2024-10-31 NOTE — PROGRESS NOTES
Gerard Jane (:  1940) is a 84 y.o. male,Established patient, here for evaluation of the following chief complaint(s):  Other (13 day check)         Assessment & Plan  Chronic combined systolic and diastolic heart failure (HCC)       Orders:    spironolactone (ALDACTONE) 25 MG tablet; Take 1 tablet by mouth daily      Consider getting the RSV vaccination at the pharmacy  Stay in close contact with the pulmonary office with how your lungs are doing  See me in April still     Subjective   HPI  We discussed the weight loss over the past 6 months  He feels that the weight is now stable  He feels that the scrotal infection is doing well  He sees the pulmonologist  Review of Systems   Constitutional:  Positive for fatigue.   HENT:  Negative for sinus pressure.    Eyes:  Negative for visual disturbance.   Respiratory:  Positive for cough and shortness of breath.    Cardiovascular:  Negative for chest pain.   Gastrointestinal:  Negative for constipation.   Genitourinary: Negative.    Musculoskeletal:  Negative for arthralgias.   Skin:  Negative for rash.   Neurological:  Positive for weakness. Negative for headaches.      The patient's medications, allergies, past medical problems, surgical, social, and family histories were reviewed and updated as needed.    Objective   Physical Exam  Constitutional:       Appearance: Normal appearance. He is well-developed.   HENT:      Head: Normocephalic and atraumatic.   Eyes:      General: No scleral icterus.     Conjunctiva/sclera: Conjunctivae normal.   Neck:      Trachea: No tracheal deviation.   Cardiovascular:      Rate and Rhythm: Normal rate and regular rhythm.      Heart sounds: Normal heart sounds.   Pulmonary:      Effort: Pulmonary effort is normal.      Breath sounds: Decreased air movement (throughout) present.   Skin:     General: Skin is warm and dry.   Neurological:      General: No focal deficit present.      Mental Status: He is alert.   Psychiatric:

## 2024-11-05 ENCOUNTER — CARE COORDINATION (OUTPATIENT)
Dept: FAMILY MEDICINE CLINIC | Age: 84
End: 2024-11-05

## 2024-11-06 NOTE — CARE COORDINATION
I spoke with his daughter Sandrita.Gerard is feeling better. He was having difficulty with his oxygen machine last week so they got him a new one. SHe said they have no needs or questions at this time and thanked me for calling to check on him.

## 2024-11-09 ASSESSMENT — ENCOUNTER SYMPTOMS: SHORTNESS OF BREATH: 1

## 2024-11-11 ENCOUNTER — CARE COORDINATION (OUTPATIENT)
Dept: FAMILY MEDICINE CLINIC | Age: 84
End: 2024-11-11

## 2024-11-11 NOTE — CARE COORDINATION
Final follow up for hospital discharge-hypotension. Spoke to Gerard's daughter, Sandrita. Gerard is doing well-new 02 equipment working good. Discussed signs and symptoms of UTI and how to prevent them-but also noted he is on fluid restriction. Follow up with cardiology in December, will follow with Dr. Callahan in April. Sandrita knows tp call with any questions or concerns

## 2024-12-04 ENCOUNTER — OFFICE VISIT (OUTPATIENT)
Dept: CARDIOLOGY CLINIC | Age: 84
End: 2024-12-04
Payer: OTHER GOVERNMENT

## 2024-12-04 VITALS
DIASTOLIC BLOOD PRESSURE: 64 MMHG | HEART RATE: 74 BPM | SYSTOLIC BLOOD PRESSURE: 110 MMHG | HEIGHT: 67 IN | BODY MASS INDEX: 22.6 KG/M2 | WEIGHT: 144 LBS

## 2024-12-04 DIAGNOSIS — I42.8 NONISCHEMIC CARDIOMYOPATHY (HCC): Primary | ICD-10-CM

## 2024-12-04 PROCEDURE — 3078F DIAST BP <80 MM HG: CPT | Performed by: INTERNAL MEDICINE

## 2024-12-04 PROCEDURE — 99214 OFFICE O/P EST MOD 30 MIN: CPT | Performed by: INTERNAL MEDICINE

## 2024-12-04 PROCEDURE — 3074F SYST BP LT 130 MM HG: CPT | Performed by: INTERNAL MEDICINE

## 2024-12-04 PROCEDURE — 1123F ACP DISCUSS/DSCN MKR DOCD: CPT | Performed by: INTERNAL MEDICINE

## 2024-12-04 RX ORDER — METOPROLOL SUCCINATE 25 MG/1
12.5 TABLET, EXTENDED RELEASE ORAL DAILY
Qty: 30 TABLET | Refills: 5 | Status: SHIPPED | OUTPATIENT
Start: 2024-12-04

## 2024-12-04 RX ORDER — FUROSEMIDE 20 MG/1
20 TABLET ORAL
Qty: 45 TABLET | Refills: 3 | Status: SHIPPED | OUTPATIENT
Start: 2024-12-04

## 2024-12-04 NOTE — PROGRESS NOTES
Pt here for 1 yr fu.    Last EKG completed on 10/2024    Pt denies any cardiac concerns at this time.     Pt states no medication changes- med pended for refill.   
difficult study due to low parasternal window.    Cardiac Catheterization 11/2023  1.  50-60% mid RCA in-stent restenosis. RCA had a negative FFR measurement on previous study, stenosis remains stable when compared to previous coronary angiogram   2.  Patent stent in the obtuse marginal branch.  3.  Nonobstructive coronary artery disease.    Assessment/Plan      CHF  S/p TAVR  CAD, Prior h/o MI, PCI  Dyslipidemia   HTN  COPD   Abdominal aortic aneurysm, S/p EVAR   COPD, on home O2    H/o CAD, MI, PCI  Nonobstructive CAD on LHC 11/2023  Continue risk factor modification and medical management  /64  Has h/o CHF, improved EF   He underwent TAVR 2/2024  Echo 5/2024 revealed an EF of 40-45%, normally functioning TAVR valve  Followed at CHF clinic   No leg edema  On lasix  Limit Na intake  Daily weight   ASA  Pravachol   GDMT optimization is limited by h/o low blood pressure (seen at hospital in October). His metoprolol was stopped  Aldactone   jardiance   /64  HR 74  States that home BP readings have improved. D/w patient and family. Will attempt low dose toprol xl 12.5 mg po daily. Advised to check daily BP/HR, call office if low    Disposition:   F/u with physician as scheduled, or sooner if needed.    Electronically signed by Erika Caceres MD [facility]  12/4/2024 at 11:33 AM EST  
(4) rarely moist

## 2025-02-11 ENCOUNTER — HOSPITAL ENCOUNTER (OUTPATIENT)
Age: 85
Discharge: HOME | End: 2025-02-13
Attending: INTERNAL MEDICINE
Payer: OTHER GOVERNMENT

## 2025-02-11 ENCOUNTER — LAB (OUTPATIENT)
Dept: LAB | Age: 85
End: 2025-02-11

## 2025-02-11 VITALS
BODY MASS INDEX: 22.6 KG/M2 | DIASTOLIC BLOOD PRESSURE: 64 MMHG | SYSTOLIC BLOOD PRESSURE: 110 MMHG | HEIGHT: 67 IN | WEIGHT: 144 LBS

## 2025-02-11 DIAGNOSIS — Z95.2 S/P TAVR (TRANSCATHETER AORTIC VALVE REPLACEMENT): ICD-10-CM

## 2025-02-11 DIAGNOSIS — I35.0 SEVERE AORTIC STENOSIS: ICD-10-CM

## 2025-02-11 LAB
DEPRECATED RDW RBC AUTO: 49.4 FL (ref 35–45)
ECHO AO ASC DIAM: 4.2 CM
ECHO AO ASCENDING AORTA INDEX: 2.39 CM/M2
ECHO AR MAX VEL PISA: 4 M/S
ECHO AV MEAN GRADIENT: 16 MMHG
ECHO AV MEAN VELOCITY: 1.9 M/S
ECHO AV PEAK GRADIENT: 27 MMHG
ECHO AV PEAK VELOCITY: 2.6 M/S
ECHO AV REGURGITANT PHT: 702 MS
ECHO AV VELOCITY RATIO: 0.35
ECHO AV VTI: 54.5 CM
ECHO BSA: 1.76 M2
ECHO LA AREA 2C: 17.1 CM2
ECHO LA AREA 4C: 14.5 CM2
ECHO LA DIAMETER INDEX: 2.22 CM/M2
ECHO LA DIAMETER: 3.9 CM
ECHO LA MAJOR AXIS: 4.7 CM
ECHO LA MINOR AXIS: 4.9 CM
ECHO LA VOL BP: 42 ML (ref 18–58)
ECHO LA VOL MOD A2C: 49 ML (ref 18–58)
ECHO LA VOL MOD A4C: 35 ML (ref 18–58)
ECHO LA VOL/BSA BIPLANE: 24 ML/M2 (ref 16–34)
ECHO LA VOLUME INDEX MOD A2C: 28 ML/M2 (ref 16–34)
ECHO LA VOLUME INDEX MOD A4C: 20 ML/M2 (ref 16–34)
ECHO LV E' LATERAL VELOCITY: 3.9 CM/S
ECHO LV E' SEPTAL VELOCITY: 3.2 CM/S
ECHO LV EDV A2C: 119 ML
ECHO LV EDV A4C: 108 ML
ECHO LV EDV INDEX A4C: 61 ML/M2
ECHO LV EDV NDEX A2C: 68 ML/M2
ECHO LV EJECTION FRACTION A2C: 39 %
ECHO LV EJECTION FRACTION A4C: 42 %
ECHO LV EJECTION FRACTION BIPLANE: 41 % (ref 55–100)
ECHO LV ESV A2C: 72 ML
ECHO LV ESV A4C: 63 ML
ECHO LV ESV INDEX A2C: 41 ML/M2
ECHO LV ESV INDEX A4C: 36 ML/M2
ECHO LV ISOVOLUMETRIC RELAXATION TIME (IVRT): 99 MS
ECHO LVOT AV VTI INDEX: 0.32
ECHO LVOT MEAN GRADIENT: 2 MMHG
ECHO LVOT PEAK GRADIENT: 3 MMHG
ECHO LVOT PEAK VELOCITY: 0.9 M/S
ECHO LVOT VTI: 17.7 CM
ECHO MV A VELOCITY: 0.89 M/S
ECHO MV E DECELERATION TIME (DT): 256 MS
ECHO MV E VELOCITY: 0.49 M/S
ECHO MV E/A RATIO: 0.55
ECHO MV E/E' LATERAL: 12.56
ECHO MV E/E' RATIO (AVERAGED): 13.94
ECHO MV E/E' SEPTAL: 15.31
ECHO MV REGURGITANT PEAK GRADIENT: 21 MMHG
ECHO MV REGURGITANT PEAK VELOCITY: 2.3 M/S
ECHO PV MAX VELOCITY: 0.8 M/S
ECHO PV PEAK GRADIENT: 2 MMHG
ECHO RV TAPSE: 1.8 CM (ref 1.7–?)
ECHO TV E WAVE: 0.4 M/S
ERYTHROCYTE [DISTWIDTH] IN BLOOD BY AUTOMATED COUNT: 14.1 % (ref 11.5–14.5)
HCT VFR BLD AUTO: 42.4 % (ref 42–52)
HGB BLD-MCNC: 13.3 GM/DL (ref 14–18)
MCH RBC QN AUTO: 30.2 PG (ref 26–33)
MCHC RBC AUTO-ENTMCNC: 31.4 GM/DL (ref 32.2–35.5)
MCV RBC AUTO: 96.1 FL (ref 80–94)
PLATELET # BLD AUTO: 168 THOU/MM3 (ref 130–400)
PMV BLD AUTO: 10.1 FL (ref 9.4–12.4)
RBC # BLD AUTO: 4.41 MILL/MM3 (ref 4.7–6.1)
WBC # BLD AUTO: 10 THOU/MM3 (ref 4.8–10.8)

## 2025-02-11 PROCEDURE — 93306 TTE W/DOPPLER COMPLETE: CPT | Performed by: INTERNAL MEDICINE

## 2025-02-11 PROCEDURE — 93306 TTE W/DOPPLER COMPLETE: CPT

## 2025-02-18 ENCOUNTER — OFFICE VISIT (OUTPATIENT)
Dept: CARDIOLOGY CLINIC | Age: 85
End: 2025-02-18
Payer: OTHER GOVERNMENT

## 2025-02-18 ENCOUNTER — TELEPHONE (OUTPATIENT)
Dept: CARDIOLOGY CLINIC | Age: 85
End: 2025-02-18

## 2025-02-18 VITALS
WEIGHT: 146 LBS | BODY MASS INDEX: 22.91 KG/M2 | HEIGHT: 67 IN | SYSTOLIC BLOOD PRESSURE: 120 MMHG | DIASTOLIC BLOOD PRESSURE: 70 MMHG | OXYGEN SATURATION: 91 % | HEART RATE: 74 BPM

## 2025-02-18 DIAGNOSIS — J44.9 CHRONIC OBSTRUCTIVE PULMONARY DISEASE, UNSPECIFIED COPD TYPE (HCC): ICD-10-CM

## 2025-02-18 DIAGNOSIS — I42.8 NONISCHEMIC CARDIOMYOPATHY (HCC): ICD-10-CM

## 2025-02-18 DIAGNOSIS — Z95.2 S/P TAVR (TRANSCATHETER AORTIC VALVE REPLACEMENT): Primary | ICD-10-CM

## 2025-02-18 DIAGNOSIS — I50.22 CHRONIC SYSTOLIC CONGESTIVE HEART FAILURE, NYHA CLASS 2 (HCC): ICD-10-CM

## 2025-02-18 PROCEDURE — 93000 ELECTROCARDIOGRAM COMPLETE: CPT | Performed by: NURSE PRACTITIONER

## 2025-02-18 PROCEDURE — 3078F DIAST BP <80 MM HG: CPT | Performed by: NURSE PRACTITIONER

## 2025-02-18 PROCEDURE — 99214 OFFICE O/P EST MOD 30 MIN: CPT | Performed by: NURSE PRACTITIONER

## 2025-02-18 PROCEDURE — 1123F ACP DISCUSS/DSCN MKR DOCD: CPT | Performed by: NURSE PRACTITIONER

## 2025-02-18 PROCEDURE — 3074F SYST BP LT 130 MM HG: CPT | Performed by: NURSE PRACTITIONER

## 2025-02-18 NOTE — PROGRESS NOTES
Gerard Jane (:  1940) is a 85 y.o. male, Established patient, here for 1 year post TAVR .    Primary Cardiologist: Erika Caceres MD  Structural Heart: Marcell Mckinney MD    Check-Up    HPI:  Last seen in office on 3/14/2024 per Dr. Mckinney for 30 day post TAVR follow-up. Per office note:  83 yo M with L percAx TAVR 24 who presents for follow-up.  EF still low.  He feels better, notices improvement, chronic oxygen.  No chest pain.  No new TODD.  Taking GDMT.  No side effects.  He states he is doing much better.  His daughter is present.  Has no bleeding. L arm access site had healed up nicely.   Assessment/Plan   S/p L PercAx TAVR, 2024  Severe bilateral CFA disease  R CFA aneurysm  EF 30-35%, NYHA II  LBBB  Doing well, no issues, improving, continue GDMT for CHF, no exacerbations, he is going to do cardiac rehab.  He tried LifeVest before, he wants to hold.  Reassess EF in 3 months post TAVR.  CHF clinic following.  He still has event monitor, will f/u.  Discussed symptoms needing emergency care or those which require further medical attention.   Discussed diet/exercise/BP/weight loss/health lifestyle choices/lipids; the patient understands the goals and will try to comply.  Disposition:6 months     Assessment & Plan  S/p L PercAx TAVR, 2024  Severe bilateral CFA disease  R CFA aneurysm  EF 30-35%, NYHA II  LBBB      1. Post-TAVR evaluation.  His condition remains stable, with no reported chest discomfort, swelling, or fluid retention. The echocardiogram shows the EF is stable at 40-45%, and the valve is in good position with no evidence of AS or AR. Mild PVL present. Recent labs are unremarkable with stable CBC, lytes and renal function.   He reports feeling well. ADL without issue. No stroke sx.  Home O2 level has been stable. Follows with Pulmonology.     2. Chronic Obstructive Pulmonary Disease (COPD).  He continues to use oxygen at home, primarily at 2.5 liters per minute, and occasionally up

## 2025-02-18 NOTE — PATIENT INSTRUCTIONS
Continue current medications as prescribed.    Stay as active as you can.     Eat heart healthy diet.     Follow-up with your PCP as scheduled.    Follow-up with Dr. Caceres in December as scheduled or sooner if need.     Follow-up with Dr. Mckinney on an as needed basis.

## 2025-03-07 RX ORDER — ALBUTEROL SULFATE 90 UG/1
2 INHALANT RESPIRATORY (INHALATION) EVERY 6 HOURS PRN
Qty: 18 G | Refills: 3 | Status: SHIPPED | OUTPATIENT
Start: 2025-03-07

## 2025-04-16 ENCOUNTER — LAB (OUTPATIENT)
Dept: LAB | Age: 85
End: 2025-04-16

## 2025-04-16 DIAGNOSIS — N13.8 BPH WITH OBSTRUCTION/LOWER URINARY TRACT SYMPTOMS: ICD-10-CM

## 2025-04-16 DIAGNOSIS — N40.1 BPH WITH OBSTRUCTION/LOWER URINARY TRACT SYMPTOMS: ICD-10-CM

## 2025-04-16 LAB — PSA SERPL-MCNC: 0.84 NG/ML (ref 0–1)

## 2025-04-22 ENCOUNTER — OFFICE VISIT (OUTPATIENT)
Dept: UROLOGY | Age: 85
End: 2025-04-22
Payer: OTHER GOVERNMENT

## 2025-04-22 VITALS
WEIGHT: 142 LBS | HEIGHT: 67 IN | DIASTOLIC BLOOD PRESSURE: 58 MMHG | SYSTOLIC BLOOD PRESSURE: 98 MMHG | BODY MASS INDEX: 22.29 KG/M2

## 2025-04-22 DIAGNOSIS — N43.3 BILATERAL HYDROCELE: ICD-10-CM

## 2025-04-22 DIAGNOSIS — Z85.51 HISTORY OF BLADDER CANCER: Primary | ICD-10-CM

## 2025-04-22 DIAGNOSIS — I86.1 RIGHT VARICOCELE: ICD-10-CM

## 2025-04-22 DIAGNOSIS — R10.31 RIGHT INGUINAL PAIN: ICD-10-CM

## 2025-04-22 DIAGNOSIS — N40.1 BPH WITH OBSTRUCTION/LOWER URINARY TRACT SYMPTOMS: ICD-10-CM

## 2025-04-22 DIAGNOSIS — N13.8 BPH WITH OBSTRUCTION/LOWER URINARY TRACT SYMPTOMS: ICD-10-CM

## 2025-04-22 PROCEDURE — 1123F ACP DISCUSS/DSCN MKR DOCD: CPT

## 2025-04-22 PROCEDURE — 3078F DIAST BP <80 MM HG: CPT

## 2025-04-22 PROCEDURE — 3074F SYST BP LT 130 MM HG: CPT

## 2025-04-22 PROCEDURE — 99213 OFFICE O/P EST LOW 20 MIN: CPT

## 2025-04-22 RX ORDER — TAMSULOSIN HYDROCHLORIDE 0.4 MG/1
0.4 CAPSULE ORAL DAILY
Qty: 90 CAPSULE | Refills: 3 | Status: SHIPPED | OUTPATIENT
Start: 2025-04-22

## 2025-04-22 NOTE — PROGRESS NOTES
[facility] [place] [facility]  [facility] UROLOGY  [address]  [address]  [facility] [address]  Dept: [phone]  Loc: [phone]  Visit Date: 4/22/2025      HPI  Gerard Jane is a 85 y.o. male that presents to the urology clinic for hospital follow-up.    BPH  Voiding without difficulty with Flomax 0.4 mg QD. Receiving via PCP. IPSS of 1/0.     History of Bladder CA  History of Bladder CA. Used to follow with Dr. Maurice. Negative Cystoscopy in August. Mild LLH w/ obstruction.    Right Testicular Pain  Resolved following treatment of epididymo-orchitis. Hx of right inguinal lymphocele, periodically drained by Dr. Delgado (Vascular). Bothersome.     AKIRA with additional findings of moderate (Grade II) right-sided varicocele and small bilateral hydroceles, neither of which are overly bothersome to the patient.    Most Recent PSA: 0.84 (04/16/25)      I independently reviewed and verified the images and reports from:    US SCROTUM AND TESTICLES  Result Date: 10/8/2024    Findings:     Right testicle measures 2.5 x 3.1 x 2.4 cm demonstrating heterogeneous echogenicity without discrete mass and diffuse hypervascularity.     Right epididymis is enlarged containing a 16 x 10 mm cyst.     There is a large right hydrocele measuring about 5 x 3 x 4 cm.     There is hypervascularity in the region of the epididymis.     There is a moderate-sized varicocele. In the right groin is a unilocular fluid collection measuring 7.4 x 4.2 x 4.9 cm.     Left testicle measures 2.3 x 3.2 x 1.8 cm, appears normal and demonstrates normal intrinsic blood flow.     The epididymis contains a complex cyst measuring 19 x 12 x 7 mm, otherwise unremarkable.     Moderate-sized left hydrocele measures 5 0.1 x 1.4 x 1.7 cm. No varicocele.     Impression:   1. Enlarged heterogeneous right testicle with hypervascularity. Findings consistent with hypervascular epididymis. Findings consistent with right epididymo-orchitis.     2.

## 2025-04-28 ENCOUNTER — OFFICE VISIT (OUTPATIENT)
Dept: CARDIOLOGY CLINIC | Age: 85
End: 2025-04-28
Payer: OTHER GOVERNMENT

## 2025-04-28 VITALS
DIASTOLIC BLOOD PRESSURE: 52 MMHG | BODY MASS INDEX: 21.97 KG/M2 | WEIGHT: 140 LBS | OXYGEN SATURATION: 92 % | HEIGHT: 67 IN | HEART RATE: 72 BPM | SYSTOLIC BLOOD PRESSURE: 102 MMHG

## 2025-04-28 DIAGNOSIS — I51.89 GRADE I DIASTOLIC DYSFUNCTION: ICD-10-CM

## 2025-04-28 DIAGNOSIS — I42.8 NONISCHEMIC CARDIOMYOPATHY (HCC): ICD-10-CM

## 2025-04-28 DIAGNOSIS — Z95.2 S/P TAVR (TRANSCATHETER AORTIC VALVE REPLACEMENT): Primary | ICD-10-CM

## 2025-04-28 DIAGNOSIS — I50.22 CHRONIC SYSTOLIC CONGESTIVE HEART FAILURE, NYHA CLASS 2 (HCC): ICD-10-CM

## 2025-04-28 PROCEDURE — 3074F SYST BP LT 130 MM HG: CPT | Performed by: NURSE PRACTITIONER

## 2025-04-28 PROCEDURE — 1123F ACP DISCUSS/DSCN MKR DOCD: CPT | Performed by: NURSE PRACTITIONER

## 2025-04-28 PROCEDURE — 99215 OFFICE O/P EST HI 40 MIN: CPT | Performed by: NURSE PRACTITIONER

## 2025-04-28 PROCEDURE — 3078F DIAST BP <80 MM HG: CPT | Performed by: NURSE PRACTITIONER

## 2025-04-28 ASSESSMENT — ENCOUNTER SYMPTOMS
VOMITING: 0
ABDOMINAL DISTENTION: 0
SHORTNESS OF BREATH: 1
COUGH: 0
NAUSEA: 0
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
## 2025-04-28 NOTE — PATIENT INSTRUCTIONS
You may receive a survey regarding the care you received during your visit.  Your input is valuable to us.  We encourage you to complete and return your survey.  We hope you will choose us in the future for your healthcare needs.    Your nurses today were Symone.  Office hours:   Mon-Thurs 8-4:30  Friday 8-12  Phone: [phone]    Continue:  Continue current medications  Daily weights and record  Fluid restriction of 2 Liters per day  Limit sodium in diet to around 2327-6503 mg/day  Monitor BP  Activity as tolerated     Call the Heart Failure Clinic for any of the following symptoms:   Weight gain of -3 pounds in 1 day or 5 pounds in 1 week  Increased shortness of breath  Shortness of breath while laying down  Cough  Chest pain  Swelling in feet, ankles or legs  Bloating in abdomen  Fatigue

## 2025-05-07 ENCOUNTER — OFFICE VISIT (OUTPATIENT)
Dept: FAMILY MEDICINE CLINIC | Age: 85
End: 2025-05-07

## 2025-05-07 VITALS
BODY MASS INDEX: 22.01 KG/M2 | HEART RATE: 58 BPM | SYSTOLIC BLOOD PRESSURE: 90 MMHG | DIASTOLIC BLOOD PRESSURE: 46 MMHG | RESPIRATION RATE: 14 BRPM | HEIGHT: 67 IN | WEIGHT: 140.25 LBS

## 2025-05-07 DIAGNOSIS — Z00.00 MEDICARE ANNUAL WELLNESS VISIT, SUBSEQUENT: Primary | ICD-10-CM

## 2025-05-07 DIAGNOSIS — I50.42 CHRONIC COMBINED SYSTOLIC AND DIASTOLIC HEART FAILURE (HCC): ICD-10-CM

## 2025-05-07 DIAGNOSIS — J44.9 CHRONIC OBSTRUCTIVE PULMONARY DISEASE, UNSPECIFIED COPD TYPE (HCC): ICD-10-CM

## 2025-05-07 PROCEDURE — G0439 PPPS, SUBSEQ VISIT: HCPCS | Performed by: FAMILY MEDICINE

## 2025-05-07 PROCEDURE — 1036F TOBACCO NON-USER: CPT | Performed by: FAMILY MEDICINE

## 2025-05-07 PROCEDURE — 99213 OFFICE O/P EST LOW 20 MIN: CPT | Performed by: FAMILY MEDICINE

## 2025-05-07 PROCEDURE — G8420 CALC BMI NORM PARAMETERS: HCPCS | Performed by: FAMILY MEDICINE

## 2025-05-07 PROCEDURE — G8427 DOCREV CUR MEDS BY ELIG CLIN: HCPCS | Performed by: FAMILY MEDICINE

## 2025-05-07 PROCEDURE — 1123F ACP DISCUSS/DSCN MKR DOCD: CPT | Performed by: FAMILY MEDICINE

## 2025-05-07 ASSESSMENT — LIFESTYLE VARIABLES
HOW MANY STANDARD DRINKS CONTAINING ALCOHOL DO YOU HAVE ON A TYPICAL DAY: 1 OR 2
HOW OFTEN DO YOU HAVE A DRINK CONTAINING ALCOHOL: MONTHLY OR LESS

## 2025-05-07 ASSESSMENT — PATIENT HEALTH QUESTIONNAIRE - PHQ9
1. LITTLE INTEREST OR PLEASURE IN DOING THINGS: NOT AT ALL
SUM OF ALL RESPONSES TO PHQ QUESTIONS 1-9: 0
SUM OF ALL RESPONSES TO PHQ QUESTIONS 1-9: 0
2. FEELING DOWN, DEPRESSED OR HOPELESS: NOT AT ALL
SUM OF ALL RESPONSES TO PHQ QUESTIONS 1-9: 0
SUM OF ALL RESPONSES TO PHQ QUESTIONS 1-9: 0

## 2025-05-07 NOTE — PROGRESS NOTES
for Wheezing or Shortness of Breath Yes Amanda Huff APRN - CNP   empagliflozin (JARDIANCE) 10 MG tablet Take 1 tablet by mouth daily Yes Linda Mayen APRN - CNP   sacubitril-valsartan (ENTRESTO) 24-26 MG per tablet Take 1 tablet by mouth 2 times daily Yes Linda Mayen APRN - CNP   OXYGEN Inhale into the lungs 3 liters Yes Miguel Medina MD   carboxymethylcellulose (REFRESH TEARS) 0.5 % SOLN ophthalmic solution Place 1 drop into both eyes every morning Yes Miguel Medina MD   pravastatin (PRAVACHOL) 80 MG tablet Take 1 tablet by mouth daily Yes Miguel Medina MD   aspirin 81 MG EC tablet Take 1 tablet by mouth daily Yes ProviderMiguel MD       CareTeam (Including outside providers/suppliers regularly involved in providing care):   Patient Care Team:  Mustapha Callahan MD as PCP - General (Family Medicine)  Mustapha Callahan MD as PCP - Empaneled Provider  Maddi Evangelista DO (Internal Medicine)  Marvin Maurice MD as Consulting Physician (Urology)  Kelby Thompson MD as Consulting Physician (Pulmonology)  Loren Milian, RN as Care Transitions Nurse  Demi Kenney RN as Nurse Navigator (Oncology)  Marisol Charles RN as Care Transitions Nurse     Recommendations for Preventive Services Due: see orders and patient instructions/AVS.  Recommended screening schedule for the next 5-10 years is provided to the patient in written form: see Patient Instructions/AVS.     Reviewed and updated this visit:  Allergies  Meds  Sexual Hx

## 2025-05-14 ENCOUNTER — LAB (OUTPATIENT)
Dept: LAB | Age: 85
End: 2025-05-14

## 2025-05-14 ENCOUNTER — RESULTS FOLLOW-UP (OUTPATIENT)
Dept: CARDIOLOGY | Age: 85
End: 2025-05-14

## 2025-05-14 DIAGNOSIS — I50.22 CHRONIC SYSTOLIC CONGESTIVE HEART FAILURE, NYHA CLASS 2 (HCC): ICD-10-CM

## 2025-05-14 DIAGNOSIS — I42.8 NONISCHEMIC CARDIOMYOPATHY (HCC): ICD-10-CM

## 2025-05-14 LAB
ANION GAP SERPL CALC-SCNC: 9 MEQ/L (ref 8–16)
BUN SERPL-MCNC: 17 MG/DL (ref 8–23)
CALCIUM SERPL-MCNC: 9.6 MG/DL (ref 8.8–10.2)
CHLORIDE SERPL-SCNC: 101 MEQ/L (ref 98–111)
CO2 SERPL-SCNC: 31 MEQ/L (ref 22–29)
CREAT SERPL-MCNC: 0.8 MG/DL (ref 0.7–1.2)
GFR SERPL CREATININE-BSD FRML MDRD: 88 ML/MIN/1.73M2
GLUCOSE SERPL-MCNC: 107 MG/DL (ref 74–109)
MAGNESIUM SERPL-MCNC: 2.2 MG/DL (ref 1.6–2.6)
NT-PROBNP SERPL IA-MCNC: 517 PG/ML (ref 0–449)
POTASSIUM SERPL-SCNC: 4.5 MEQ/L (ref 3.5–5.2)
SODIUM SERPL-SCNC: 141 MEQ/L (ref 135–145)

## 2025-05-14 NOTE — TELEPHONE ENCOUNTER
----- Message from CASEY Chua - CNP sent at 5/14/2025  3:41 PM EDT -----  Renal function stable - no changes at this time

## 2025-06-09 ENCOUNTER — TELEPHONE (OUTPATIENT)
Dept: PULMONOLOGY | Age: 85
End: 2025-06-09

## 2025-06-09 DIAGNOSIS — J44.9 STAGE 3 SEVERE COPD BY GOLD CLASSIFICATION (HCC): Primary | ICD-10-CM

## 2025-06-09 RX ORDER — ALBUTEROL SULFATE 90 UG/1
2 INHALANT RESPIRATORY (INHALATION) EVERY 6 HOURS PRN
Qty: 18 G | Refills: 3 | Status: SHIPPED | OUTPATIENT
Start: 2025-06-09

## 2025-06-19 ENCOUNTER — HOSPITAL ENCOUNTER (OUTPATIENT)
Dept: GENERAL RADIOLOGY | Age: 85
Discharge: HOME | End: 2025-06-19
Payer: OTHER GOVERNMENT

## 2025-06-19 ENCOUNTER — PREP FOR PROCEDURE (OUTPATIENT)
Age: 85
End: 2025-06-19

## 2025-06-19 ENCOUNTER — OFFICE VISIT (OUTPATIENT)
Age: 85
End: 2025-06-19
Payer: OTHER GOVERNMENT

## 2025-06-19 ENCOUNTER — HOSPITAL ENCOUNTER (OUTPATIENT)
Age: 85
Discharge: HOME | End: 2025-06-19
Payer: OTHER GOVERNMENT

## 2025-06-19 ENCOUNTER — TELEPHONE (OUTPATIENT)
Age: 85
End: 2025-06-19

## 2025-06-19 VITALS
DIASTOLIC BLOOD PRESSURE: 58 MMHG | HEIGHT: 67 IN | SYSTOLIC BLOOD PRESSURE: 118 MMHG | WEIGHT: 138.4 LBS | BODY MASS INDEX: 21.72 KG/M2 | HEART RATE: 63 BPM

## 2025-06-19 DIAGNOSIS — Z86.79 STATUS POST ENDOVASCULAR ANEURYSM REPAIR (EVAR): ICD-10-CM

## 2025-06-19 DIAGNOSIS — T81.89XA LYMPHOCELE AFTER SURGICAL PROCEDURE: Primary | ICD-10-CM

## 2025-06-19 DIAGNOSIS — I71.23 ANEURYSM OF DESCENDING THORACIC AORTA WITHOUT RUPTURE: ICD-10-CM

## 2025-06-19 DIAGNOSIS — I71.21 ANEURYSM OF ASCENDING AORTA WITHOUT RUPTURE: ICD-10-CM

## 2025-06-19 DIAGNOSIS — Z01.818 PRE-OP EVALUATION: ICD-10-CM

## 2025-06-19 DIAGNOSIS — I89.8 LYMPHOCELE: ICD-10-CM

## 2025-06-19 DIAGNOSIS — J44.9 CHRONIC OBSTRUCTIVE PULMONARY DISEASE, UNSPECIFIED COPD TYPE (HCC): ICD-10-CM

## 2025-06-19 DIAGNOSIS — Z98.890 STATUS POST ENDOVASCULAR ANEURYSM REPAIR (EVAR): ICD-10-CM

## 2025-06-19 DIAGNOSIS — I71.43 INFRARENAL ABDOMINAL AORTIC ANEURYSM (AAA) WITHOUT RUPTURE: ICD-10-CM

## 2025-06-19 DIAGNOSIS — I89.8 LYMPHOCELE AFTER SURGICAL PROCEDURE: Primary | ICD-10-CM

## 2025-06-19 DIAGNOSIS — K40.90 NON-RECURRENT UNILATERAL INGUINAL HERNIA WITHOUT OBSTRUCTION OR GANGRENE: ICD-10-CM

## 2025-06-19 LAB
DEPRECATED RDW RBC AUTO: 52 FL (ref 35–45)
ERYTHROCYTE [DISTWIDTH] IN BLOOD BY AUTOMATED COUNT: 14.9 % (ref 11.5–14.5)
HCT VFR BLD AUTO: 40.6 % (ref 42–52)
HGB BLD-MCNC: 13.1 GM/DL (ref 14–18)
MCH RBC QN AUTO: 30.9 PG (ref 26–33)
MCHC RBC AUTO-ENTMCNC: 32.3 GM/DL (ref 32.2–35.5)
MCV RBC AUTO: 95.8 FL (ref 80–94)
PLATELET # BLD AUTO: 151 THOU/MM3 (ref 130–400)
PMV BLD AUTO: 10.5 FL (ref 9.4–12.4)
RBC # BLD AUTO: 4.24 MILL/MM3 (ref 4.7–6.1)
WBC # BLD AUTO: 7.5 THOU/MM3 (ref 4.8–10.8)

## 2025-06-19 PROCEDURE — 3023F SPIROM DOC REV: CPT | Performed by: SURGERY

## 2025-06-19 PROCEDURE — G8427 DOCREV CUR MEDS BY ELIG CLIN: HCPCS | Performed by: SURGERY

## 2025-06-19 PROCEDURE — 36415 COLL VENOUS BLD VENIPUNCTURE: CPT

## 2025-06-19 PROCEDURE — 99215 OFFICE O/P EST HI 40 MIN: CPT | Performed by: SURGERY

## 2025-06-19 PROCEDURE — 1036F TOBACCO NON-USER: CPT | Performed by: SURGERY

## 2025-06-19 PROCEDURE — 85027 COMPLETE CBC AUTOMATED: CPT

## 2025-06-19 PROCEDURE — 1123F ACP DISCUSS/DSCN MKR DOCD: CPT | Performed by: SURGERY

## 2025-06-19 PROCEDURE — 71046 X-RAY EXAM CHEST 2 VIEWS: CPT

## 2025-06-19 PROCEDURE — G8420 CALC BMI NORM PARAMETERS: HCPCS | Performed by: SURGERY

## 2025-06-19 PROCEDURE — 3074F SYST BP LT 130 MM HG: CPT | Performed by: SURGERY

## 2025-06-19 PROCEDURE — 3078F DIAST BP <80 MM HG: CPT | Performed by: SURGERY

## 2025-06-19 RX ORDER — SODIUM CHLORIDE 0.9 % (FLUSH) 0.9 %
5-40 SYRINGE (ML) INJECTION EVERY 12 HOURS SCHEDULED
Status: CANCELLED | OUTPATIENT
Start: 2025-06-19

## 2025-06-19 RX ORDER — SODIUM CHLORIDE 9 MG/ML
INJECTION, SOLUTION INTRAVENOUS PRN
Status: CANCELLED | OUTPATIENT
Start: 2025-06-19

## 2025-06-19 RX ORDER — SODIUM CHLORIDE 0.9 % (FLUSH) 0.9 %
5-40 SYRINGE (ML) INJECTION PRN
Status: CANCELLED | OUTPATIENT
Start: 2025-06-19

## 2025-06-19 RX ORDER — SODIUM CHLORIDE 9 MG/ML
INJECTION, SOLUTION INTRAVENOUS CONTINUOUS
Status: CANCELLED | OUTPATIENT
Start: 2025-06-19

## 2025-06-19 ASSESSMENT — ENCOUNTER SYMPTOMS
CHEST TIGHTNESS: 0
ABDOMINAL PAIN: 0
SHORTNESS OF BREATH: 1
RHINORRHEA: 0

## 2025-06-19 NOTE — TELEPHONE ENCOUNTER
Gerard Jane is scheduled for Right Groin Lymphocele Excision and Wound Vac Placement with Dr Skelton on 7/725 at 7:30. Pt agreed to date/time.     Surgery instructions are as follows:  - Arrive to Same Day Surgery at [facility] at 5:30 am  - NPO after midnight the night before surgery  - Continue aspirin  - Will need  upon discharge     All instructions discussed with pt, he verbalized understanding. He denied questions or concerns at this time.     Primary insurance is [employer]. Will obtain prior authorization as necessary.

## 2025-06-19 NOTE — TELEPHONE ENCOUNTER
Surgery Scheduling Form   [facility] [address] [address]    Phone * [phone]   Surgical Scheduling Direct line Phone * [phone]  Fax * [phone]      Gerard Jane      1940    male    [address] [address]   Marital Status:         Home Phone: [phone]   Cell Phone:   Telephone Information:   Mobile [phone]              Surgeon: Dr Skelton  Surgery Date:25   Time: 7:30     Procedure: Right Groin Lymphocele Excision and Wound Vac Placement  Outpatient   Diagnosis: I89.8, T81.89XA    Important Medical History:     Special Inst/Equip: Wound Vac ordered    CPT Codes: 64769    Latex Allergy:   no Cardiac Device:  no    Anesthesia Type: General    Case Location:  OR     Preadmission Testing: Phone Call      Need Preop Cardiac Clearance:   no    Does patient have Cardiologist/physician?       : _Khushi Seay CMA___________________________ Date:_25______    CBC: 25  BMP: 25  CXR: 25  [id number]

## 2025-06-19 NOTE — H&P (VIEW-ONLY)
Chief Complaint   Patient presents with    New Patient         HPI: Gerard Jane is an 85 y.o. 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 He says he has had it aspirated 4 times. It keeps coming back. He also complains of a \"bulge\" in the left groin. He has trouble pulling his pants over his right groin; the fluid collection causes him discomfort. He has COPD on 3 L NC O2; he is s/p TAVR, EF 45%. He denies any chest pains or recent hospitalizations. He also has ascending and descending thoracic aortic aneurysms.      Review of Systems   Constitutional:  Negative for activity change, appetite change and fatigue.   HENT:  Negative for rhinorrhea.    Eyes:  Negative for visual disturbance.   Respiratory:  Positive for shortness of breath. Negative for chest tightness.    Cardiovascular:  Negative for chest pain.   Gastrointestinal:  Negative for abdominal pain.   Genitourinary:  Negative for dysuria.   Neurological:  Negative for speech difficulty and light-headedness.   Psychiatric/Behavioral:  Negative for behavioral problems and confusion.           Allergies: No Known Allergies      Current Outpatient Medications   Medication Sig Dispense Refill    albuterol sulfate HFA (PROVENTIL HFA) 108 (90 Base) MCG/ACT inhaler Inhale 2 puffs into the lungs every 6 hours as needed for Wheezing 18 g 3    tamsulosin (FLOMAX) 0.4 MG capsule Take 1 capsule by mouth daily 90 capsule 3    furosemide (LASIX) 20 MG tablet Take 1 tablet by mouth every 48 hours 45 tablet 3    metoprolol succinate (TOPROL XL) 25 MG extended release tablet Take 0.5 tablets by mouth daily 30 tablet 5    spironolactone (ALDACTONE) 25 MG tablet Take 1 tablet by mouth daily 90 tablet 3    tiotropium (SPIRIVA RESPIMAT) 2.5 MCG/ACT AERS inhaler Inhale 2 puffs into the lungs daily 12 g 3    fluticasone-salmeterol (WIXELA INHUB) 250-50 MCG/ACT AEPB diskus inhaler Inhale 1 puff into the lungs in the morning and 1 puff in the evening. 180

## 2025-06-19 NOTE — PATIENT INSTRUCTIONS
If you receive a survey asking about your care experience, please respond. Your answers will help ensure you receive high-quality care at this office. Thank you!    Your Medical Assistant today: Dedra GORDON  Thank you for coming to our office! It was a pleasure to serve you.

## 2025-07-07 ENCOUNTER — HOSPITAL ENCOUNTER (INPATIENT)
Age: 85
LOS: 3 days | Discharge: HOME HEALTH | DRG: 803 | End: 2025-07-10
Attending: SURGERY | Admitting: SURGERY
Payer: MEDICARE

## 2025-07-07 ENCOUNTER — ANESTHESIA (OUTPATIENT)
Dept: OPERATING ROOM | Age: 85
DRG: 803 | End: 2025-07-07
Payer: MEDICARE

## 2025-07-07 ENCOUNTER — ANESTHESIA EVENT (OUTPATIENT)
Dept: OPERATING ROOM | Age: 85
DRG: 803 | End: 2025-07-07
Payer: MEDICARE

## 2025-07-07 DIAGNOSIS — T81.89XA LYMPHOCELE AFTER SURGICAL PROCEDURE: Primary | ICD-10-CM

## 2025-07-07 DIAGNOSIS — I89.8 LYMPHOCELE AFTER SURGICAL PROCEDURE: Primary | ICD-10-CM

## 2025-07-07 DIAGNOSIS — I89.8 LYMPHOCELE: ICD-10-CM

## 2025-07-07 LAB
CREAT SERPL-MCNC: 0.8 MG/DL (ref 0.7–1.2)
GFR SERPL CREATININE-BSD FRML MDRD: 88 ML/MIN/1.73M2
POTASSIUM SERPL-SCNC: 5.1 MEQ/L (ref 3.5–5.2)

## 2025-07-07 PROCEDURE — 2500000003 HC RX 250 WO HCPCS: Performed by: PHYSICIAN ASSISTANT

## 2025-07-07 PROCEDURE — 2500000003 HC RX 250 WO HCPCS: Performed by: SURGERY

## 2025-07-07 PROCEDURE — 2709999900 HC NON-CHARGEABLE SUPPLY: Performed by: SURGERY

## 2025-07-07 PROCEDURE — 6370000000 HC RX 637 (ALT 250 FOR IP): Performed by: SURGERY

## 2025-07-07 PROCEDURE — 2500000003 HC RX 250 WO HCPCS: Performed by: NURSE ANESTHETIST, CERTIFIED REGISTERED

## 2025-07-07 PROCEDURE — 2580000003 HC RX 258: Performed by: PHYSICIAN ASSISTANT

## 2025-07-07 PROCEDURE — 36415 COLL VENOUS BLD VENIPUNCTURE: CPT

## 2025-07-07 PROCEDURE — 7100000001 HC PACU RECOVERY - ADDTL 15 MIN: Performed by: SURGERY

## 2025-07-07 PROCEDURE — 87205 SMEAR GRAM STAIN: CPT

## 2025-07-07 PROCEDURE — 6360000002 HC RX W HCPCS: Performed by: SURGERY

## 2025-07-07 PROCEDURE — 2060000000 HC ICU INTERMEDIATE R&B

## 2025-07-07 PROCEDURE — 6370000000 HC RX 637 (ALT 250 FOR IP): Performed by: ANESTHESIOLOGY

## 2025-07-07 PROCEDURE — 3700000001 HC ADD 15 MINUTES (ANESTHESIA): Performed by: SURGERY

## 2025-07-07 PROCEDURE — 88304 TISSUE EXAM BY PATHOLOGIST: CPT

## 2025-07-07 PROCEDURE — 07BH0ZZ EXCISION OF RIGHT INGUINAL LYMPHATIC, OPEN APPROACH: ICD-10-PCS | Performed by: SURGERY

## 2025-07-07 PROCEDURE — 3600000012 HC SURGERY LEVEL 2 ADDTL 15MIN: Performed by: SURGERY

## 2025-07-07 PROCEDURE — 84132 ASSAY OF SERUM POTASSIUM: CPT

## 2025-07-07 PROCEDURE — 82565 ASSAY OF CREATININE: CPT

## 2025-07-07 PROCEDURE — 7100000000 HC PACU RECOVERY - FIRST 15 MIN: Performed by: SURGERY

## 2025-07-07 PROCEDURE — 3700000000 HC ANESTHESIA ATTENDED CARE: Performed by: SURGERY

## 2025-07-07 PROCEDURE — 6360000002 HC RX W HCPCS: Performed by: PHYSICIAN ASSISTANT

## 2025-07-07 PROCEDURE — 94640 AIRWAY INHALATION TREATMENT: CPT

## 2025-07-07 PROCEDURE — 3600000002 HC SURGERY LEVEL 2 BASE: Performed by: SURGERY

## 2025-07-07 PROCEDURE — 87070 CULTURE OTHR SPECIMN AEROBIC: CPT

## 2025-07-07 PROCEDURE — 6360000002 HC RX W HCPCS: Performed by: NURSE ANESTHETIST, CERTIFIED REGISTERED

## 2025-07-07 PROCEDURE — 87075 CULTR BACTERIA EXCEPT BLOOD: CPT

## 2025-07-07 RX ORDER — SODIUM CHLORIDE 9 MG/ML
INJECTION, SOLUTION INTRAVENOUS PRN
Status: DISCONTINUED | OUTPATIENT
Start: 2025-07-07 | End: 2025-07-07 | Stop reason: HOSPADM

## 2025-07-07 RX ORDER — LIDOCAINE HYDROCHLORIDE 20 MG/ML
INJECTION, SOLUTION INTRAVENOUS
Status: DISCONTINUED | OUTPATIENT
Start: 2025-07-07 | End: 2025-07-07 | Stop reason: SDUPTHER

## 2025-07-07 RX ORDER — ASPIRIN 81 MG/1
81 TABLET ORAL DAILY
Status: DISCONTINUED | OUTPATIENT
Start: 2025-07-08 | End: 2025-07-10 | Stop reason: HOSPADM

## 2025-07-07 RX ORDER — PHENYLEPHRINE HCL IN 0.9% NACL 1 MG/10 ML
SYRINGE (ML) INTRAVENOUS
Status: DISCONTINUED | OUTPATIENT
Start: 2025-07-07 | End: 2025-07-07 | Stop reason: SDUPTHER

## 2025-07-07 RX ORDER — SODIUM CHLORIDE 0.9 % (FLUSH) 0.9 %
5-40 SYRINGE (ML) INJECTION PRN
Status: DISCONTINUED | OUTPATIENT
Start: 2025-07-07 | End: 2025-07-07 | Stop reason: HOSPADM

## 2025-07-07 RX ORDER — ONDANSETRON 4 MG/1
4 TABLET, ORALLY DISINTEGRATING ORAL EVERY 8 HOURS PRN
Status: DISCONTINUED | OUTPATIENT
Start: 2025-07-07 | End: 2025-07-10 | Stop reason: HOSPADM

## 2025-07-07 RX ORDER — POLYVINYL ALCOHOL 14 MG/ML
1 SOLUTION/ DROPS OPHTHALMIC EVERY MORNING
Status: DISCONTINUED | OUTPATIENT
Start: 2025-07-08 | End: 2025-07-10 | Stop reason: HOSPADM

## 2025-07-07 RX ORDER — SODIUM CHLORIDE 0.9 % (FLUSH) 0.9 %
5-40 SYRINGE (ML) INJECTION EVERY 12 HOURS SCHEDULED
Status: DISCONTINUED | OUTPATIENT
Start: 2025-07-07 | End: 2025-07-10 | Stop reason: HOSPADM

## 2025-07-07 RX ORDER — SODIUM CHLORIDE 0.9 % (FLUSH) 0.9 %
5-40 SYRINGE (ML) INJECTION EVERY 12 HOURS SCHEDULED
Status: DISCONTINUED | OUTPATIENT
Start: 2025-07-07 | End: 2025-07-07 | Stop reason: HOSPADM

## 2025-07-07 RX ORDER — BUDESONIDE AND FORMOTEROL FUMARATE DIHYDRATE 160; 4.5 UG/1; UG/1
2 AEROSOL RESPIRATORY (INHALATION)
Status: DISCONTINUED | OUTPATIENT
Start: 2025-07-07 | End: 2025-07-10 | Stop reason: HOSPADM

## 2025-07-07 RX ORDER — ALBUTEROL SULFATE 0.83 MG/ML
2.5 SOLUTION RESPIRATORY (INHALATION) EVERY 6 HOURS PRN
Status: DISCONTINUED | OUTPATIENT
Start: 2025-07-07 | End: 2025-07-08

## 2025-07-07 RX ORDER — TRAMADOL HYDROCHLORIDE 50 MG/1
50 TABLET ORAL EVERY 6 HOURS PRN
Status: DISCONTINUED | OUTPATIENT
Start: 2025-07-07 | End: 2025-07-10 | Stop reason: HOSPADM

## 2025-07-07 RX ORDER — ONDANSETRON 2 MG/ML
4 INJECTION INTRAMUSCULAR; INTRAVENOUS EVERY 6 HOURS PRN
Status: DISCONTINUED | OUTPATIENT
Start: 2025-07-07 | End: 2025-07-10 | Stop reason: HOSPADM

## 2025-07-07 RX ORDER — PRAVASTATIN SODIUM 80 MG/1
80 TABLET ORAL DAILY
Status: DISCONTINUED | OUTPATIENT
Start: 2025-07-08 | End: 2025-07-10 | Stop reason: HOSPADM

## 2025-07-07 RX ORDER — FUROSEMIDE 20 MG/1
20 TABLET ORAL
Status: DISCONTINUED | OUTPATIENT
Start: 2025-07-08 | End: 2025-07-10 | Stop reason: HOSPADM

## 2025-07-07 RX ORDER — SODIUM CHLORIDE 0.9 % (FLUSH) 0.9 %
5-40 SYRINGE (ML) INJECTION PRN
Status: DISCONTINUED | OUTPATIENT
Start: 2025-07-07 | End: 2025-07-10 | Stop reason: HOSPADM

## 2025-07-07 RX ORDER — SODIUM CHLORIDE 9 MG/ML
INJECTION, SOLUTION INTRAVENOUS CONTINUOUS
Status: DISCONTINUED | OUTPATIENT
Start: 2025-07-07 | End: 2025-07-07

## 2025-07-07 RX ORDER — ONDANSETRON 2 MG/ML
INJECTION INTRAMUSCULAR; INTRAVENOUS
Status: DISCONTINUED | OUTPATIENT
Start: 2025-07-07 | End: 2025-07-07 | Stop reason: SDUPTHER

## 2025-07-07 RX ORDER — LABETALOL HYDROCHLORIDE 5 MG/ML
10 INJECTION, SOLUTION INTRAVENOUS
Status: DISCONTINUED | OUTPATIENT
Start: 2025-07-07 | End: 2025-07-10 | Stop reason: HOSPADM

## 2025-07-07 RX ORDER — IPRATROPIUM BROMIDE AND ALBUTEROL SULFATE 2.5; .5 MG/3ML; MG/3ML
1 SOLUTION RESPIRATORY (INHALATION) ONCE
Status: COMPLETED | OUTPATIENT
Start: 2025-07-07 | End: 2025-07-07

## 2025-07-07 RX ORDER — EPHEDRINE SULFATE/0.9% NACL/PF 25 MG/5 ML
SYRINGE (ML) INTRAVENOUS
Status: DISCONTINUED | OUTPATIENT
Start: 2025-07-07 | End: 2025-07-07 | Stop reason: SDUPTHER

## 2025-07-07 RX ORDER — TRAMADOL HYDROCHLORIDE 50 MG/1
100 TABLET ORAL EVERY 6 HOURS PRN
Status: DISCONTINUED | OUTPATIENT
Start: 2025-07-07 | End: 2025-07-07

## 2025-07-07 RX ORDER — ACETAMINOPHEN 325 MG/1
650 TABLET ORAL EVERY 6 HOURS
Status: DISCONTINUED | OUTPATIENT
Start: 2025-07-07 | End: 2025-07-10 | Stop reason: HOSPADM

## 2025-07-07 RX ORDER — FENTANYL CITRATE 50 UG/ML
INJECTION, SOLUTION INTRAMUSCULAR; INTRAVENOUS
Status: DISCONTINUED | OUTPATIENT
Start: 2025-07-07 | End: 2025-07-07 | Stop reason: SDUPTHER

## 2025-07-07 RX ORDER — SPIRONOLACTONE 25 MG/1
25 TABLET ORAL DAILY
Status: DISCONTINUED | OUTPATIENT
Start: 2025-07-08 | End: 2025-07-10 | Stop reason: HOSPADM

## 2025-07-07 RX ORDER — TAMSULOSIN HYDROCHLORIDE 0.4 MG/1
0.4 CAPSULE ORAL DAILY
Status: DISCONTINUED | OUTPATIENT
Start: 2025-07-07 | End: 2025-07-10 | Stop reason: HOSPADM

## 2025-07-07 RX ORDER — ENOXAPARIN SODIUM 100 MG/ML
40 INJECTION SUBCUTANEOUS DAILY
Status: DISCONTINUED | OUTPATIENT
Start: 2025-07-09 | End: 2025-07-08

## 2025-07-07 RX ORDER — DEXAMETHASONE SODIUM PHOSPHATE 4 MG/ML
INJECTION, SOLUTION INTRA-ARTICULAR; INTRALESIONAL; INTRAMUSCULAR; INTRAVENOUS; SOFT TISSUE
Status: DISCONTINUED | OUTPATIENT
Start: 2025-07-07 | End: 2025-07-07 | Stop reason: SDUPTHER

## 2025-07-07 RX ORDER — SODIUM CHLORIDE 9 MG/ML
INJECTION, SOLUTION INTRAVENOUS PRN
Status: DISCONTINUED | OUTPATIENT
Start: 2025-07-07 | End: 2025-07-10 | Stop reason: HOSPADM

## 2025-07-07 RX ORDER — METOPROLOL SUCCINATE 25 MG/1
12.5 TABLET, EXTENDED RELEASE ORAL DAILY
Status: DISCONTINUED | OUTPATIENT
Start: 2025-07-08 | End: 2025-07-10 | Stop reason: HOSPADM

## 2025-07-07 RX ORDER — HYDRALAZINE HYDROCHLORIDE 20 MG/ML
10 INJECTION INTRAMUSCULAR; INTRAVENOUS EVERY 4 HOURS PRN
Status: DISCONTINUED | OUTPATIENT
Start: 2025-07-07 | End: 2025-07-10 | Stop reason: HOSPADM

## 2025-07-07 RX ORDER — BUPIVACAINE HYDROCHLORIDE 5 MG/ML
INJECTION, SOLUTION EPIDURAL; INTRACAUDAL; PERINEURAL PRN
Status: DISCONTINUED | OUTPATIENT
Start: 2025-07-07 | End: 2025-07-07 | Stop reason: ALTCHOICE

## 2025-07-07 RX ORDER — POLYETHYLENE GLYCOL 3350 17 G/17G
17 POWDER, FOR SOLUTION ORAL DAILY PRN
Status: DISCONTINUED | OUTPATIENT
Start: 2025-07-07 | End: 2025-07-10 | Stop reason: HOSPADM

## 2025-07-07 RX ORDER — PROPOFOL 10 MG/ML
INJECTION, EMULSION INTRAVENOUS
Status: DISCONTINUED | OUTPATIENT
Start: 2025-07-07 | End: 2025-07-07 | Stop reason: SDUPTHER

## 2025-07-07 RX ADMIN — DEXAMETHASONE SODIUM PHOSPHATE 8 MG: 4 INJECTION, SOLUTION INTRAMUSCULAR; INTRAVENOUS at 07:48

## 2025-07-07 RX ADMIN — Medication 100 MCG: at 08:01

## 2025-07-07 RX ADMIN — EPHEDRINE SULFATE 10 MG: 5 INJECTION INTRAVENOUS at 08:04

## 2025-07-07 RX ADMIN — EPHEDRINE SULFATE 10 MG: 5 INJECTION INTRAVENOUS at 08:16

## 2025-07-07 RX ADMIN — Medication 100 MCG: at 08:43

## 2025-07-07 RX ADMIN — SODIUM CHLORIDE: 9 INJECTION, SOLUTION INTRAVENOUS at 07:38

## 2025-07-07 RX ADMIN — WATER 2000 MG: 1 INJECTION INTRAMUSCULAR; INTRAVENOUS; SUBCUTANEOUS at 23:30

## 2025-07-07 RX ADMIN — SODIUM CHLORIDE: 9 INJECTION, SOLUTION INTRAVENOUS at 08:58

## 2025-07-07 RX ADMIN — Medication 100 MCG: at 08:45

## 2025-07-07 RX ADMIN — IPRATROPIUM BROMIDE AND ALBUTEROL SULFATE 1 DOSE: .5; 3 SOLUTION RESPIRATORY (INHALATION) at 14:07

## 2025-07-07 RX ADMIN — Medication 100 MCG: at 08:32

## 2025-07-07 RX ADMIN — Medication 50 MG: at 07:43

## 2025-07-07 RX ADMIN — TAMSULOSIN HYDROCHLORIDE 0.4 MG: 0.4 CAPSULE ORAL at 18:37

## 2025-07-07 RX ADMIN — Medication 100 MCG: at 08:04

## 2025-07-07 RX ADMIN — FENTANYL CITRATE 50 MCG: 50 INJECTION, SOLUTION INTRAMUSCULAR; INTRAVENOUS at 08:13

## 2025-07-07 RX ADMIN — FENTANYL CITRATE 50 MCG: 50 INJECTION, SOLUTION INTRAMUSCULAR; INTRAVENOUS at 08:07

## 2025-07-07 RX ADMIN — Medication 100 MCG: at 07:56

## 2025-07-07 RX ADMIN — ACETAMINOPHEN 650 MG: 325 TABLET ORAL at 14:05

## 2025-07-07 RX ADMIN — WATER 2000 MG: 1 INJECTION INTRAMUSCULAR; INTRAVENOUS; SUBCUTANEOUS at 07:35

## 2025-07-07 RX ADMIN — Medication 100 MCG: at 08:02

## 2025-07-07 RX ADMIN — Medication 100 MCG: at 08:16

## 2025-07-07 RX ADMIN — Medication 100 MCG: at 08:03

## 2025-07-07 RX ADMIN — WATER 2000 MG: 1 INJECTION INTRAMUSCULAR; INTRAVENOUS; SUBCUTANEOUS at 17:57

## 2025-07-07 RX ADMIN — BUDESONIDE AND FORMOTEROL FUMARATE DIHYDRATE 2 PUFF: 160; 4.5 AEROSOL RESPIRATORY (INHALATION) at 20:12

## 2025-07-07 RX ADMIN — EPHEDRINE SULFATE 5 MG: 5 INJECTION INTRAVENOUS at 08:18

## 2025-07-07 RX ADMIN — PROPOFOL 100 MG: 10 INJECTION, EMULSION INTRAVENOUS at 07:43

## 2025-07-07 RX ADMIN — ONDANSETRON 4 MG: 2 INJECTION, SOLUTION INTRAMUSCULAR; INTRAVENOUS at 07:47

## 2025-07-07 RX ADMIN — SODIUM CHLORIDE: 9 INJECTION, SOLUTION INTRAVENOUS at 06:35

## 2025-07-07 ASSESSMENT — PAIN SCALES - GENERAL
PAINLEVEL_OUTOF10: 0
PAINLEVEL_OUTOF10: 5
PAINLEVEL_OUTOF10: 0

## 2025-07-07 ASSESSMENT — COPD QUESTIONNAIRES: CAT_SEVERITY: SEVERE

## 2025-07-07 ASSESSMENT — PAIN DESCRIPTION - ORIENTATION: ORIENTATION: RIGHT

## 2025-07-07 ASSESSMENT — PAIN - FUNCTIONAL ASSESSMENT
PAIN_FUNCTIONAL_ASSESSMENT: WONG-BAKER FACES
PAIN_FUNCTIONAL_ASSESSMENT: 0-10

## 2025-07-07 ASSESSMENT — PAIN DESCRIPTION - DESCRIPTORS: DESCRIPTORS: ACHING

## 2025-07-07 ASSESSMENT — PAIN DESCRIPTION - LOCATION: LOCATION: GROIN

## 2025-07-07 NOTE — INTERVAL H&P NOTE
Update History & Physical    The patient's History and Physical of July 7,  was reviewed with the patient and I examined the patient. There was no change. The surgical site was confirmed by the patient and me.     Plan: The risks, benefits, expected outcome, and alternative to the recommended procedure have been discussed with the patient. Patient understands and wants to proceed with the procedure. The patient understands the risks and benefits of the operation including bleeding, infection, nerve injury, myocardial infarction and stroke. The patient wishes to proceed with the surgery.      Electronically signed by Carroll Skelton MD on 7/7/2025 at 7:21 AM

## 2025-07-07 NOTE — OP NOTE
Operative Note      Patient: Gerard Jane  YOB: 1940  MRN: [id number]    Date of Procedure: 7/7/2025    Pre-Op Diagnosis Codes:      * Lymphocele [I89.8] right groin; systolic CHF, COPD, Hx of EVAR with groin cutdowns    Post-Op Diagnosis: Same       Procedure(s):  Right groin lymphocele excision and wound vac placement. Wound dimensions 5 x 1 x 2.5 cm    Surgeon(s):  Carroll Skelton MD    Assistant:   Physician Assistant: Joey Suarez PA-C who assisted with wound closure and retraction.     Anesthesia: General    Estimated Blood Loss (mL): Minimal    Complications: None    Specimens:   ID Type Source Tests Collected by Time Destination   1 : right groin lymphocele Body Fluid Groin CULTURE, ANAEROBIC AND AEROBIC Carroll Skelton MD 7/7/2025 0815    A : right lymphocele sac Tissue Groin SURGICAL PATHOLOGY, CULTURE, ANAEROBIC AND AEROBIC Carroll Skelton MD 7/7/2025 0818        Implants:  * No implants in log *      Drains:   Negative Pressure Wound Therapy Groin Right (Active)   Dressing Status Intact w/seal 07/07/25 0900   Unit Type 3M Ulta wound vac 07/07/25 0900   Mode Continuous 07/07/25 0900   Target Pressure (mmHg) 125 07/07/25 0900   Dressing Type Black foam 07/07/25 0900       Findings:  Infection Present At Time Of Surgery (PATOS) (choose all levels that have infection present):  No infection present  Other Findings: see below      Detailed Description of Procedure:      IVF: 1000 ml LR     Abx: Cefazolin 2 gm IVPB     EBL: < 10 ml     Specimens Sent: lymphocele fluid and sac for culture     HISTORY OF PRESENT ILLNESS:  Gerard Jane is a 84 yo male with a persistent right groin lymphocele despite 4 prior aspiration attempts s/p EVAR with groin cutdown. The patient understood the risks and benefits of operation including bleeding, infection, myocardial infarction, and stroke and wished to proceed.      OPERATIVE NOTE:      The patient was taken back to the OR where a time out was called, verifying the

## 2025-07-07 NOTE — ANESTHESIA POSTPROCEDURE EVALUATION
Department of Anesthesiology  Postprocedure Note    Patient: Gerard Jane  MRN: [id number]  YOB: 1940  Date of evaluation: 7/7/2025    Procedure Summary       Date: 07/07/25 Room / Location: [facility] OR [address] [place] [address] OR    Anesthesia Start: 0738 Anesthesia Stop: 0902    Procedure: Right groin lymphocele excision and wound vac placement (Right) Diagnosis:       Lymphocele      (Lymphocele [I89.8])    Surgeons: Carroll Skelton MD Responsible Provider: Vasquez Allen DO    Anesthesia Type: general ASA Status: 4            Anesthesia Type: No value filed.    Milli Phase I: Milli Score: 8    Milli Phase II:      Anesthesia Post Evaluation    Patient location during evaluation: PACU  Patient participation: complete - patient participated  Level of consciousness: awake  Airway patency: patent  Nausea & Vomiting: no nausea  Cardiovascular status: hemodynamically stable  Respiratory status: acceptable  Hydration status: stable  Pain management: adequate    No notable events documented.

## 2025-07-07 NOTE — ANESTHESIA PRE PROCEDURE
Department of Anesthesiology  Preprocedure Note       Name:  Gerard Jane   Age:  85 y.o.  :  1940                                          MRN:  [id number]         Date:  2025      Surgeon: Surgeon(s):  Carroll Skelton MD    Procedure: Procedure(s):  Right groin lymphocele excision and wound vac placement    Medications prior to admission:   Prior to Admission medications    Medication Sig Start Date End Date Taking? Authorizing Provider   albuterol sulfate HFA (PROVENTIL HFA) 108 (90 Base) MCG/ACT inhaler Inhale 2 puffs into the lungs every 6 hours as needed for Wheezing 25  Yes Amanda Huff APRN - CNP   tamsulosin (FLOMAX) 0.4 MG capsule Take 1 capsule by mouth daily 25  Yes Mandeep Cortez PA-C   furosemide (LASIX) 20 MG tablet Take 1 tablet by mouth every 48 hours 24  Yes Erika Caceres MD   metoprolol succinate (TOPROL XL) 25 MG extended release tablet Take 0.5 tablets by mouth daily 24  Yes Erika Caceres MD   spironolactone (ALDACTONE) 25 MG tablet Take 1 tablet by mouth daily 10/31/24  Yes Mustapha Callahan MD   tiotropium (SPIRIVA RESPIMAT) 2.5 MCG/ACT AERS inhaler Inhale 2 puffs into the lungs daily 10/28/24  Yes Amanda Huff APRN - CNP   fluticasone-salmeterol (WIXELA INHUB) 250-50 MCG/ACT AEPB diskus inhaler Inhale 1 puff into the lungs in the morning and 1 puff in the evening. 10/28/24  Yes Amanda Huff APRN - CNP   albuterol (PROVENTIL) (2.5 MG/3ML) 0.083% nebulizer solution Take 3 mLs by nebulization every 6 hours as needed for Wheezing or Shortness of Breath 10/28/24 10/28/25 Yes Amanda Huff APRN - CNP   empagliflozin (JARDIANCE) 10 MG tablet Take 1 tablet by mouth daily 10/18/24  Yes Linda Mayen APRN - CNP   sacubitril-valsartan (ENTRESTO) 24-26 MG per tablet Take 1 tablet by mouth 2 times daily 24  Yes Linda Mayen APRN - CNP   OXYGEN Inhale into the lungs 3 liters   Yes Provider, MD Miguel   carboxymethylcellulose (REFRESH

## 2025-07-08 LAB
ANION GAP SERPL CALC-SCNC: 8 MEQ/L (ref 8–16)
BUN SERPL-MCNC: 19 MG/DL (ref 8–23)
CALCIUM SERPL-MCNC: 9.3 MG/DL (ref 8.8–10.2)
CHLORIDE SERPL-SCNC: 102 MEQ/L (ref 98–111)
CO2 SERPL-SCNC: 30 MEQ/L (ref 22–29)
CREAT SERPL-MCNC: 0.8 MG/DL (ref 0.7–1.2)
DEPRECATED RDW RBC AUTO: 49.7 FL (ref 35–45)
ERYTHROCYTE [DISTWIDTH] IN BLOOD BY AUTOMATED COUNT: 14.1 % (ref 11.5–14.5)
GFR SERPL CREATININE-BSD FRML MDRD: 88 ML/MIN/1.73M2
GLUCOSE SERPL-MCNC: 92 MG/DL (ref 74–109)
HCT VFR BLD AUTO: 39.7 % (ref 42–52)
HGB BLD-MCNC: 12.7 GM/DL (ref 14–18)
MAGNESIUM SERPL-MCNC: 2.5 MG/DL (ref 1.6–2.6)
MCH RBC QN AUTO: 30.8 PG (ref 26–33)
MCHC RBC AUTO-ENTMCNC: 32 GM/DL (ref 32.2–35.5)
MCV RBC AUTO: 96.1 FL (ref 80–94)
PLATELET # BLD AUTO: 150 THOU/MM3 (ref 130–400)
PMV BLD AUTO: 9.7 FL (ref 9.4–12.4)
POTASSIUM SERPL-SCNC: 4.8 MEQ/L (ref 3.5–5.2)
RBC # BLD AUTO: 4.13 MILL/MM3 (ref 4.7–6.1)
SODIUM SERPL-SCNC: 140 MEQ/L (ref 135–145)
WBC # BLD AUTO: 10 THOU/MM3 (ref 4.8–10.8)

## 2025-07-08 PROCEDURE — 2500000003 HC RX 250 WO HCPCS: Performed by: SURGERY

## 2025-07-08 PROCEDURE — 2060000000 HC ICU INTERMEDIATE R&B

## 2025-07-08 PROCEDURE — 80048 BASIC METABOLIC PNL TOTAL CA: CPT

## 2025-07-08 PROCEDURE — 85027 COMPLETE CBC AUTOMATED: CPT

## 2025-07-08 PROCEDURE — 2700000000 HC OXYGEN THERAPY PER DAY

## 2025-07-08 PROCEDURE — 6370000000 HC RX 637 (ALT 250 FOR IP): Performed by: SURGERY

## 2025-07-08 PROCEDURE — APPSS30 APP SPLIT SHARED TIME 16-30 MINUTES: Performed by: PHYSICIAN ASSISTANT

## 2025-07-08 PROCEDURE — 94640 AIRWAY INHALATION TREATMENT: CPT

## 2025-07-08 PROCEDURE — 36415 COLL VENOUS BLD VENIPUNCTURE: CPT

## 2025-07-08 PROCEDURE — 6360000002 HC RX W HCPCS: Performed by: SURGERY

## 2025-07-08 PROCEDURE — 83735 ASSAY OF MAGNESIUM: CPT

## 2025-07-08 PROCEDURE — 94761 N-INVAS EAR/PLS OXIMETRY MLT: CPT

## 2025-07-08 RX ORDER — ALBUTEROL SULFATE 0.83 MG/ML
2.5 SOLUTION RESPIRATORY (INHALATION) EVERY 6 HOURS
Status: DISCONTINUED | OUTPATIENT
Start: 2025-07-08 | End: 2025-07-09

## 2025-07-08 RX ORDER — ALBUTEROL SULFATE 0.83 MG/ML
2.5 SOLUTION RESPIRATORY (INHALATION) EVERY 4 HOURS PRN
Status: DISCONTINUED | OUTPATIENT
Start: 2025-07-08 | End: 2025-07-10 | Stop reason: HOSPADM

## 2025-07-08 RX ORDER — ENOXAPARIN SODIUM 100 MG/ML
30 INJECTION SUBCUTANEOUS DAILY
Status: DISCONTINUED | OUTPATIENT
Start: 2025-07-09 | End: 2025-07-10 | Stop reason: HOSPADM

## 2025-07-08 RX ADMIN — BUDESONIDE AND FORMOTEROL FUMARATE DIHYDRATE 2 PUFF: 160; 4.5 AEROSOL RESPIRATORY (INHALATION) at 08:06

## 2025-07-08 RX ADMIN — EMPAGLIFLOZIN 10 MG: 10 TABLET, FILM COATED ORAL at 08:19

## 2025-07-08 RX ADMIN — PRAVASTATIN SODIUM 80 MG: 80 TABLET ORAL at 20:50

## 2025-07-08 RX ADMIN — METOPROLOL SUCCINATE 12.5 MG: 25 TABLET, EXTENDED RELEASE ORAL at 08:19

## 2025-07-08 RX ADMIN — FUROSEMIDE 20 MG: 20 TABLET ORAL at 08:19

## 2025-07-08 RX ADMIN — TAMSULOSIN HYDROCHLORIDE 0.4 MG: 0.4 CAPSULE ORAL at 20:50

## 2025-07-08 RX ADMIN — SACUBITRIL AND VALSARTAN 1 TABLET: 24; 26 TABLET, FILM COATED ORAL at 08:19

## 2025-07-08 RX ADMIN — SODIUM CHLORIDE, PRESERVATIVE FREE 10 ML: 5 INJECTION INTRAVENOUS at 08:19

## 2025-07-08 RX ADMIN — POLYVINYL ALCOHOL 1 DROP: 1.4 SOLUTION/ DROPS OPHTHALMIC at 08:21

## 2025-07-08 RX ADMIN — ACETAMINOPHEN 650 MG: 325 TABLET ORAL at 20:50

## 2025-07-08 RX ADMIN — SPIRONOLACTONE 25 MG: 25 TABLET ORAL at 08:19

## 2025-07-08 RX ADMIN — SODIUM CHLORIDE, PRESERVATIVE FREE 10 ML: 5 INJECTION INTRAVENOUS at 20:53

## 2025-07-08 RX ADMIN — ASPIRIN 81 MG: 81 TABLET, COATED ORAL at 08:19

## 2025-07-08 RX ADMIN — ALBUTEROL SULFATE 2.5 MG: 2.5 SOLUTION RESPIRATORY (INHALATION) at 18:06

## 2025-07-08 RX ADMIN — ALBUTEROL SULFATE 2.5 MG: 2.5 SOLUTION RESPIRATORY (INHALATION) at 11:44

## 2025-07-08 RX ADMIN — SACUBITRIL AND VALSARTAN 1 TABLET: 24; 26 TABLET, FILM COATED ORAL at 20:50

## 2025-07-08 RX ADMIN — TIOTROPIUM BROMIDE INHALATION SPRAY 5 MCG: 3.12 SPRAY, METERED RESPIRATORY (INHALATION) at 08:06

## 2025-07-08 RX ADMIN — ALBUTEROL SULFATE 2.5 MG: 2.5 SOLUTION RESPIRATORY (INHALATION) at 05:23

## 2025-07-08 RX ADMIN — BUDESONIDE AND FORMOTEROL FUMARATE DIHYDRATE 2 PUFF: 160; 4.5 AEROSOL RESPIRATORY (INHALATION) at 18:06

## 2025-07-08 ASSESSMENT — PAIN DESCRIPTION - ORIENTATION: ORIENTATION: RIGHT

## 2025-07-08 ASSESSMENT — PAIN DESCRIPTION - DESCRIPTORS: DESCRIPTORS: DISCOMFORT

## 2025-07-08 ASSESSMENT — PAIN SCALES - GENERAL
PAINLEVEL_OUTOF10: 2
PAINLEVEL_OUTOF10: 0
PAINLEVEL_OUTOF10: 0

## 2025-07-08 ASSESSMENT — PAIN - FUNCTIONAL ASSESSMENT: PAIN_FUNCTIONAL_ASSESSMENT: ACTIVITIES ARE NOT PREVENTED

## 2025-07-08 ASSESSMENT — PAIN DESCRIPTION - PAIN TYPE: TYPE: ACUTE PAIN

## 2025-07-08 ASSESSMENT — PAIN DESCRIPTION - LOCATION: LOCATION: GROIN

## 2025-07-08 ASSESSMENT — PAIN DESCRIPTION - FREQUENCY: FREQUENCY: INTERMITTENT

## 2025-07-08 ASSESSMENT — PAIN DESCRIPTION - ONSET: ONSET: GRADUAL

## 2025-07-08 NOTE — CARE COORDINATION
Case Management Assessment Initial Evaluation    Date/Time of Evaluation: 7/8/2025 10:05 AM  Assessment Completed by: Pancho Cha, RN    If patient is discharged prior to next notation, then this note serves as note for discharge by case management.    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/  Oxygen 4L  Procedures:   7/7 Right Groin Lymphocele Excision w VAC  Patient Goals/Plan/Treatment Preferences: lives alone, prefers new [facility] via [facility] and [facility] (change MWF) after choices offered, current CHF Clinic, has nebulizer, [employer] oxygen 3L ATC, still drives    Post-acute (PAC) provider list was provided to patient. Patient was informed of their freedom to choose PAC provider. Discussed and offered to show the patient the relevant PAC Providers quality and resource use measures on Medicare Compare web site via computer based on patient's goals of care and treatment preferences. Questions regarding selection process were answered.    Update  [facility] initiated [facility] V[facility] Authorization, await authorization; collaborated w patient, [facility], ELBERT, Gloria, [facility] Liaison    Update  Faxed [facility] Referrals, LUISANA Magallanes RN  Electronically signed by Pancho Cha RN on 7/8/2025 at 12:47 PM    Update  Faxed

## 2025-07-09 PROCEDURE — 2700000000 HC OXYGEN THERAPY PER DAY

## 2025-07-09 PROCEDURE — 6360000002 HC RX W HCPCS: Performed by: SURGERY

## 2025-07-09 PROCEDURE — 97605 NEG PRS WND THER DME<=50SQCM: CPT

## 2025-07-09 PROCEDURE — 94640 AIRWAY INHALATION TREATMENT: CPT

## 2025-07-09 PROCEDURE — APPSS30 APP SPLIT SHARED TIME 16-30 MINUTES: Performed by: PHYSICIAN ASSISTANT

## 2025-07-09 PROCEDURE — 2500000003 HC RX 250 WO HCPCS: Performed by: SURGERY

## 2025-07-09 PROCEDURE — 94761 N-INVAS EAR/PLS OXIMETRY MLT: CPT

## 2025-07-09 PROCEDURE — 6370000000 HC RX 637 (ALT 250 FOR IP): Performed by: SURGERY

## 2025-07-09 PROCEDURE — 2060000000 HC ICU INTERMEDIATE R&B

## 2025-07-09 RX ORDER — ALBUTEROL SULFATE 0.83 MG/ML
2.5 SOLUTION RESPIRATORY (INHALATION)
Status: DISCONTINUED | OUTPATIENT
Start: 2025-07-09 | End: 2025-07-09

## 2025-07-09 RX ORDER — ALBUTEROL SULFATE 0.83 MG/ML
2.5 SOLUTION RESPIRATORY (INHALATION) EVERY 6 HOURS SCHEDULED
Status: DISCONTINUED | OUTPATIENT
Start: 2025-07-09 | End: 2025-07-10 | Stop reason: HOSPADM

## 2025-07-09 RX ADMIN — TAMSULOSIN HYDROCHLORIDE 0.4 MG: 0.4 CAPSULE ORAL at 19:37

## 2025-07-09 RX ADMIN — ENOXAPARIN SODIUM 30 MG: 100 INJECTION SUBCUTANEOUS at 08:48

## 2025-07-09 RX ADMIN — POLYVINYL ALCOHOL 1 DROP: 1.4 SOLUTION/ DROPS OPHTHALMIC at 08:49

## 2025-07-09 RX ADMIN — EMPAGLIFLOZIN 10 MG: 10 TABLET, FILM COATED ORAL at 08:51

## 2025-07-09 RX ADMIN — PRAVASTATIN SODIUM 80 MG: 80 TABLET ORAL at 19:37

## 2025-07-09 RX ADMIN — SACUBITRIL AND VALSARTAN 1 TABLET: 24; 26 TABLET, FILM COATED ORAL at 08:50

## 2025-07-09 RX ADMIN — TIOTROPIUM BROMIDE INHALATION SPRAY 5 MCG: 3.12 SPRAY, METERED RESPIRATORY (INHALATION) at 09:01

## 2025-07-09 RX ADMIN — ALBUTEROL SULFATE 2.5 MG: 2.5 SOLUTION RESPIRATORY (INHALATION) at 06:02

## 2025-07-09 RX ADMIN — ALBUTEROL SULFATE 2.5 MG: 2.5 SOLUTION RESPIRATORY (INHALATION) at 13:22

## 2025-07-09 RX ADMIN — ALBUTEROL SULFATE 2.5 MG: 2.5 SOLUTION RESPIRATORY (INHALATION) at 00:31

## 2025-07-09 RX ADMIN — BUDESONIDE AND FORMOTEROL FUMARATE DIHYDRATE 2 PUFF: 160; 4.5 AEROSOL RESPIRATORY (INHALATION) at 09:01

## 2025-07-09 RX ADMIN — ASPIRIN 81 MG: 81 TABLET, COATED ORAL at 08:49

## 2025-07-09 RX ADMIN — ALBUTEROL SULFATE 2.5 MG: 2.5 SOLUTION RESPIRATORY (INHALATION) at 17:12

## 2025-07-09 RX ADMIN — SACUBITRIL AND VALSARTAN 1 TABLET: 24; 26 TABLET, FILM COATED ORAL at 19:37

## 2025-07-09 RX ADMIN — SODIUM CHLORIDE, PRESERVATIVE FREE 10 ML: 5 INJECTION INTRAVENOUS at 08:44

## 2025-07-09 RX ADMIN — BUDESONIDE AND FORMOTEROL FUMARATE DIHYDRATE 2 PUFF: 160; 4.5 AEROSOL RESPIRATORY (INHALATION) at 19:51

## 2025-07-09 ASSESSMENT — PAIN SCALES - GENERAL
PAINLEVEL_OUTOF10: 0

## 2025-07-09 NOTE — PLAN OF CARE
Problem: Respiratory - Adult  Goal: Clear lung sounds  Description: Clear lung sounds  7/9/2025 0607 by Wendy Sadler RCP  Outcome: Progressing

## 2025-07-09 NOTE — PLAN OF CARE
Problem: Chronic Conditions and Co-morbidities  Goal: Patient's chronic conditions and co-morbidity symptoms are monitored and maintained or improved  Outcome: Progressing     Problem: Discharge Planning  Goal: Discharge to home or other facility with appropriate resources  Outcome: Progressing     Problem: Pain  Goal: Verbalizes/displays adequate comfort level or baseline comfort level  Outcome: Progressing     Problem: Safety - Adult  Goal: Free from fall injury  Outcome: Progressing     Problem: Respiratory - Adult  Goal: Clear lung sounds  Description: Clear lung sounds  Outcome: Progressing

## 2025-07-09 NOTE — CARE COORDINATION
CM Note   approved by [facility] Approval  Electronically signed by Pancho Cha RN on 7/9/2025 at 2:53 PM

## 2025-07-10 VITALS
RESPIRATION RATE: 16 BRPM | DIASTOLIC BLOOD PRESSURE: 45 MMHG | HEART RATE: 74 BPM | TEMPERATURE: 98 F | HEIGHT: 67 IN | WEIGHT: 134.3 LBS | SYSTOLIC BLOOD PRESSURE: 92 MMHG | BODY MASS INDEX: 21.08 KG/M2 | OXYGEN SATURATION: 93 %

## 2025-07-10 PROBLEM — T81.89XA LYMPHOCELE AFTER SURGICAL PROCEDURE: Status: RESOLVED | Noted: 2025-07-07 | Resolved: 2025-07-10

## 2025-07-10 PROBLEM — I89.8 LYMPHOCELE: Status: RESOLVED | Noted: 2025-06-19 | Resolved: 2025-07-10

## 2025-07-10 PROBLEM — I89.8 LYMPHOCELE AFTER SURGICAL PROCEDURE: Status: RESOLVED | Noted: 2025-07-07 | Resolved: 2025-07-10

## 2025-07-10 LAB
ANION GAP SERPL CALC-SCNC: 9 MEQ/L (ref 8–16)
BUN SERPL-MCNC: 26 MG/DL (ref 8–23)
CALCIUM SERPL-MCNC: 9.6 MG/DL (ref 8.8–10.2)
CHLORIDE SERPL-SCNC: 101 MEQ/L (ref 98–111)
CO2 SERPL-SCNC: 29 MEQ/L (ref 22–29)
CREAT SERPL-MCNC: 0.6 MG/DL (ref 0.7–1.2)
DEPRECATED RDW RBC AUTO: 50.6 FL (ref 35–45)
ERYTHROCYTE [DISTWIDTH] IN BLOOD BY AUTOMATED COUNT: 14.4 % (ref 11.5–14.5)
GFR SERPL CREATININE-BSD FRML MDRD: > 90 ML/MIN/1.73M2
GLUCOSE SERPL-MCNC: 106 MG/DL (ref 74–109)
HCT VFR BLD AUTO: 40.2 % (ref 42–52)
HGB BLD-MCNC: 12.7 GM/DL (ref 14–18)
MAGNESIUM SERPL-MCNC: 2.3 MG/DL (ref 1.6–2.6)
MCH RBC QN AUTO: 30.3 PG (ref 26–33)
MCHC RBC AUTO-ENTMCNC: 31.6 GM/DL (ref 32.2–35.5)
MCV RBC AUTO: 95.9 FL (ref 80–94)
PHOSPHATE SERPL-MCNC: 3.2 MG/DL (ref 2.5–4.5)
PLATELET # BLD AUTO: 152 THOU/MM3 (ref 130–400)
PMV BLD AUTO: 10.1 FL (ref 9.4–12.4)
POTASSIUM SERPL-SCNC: 4.3 MEQ/L (ref 3.5–5.2)
RBC # BLD AUTO: 4.19 MILL/MM3 (ref 4.7–6.1)
SODIUM SERPL-SCNC: 139 MEQ/L (ref 135–145)
WBC # BLD AUTO: 10.6 THOU/MM3 (ref 4.8–10.8)

## 2025-07-10 PROCEDURE — 36415 COLL VENOUS BLD VENIPUNCTURE: CPT

## 2025-07-10 PROCEDURE — 83735 ASSAY OF MAGNESIUM: CPT

## 2025-07-10 PROCEDURE — 94761 N-INVAS EAR/PLS OXIMETRY MLT: CPT

## 2025-07-10 PROCEDURE — 6360000002 HC RX W HCPCS: Performed by: SURGERY

## 2025-07-10 PROCEDURE — 80048 BASIC METABOLIC PNL TOTAL CA: CPT

## 2025-07-10 PROCEDURE — 94640 AIRWAY INHALATION TREATMENT: CPT

## 2025-07-10 PROCEDURE — 85027 COMPLETE CBC AUTOMATED: CPT

## 2025-07-10 PROCEDURE — 6370000000 HC RX 637 (ALT 250 FOR IP): Performed by: SURGERY

## 2025-07-10 PROCEDURE — 84100 ASSAY OF PHOSPHORUS: CPT

## 2025-07-10 PROCEDURE — 2700000000 HC OXYGEN THERAPY PER DAY

## 2025-07-10 PROCEDURE — P9047 ALBUMIN (HUMAN), 25%, 50ML: HCPCS | Performed by: SURGERY

## 2025-07-10 PROCEDURE — 2500000003 HC RX 250 WO HCPCS: Performed by: SURGERY

## 2025-07-10 RX ORDER — ACETAMINOPHEN 325 MG/1
650 TABLET ORAL EVERY 6 HOURS PRN
COMMUNITY
Start: 2025-07-10

## 2025-07-10 RX ORDER — ALBUMIN (HUMAN) 12.5 G/50ML
25 SOLUTION INTRAVENOUS ONCE
Status: COMPLETED | OUTPATIENT
Start: 2025-07-10 | End: 2025-07-10

## 2025-07-10 RX ORDER — TRAMADOL HYDROCHLORIDE 50 MG/1
50 TABLET ORAL EVERY 6 HOURS PRN
Qty: 15 TABLET | Refills: 0 | Status: SHIPPED | OUTPATIENT
Start: 2025-07-10 | End: 2025-07-15

## 2025-07-10 RX ADMIN — ACETAMINOPHEN 650 MG: 325 TABLET ORAL at 08:39

## 2025-07-10 RX ADMIN — ALBUTEROL SULFATE 2.5 MG: 2.5 SOLUTION RESPIRATORY (INHALATION) at 02:13

## 2025-07-10 RX ADMIN — SACUBITRIL AND VALSARTAN 1 TABLET: 24; 26 TABLET, FILM COATED ORAL at 08:39

## 2025-07-10 RX ADMIN — ENOXAPARIN SODIUM 30 MG: 100 INJECTION SUBCUTANEOUS at 08:40

## 2025-07-10 RX ADMIN — POLYVINYL ALCOHOL 1 DROP: 1.4 SOLUTION/ DROPS OPHTHALMIC at 08:45

## 2025-07-10 RX ADMIN — ALBUTEROL SULFATE 2.5 MG: 2.5 SOLUTION RESPIRATORY (INHALATION) at 13:16

## 2025-07-10 RX ADMIN — METOPROLOL SUCCINATE 12.5 MG: 25 TABLET, EXTENDED RELEASE ORAL at 08:39

## 2025-07-10 RX ADMIN — SPIRONOLACTONE 25 MG: 25 TABLET ORAL at 08:39

## 2025-07-10 RX ADMIN — ALBUTEROL SULFATE 2.5 MG: 2.5 SOLUTION RESPIRATORY (INHALATION) at 07:43

## 2025-07-10 RX ADMIN — ASPIRIN 81 MG: 81 TABLET, COATED ORAL at 08:40

## 2025-07-10 RX ADMIN — ALBUMIN (HUMAN) 25 G: 0.25 INJECTION, SOLUTION INTRAVENOUS at 11:52

## 2025-07-10 RX ADMIN — TIOTROPIUM BROMIDE INHALATION SPRAY 5 MCG: 3.12 SPRAY, METERED RESPIRATORY (INHALATION) at 07:41

## 2025-07-10 RX ADMIN — EMPAGLIFLOZIN 10 MG: 10 TABLET, FILM COATED ORAL at 08:39

## 2025-07-10 RX ADMIN — SODIUM CHLORIDE, PRESERVATIVE FREE 10 ML: 5 INJECTION INTRAVENOUS at 11:54

## 2025-07-10 RX ADMIN — FUROSEMIDE 20 MG: 20 TABLET ORAL at 08:39

## 2025-07-10 RX ADMIN — BUDESONIDE AND FORMOTEROL FUMARATE DIHYDRATE 2 PUFF: 160; 4.5 AEROSOL RESPIRATORY (INHALATION) at 07:51

## 2025-07-10 ASSESSMENT — PAIN SCALES - GENERAL
PAINLEVEL_OUTOF10: 0
PAINLEVEL_OUTOF10: 0

## 2025-07-10 NOTE — DISCHARGE SUMMARY
Date of Admission: 7/7/25     Date of Discharge: 7/10/25     Admit Diagnoses: Lymphocele [I89.8] right groin; systolic CHF, COPD, Hx of EVAR with groin cutdowns      Discharge Diagnoses: same     Procedures: 7/7/25:   Right groin lymphocele excision and wound vac placement. Wound dimensions 5 x 1 x 2.5 cm      HPI: 86 yo male s/p EVAR a few years ago presents with a persistent right groin lymphocele. He underwent a successful lymphocele excision. He had to be admitted while waiting for [facility] insurance home wound vac approval.      Hospital Course: By POD 1, he was ready for discharge. We had to wait until 7/10/25 for insurance approval for him to go home with a wound vac. At the time of discharge, his pain was under control. He had no acute issues. His SBP was low but he was asymptomatic; his diuretics were held at times due to low BP and he was given one dose of albumin to help with this. He was able to ambulate with assistance on the day of discharge.      Discharge Instructions:     [facility] wound vac changes to the right groin. Ok to shower with wound vac off; cover wound with gauze during shower. Home health eval and treat.     Call if T >101.5 F, fevers, chills, suspicious drainage from wound, redness around your wound or any other questions or concerns.     Discharge Medications:     Current Discharge Medication List        START taking these medications    Details   traMADol (ULTRAM) 50 MG tablet Take 1 tablet by mouth every 6 hours as needed for Pain for up to 5 days. Max Daily Amount: 200 mg  Qty: 15 tablet, Refills: 0  Start date: 7/10/2025, End date: 7/15/2025    Comments: Reduce doses taken as pain becomes manageable  Associated Diagnoses: Lymphocele after surgical procedure      acetaminophen (TYLENOL) 325 MG tablet Take 2 tablets by mouth every 6 hours as needed for Pain  Start date: 7/10/2025           CONTINUE these medications which have NOT CHANGED    Details   albuterol sulfate HFA (PROVENTIL HFA) 108

## 2025-07-10 NOTE — CASE COMMUNICATION
7/10/25, 9:50 AM EDT    Patient goals/plan/ treatment preferences discussed by  and .  Patient goals/plan/ treatment preferences reviewed with patient/ family.  Patient/ family verbalize understanding of discharge plan and are in agreement with goal/plan/treatment preferences.  Understanding was demonstrated using the teach back method.  AVS provided by RN at time of discharge, which includes all necessary medical information pertaining to the patients current course of illness, treatment, post-discharge goals of care, and treatment preferences.     Services At/After Discharge: DME, Home Health, and Nursing service    lives alone, prefers new [facility] via [facility] and [facility] (change MWF), current CHF Clinic, has nebulizer, [employer] oxygen 3L ATC, still drives

## 2025-07-10 NOTE — DISCHARGE INSTRUCTIONS
MWF wound vac changes to the right groin. Ok to shower with wound vac off; cover wound with gauze during shower. Home health consult eval and treat.     Call if T >101.5 F, fevers, chills, suspicious drainage from wound, redness around your wound or any other questions or concerns.

## 2025-07-10 NOTE — RT PROTOCOL NOTE
RT Inhaler-Nebulizer Bronchodilator Protocol Note    There is a bronchodilator order in the chart from a provider indicating to follow the RT Bronchodilator Protocol and there is an “Initiate RT Inhaler-Nebulizer Bronchodilator Protocol” order as well (see protocol at bottom of note).    CXR Findings:  No results found.    The findings from the last RT Protocol Assessment were as follows:   History Pulmonary Disease: Chronic pulmonary disease  Respiratory Pattern: Regular pattern and RR 12-20 bpm  Breath Sounds: Severe inspiratory and expiratory wheezing or severely diminished  Cough: Strong, spontaneous, non-productive  Indication for Bronchodilator Therapy: On home bronchodilators (Q6 at home)  Bronchodilator Assessment Score: 10    Aerosolized bronchodilator medication orders have been revised according to the RT Inhaler-Nebulizer Bronchodilator Protocol below.    Respiratory Therapist to perform RT Therapy Protocol Assessment initially then follow the protocol.  Repeat RT Therapy Protocol Assessment PRN for score 0-3 or on second treatment, BID, and PRN for scores above 3.    No Indications - adjust the frequency to every 6 hours PRN wheezing or bronchospasm, if no treatments needed after 48 hours then discontinue using Per Protocol order mode.     If indication present, adjust the RT bronchodilator orders based on the Bronchodilator Assessment Score as indicated below.  Use Inhaler orders unless patient has one or more of the following: on home nebulizer, not able to hold breath for 10 seconds, is not alert and oriented, cannot activate and use MDI correctly, or respiratory rate 25 breaths per minute or more, then use the equivalent nebulizer order(s) with same Frequency and PRN reasons based on the score.  If a patient is on this medication at home then do not decrease Frequency below that used at home.    0-3 - enter or revise RT bronchodilator order(s) to equivalent RT Bronchodilator order with Frequency of 
RT Nebulizer Bronchodilator Protocol Note    There is a bronchodilator order in the chart from a provider indicating to follow the RT Bronchodilator Protocol and there is an “Initiate RT Bronchodilator Protocol” order as well (see protocol at bottom of note).    CXR Findings:  No results found.    The findings from the last RT Protocol Assessment were as follows:  Smoking: Chronic pulmonary disease  Respiratory Pattern: Dyspnea on exertion or RR 21-25 bpm  Breath Sounds: Inspiratory and expiratory or bilateral wheezing and/or rhonchi  Cough: Strong, spontaneous, non-productive  Indication for Bronchodilator Therapy: On home bronchodilators (Q6 at home)  Bronchodilator Assessment Score: 10    Aerosolized bronchodilator medication orders have been revised according to the RT Nebulizer Bronchodilator Protocol below.    Respiratory Therapist to perform RT Therapy Protocol Assessment initially then follow the protocol.  Repeat RT Therapy Protocol Assessment PRN for score 0-3 or on second treatment, BID, and PRN for scores above 3.    No Indications - adjust the frequency to every 6 hours PRN wheezing or bronchospasm, if no treatments needed after 48 hours then discontinue using Per Protocol order mode.     If indication present, adjust the RT bronchodilator orders based on the Bronchodilator Assessment Score as indicated below.  If a patient is on this medication at home then do not decrease Frequency below that used at home.    0-3 - enter or revise RT bronchodilator order(s) to equivalent RT Bronchodilator order with Frequency of every 4 hours PRN for wheezing or increased work of breathing using Per Protocol order mode.       4-6 - enter or revise RT Bronchodilator order(s) to two equivalent RT bronchodilator orders with one order with BID Frequency and one order with Frequency of every 4 hours PRN wheezing or increased work of breathing using Per Protocol order mode.         7-10 - enter or revise RT Bronchodilator 
or increased work of breathing using Per Protocol order mode.        4-6 - enter or revise RT Bronchodilator order(s) to two equivalent RT bronchodilator orders with one order with BID Frequency and one order with Frequency of every 4 hours PRN wheezing or increased work of breathing using Per Protocol order mode.        7-10 - enter or revise RT Bronchodilator order(s) to two equivalent RT bronchodilator orders with one order with TID Frequency and one order with Frequency of every 4 hours PRN wheezing or increased work of breathing using Per Protocol order mode.       11-13 - enter or revise RT Bronchodilator order(s) to one equivalent RT bronchodilator order with QID Frequency and an Albuterol order with Frequency of every 4 hours PRN wheezing or increased work of breathing using Per Protocol order mode.      Greater than 13 - enter or revise RT Bronchodilator order(s) to one equivalent RT bronchodilator order with every 4 hours Frequency and an Albuterol order with Frequency of every 2 hours PRN wheezing or increased work of breathing using Per Protocol order mode.     RT to enter RT Home Evaluation for COPD & MDI Assessment order using Per Protocol order mode.    Electronically signed by Anita Hampton RCP on 7/10/2025 at 7:56 AM

## 2025-07-10 NOTE — PLAN OF CARE
Problem: Chronic Conditions and Co-morbidities  Goal: Patient's chronic conditions and co-morbidity symptoms are monitored and maintained or improved  7/10/2025 1321 by Dee Byrne RN  Outcome: Completed  7/10/2025 0446 by Kellen Camejo RN  Outcome: Progressing     Problem: Discharge Planning  Goal: Discharge to home or other facility with appropriate resources  7/10/2025 1321 by Dee Byrne RN  Outcome: Completed  7/10/2025 0446 by Kellen Camejo RN  Outcome: Progressing     Problem: Pain  Goal: Verbalizes/displays adequate comfort level or baseline comfort level  7/10/2025 1321 by Dee Byrne RN  Outcome: Completed  7/10/2025 0446 by Kellen Camejo RN  Outcome: Progressing     Problem: Safety - Adult  Goal: Free from fall injury  7/10/2025 1321 by Dee Byrne RN  Outcome: Completed  7/10/2025 0446 by Kellen Camejo RN  Outcome: Progressing     Problem: Respiratory - Adult  Goal: Clear lung sounds  Description: Clear lung sounds  7/10/2025 1321 by Dee Byrne RN  Outcome: Completed  7/10/2025 0446 by Kellen Camejo RN  Outcome: Progressing   Care plan reviewed with patient.  Patient verbalizes understanding of the plan of care and contributes to goal setting.

## 2025-07-10 NOTE — PROGRESS NOTES
[facility] Nurse  Progress Note       NAME:  Gerard Jane  MEDICAL RECORD NUMBER:  [id number]  AGE: 85 y.o.   GENDER: male  : 1940  TODAY'S DATE:  2025    Subjective   Reason for WOCN Evaluation and Assessment: wound vac dressing change to Right groin       Gerard Jane is a 85 y.o. male referred by:   [x] Provider: Dr. Skelton  [] Nursing  [] Other:     Objective     Randall Risk Score: Randall Scale Score: 20    Assessment     Encounter:   Wound ostomy present to room for wound vac dressing change. Patient in chair.  Removed old vac dressing. Wound photo and assessment to follow.   Cleansed wound with normal saline and gauze.   Applied skin prep and stoma wafer to henrik wound to protect good skin.   Applied snaked black foam to wound bed followed by clear drape.   Cut quarter size hole in center of drape over sponge area and apply vac suction. Adequate seal obtained.  Wound vac settings at 125mmHg continuous suction.   Will continue to follow patient. Patient in chair, call light in reach.    Negative Pressure Wound Therapy Groin Right (Active)   Dressing Status Other (comment) 25 1256   Canister changed? No 25 1256   Output (ml) 0 ml 25 0824   Unit Type 3M Ulta wound vac 25 1256   Mode Continuous 25 1256   Target Pressure (mmHg) 125 25 1256   $$ Dressing Changed & Charged $ Standard NPWT less than or equal to 50 sq cm PER TX 25 1256   Number of pieces placed 1 25 1256   Dressing Type Black foam 25 1256   Dressing Change Due 25 1256   Number of days: 2       Wound Groin Right full thickness surgical wound (Active)   Wound Image   25 1256   Wound Etiology Surgical 25 1256   Dressing Status New dressing applied 25 1256   Wound Cleansed Cleansed with saline 25 1256   Dressing/Treatment Negative pressure wound therapy 25 1256   Offloading for Diabetic Foot Ulcers Offloading not required 
  Vascular Surgery Progress Note    2025 7:39 AM  Surgeon:  Dr. Skelton    Procedure: POD #1  S/p Right groin lymphocele excision and wound vac placement. Wound dimensions 5 x 1 x 2.5 cm    Subjective:  Mr. Jane is resting comfortably in bed on 5L NC, alert, and in no acute distress. He ambulated in his room yesterday.  Wound vac in place and functioning with very minimal bloody fluid in cannister, has not increased since yesterday.         Intake/Output Summary (Last 24 hours) at 2025 0739  Last data filed at 2025 0531  Gross per 24 hour   Intake 500 ml   Output 1400 ml   Net -900 ml     Vital Signs: BP (!) 109/51   Pulse 66   Temp 97.8 °F (36.6 °C) (Oral)   Resp 20   Ht 1.702 m (5' 7\")   Wt 60.4 kg (133 lb 2.5 oz)   SpO2 91%   BMI 20.86 kg/m²    Temp (24hrs), Av.3 °F (36.8 °C), Min:97.8 °F (36.6 °C), Max:98.9 °F (37.2 °C)    Labs:   CBC:  Recent Labs     25  0514   WBC 10.0   HGB 12.7*   HCT 39.7*   MCV 96.1*        BMP:   Recent Labs     25  0603 25  1651 25  0514   NA  --   --  140   K 5.1  --  4.8   CL  --   --  102   CO2  --   --  30*   BUN  --   --  19   CREATININE  --  0.8 0.8   MG  --   --  2.5     Last HgA1C:   Lab Results   Component Value Date    LABA1C 5.7 2023       Scheduled Meds:    albuterol  2.5 mg Nebulization Q6H    enoxaparin  30 mg SubCUTAneous Daily    sodium chloride flush  5-40 mL IntraVENous 2 times per day    acetaminophen  650 mg Oral Q6H    aspirin  81 mg Oral Daily    polyvinyl alcohol  1 drop Both Eyes QAM    empagliflozin  10 mg Oral Daily    budesonide-formoterol  2 puff Inhalation BID RT    furosemide  20 mg Oral Q48H    metoprolol succinate  12.5 mg Oral Daily    pravastatin  80 mg Oral Daily    sacubitril-valsartan  1 tablet Oral BID    spironolactone  25 mg Oral Daily    tamsulosin  0.4 mg Oral Daily    tiotropium  2 puff Inhalation Daily RT     ROS: All neg unless specifically mentioned in subjective section. 
  Vascular Surgery Progress Note    2025 8:46 AM  Surgeon:  Dr. Skelton    Procedure: POD #1  S/p Right groin lymphocele excision and wound vac placement. Wound dimensions 5 x 1 x 2.5 cm    Subjective:  Mr. Jane is resting comfortably in bed on 3L NC, alert, and in no acute distress. He is eating breakfast this morning and has no complaints.  Wound vac in place and functioning with very minimal bloody fluid in cannister.         Intake/Output Summary (Last 24 hours) at 2025 0846  Last data filed at 2025 0824  Gross per 24 hour   Intake 1180 ml   Output 1630 ml   Net -450 ml     Vital Signs: BP (!) 124/57   Pulse 67   Temp 97.8 °F (36.6 °C) (Oral)   Resp 16   Ht 1.702 m (5' 7\")   Wt 55.5 kg (122 lb 5.7 oz)   SpO2 90%   BMI 19.16 kg/m²    Temp (24hrs), Av.2 °F (36.8 °C), Min:97.7 °F (36.5 °C), Max:98.9 °F (37.2 °C)    Labs:   CBC:  Recent Labs     25  0514   WBC 10.0   HGB 12.7*   HCT 39.7*   MCV 96.1*        BMP:   Recent Labs     25  0603 25  1651 25  0514   NA  --   --  140   K 5.1  --  4.8   CL  --   --  102   CO2  --   --  30*   BUN  --   --  19   CREATININE  --  0.8 0.8   MG  --   --  2.5     Last HgA1C:   Lab Results   Component Value Date    LABA1C 5.7 2023       Scheduled Meds:    albuterol  2.5 mg Nebulization Q6H    sodium chloride flush  5-40 mL IntraVENous 2 times per day    acetaminophen  650 mg Oral Q6H    [START ON 2025] enoxaparin  40 mg SubCUTAneous Daily    aspirin  81 mg Oral Daily    polyvinyl alcohol  1 drop Both Eyes QAM    empagliflozin  10 mg Oral Daily    budesonide-formoterol  2 puff Inhalation BID RT    furosemide  20 mg Oral Q48H    metoprolol succinate  12.5 mg Oral Daily    pravastatin  80 mg Oral Daily    sacubitril-valsartan  1 tablet Oral BID    spironolactone  25 mg Oral Daily    tamsulosin  0.4 mg Oral Daily    tiotropium  2 puff Inhalation Daily RT     ROS: All neg unless specifically mentioned in subjective section. 
0900: Pt arrives to pacu. Eyes closed and respirations easy and unlabored. spO2 98% on 3L NC. Pt awakens to voice. Alert and oriented x3. Pt easily closes eyes and resting comfortably. Wound vac intact with seal to right groin incision. NPWT at continuous 125 mmHg. No drainage noted in canister. VSS.     0910: Pt resting in bed with eyes closed. Awakens easily to voice. Pt denies any pain/nausea. Respirations easy and unlabored. VSS.     0920: Pt resting in bed with eyes closed. Awakens easily to voice. Denies any pain/nausea. VSS.     0930: Pt meets criteria for pacu discharge. Awaiting bed placement.     0940: Pt resting in bed with eyes closed. Respirations easy and unlabored. VSS.     0950: Pt lying in bed awake. Respirations easy and unlabored. Pt denies any pain/nausea. VSS.     0956: Phone call to [facility] , spoke with Cheryl. Requested to update pt's family, pt doing well, awaiting bed placement.     1000: Pt resting in bed awake, awaiting bed placement. VSS.     1015: Pt resting in bed with eyes closed, sleeping. VSS.    1030: Pt resting in bed awake. Pt tolerating ice chips and small sips of water well. VSS. Call light within reach.      1100: Pt resting in bed awake. Respirations easy and unlabored. Pt denies any pain/nausea. VSS. Call light within reach.     1130: Pt resting in bed with eyes closed, sleeping. Respirations easy and unlabored. VSS. Call light within reach.     1200: Pt resting in bed awake. Pt denies any pain. Respirations easy and unlabored. Pt denies any pain/nausea. VSS.     1230: Pt eating lunch at this time. Tolerating well. 100% of lunch tray ate.     1320: Phone call to pt's daughter at this time and updated her on pt status and awaiting bed placement.     1345: Pt resting in bed with eyes closed, sleeping. Pt appears comfortable. VSS.     1405: Pt c/o surgical pain in right groin and rates pain 6/10. Pt requesting tylenol. Tylenol administered per MAR at this time. VSS.     1407: 
Follow all instructions given by your physician  If Urology case Call [phone] the weekday before procedure to find out Arrival Time.  Do not eat or drink anything after midnight prior to surgery(includes water, chewing gum, mints and ice chips)  Sips of water am of surgery with allowed medications  May brush teeth   Do not smoke or chew tobacco, drink alcoholic beverages or use any illicit drugs for 24 hours prior to surgery  Bring insurance info and photo ID  Bring pertinent paperwork with you from Doctor or surgeons's office  Wear clean comfortable, loose-fitting clothing  No make-up, nail polish, jewelry, piercings, or contact lenses to be worn day of surgery  No glue on dentures morning of surgery; you will be asked to remove them for surgery. Case for glasses.  Shower the night before and the morning of surgery with cleansing soap provided or a liquid antibacterial soap, dry with new fresh clean towel after each shower, no lotions, creams or powder.  Clean sheets and pillowcase on bed night before surgery  Bring rescue inhalers with you  Bring CPAP/BIPAP machine if you have one ( you may be charged if one is needed in recovery room )    If patient is a Hip or Knee Replacement, HOOS or KOOS is reviewed and documented.     Do you have a DNR?   Please Bring Healthcare Directive or Healthcare Power of  in so we can scan it into your chart.    Report to [facility] on 2nd floor  Make sure you have a responsible adult to drive you home after your surgery otherwise surgery will be cancelled.   You must have a responsible adult to stay with you overnight after your procedure.    If you would become ill prior to surgery, please call the surgeon right away.  We request that you limit to 2 visitors in pre-op area    Call [phone] for any questions    Our pharmacy has a Meds to Beds program where they will deliver any new prescriptions you may have to your room before you leave. Our Pharmacy will clear it 
Patient oriented to Same Day department and admitted to Same Day Surgery room 9.   Patient verbalized approval for first name, last initial with physician name on unit whiteboard.     Plan of care reviewed with patient.   Patient room whiteboard filled out and discussed with patient and responsible adult.   Patient and responsible adult offered Same Day Welcome Packet to review.    Call light in reach.   Bed in lowest position, locked, with one bed rail up.   SCDs and warming blanket in place.  Appropriate arm bands on patient.   Bathroom offered.   All questions and concerns of patient addressed.        Meds to Beds:   Patient informed of . [facility] Meds to Beds program during admission. Patient is agreeable to program.   Contact information for the pharmacy and the Meds to Beds program:   Name: Sandrita   Relationship to patient:child   Phone number: [phone]           
Patient received sacramental anointing by a . If you are in need of  support, please call [phone]. If you are in need of a  after 6:30pm, please call the house supervisor for the on-call .      Yadira Yang  [facility] Health Coordinator  [phone]   
Pt  discharged to home with daughter. Home o2 at 3L/min Nasal cannula and pts home oxygen concentrator. Pt left with right groin wound vac intact, switched to home vac device. Instruction provided on changing canister and tubing, troubleshooting the device itself and phone number for home services if any questions arise at home. Pt sent with additional supplies and case, chharger for wound vac.   
S: Low BP. Patient asymptomatic. He has no complaints and wants to go home when they get a home vac.      O:   Vitals:    07/09/25 1933 07/09/25 1935 07/09/25 2326 07/10/25 0332   BP: (!) 87/55 97/70 (!) 82/57 (!) 93/52   Pulse:   81 78   Resp:   17 20   Temp:   98.1 °F (36.7 °C) 98.3 °F (36.8 °C)   TempSrc:    Oral   SpO2:   90% 93%   Weight:    60.9 kg (134 lb 4.8 oz)   Height:            Current Facility-Administered Medications   Medication Dose Route Frequency Provider Last Rate Last Admin    albumin human 25% IV solution 25 g  25 g IntraVENous Once Carroll Skelton MD        albuterol (PROVENTIL) (2.5 MG/3ML) 0.083% nebulizer solution 2.5 mg  2.5 mg Nebulization 4 times per day Carroll Skelton MD   2.5 mg at 07/10/25 0743    enoxaparin Sodium (LOVENOX) injection 30 mg  30 mg SubCUTAneous Daily Carroll Skelton MD   30 mg at 07/09/25 0848    albuterol (PROVENTIL) (2.5 MG/3ML) 0.083% nebulizer solution 2.5 mg  2.5 mg Nebulization Q4H PRN Carroll Skelton MD        sodium chloride flush 0.9 % injection 5-40 mL  5-40 mL IntraVENous 2 times per day Carroll Skelton MD   10 mL at 07/09/25 0844    sodium chloride flush 0.9 % injection 5-40 mL  5-40 mL IntraVENous PRN Carroll Skelton MD        0.9 % sodium chloride infusion   IntraVENous PRN Carroll Skelton MD        ondansetron (ZOFRAN-ODT) disintegrating tablet 4 mg  4 mg Oral Q8H PRN Carroll Skelton MD        Or    ondansetron (ZOFRAN) injection 4 mg  4 mg IntraVENous Q6H PRN Carroll Skelton MD        acetaminophen (TYLENOL) tablet 650 mg  650 mg Oral Q6H Carroll Skelton MD   650 mg at 07/08/25 2050    traMADol (ULTRAM) tablet 50 mg  50 mg Oral Q6H PRN Carroll Skelton MD        polyethylene glycol (GLYCOLAX) packet 17 g  17 g Oral Daily PRN Carroll Skelton MD        hydrALAZINE (APRESOLINE) injection 10 mg  10 mg IntraVENous Q4H PRN Carroll Skelton MD        labetalol (NORMODYNE;TRANDATE) injection 10 mg  10 mg IntraVENous Q2H PRN Carroll Skelton MD        aspirin EC tablet 81 mg  
will be cancelled.   You must have a responsible adult to stay with you overnight after your procedure.     If you would become ill prior to surgery, please call the surgeon right away.  We request that you limit to 2 visitors in pre-op area     Call [phone] for any questions     Our pharmacy has a Meds to Beds program where they will deliver any new prescriptions you may have to your room before you leave. Our Pharmacy will clear it through your insurance; for example (same co pay). This enables you to take your new RX as soon as you need when you get home and avoids stop/wait delays on the way home.  Please have a form of payment with you and have someone designated as your Pharmacy contact with their phone # as you may not feel well or still be under the influence of anesthesia.     Please refer to the SSI-Surgical Site Infection Flyer you hopefully received in the mail-together we can prevent infections; signs and symptoms reviewed.  When discharged be sure you understand how to care for your wound and that you have the Dr./office phone # to call if you have any concerns or questions about your wound.       This [employer] message has not been read.

## 2025-07-11 ENCOUNTER — CARE COORDINATION (OUTPATIENT)
Dept: FAMILY MEDICINE CLINIC | Age: 85
End: 2025-07-11

## 2025-07-11 NOTE — CARE COORDINATION
Care Transitions Initial Follow Up Call    Call within 2 business days of discharge: Yes     Patient: Gerard Jane Patient : 1940 MRN: [id number]    Last Discharge Facility       Date Complaint Diagnosis Description Type Department Provider    25  Lymphocele after surgical procedure ... Admission (Discharged) [facility] 4K Carroll Skelton MD            RARS: Readmission Risk Score: 13.5       Spoke with: Gerard Remy's daughter    Discharge department/facility: [facility]    Non-face-to-face services provided:  Scheduled appointment with PCP-Not at this time  Scheduled appointment with Specialist-yes, with surgeon  Obtained and reviewed discharge summary and/or continuity of care documents  Reviewed and followed up on pending diagnostic tests and treatments-yes  Communication with home health agencies or other community services the patient is currently using-yes  Communication with specialists who will assume or re-assume care of the patient's system-specific problems-yes  Education of patient/family/caregiver/guardian to support self-management-yes  Assessment and support for treatment adherence and medication management-yes  Establishment or re-establishment of referrals-yes  Assistance in accessing community resources-na    Follow Up  Future Appointments   Date Time Provider Department Center   2025  9:30 AM Abraham Suarez PA-C [facility] - [place]   2025  9:00 AM Mian Ellsworth MD N [place] [facility] [place]   2025  9:20 AM STR CT IMAGING RM1  [facility] OUT EXP STR Rad/Card   10/7/2025  9:30 AM Mustapha Zapien MD [facility] - [place]   10/27/2025  9:00 AM Amanda Huff APRN - CNP N Pulm Med P - [place]   10/27/2025 10:00 AM Linda Mayen APRN - CNP N [facility] CHF [facility] - [place]   2025  1:00 PM Erika Caceres MD N [facility] Heart [facility] - [place]   2026  9:15 AM Mandeep Cortez PA-C N [place] [facility] [place]   2026 10:00 AM Mustapha Callahan MD [facility]     Initial follow up

## 2025-07-12 LAB
BACTERIA SPEC AEROBE CULT: NORMAL
BACTERIA SPEC AEROBE CULT: NORMAL
BACTERIA SPEC ANAEROBE CULT: NORMAL
BACTERIA SPEC ANAEROBE CULT: NORMAL
GRAM STN SPEC: NORMAL
GRAM STN SPEC: NORMAL

## 2025-07-19 ENCOUNTER — TELEPHONE (OUTPATIENT)
Dept: FAMILY MEDICINE CLINIC | Age: 85
End: 2025-07-19

## 2025-07-19 RX ORDER — PREDNISONE 10 MG/1
TABLET ORAL
Qty: 21 TABLET | Refills: 0 | Status: SHIPPED | OUTPATIENT
Start: 2025-07-19

## 2025-07-19 RX ORDER — DOXYCYCLINE 100 MG/1
100 CAPSULE ORAL 2 TIMES DAILY
Qty: 20 CAPSULE | Refills: 0 | Status: SHIPPED | OUTPATIENT
Start: 2025-07-19 | End: 2025-07-29

## 2025-07-19 NOTE — TELEPHONE ENCOUNTER
Patient called due to cough cold congestion, he has a nonhealing wound and currently in a wound VAC with a yellow phlegm.    E Rx doxycycline and prednisone to [facility]

## 2025-07-22 NOTE — PROGRESS NOTES
CT/CV Surgery Follow Up Office Visit      Patient's Name/Date of Birth: Gerard Jane / 1940 (85 y.o.)    CC: F/U wound vac right groin s/p right groin lymphocele excision.    PCP: Mustapha Callahan MD    Date: July 22, 2025    We had the pleasure of seeing Gerard Jane in the office today, as you know this is a very pleasant 85 y.o. year old male s/p EVAR a few years ago presents with a persistent right groin lymphocele. He underwent a successful lymphocele excision on 7/7/25. He had to be admitted while waiting for [employer] insurance home wound vac approval. He returns today for right groin wound check. Patient states he is not sure of amount of fluid collecting in the wound vac container, but knows that it is still draining. On examination after removing the wound vac, clear drainage is coming out of right groin incision site. The pt denies chest pressure, SOB, fever, chills, N/V/D. He has wound vac changes [facility]     Media Information      Document Information    Wound Care Image: Wound   Right groin   07/23/2025 10:17   Attached To:   Office Visit on 7/23/25 with Adelina Cantu PA   Source Information    Adelina Cantu PA  [facility]   Document History         CT abd/pelvisw/o  IV contrast Results for orders placed during the hospital encounter of 10/08/24    CT ABDOMEN PELVIS WO CONTRAST Additional Contrast? None    Narrative  CT abdomen and pelvis without contrast.    Comparison: US/SR - US ABDOMINAL AORTA LIMITED - 09/16/2024 08:54 AM EDT  CT/KO/SR - CTA ABDOMEN PELVIS W WO CONTRAST - 02/27/2024 08:59 AM EST    FINDINGS:    Lung bases:  Mild dependent atelectasis or scarring posteriorly.  S/P TAVR.    Upper Abdomen:  The liver, spleen, pancreas, and gallbladder are within normal limits.  Several punctate calcifications within the spleen.  No biliary dilatation.    Retroperitoneum/Pelvis:  Severe calcific atheromatous change of the abdominal aorta, distal  descending thoracic aorta, and

## 2025-07-23 ENCOUNTER — OFFICE VISIT (OUTPATIENT)
Age: 85
End: 2025-07-23
Payer: OTHER GOVERNMENT

## 2025-07-23 VITALS
HEIGHT: 67 IN | WEIGHT: 135.8 LBS | SYSTOLIC BLOOD PRESSURE: 118 MMHG | HEART RATE: 64 BPM | DIASTOLIC BLOOD PRESSURE: 58 MMHG | BODY MASS INDEX: 21.31 KG/M2

## 2025-07-23 DIAGNOSIS — T81.89XA LYMPHOCELE AFTER SURGICAL PROCEDURE: Primary | ICD-10-CM

## 2025-07-23 DIAGNOSIS — I89.8 LYMPHOCELE AFTER SURGICAL PROCEDURE: Primary | ICD-10-CM

## 2025-07-23 PROCEDURE — 3074F SYST BP LT 130 MM HG: CPT | Performed by: PHYSICIAN ASSISTANT

## 2025-07-23 PROCEDURE — 99214 OFFICE O/P EST MOD 30 MIN: CPT | Performed by: PHYSICIAN ASSISTANT

## 2025-07-23 PROCEDURE — 1123F ACP DISCUSS/DSCN MKR DOCD: CPT | Performed by: PHYSICIAN ASSISTANT

## 2025-07-23 PROCEDURE — 3078F DIAST BP <80 MM HG: CPT | Performed by: PHYSICIAN ASSISTANT

## 2025-07-25 ENCOUNTER — CARE COORDINATION (OUTPATIENT)
Dept: FAMILY MEDICINE CLINIC | Age: 85
End: 2025-07-25

## 2025-07-25 NOTE — CARE COORDINATION
Follow up Week #2 post hospital discharge for   Right groin lymphocele excision and wound vac placement.     Spoke to SandritaGerard's daughter.  Started with upper respiratory symptoms, started on doxycycline and prednisone. Cough much better.  Completed follow up with surgery office-pleased with healing of lymphocele excision. Continue with wound vac 2 more weeks, will see August 6 and hopefully can remove vac. Home Health continues to change dressing MWF.  Debbi pleased with progress.  Follow up with other physicians, will see Dr. Callahan in May for medicare wellness, unless needed prior.  Debbi know to call with any questions or concerns.

## 2025-08-01 ENCOUNTER — CARE COORDINATION (OUTPATIENT)
Dept: FAMILY MEDICINE CLINIC | Age: 85
End: 2025-08-01

## 2025-08-04 ENCOUNTER — TELEPHONE (OUTPATIENT)
Age: 85
End: 2025-08-04

## 2025-08-06 ENCOUNTER — OFFICE VISIT (OUTPATIENT)
Age: 85
End: 2025-08-06

## 2025-08-06 VITALS
HEIGHT: 67 IN | WEIGHT: 136.8 LBS | BODY MASS INDEX: 21.47 KG/M2 | SYSTOLIC BLOOD PRESSURE: 100 MMHG | HEART RATE: 67 BPM | DIASTOLIC BLOOD PRESSURE: 55 MMHG

## 2025-08-06 DIAGNOSIS — I89.8 LYMPHOCELE: Primary | ICD-10-CM

## 2025-08-06 PROCEDURE — 99024 POSTOP FOLLOW-UP VISIT: CPT | Performed by: PHYSICIAN ASSISTANT

## 2025-08-11 ENCOUNTER — CARE COORDINATION (OUTPATIENT)
Dept: FAMILY MEDICINE CLINIC | Age: 85
End: 2025-08-11

## 2025-08-20 DIAGNOSIS — I42.8 NONISCHEMIC CARDIOMYOPATHY (HCC): ICD-10-CM

## 2025-08-20 RX ORDER — SACUBITRIL AND VALSARTAN 24; 26 MG/1; MG/1
1 TABLET ORAL 2 TIMES DAILY
Qty: 180 TABLET | Refills: 0 | Status: SHIPPED | OUTPATIENT
Start: 2025-08-20

## 2025-08-28 ENCOUNTER — PROCEDURE VISIT (OUTPATIENT)
Dept: UROLOGY | Age: 85
End: 2025-08-28

## 2025-08-28 VITALS
SYSTOLIC BLOOD PRESSURE: 108 MMHG | BODY MASS INDEX: 21.03 KG/M2 | HEIGHT: 67 IN | WEIGHT: 134 LBS | DIASTOLIC BLOOD PRESSURE: 60 MMHG

## 2025-08-28 DIAGNOSIS — N13.8 BPH WITH OBSTRUCTION/LOWER URINARY TRACT SYMPTOMS: ICD-10-CM

## 2025-08-28 DIAGNOSIS — Z85.51 HISTORY OF BLADDER CANCER: Primary | ICD-10-CM

## 2025-08-28 DIAGNOSIS — N40.1 BPH WITH OBSTRUCTION/LOWER URINARY TRACT SYMPTOMS: ICD-10-CM

## 2025-08-28 RX ORDER — CIPROFLOXACIN 500 MG/1
500 TABLET, FILM COATED ORAL 2 TIMES DAILY
Qty: 6 TABLET | Refills: 0 | Status: SHIPPED | OUTPATIENT
Start: 2025-08-28

## 2025-09-03 ENCOUNTER — OFFICE VISIT (OUTPATIENT)
Age: 85
End: 2025-09-03
Payer: OTHER GOVERNMENT

## 2025-09-03 VITALS
HEART RATE: 66 BPM | BODY MASS INDEX: 22.19 KG/M2 | HEIGHT: 67 IN | SYSTOLIC BLOOD PRESSURE: 117 MMHG | WEIGHT: 141.4 LBS | DIASTOLIC BLOOD PRESSURE: 58 MMHG

## 2025-09-03 DIAGNOSIS — I71.43 INFRARENAL ABDOMINAL AORTIC ANEURYSM (AAA) WITHOUT RUPTURE: ICD-10-CM

## 2025-09-03 DIAGNOSIS — I71.21 ANEURYSM OF ASCENDING AORTA WITHOUT RUPTURE: Primary | ICD-10-CM

## 2025-09-03 PROCEDURE — 1123F ACP DISCUSS/DSCN MKR DOCD: CPT | Performed by: PHYSICIAN ASSISTANT

## 2025-09-03 PROCEDURE — 3078F DIAST BP <80 MM HG: CPT | Performed by: PHYSICIAN ASSISTANT

## 2025-09-03 PROCEDURE — 99214 OFFICE O/P EST MOD 30 MIN: CPT | Performed by: PHYSICIAN ASSISTANT

## 2025-09-03 PROCEDURE — 3074F SYST BP LT 130 MM HG: CPT | Performed by: PHYSICIAN ASSISTANT

## (undated) DEVICE — ANGIO-SEAL VIP VASCULAR CLOSURE DEVICE: Brand: ANGIO-SEAL

## (undated) DEVICE — DEVICE COMPR 17 CM 120 CC SAFEGUARD FOCUS LF

## (undated) DEVICE — TOWEL,OR,DSP,ST,WHITE,DLX,XR,4/PK,20PK/C: Brand: MEDLINE

## (undated) DEVICE — SUTURE PROL SZ 3-0 L36IN NONABSORBABLE BLU L26MM SH 1/2 CIR 8522H

## (undated) DEVICE — PINNACLE INTRODUCER SHEATH: Brand: PINNACLE

## (undated) DEVICE — CHECK-FLO INTRODUCER SET: Brand: CHECK-FLO

## (undated) DEVICE — TAPE ADH W1INXL10YD PLAS TRNSPAR H2O RESIST HYPOALRG CURAD

## (undated) DEVICE — MARKER,SKIN,WI/RULER AND LABELS: Brand: MEDLINE

## (undated) DEVICE — 450 ML BOTTLE OF 0.05% CHLORHEXIDINE GLUCONATE IN 99.95% STERILE WATER FOR IRRIGATION, USP AND APPLICATOR.: Brand: IRRISEPT ANTIMICROBIAL WOUND LAVAGE

## (undated) DEVICE — ADAPTER,CATHETER/SYRINGE/LUER,STERILE: Brand: MEDLINE

## (undated) DEVICE — DRESSING NEG PRSS SM 10X7.5X3.3CM POLYUR FOR WND THER VAC

## (undated) DEVICE — LOOP VES W25MM THK1MM MAXI RED SIL FLD REPELLENT 100 PER

## (undated) DEVICE — APPLIER CLP L9.38IN M LIG TI DISP STR RNG HNDL LIGACLP

## (undated) DEVICE — GUIDEWIRE WITH ICE™ HYDROPHILIC COATING: Brand: V-18™ CONTROL WIRE™

## (undated) DEVICE — DRESSING,GAUZE,OIL EMULSION,CURAD,3"X3": Brand: CURAD

## (undated) DEVICE — RADIFOCUS GLIDEWIRE ADVANTAGE GUIDEWIRE: Brand: GLIDEWIRE ADVANTAGE

## (undated) DEVICE — PENCIL SMK EVAC 10 FT BLADE ELECTRD ROCKER FOR TELSCP

## (undated) DEVICE — SUTURE VICRYL + SZ 2-0 L27IN ABSRB WHT SH 1/2 CIR TAPERCUT VCP417H

## (undated) DEVICE — ELECTRODE PACE S STL BPLR BLLN TEMP CATH

## (undated) DEVICE — SOLUTION IV 1000ML 0.9% SOD CHL PH 5 INJ USP VIAFLX PLAS

## (undated) DEVICE — BAND COMPR L24CM REG CLR PLAS HEMSTAT EXT HK AND LOOP RETEN

## (undated) DEVICE — COVER,LIGHT HANDLE,FLX,2/PK: Brand: MEDLINE INDUSTRIES, INC.

## (undated) DEVICE — PENCIL SMK EVAC ALL IN 1 DSGN ENH VISIBILITY IMPROVED AIR

## (undated) DEVICE — PERCLOSE™ PROSTYLE™ SUTURE-MEDIATED CLOSURE AND REPAIR SYSTEM: Brand: PERCLOSE™ PROSTYLE™

## (undated) DEVICE — APPLIER CLP L9.375IN APER 2.1MM CLS L3.8MM 20 SM TI CLP

## (undated) DEVICE — SUTURE PROL SZ 6-0 L24IN NONABSORBABLE BLU L13MM C-1 3/8 8726H

## (undated) DEVICE — CATHETER GUID ANGLED 0.056 INX90 CM SUPP BRAIDED TRAILBLAZER

## (undated) DEVICE — PADDING CAST W6INXL4YD COT LO LINTING WYTEX

## (undated) DEVICE — PACK,UNIVERSAL,NO GOWNS: Brand: MEDLINE

## (undated) DEVICE — GUIDEWIRE VASC L150CM DIA0.035IN FLX END L7CM J 3MM PTFE

## (undated) DEVICE — Z DISCONTINUED NO SUB IDED TAPE UMB W1/8XL36IN WHT COT STRND NONRADIOPAQUE

## (undated) DEVICE — BOOT,SUTURE,STANDARD,YELLOW-IN-BLUE: Brand: MEDLINE

## (undated) DEVICE — GAUZE,SPONGE,8"X4",12PLY,XRAY,STRL,LF: Brand: MEDLINE

## (undated) DEVICE — LUKI TUBE SPECIMEN COLLECTION DEVICE,20 ML: Brand: ARGYLE

## (undated) DEVICE — GLOVE ORANGE PI 7   MSG9070

## (undated) DEVICE — VASCULAR: Brand: MEDLINE INDUSTRIES, INC.

## (undated) DEVICE — CATHETER ANGIO AMPLATZ LT 1 6 FRX100 CM INFIN

## (undated) DEVICE — CATHETER ANGIO (ORDER IN MULTIPLES OF 5 EACHS) PIG INFINITI 5FR 110CM 0.038IN STRAIGHT

## (undated) DEVICE — GLIDESHEATH SLENDER NITINOL HYDROPHILIC COATED INTRODUCER SHEATH: Brand: GLIDESHEATH SLENDER

## (undated) DEVICE — SHEATH INTRO L12CM DIA6FR W/ LUERLOCK HUB HEMSTAS VLV

## (undated) DEVICE — APPLICATOR MEDICATED 26 CC SOLUTION HI LT ORNG CHLORAPREP

## (undated) DEVICE — INTENDED FOR TISSUE SEPARATION, AND OTHER PROCEDURES THAT REQUIRE A SHARP SURGICAL BLADE TO PUNCTURE OR CUT.: Brand: BARD-PARKER ® CARBON RIB-BACK BLADES

## (undated) DEVICE — SUTURE ETHILON SZ 3-0 L30IN NONABSORBABLE BLK L30MM PSLX 3/8 1691H

## (undated) DEVICE — DECANTER FLD 9IN ST BG FOR ASEP TRNSF OF FLD

## (undated) DEVICE — GOWN,SIRUS,NON REINFRCD,LARGE,SET IN SL: Brand: MEDLINE

## (undated) DEVICE — CATHETER,URETHRAL,REDRUBBER,STERILE,20FR: Brand: MEDLINE

## (undated) DEVICE — GLOVE SURG SZ 65 THK91MIL LTX FREE SYN POLYISOPRENE

## (undated) DEVICE — GLOVE ORANGE PI 7 1/2   MSG9075

## (undated) DEVICE — PAD GEN USE BORDERED ADH 14IN 2IN AND 12IN 4IN GZ UNIV ST

## (undated) DEVICE — 3M™ IOBAN™ 2 ANTIMICROBIAL INCISE DRAPE 6650EZ: Brand: IOBAN™ 2

## (undated) DEVICE — CONTAINER,SPECIMEN,PNEU TUBE,4OZ,OR STRL: Brand: MEDLINE

## (undated) DEVICE — Device

## (undated) DEVICE — GUIDEWIRE VASC 0.035 INX275 CM X SM CRV LUBRIGREEN SS SAFARI

## (undated) DEVICE — TOTAL TRAY, 16FR 10ML SIL FOLEY, URN: Brand: MEDLINE

## (undated) DEVICE — DEVICE INFL 20ML 30ATM POLYCARB BRL ABS PLUNG DGT DISPLAY

## (undated) DEVICE — SCANLAN® VASCU-STATT® II PLUS S-USE BULLDOG CLAMP W/FIRM PRESS GENTLE-JAW™- MAXI ANGLED 45° (ORANGE), CLAMPING PRESS 165-175 G (2/STERILE PKG): Brand: SCANLAN® VASCU-STATT® II PLUS S-USE BULLDOG CLAMP W/FIRM PRESS GENTLE-JAW™

## (undated) DEVICE — VALVE HEMSTAS W/ GWIRE INSRTN TOOL GRDIAN II NC

## (undated) DEVICE — SPONGE GZ W4XL4IN COT 12 PLY TYP VII WVN C FLD DSGN

## (undated) DEVICE — GOWN,SIRUS,NONRNF,SETINSLV,XL,20/CS: Brand: MEDLINE

## (undated) DEVICE — BASIC SINGLE BASIN BTC-LF: Brand: MEDLINE INDUSTRIES, INC.

## (undated) DEVICE — HI-TORQUE SUPRA CORE .035 PERIPHERAL GUIDE WIRE .035 X 190 CM: Brand: HI-TORQUE SUPRA CORE

## (undated) DEVICE — SYRINGE MED 30ML STD CLR PLAS LUERLOCK TIP N CTRL DISP

## (undated) DEVICE — SUTURE VCRL SZ 2-0 L36IN ABSRB UD L40MM CT 1/2 CIR J957H

## (undated) DEVICE — CANISTER NEG PRSS 1000ML W/ GEL INFOVAC

## (undated) DEVICE — 4F (1.0MM ID) X 9CM STIFF4F (1.0 MICRO-STICK®INTRODUCER SE WITH NITINOL GUIDEWIREWITH NITIN WITH RADIOPAQUE TIPWITH RADIOPAQ: Brand: MICRO-STICK SETMICRO-STICK SET

## (undated) DEVICE — ADHESIVE SKIN CLSR 0.7ML TOP DERMBND ADV

## (undated) DEVICE — SUTURE VICRYL + SZ 3-0 L27IN ABSRB UD L26MM SH 1/2 CIR VCP416H

## (undated) DEVICE — LOOP VES W13MM THK09MM MINI RED SIL FLD REPELLENT

## (undated) DEVICE — GLOVE ORANGE PI 8   MSG9080

## (undated) DEVICE — SPONGE,PEANUT,XRAY,ST,SM,3/8",5/CARD: Brand: MEDLINE INDUSTRIES, INC.

## (undated) DEVICE — 1010 S-DRAPE TOWEL DRAPE 10/BX: Brand: STERI-DRAPE™

## (undated) DEVICE — 3M™ IOBAN™ 2 ANTIMICROBIAL INCISE DRAPE 6651EZ: Brand: IOBAN™ 2

## (undated) DEVICE — ARMADA 35 PTA CATHETER 12 MM X 40 MM X 80 CM / OVER-THE-WIRE: Brand: ARMADA